# Patient Record
Sex: MALE | Race: WHITE | NOT HISPANIC OR LATINO | Employment: OTHER | URBAN - METROPOLITAN AREA
[De-identification: names, ages, dates, MRNs, and addresses within clinical notes are randomized per-mention and may not be internally consistent; named-entity substitution may affect disease eponyms.]

---

## 2018-03-08 ENCOUNTER — OFFICE VISIT CONVERTED (OUTPATIENT)
Dept: FAMILY MEDICINE CLINIC | Facility: CLINIC | Age: 83
End: 2018-03-08
Attending: FAMILY MEDICINE

## 2018-03-08 ENCOUNTER — CONVERSION ENCOUNTER (OUTPATIENT)
Dept: FAMILY MEDICINE CLINIC | Facility: CLINIC | Age: 83
End: 2018-03-08

## 2018-03-15 ENCOUNTER — CONVERSION ENCOUNTER (OUTPATIENT)
Dept: FAMILY MEDICINE CLINIC | Facility: CLINIC | Age: 83
End: 2018-03-15

## 2018-03-15 ENCOUNTER — OFFICE VISIT CONVERTED (OUTPATIENT)
Dept: FAMILY MEDICINE CLINIC | Facility: CLINIC | Age: 83
End: 2018-03-15
Attending: FAMILY MEDICINE

## 2018-07-24 ENCOUNTER — OFFICE VISIT CONVERTED (OUTPATIENT)
Dept: FAMILY MEDICINE CLINIC | Facility: CLINIC | Age: 83
End: 2018-07-24
Attending: FAMILY MEDICINE

## 2019-02-06 ENCOUNTER — OFFICE VISIT CONVERTED (OUTPATIENT)
Dept: FAMILY MEDICINE CLINIC | Facility: CLINIC | Age: 84
End: 2019-02-06
Attending: FAMILY MEDICINE

## 2019-03-14 ENCOUNTER — OFFICE VISIT CONVERTED (OUTPATIENT)
Dept: FAMILY MEDICINE CLINIC | Facility: CLINIC | Age: 84
End: 2019-03-14
Attending: FAMILY MEDICINE

## 2019-03-14 ENCOUNTER — CONVERSION ENCOUNTER (OUTPATIENT)
Dept: FAMILY MEDICINE CLINIC | Facility: CLINIC | Age: 84
End: 2019-03-14

## 2019-03-18 ENCOUNTER — HOSPITAL ENCOUNTER (OUTPATIENT)
Dept: GENERAL RADIOLOGY | Facility: HOSPITAL | Age: 84
Discharge: HOME | End: 2019-03-18
Attending: FAMILY MEDICINE

## 2019-03-21 ENCOUNTER — HOSPITAL ENCOUNTER (OUTPATIENT)
Dept: SURGERY | Facility: CLINIC | Age: 84
Discharge: HOME | End: 2019-03-21

## 2019-03-21 ENCOUNTER — CONVERSION ENCOUNTER (OUTPATIENT)
Dept: SURGERY | Facility: CLINIC | Age: 84
End: 2019-03-21

## 2019-03-21 ENCOUNTER — OFFICE VISIT CONVERTED (OUTPATIENT)
Dept: SURGERY | Facility: CLINIC | Age: 84
End: 2019-03-21
Attending: UROLOGY

## 2019-03-22 ENCOUNTER — HOSPITAL ENCOUNTER (OUTPATIENT)
Dept: OTHER | Facility: HOSPITAL | Age: 84
Discharge: HOME | End: 2019-03-22

## 2019-03-22 LAB
ANION GAP SERPL CALC-SCNC: 14 MMOL/L (ref 8–19)
BASOPHILS # BLD AUTO: 0.03 10*3/UL (ref 0–0.2)
BASOPHILS NFR BLD AUTO: 0.3 % (ref 0–3)
BUN SERPL-MCNC: 16 MG/DL (ref 5–25)
BUN/CREAT SERPL: 14 {RATIO} (ref 6–20)
CALCIUM SERPL-MCNC: 8.7 MG/DL (ref 8.7–10.4)
CHLORIDE SERPL-SCNC: 103 MMOL/L (ref 99–111)
CONV ABS IMM GRAN: 0.03 10*3/UL (ref 0–0.2)
CONV CO2: 29 MMOL/L (ref 22–32)
CONV IMMATURE GRAN: 0.3 % (ref 0–1.8)
CREAT UR-MCNC: 1.16 MG/DL (ref 0.7–1.2)
DEPRECATED RDW RBC AUTO: 45.1 FL (ref 35.1–43.9)
EOSINOPHIL # BLD AUTO: 0.09 10*3/UL (ref 0–0.7)
EOSINOPHIL # BLD AUTO: 1 % (ref 0–7)
ERYTHROCYTE [DISTWIDTH] IN BLOOD BY AUTOMATED COUNT: 12.4 % (ref 11.6–14.4)
GFR SERPLBLD BASED ON 1.73 SQ M-ARVRAT: 58 ML/MIN/{1.73_M2}
GLUCOSE SERPL-MCNC: 83 MG/DL (ref 70–99)
HBA1C MFR BLD: 14.9 G/DL (ref 14–18)
HCT VFR BLD AUTO: 46.6 % (ref 42–52)
LYMPHOCYTES # BLD AUTO: 2.91 10*3/UL (ref 1–5)
MCH RBC QN AUTO: 31.8 PG (ref 27–31)
MCHC RBC AUTO-ENTMCNC: 32 G/DL (ref 33–37)
MCV RBC AUTO: 99.6 FL (ref 80–96)
MONOCYTES # BLD AUTO: 1.01 10*3/UL (ref 0.2–1.2)
MONOCYTES NFR BLD AUTO: 11.4 % (ref 3–10)
NEUTROPHILS # BLD AUTO: 4.78 10*3/UL (ref 2–8)
NEUTROPHILS NFR BLD AUTO: 54.1 % (ref 30–85)
NRBC CBCN: 0 % (ref 0–0.7)
OSMOLALITY SERPL CALC.SUM OF ELEC: 294 MOSM/KG (ref 273–304)
PLATELET # BLD AUTO: 288 10*3/UL (ref 130–400)
PMV BLD AUTO: 9.3 FL (ref 9.4–12.4)
POTASSIUM SERPL-SCNC: 4.4 MMOL/L (ref 3.5–5.3)
RBC # BLD AUTO: 4.68 10*6/UL (ref 4.7–6.1)
SODIUM SERPL-SCNC: 142 MMOL/L (ref 135–147)
VARIANT LYMPHS NFR BLD MANUAL: 32.9 % (ref 20–45)
WBC # BLD AUTO: 8.85 10*3/UL (ref 4.8–10.8)

## 2019-03-23 LAB — BACTERIA UR CULT: NORMAL

## 2019-03-24 LAB — BACTERIA UR CULT: NORMAL

## 2019-03-27 ENCOUNTER — HOSPITAL ENCOUNTER (OUTPATIENT)
Dept: PERIOP | Facility: HOSPITAL | Age: 84
Setting detail: HOSPITAL OUTPATIENT SURGERY
Discharge: HOME | End: 2019-03-27

## 2019-04-02 LAB
COLOR STONE: NORMAL
COMPN STONE: NORMAL
CONV CA OXALATE DIHYDRATE: 20 %
CONV CA OXALATE MONOHYDRATE: 75 %
CONV CALCIUM PHOSPHATE: 5 %
CONV CALCULI COMMENT: NORMAL
CONV CALCULI DISCLAIMER: NORMAL
CONV CALCULI NOTE: NORMAL
NIDUS STONE QL: NORMAL
SIZE STONE: NORMAL MM
WT STONE: 13.3 MG

## 2019-04-09 ENCOUNTER — PROCEDURE VISIT CONVERTED (OUTPATIENT)
Dept: SURGERY | Facility: CLINIC | Age: 84
End: 2019-04-09
Attending: UROLOGY

## 2019-05-07 ENCOUNTER — OFFICE VISIT CONVERTED (OUTPATIENT)
Dept: SURGERY | Facility: CLINIC | Age: 84
End: 2019-05-07
Attending: UROLOGY

## 2019-06-20 ENCOUNTER — OFFICE VISIT CONVERTED (OUTPATIENT)
Dept: FAMILY MEDICINE CLINIC | Facility: CLINIC | Age: 84
End: 2019-06-20
Attending: FAMILY MEDICINE

## 2019-08-08 ENCOUNTER — CONVERSION ENCOUNTER (OUTPATIENT)
Dept: FAMILY MEDICINE CLINIC | Facility: CLINIC | Age: 84
End: 2019-08-08

## 2019-08-08 ENCOUNTER — OFFICE VISIT CONVERTED (OUTPATIENT)
Dept: FAMILY MEDICINE CLINIC | Facility: CLINIC | Age: 84
End: 2019-08-08
Attending: FAMILY MEDICINE

## 2019-08-08 ENCOUNTER — HOSPITAL ENCOUNTER (OUTPATIENT)
Dept: FAMILY MEDICINE CLINIC | Facility: CLINIC | Age: 84
Discharge: HOME | End: 2019-08-08
Attending: FAMILY MEDICINE

## 2019-08-09 LAB
CHOLEST SERPL-MCNC: 185 MG/DL (ref 107–200)
CHOLEST/HDLC SERPL: 2.7 {RATIO} (ref 3–6)
HDLC SERPL-MCNC: 69 MG/DL (ref 40–60)
LDLC SERPL CALC-MCNC: 91 MG/DL (ref 70–100)
TRIGL SERPL-MCNC: 123 MG/DL (ref 40–150)
VLDLC SERPL-MCNC: 25 MG/DL (ref 5–37)

## 2019-10-31 ENCOUNTER — CONVERSION ENCOUNTER (OUTPATIENT)
Dept: FAMILY MEDICINE CLINIC | Facility: CLINIC | Age: 84
End: 2019-10-31

## 2019-10-31 ENCOUNTER — OFFICE VISIT CONVERTED (OUTPATIENT)
Dept: FAMILY MEDICINE CLINIC | Facility: CLINIC | Age: 84
End: 2019-10-31
Attending: FAMILY MEDICINE

## 2019-11-27 ENCOUNTER — HOSPITAL ENCOUNTER (OUTPATIENT)
Dept: GENERAL RADIOLOGY | Facility: HOSPITAL | Age: 84
Discharge: HOME | End: 2019-11-27
Attending: FAMILY MEDICINE

## 2019-11-27 ENCOUNTER — OFFICE VISIT CONVERTED (OUTPATIENT)
Dept: FAMILY MEDICINE CLINIC | Facility: CLINIC | Age: 84
End: 2019-11-27
Attending: FAMILY MEDICINE

## 2019-11-27 ENCOUNTER — CONVERSION ENCOUNTER (OUTPATIENT)
Dept: FAMILY MEDICINE CLINIC | Facility: CLINIC | Age: 84
End: 2019-11-27

## 2019-12-16 ENCOUNTER — CONVERSION ENCOUNTER (OUTPATIENT)
Dept: FAMILY MEDICINE CLINIC | Facility: CLINIC | Age: 84
End: 2019-12-16

## 2019-12-16 ENCOUNTER — OFFICE VISIT CONVERTED (OUTPATIENT)
Dept: FAMILY MEDICINE CLINIC | Facility: CLINIC | Age: 84
End: 2019-12-16
Attending: FAMILY MEDICINE

## 2020-05-21 ENCOUNTER — HOSPITAL ENCOUNTER (OUTPATIENT)
Dept: GENERAL RADIOLOGY | Facility: HOSPITAL | Age: 85
Discharge: HOME | End: 2020-05-21
Attending: FAMILY MEDICINE

## 2020-05-21 ENCOUNTER — OFFICE VISIT CONVERTED (OUTPATIENT)
Dept: FAMILY MEDICINE CLINIC | Facility: CLINIC | Age: 85
End: 2020-05-21
Attending: FAMILY MEDICINE

## 2020-06-29 ENCOUNTER — OFFICE VISIT CONVERTED (OUTPATIENT)
Dept: FAMILY MEDICINE CLINIC | Facility: CLINIC | Age: 85
End: 2020-06-29
Attending: FAMILY MEDICINE

## 2020-07-30 ENCOUNTER — OFFICE VISIT CONVERTED (OUTPATIENT)
Dept: FAMILY MEDICINE CLINIC | Facility: CLINIC | Age: 85
End: 2020-07-30
Attending: FAMILY MEDICINE

## 2020-08-31 ENCOUNTER — OFFICE VISIT CONVERTED (OUTPATIENT)
Dept: FAMILY MEDICINE CLINIC | Facility: CLINIC | Age: 85
End: 2020-08-31
Attending: FAMILY MEDICINE

## 2020-08-31 ENCOUNTER — CONVERSION ENCOUNTER (OUTPATIENT)
Dept: FAMILY MEDICINE CLINIC | Facility: CLINIC | Age: 85
End: 2020-08-31

## 2021-01-05 ENCOUNTER — OFFICE VISIT CONVERTED (OUTPATIENT)
Dept: FAMILY MEDICINE CLINIC | Facility: CLINIC | Age: 86
End: 2021-01-05
Attending: FAMILY MEDICINE

## 2021-01-05 ENCOUNTER — HOSPITAL ENCOUNTER (OUTPATIENT)
Dept: FAMILY MEDICINE CLINIC | Facility: CLINIC | Age: 86
Discharge: HOME | End: 2021-01-05
Attending: FAMILY MEDICINE

## 2021-01-05 LAB
ALBUMIN SERPL-MCNC: 4.2 G/DL (ref 3.5–5)
ALBUMIN/GLOB SERPL: 1.6 {RATIO} (ref 1.4–2.6)
ALP SERPL-CCNC: 72 U/L (ref 56–155)
ALT SERPL-CCNC: 11 U/L (ref 10–40)
ANION GAP SERPL CALC-SCNC: 14 MMOL/L (ref 8–19)
AST SERPL-CCNC: 23 U/L (ref 15–50)
BASOPHILS # BLD AUTO: 0.02 10*3/UL (ref 0–0.2)
BASOPHILS NFR BLD AUTO: 0.3 % (ref 0–3)
BILIRUB SERPL-MCNC: 0.68 MG/DL (ref 0.2–1.3)
BUN SERPL-MCNC: 18 MG/DL (ref 5–25)
BUN/CREAT SERPL: 16 {RATIO} (ref 6–20)
CALCIUM SERPL-MCNC: 9.2 MG/DL (ref 8.7–10.4)
CHLORIDE SERPL-SCNC: 107 MMOL/L (ref 99–111)
CHOLEST SERPL-MCNC: 160 MG/DL (ref 107–200)
CHOLEST/HDLC SERPL: 2 {RATIO} (ref 3–6)
CONV ABS IMM GRAN: 0.01 10*3/UL (ref 0–0.2)
CONV CO2: 27 MMOL/L (ref 22–32)
CONV IMMATURE GRAN: 0.2 % (ref 0–1.8)
CONV TOTAL PROTEIN: 6.8 G/DL (ref 6.3–8.2)
CREAT UR-MCNC: 1.15 MG/DL (ref 0.7–1.2)
DEPRECATED RDW RBC AUTO: 44.3 FL (ref 35.1–43.9)
EOSINOPHIL # BLD AUTO: 0.04 10*3/UL (ref 0–0.7)
EOSINOPHIL # BLD AUTO: 0.6 % (ref 0–7)
ERYTHROCYTE [DISTWIDTH] IN BLOOD BY AUTOMATED COUNT: 12.3 % (ref 11.6–14.4)
GFR SERPLBLD BASED ON 1.73 SQ M-ARVRAT: 58 ML/MIN/{1.73_M2}
GLOBULIN UR ELPH-MCNC: 2.6 G/DL (ref 2–3.5)
GLUCOSE SERPL-MCNC: 90 MG/DL (ref 70–99)
HCT VFR BLD AUTO: 45.9 % (ref 42–52)
HDLC SERPL-MCNC: 82 MG/DL (ref 40–60)
HGB BLD-MCNC: 14.6 G/DL (ref 14–18)
LDLC SERPL CALC-MCNC: 64 MG/DL (ref 70–100)
LYMPHOCYTES # BLD AUTO: 1.11 10*3/UL (ref 1–5)
LYMPHOCYTES NFR BLD AUTO: 17.5 % (ref 20–45)
MCH RBC QN AUTO: 31.4 PG (ref 27–31)
MCHC RBC AUTO-ENTMCNC: 31.8 G/DL (ref 33–37)
MCV RBC AUTO: 98.7 FL (ref 80–96)
MONOCYTES # BLD AUTO: 0.79 10*3/UL (ref 0.2–1.2)
MONOCYTES NFR BLD AUTO: 12.5 % (ref 3–10)
NEUTROPHILS # BLD AUTO: 4.36 10*3/UL (ref 2–8)
NEUTROPHILS NFR BLD AUTO: 68.9 % (ref 30–85)
NRBC CBCN: 0 % (ref 0–0.7)
OSMOLALITY SERPL CALC.SUM OF ELEC: 297 MOSM/KG (ref 273–304)
PLATELET # BLD AUTO: 243 10*3/UL (ref 130–400)
PMV BLD AUTO: 9.3 FL (ref 9.4–12.4)
POTASSIUM SERPL-SCNC: 4.6 MMOL/L (ref 3.5–5.3)
RBC # BLD AUTO: 4.65 10*6/UL (ref 4.7–6.1)
SODIUM SERPL-SCNC: 143 MMOL/L (ref 135–147)
TRIGL SERPL-MCNC: 68 MG/DL (ref 40–150)
TSH SERPL-ACNC: 1.03 M[IU]/L (ref 0.27–4.2)
VLDLC SERPL-MCNC: 14 MG/DL (ref 5–37)
WBC # BLD AUTO: 6.33 10*3/UL (ref 4.8–10.8)

## 2021-05-13 NOTE — PROGRESS NOTES
Progress Note      Patient Name: Enrique Wylie   Patient ID: [id number]   Sex: Male   YOB: 1935    Primary Care Provider: Delmer Bunch III MD   Referring Provider: Delmer Bunch III MD    Visit Date: August 31, 2020    Provider: Delmer Bunch III MD   Location: [facility]   Location Address: [address] [place] [address]  [id number]   Location Phone: [phone]          Chief Complaint  · 1 month follow up, grieving his wife, weight loss and unsteady gait      History Of Present Illness  Enrique Wylie is a 85 year old /White male who presents for evaluation and treatment of: here today for follow up weight loss and unsteady gait.      HPI     patient is 85-year-old has some gait disturbance and mild Parkinson's on takes carbidopa levodopa.  No history of falls states he is doing better well at home.  Estimate his bathroom says he is doing fine has no problem with the bathroom.  States his appetite is okay discussed assisted living.    Review of systems     cardiovascular no chest pain no palpitations  Respiratory no shortness of breath dyspnea on exertion  Neurologic no falls.  States he is moving about the same.    PE    Blood pressure 118/70 weight is 180 no weight gain or loss pulse ox 98 heart rate 62 temperature 98.2  General no distress  Neurologic slow shuffling gait.  Mentation is normal speech is normal  Cardiovascular regular rhythm no murmur  Respiratory no increased work of breathing lungs clear and equal bilaterally no rales rhonchi or wheezes     assessment     patient stable wants to live at home    Plan     recheck 4 months.    Flu shot in October coronavirus vaccine first part of the year       Past Medical History  Disease Name Date Onset Notes   Advance directive in chart 05/21/2020 --    Back Pain  --  --    Back Pain  05/14/2014 --    Cervical spinal stenosis 02/02/2016 Previous surgery for myelopathy.   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-6 with edema in the cord at C4-5   Death of wife 07/30/2020 --    Fall at home, initial encounter 05/21/2020 --    Gait instability 02/06/2019 --    Grief reaction 07/30/2020 --    High cholesterol --  --    Lumbar Spinal Stenosis 03/12/2015 Worsening leg symptoms   Lumbar Spinal Stenosis 02/02/2016 --    Medication management 02/06/2019 --    Restless leg syndrome 02/06/2019 --    Weight loss, unintentional 07/30/2020 --          Past Surgical History  Procedure Name Date Notes   Arthroscopic knee surgery, right --  --    Lumbar laminectomy 2/10/2016 L3-4   Repair of right rotator cuff --  --          Medication List  Name Date Started Instructions   atorvastatin 10 mg oral tablet 08/08/2019 take 1 tablet (10 mg) by oral route once daily for 90 days   carbidopa-levodopa  mg oral tablet 06/08/2020 take 1 tablet twice a day for 90 days   ChlorTabs 4 mg oral tablet  take 1 tablet (4 mg) by oral route every 6 hours as needed   diphenhydramine HCl 25 mg oral capsule  take 1 capsule by oral route once a day (at bedtime)   Glucosamine 500 mg Oral tablet  take 1 tablet by oral route 3 times a day   hydrocortisone 2.5 % topical ointment 06/29/2020 apply a thin layer to the affected area(s) by topical route 2 times per day D: 454 gram jar   multivitamin oral capsule  take 1 capsule by oral route daily   Remeron 15 mg oral tablet 07/30/2020 take 1 tablet (15 mg) by oral route once daily before bedtime for 30 days   Tylenol Arthritis Pain 650 mg Oral tablet extended release  take 2 tablets (1,300 mg) by oral route every 8 hours as needed swallowing whole with water. Do not break, crush, dissolve and/or chew.         Allergy List  Allergen Name Date Reaction Notes   NO KNOWN DRUG ALLERGIES --  --  --          Family Medical History  Disease Name Relative/Age Notes   Congestive Heart Failure Father/  Mother/   --          Social History  Finding Status Start/Stop Quantity Notes  "  Active but no formal exercise --  --/-- --  --    Advance Care Plan/Surrogate Decision Maker scanned into EMR --  --/-- --  --    Advance directive declined by patient --  --/-- --  --    Alcohol Never --/-- --  07/19/2017 -    Tobacco Never --/-- --  --          Immunizations  NameDate Admin Mfg Trade Name Lot Number Route Inj VIS Given VIS Publication   Hddbcsqvz70/01/2019 SKB Fluarix, quadrivalent, preservative free [id number] NE NE 10/01/2019    Comments: [facility]   Fmtcutmse0980/01/2009 NE PNEUMOVAX 23  NE NE     Comments:    Prevnar 1307/19/2017 UNK PREVNAR 13 [id number] IM LD 07/19/2017 11/05/2015   Comments: [facility] pt tolerated shot well   Lvioedil66/10/2018 NE Shingrix  NE NE     Comments: pharmacy   Kunzqmdm68/27/2018 NE Shingrix  NE NE     Comments: pharmacy         Review of Systems  · Constitutional  o * See HPI  · Eyes  o * See HPI  · HENT  o * See HPI  · Breasts  o * See HPI  · Cardiovascular  o * See HPI  · Respiratory  o * See HPI  · Gastrointestinal  o * See HPI  · Genitourinary  o * See HPI  · Integument  o * See HPI  · Neurologic  o * See HPI  · Musculoskeletal  o * See HPI  · Endocrine  o * See HPI  · Psychiatric  o * See HPI  · Heme-Lymph  o * See HPI  · Allergic-Immunologic  o * See HPI      Vitals  Date Time BP Position Site L\R Cuff Size HR RR TEMP (F) WT  HT  BMI kg/m2 BSA m2 O2 Sat        08/31/2020 10:16 /70 Sitting    62 - R  98.2 180lbs 0oz 6'  1\" 23.75 2.05 98 %                  Assessment  · Screening for depression     V79.0/Z13.89  · Death of wife     V62.82/Z63.4  · Gait instability     781.2/R26.81  · Medication management     V58.69/Z79.899  · Restless leg syndrome     333.94/G25.81    Problems Reconciled  Plan  · Orders  o ACO-39: Current medications updated and reviewed () - - 08/31/2020  o ACO-18: Negative screen for clinical depression using a standardized tool () - - 08/31/2020  o ACO-13: Fall Risk Screening with no falls in past year or only one " fall without injury in the past year (1101F) - - 08/31/2020  · Medications  o Medications have been Reconciled  o Transition of Care or Provider Policy  · Instructions  o Depression Screen completed and scanned into the EMR under the designated folder within the patient's documents.  o Today's PHQ-9 result is __0_  o The provider screening met the required time of 15 minutes.  o Patient is taking medications as prescribed and doing well.   o Take all medications as prescribed/directed.  o Patient instructed/educated on their diet and exercise program.  o Patient was educated/instructed on their diagnosis, treatment and medications prior to discharge from the clinic today.  o Bring all medicines with their bottles to each office visit.  o Time spent with the patient was 20 minutes, more than 50% face to face.  o Chronic conditions reviewed and taken into consideration for today's treatment plan.  o Discussed Covid-19 precautions including, but not limited to, social distancing, avoid touching your face, and hand washing.             Electronically Signed by: Faby Talley, -Author on August 31, 2020 01:51:02 PM  Electronically Co-signed by: Delmer Bunch III MD -Reviewer on September 29, 2020 05:37:39 PM

## 2021-05-13 NOTE — PROGRESS NOTES
Progress Note      Patient Name: Enrique Wylie   Patient ID: [id number]   Sex: Male   YOB: 1935    Primary Care Provider: Delmer Bunch III MD   Referring Provider: Delmer Bunch III MD    Visit Date: July 30, 2020    Provider: Delmer Bunch III MD   Location: [facility]   Location Address: [address] [place] [address]  [id number]   Location Phone: [phone]          Chief Complaint  · follow up weight loss, grieving death of wife      History Of Present Illness  Enrique Wylie is a 85 year old /White male who presents for evaluation and treatment of: here today for follow up weight loss, he has lost 4 more pounds since last visit. Pt states he only took escitalopram 10mg 1/2 tab but he stopped it because he was having increased urination. Pt states that he thinks he feels better.      HPI     Mr. Wylie is 85 year old this lost his wife of over 40 years.  Did not take the antidepressant because he said it made him urinate we will try some Remeron 15.  He says he will take it in the stroke well.  Discussed moving and he needs to move his difficulty walking he will be able to walk for a fall.    Review of systems     cardiovascular chest pain no palpitations  Respiratory no shortness of breath dyspnea exertion.  Patient is lost 4 pounds is eating reasonably well    Physical exam     weight is 180 this is a 4 pound weight loss blood pressure 120/70 temperature 97.8  General does not appear to be any distress is noted.  A bit of spasticity.  Cardiovascular regular rhythm no murmur  Respiratory no increased work of breathing lungs clinical bilaterally no wheezes no rales no rhonchi  Psych patient does not appear to be especially anxious but there is a general sadness to his countenance.      Assessment     grief seen, loss of his wife   he needs to walk more or he will not be able to stay at home.    Plan     Remeron 15 mg recheck in 1 month                  .        Past Medical History  Disease Name Date Onset Notes   Advance directive in chart 05/21/2020 --    Back Pain  --  --    Back Pain  05/14/2014 --    Cervical spinal stenosis 02/02/2016 Previous surgery for myelopathy.   Cervical spondylosis with myelopathy 12/18/2012 C4-5 and C5-6 with edema in the cord at C4-5   Death of wife 07/30/2020 --    Fall at home, initial encounter 05/21/2020 --    Gait instability 02/06/2019 --    Grief reaction 07/30/2020 --    High cholesterol --  --    Lumbar Spinal Stenosis 03/12/2015 Worsening leg symptoms   Lumbar Spinal Stenosis 02/02/2016 --    Medication management 02/06/2019 --    Restless leg syndrome 02/06/2019 --    Weight loss, unintentional 07/30/2020 --          Past Surgical History  Procedure Name Date Notes   Arthroscopic knee surgery, right --  --    Lumbar laminectomy 2/10/2016 L3-4   Repair of right rotator cuff --  --          Medication List  Name Date Started Instructions   atorvastatin 10 mg oral tablet 08/08/2019 take 1 tablet (10 mg) by oral route once daily for 90 days   carbidopa-levodopa  mg oral tablet 06/08/2020 take 1 tablet twice a day for 90 days   ChlorTabs 4 mg oral tablet  take 1 tablet (4 mg) by oral route every 6 hours as needed   diphenhydramine HCl 25 mg oral capsule  take 1 capsule by oral route once a day (at bedtime)   Glucosamine 500 mg Oral tablet  take 1 tablet by oral route 3 times a day   hydrocortisone 2.5 % topical ointment 06/29/2020 apply a thin layer to the affected area(s) by topical route 2 times per day D: 454 gram jar   multivitamin oral capsule  take 1 capsule by oral route daily   Tylenol Arthritis Pain 650 mg Oral tablet extended release  take 2 tablets (1,300 mg) by oral route every 8 hours as needed swallowing whole with water. Do not break, crush, dissolve and/or chew.         Allergy List  Allergen Name Date Reaction Notes   NO KNOWN DRUG ALLERGIES --  --  --        Allergies Reconciled  Family Medical  "History  Disease Name Relative/Age Notes   Congestive Heart Failure Father/  Mother/   --          Social History  Finding Status Start/Stop Quantity Notes   Active but no formal exercise --  --/-- --  --    Advance Care Plan/Surrogate Decision Maker scanned into EMR --  --/-- --  --    Advance directive declined by patient --  --/-- --  --    Alcohol Never --/-- --  07/19/2017 -    Tobacco Never --/-- --  --          Immunizations  NameDate Admin Mfg Trade Name Lot Number Route Inj VIS Given VIS Publication   Nrkkkeitl68/01/2019 SKB Fluarix, quadrivalent, preservative free [id number] NE NE 10/01/2019    Comments: [facility]   Tkuvfqhdt4697/01/2009 NE PNEUMOVAX 23  NE NE     Comments:    Prevnar 1307/19/2017 UNK PREVNAR 13 [id number] IM LD 07/19/2017 11/05/2015   Comments: mf:[facility] pt tolerated shot well   Ndnwxmkm55/10/2018 NE Sh[facility]rix  NE NE     Comments: pharmacy   Tkrnbngo16/27/2018 NE [facility]  NE NE     Comments: pharmacy         Review of Systems  · Constitutional  o * See HPI  · Eyes  o * See HPI  · HENT  o * See HPI  · Breasts  o * See HPI  · Cardiovascular  o * See HPI  · Respiratory  o * See HPI  · Gastrointestinal  o * See HPI  · Genitourinary  o * See HPI  · Integument  o * See HPI  · Neurologic  o * See HPI  · Musculoskeletal  o * See HPI  · Endocrine  o * See HPI  · Psychiatric  o * See HPI  · Heme-Lymph  o * See HPI  · Allergic-Immunologic  o * See HPI      Vitals  Date Time BP Position Site L\R Cuff Size HR RR TEMP (F) WT  HT  BMI kg/m2 BSA m2 O2 Sat        07/30/2020 10:52 /70 Sitting    81 - R  97.8 180lbs 0oz 6'  1\" 23.75 2.05 99 %                  Assessment  · Advance directive in chart     V49.89/Z78.9  · Gait instability     781.2/R26.81  · Medication management     V58.69/Z79.899  · Restless leg syndrome     333.94/G25.81  · High cholesterol     272.0/E78.0  · Grief reaction     309.0/F43.21  · Death of wife     V62.82/Z63.4  · Weight loss, unintentional     783.21/R63.4    Problems " Reconciled  Plan  · Orders  o ACO-39: Current medications updated and reviewed () - - 07/30/2020  · Medications  o Remeron 15 mg oral tablet   SIG: take 1 tablet (15 mg) by oral route once daily before bedtime for 30 days   DISP: (30) tablets with 5 refills  Prescribed on 07/30/2020     o escitalopram oxalate 10 mg oral tablet   SIG: take 1 tablet (10 mg) by oral route once daily for 30 days   DISP: (30) tablets with 5 refills  Discontinued on 07/30/2020     o Medications have been Reconciled  o Transition of Care or Provider Policy  · Instructions  o Patient is taking medications as prescribed and doing well.   o Take all medications as prescribed/directed.  o Patient instructed/educated on their diet and exercise program.  o Patient was educated/instructed on their diagnosis, treatment and medications prior to discharge from the clinic today.  o Bring all medicines with their bottles to each office visit.  o Time spent with the patient was 25 minutes, more than 50% face to face.  o Chronic conditions reviewed and taken into consideration for today's treatment plan.  o Discussed Covid-19 precautions including, but not limited to, social distancing, avoid touching your face, and hand washing.             Electronically Signed by: Faby Talley, -Author on July 30, 2020 12:51:54 PM  Electronically Co-signed by: Delmer Bunch III MD -Reviewer on July 30, 2020 01:50:38 PM

## 2021-05-13 NOTE — PROGRESS NOTES
Progress Note      Patient Name: Enrique Wylie   Patient ID: [id number]   Sex: Male   YOB: 1935    Primary Care Provider: Delmer Bunch III MD   Referring Provider: Delmer Bunch III MD    Visit Date: May 21, 2020    Provider: Delmer Bunch III MD   Location: [facility]   Location Address: [address] [place] [address]  [id number]   Location Phone: [phone]          Chief Complaint  · fell and landed on right knee 1 week ago, having knee pain      History Of Present Illness  Enrique Wylie is a 84 year old /White male who presents for evaluation and treatment of:      HPI     patient is a 84-year-old white male somewhat unstable gait, he fell and has pain above the right knee, he is  able to walk. Pt states he was on steep ground when he did.  States not needing any extra pain medicine for it. Pt  Is already taking Tylenol arthritis 2 tablets every 8 hours.    Review of systems     psych patient is wife of 40 years suggest that he is lost about 11 pounds.  Has a desultory forgetting also explained that it was congestive heart failure which was.  He stated he was sorry that he had a come and get her from the house.    Physical exam     weight 184 this is a 11 pound weight loss,  blood pressure 120/62,  temperature 98  General patient appears stable  Respiratory No increased work of breathing, lungs clinical bilaterally, no rales rhonchi or wheezes  Cardiovascular regular rhythm.  no murmur  Musculoskeletal slight tenderness about the right knee.  More medial than lateral.  No effusion in the knee.      Assessment     #1 contusion knee   #2 grief reaction    plan     x-ray right knee  weight loss,  recheck in 2 months       Past Medical History  Disease Name Date Onset Notes   Advance directive in chart 05/21/2020 --    Back Pain  --  --    Back Pain  05/14/2014 --    Cervical spinal stenosis 02/02/2016 Previous surgery for myelopathy.   Cervical spondylosis with  myelopathy 12/18/2012 C4-5 and C5-6 with edema in the cord at C4-5   Fall at home, initial encounter 05/21/2020 --    Gait instability 02/06/2019 --    High cholesterol --  --    Lumbar Spinal Stenosis 03/12/2015 Worsening leg symptoms   Lumbar Spinal Stenosis 02/02/2016 --    Medication management 02/06/2019 --    Restless leg syndrome 02/06/2019 --          Past Surgical History  Procedure Name Date Notes   Arthroscopic knee surgery, right --  --    Lumbar laminectomy 2/10/2016 L3-4   Repair of right rotator cuff --  --          Medication List  Name Date Started Instructions   atorvastatin 10 mg oral tablet 08/08/2019 take 1 tablet (10 mg) by oral route once daily for 90 days   carbidopa-levodopa  mg oral tablet 08/08/2019 take 1 tablet by oral route one hour QHS for 90 days   ChlorTabs 4 mg oral tablet  take 1 tablet (4 mg) by oral route every 6 hours as needed   diphenhydramine HCl 25 mg oral capsule  take 1 capsule by oral route once a day (at bedtime)   Glucosamine 500 mg Oral tablet  take 1 tablet by oral route 3 times a day   hydrocortisone 2.5 % topical lotion 01/26/2015 apply a thin layer to the affected area(s) by topical route once daily   multivitamin oral capsule  take 1 capsule by oral route daily   Tylenol Arthritis Pain 650 mg Oral tablet extended release  take 2 tablets (1,300 mg) by oral route every 8 hours as needed swallowing whole with water. Do not break, crush, dissolve and/or chew.         Allergy List  Allergen Name Date Reaction Notes   NO KNOWN DRUG ALLERGIES --  --  --        Allergies Reconciled  Family Medical History  Disease Name Relative/Age Notes   Congestive Heart Failure Father/  Mother/   --          Social History  Finding Status Start/Stop Quantity Notes   Active but no formal exercise --  --/-- --  --    Advance directive declined by patient --  --/-- --  --    Alcohol Never --/-- --  07/19/2017 -    Tobacco Never --/-- --  --          Immunizations  NameDate Admin [facility]  "Trade Name Lot Number Route Inj VIS Given VIS Publication   Szfarsswy24/01/2019 SKB Fluarix, quadrivalent, preservative free [id number] NE NE 10/01/2019    Comments: [facility]   Pndsuxcym2750/01/2009 NE PNEUMOVAX 23  NE NE     Comments:    Prevnar 1307/19/2017 UNK PREVNAR 13 [id number] IM LD 07/19/2017 11/05/2015   Comments: mfg:[facility] pt tolerated shot well   Zdtghzum44/10/2018 NE [facility]  NE NE     Comments: pharmacy   Vcdapgnc57/27/2018 NE [facility]  NE NE     Comments: pharmacy         Vitals  Date Time BP Position Site L\R Cuff Size HR RR TEMP (F) WT  HT  BMI kg/m2 BSA m2 O2 Sat HC       05/21/2020 11:50 /62 Sitting      90 184lbs 0oz 6'  1\" 24.28 2.07               Assessment  · Gait instability     781.2/R26.81  · Right knee pain     719.46/M25.561  · Fall at home, initial encounter       Unspecified fall, initial encounter     E888.9/W19.XXXA  Unspecified place in unspecified non-institutional (private) residence as the place of occurrence of the external cause     E888.9/Y92.009  · Advance directive in chart     V49.89/Z78.9    Problems Reconciled  Plan  · Orders  o ACO - Advance Care Plan or Surrogate Decision Maker documented in EMR (1123F) - - 05/21/2020  o ACO-39: Current medications updated and reviewed () - - 05/21/2020  o ACO-13: Fall Risk Screening with 2 or more falls in past year or any fall with injury in the past year (1100F) - - 05/21/2020  o Knee (Right) 3 views X-Ray [facility] ([id number]) - 719.46/M25.561, E888.9/Y92.009, E888.9/W19.XXXA, 781.2/R26.81 - 05/21/2020   call to dr Bunch [phone]  · Medications  o Medications have been Reconciled  o Transition of Care or Provider Policy  · Instructions  o Patient is taking medications as prescribed and doing well.   o Take all medications as prescribed/directed.  o Patient instructed/educated on their diet and exercise program.  o Patient was educated/instructed on their diagnosis, treatment and medications prior to " discharge from the clinic today.  o Bring all medicines with their bottles to each office visit.  o Time spent with the patient was 20 minutes, more than 50% face to face.  o Chronic conditions reviewed and taken into consideration for today's treatment plan.  o Discussed Covid-19 precautions including, but not limited to, social distancing, avoid touching your face, and hand washing.             Electronically Signed by: Faby Talley, -Author on May 21, 2020 01:08:41 PM  Electronically Co-signed by: Delmer Bunch III MD -Reviewer on May 21, 2020 01:41:48 PM

## 2021-05-13 NOTE — PROGRESS NOTES
Progress Note      Patient Name: Enrique Wylie   Patient ID: [id number]   Sex: Male   YOB: 1935    Primary Care Provider: Delmer Bunch III MD   Referring Provider: Delmer Bunch III MD    Visit Date: 2020    Provider: Delmer Bunch III MD   Location: [facility]   Location Address: [address] [place] [address]  [id number]   Location Phone: [phone]          Chief Complaint  · check sores on right buttocks  · grieving the death of his wife, crying alot      History Of Present Illness  Enrique Wylie is a 85 year old /White male who presents for evaluation and treatment of:      HPI    patient is a 85-year-old white male who is wife just  about a month ago.  His been crying more and more sad.  Hehas a place on his bottom, due to pressure.  Has not been moving around much, discussed sitting and discussed not sitting, discussed either lying down or standing is better and to get off off his buttocks when he was sitting on one side or the other.  This has a stage I decubitus ulcer on his right buttock.    Review of systems     psych patient has been more sad crying or feeling chronic at bedtime.  Respiratory no shortness of breath dyspnea exertion  Cardiovascular chest chest pain.  No shortness of breath.      Physical exam     weight is 184, no weight gain or loss.  Pulse ox 98, blood pressure 110/60, temperature 99.4, heart rate 76  General patient appears sad.    Cardiovascular regular rhythm no murmur respiratory no increased work of breathing lungs clinical bilaterally no wheezes no rales or rhonchi   skin patient has a stage I soreness more on the right than the left.  But on both sides.      Assessment     depression grief reaction, start escitalopram   No. 2 is patient is to get out of the house.    Pressure sore buttock stage between 1 and 2, needs to use hydrocortisone ointment and stay off of the it.    Plan     hydrocortisone ointment to 20%  twice daily stay off the the sore,  left sitting.    Start escitalopram 10 mg       Past Medical History  Disease Name Date Onset Notes   Advance directive in chart 05/21/2020 --    Back Pain  --  --    Back Pain  05/14/2014 --    Cervical spinal stenosis 02/02/2016 Previous surgery for myelopathy.   Cervical spondylosis with myelopathy 12/18/2012 C4-5 and C5-6 with edema in the cord at C4-5   Fall at home, initial encounter 05/21/2020 --    Gait instability 02/06/2019 --    High cholesterol --  --    Lumbar Spinal Stenosis 03/12/2015 Worsening leg symptoms   Lumbar Spinal Stenosis 02/02/2016 --    Medication management 02/06/2019 --    Restless leg syndrome 02/06/2019 --          Past Surgical History  Procedure Name Date Notes   Arthroscopic knee surgery, right --  --    Lumbar laminectomy 2/10/2016 L3-4   Repair of right rotator cuff --  --          Medication List  Name Date Started Instructions   atorvastatin 10 mg oral tablet 08/08/2019 take 1 tablet (10 mg) by oral route once daily for 90 days   carbidopa-levodopa  mg oral tablet 06/08/2020 take 1 tablet twice a day for 90 days   ChlorTabs 4 mg oral tablet  take 1 tablet (4 mg) by oral route every 6 hours as needed   diphenhydramine HCl 25 mg oral capsule  take 1 capsule by oral route once a day (at bedtime)   Glucosamine 500 mg Oral tablet  take 1 tablet by oral route 3 times a day   hydrocortisone 2.5 % topical ointment 06/29/2020 apply a thin layer to the affected area(s) by topical route 2 times per day D: 454 gram jar   multivitamin oral capsule  take 1 capsule by oral route daily   Tylenol Arthritis Pain 650 mg Oral tablet extended release  take 2 tablets (1,300 mg) by oral route every 8 hours as needed swallowing whole with water. Do not break, crush, dissolve and/or chew.         Allergy List  Allergen Name Date Reaction Notes   NO KNOWN DRUG ALLERGIES --  --  --          Family Medical History  Disease Name Relative/Age Notes   Congestive Heart  "Failure Father/  Mother/   --          Social History  Finding Status Start/Stop Quantity Notes   Active but no formal exercise --  --/-- --  --    Advance directive declined by patient --  --/-- --  --    Alcohol Never --/-- --  07/19/2017 -    Tobacco Never --/-- --  --          Immunizations  NameDate Admin Mfg Trade Name Lot Number Route Inj VIS Given VIS Publication   Zbuibxxul26/01/2019 SKB Fluarix, quadrivalent, preservative free [id number] NE NE 10/01/2019    Comments: [facility]   Nuiskspet1935/01/2009 NE PNEUMOVAX 23  NE NE     Comments:    Prevnar 1307/19/2017 UNK PREVNAR 13 [id number] IM LD 07/19/2017 11/05/2015   Comments: mfg:[facility] pt tolerated shot well   Hmexrxhs87/10/2018 NE Shingrix  NE NE     Comments: pharmacy   Gnazghja54/27/2018 NE Shingrix  NE NE     Comments: pharmacy         Vitals  Date Time BP Position Site L\R Cuff Size HR RR TEMP (F) WT  HT  BMI kg/m2 BSA m2 O2 Sat HC       06/29/2020 10:37 /60 Sitting    76 - R  99.4 184lbs 0oz 6'  1\" 24.28 2.07 98 %              Assessment  · Anxiety disorder     300.00/F41.9  · Depression     311/F32.9  · Fatigue     780.79/R53.83  · Advance directive in chart     V49.89/Z78.9  · Gait instability     781.2/R26.81  · Medication management     V58.69/Z79.899  · Grieving     309.0/F43.21  · Death of wife     V62.82/Z63.4  · Pressure sore on buttocks       Pressure ulcer of unspecified buttock, unspecified stage     707.05/L89.309    Problems Reconciled  Plan  · Orders  o ACO - Advance Care Plan or Surrogate Decision Maker documented in EMR (1123F) - - 06/29/2020  o ACO-39: Current medications updated and reviewed () - - 06/29/2020  · Medications  o escitalopram oxalate 10 mg oral tablet   SIG: take 1 tablet (10 mg) by oral route once daily for 30 days   DISP: (30) tablets with 5 refills  Prescribed on 06/29/2020     o hydrocortisone 2.5 % topical ointment   SIG: apply a thin layer to the affected area(s) by topical route 2 times per day D: 454 " gram jar   DISP: (1) 454 gm jar with 1 refills  Adjusted on 06/29/2020     o Medications have been Reconciled  o Transition of Care or Provider Policy  · Instructions  o Patient was given an SSRI/SSNRI medication and warned of possible side effects of the medication including potential for increased risk of suicidal thoughts and feelings. Patient was instructed that if they begin to exhibit any of these effects they will discontinue the medication immediately and contact our office or the ER ASAP.  o Patient was given an SSRI/SSNRI medication and warned of possible side effects of the medication including potential for increased risk of suicidal thoughts and feelings. Patient was instructed that if they begin to exhibit any of these effects they will discontinue the medication immediately and contact our office or the ER ASAP.  o Patient is taking medications as prescribed and doing well.   o Take all medications as prescribed/directed.  o Patient instructed/educated on their diet and exercise program.  o Patient was educated/instructed on their diagnosis, treatment and medications prior to discharge from the clinic today.  o Bring all medicines with their bottles to each office visit.  o Time spent with the patient was 20 minutes, more than 50% face to face.  o Chronic conditions reviewed and taken into consideration for today's treatment plan.  o Discussed Covid-19 precautions including, but not limited to, social distancing, avoid touching your face, and hand washing.             Electronically Signed by: Faby Talley, -Author on June 29, 2020 12:26:47 PM  Electronically Co-signed by: Delmer Bunch III MD -Reviewer on June 29, 2020 05:13:40 PM

## 2021-05-14 VITALS
BODY MASS INDEX: 23.86 KG/M2 | DIASTOLIC BLOOD PRESSURE: 70 MMHG | HEIGHT: 73 IN | OXYGEN SATURATION: 98 % | HEART RATE: 62 BPM | WEIGHT: 180 LBS | TEMPERATURE: 98.2 F | SYSTOLIC BLOOD PRESSURE: 118 MMHG

## 2021-05-14 VITALS
OXYGEN SATURATION: 98 % | BODY MASS INDEX: 23.99 KG/M2 | SYSTOLIC BLOOD PRESSURE: 110 MMHG | TEMPERATURE: 98.8 F | HEIGHT: 73 IN | WEIGHT: 181 LBS | DIASTOLIC BLOOD PRESSURE: 70 MMHG | HEART RATE: 102 BPM

## 2021-05-14 NOTE — PROGRESS NOTES
Progress Note      Patient Name: Enrique Wylie   Patient ID: [id number]   Sex: Male   YOB: 1935    Primary Care Provider: Delmer Bunch III MD   Referring Provider: Delmer Bunch III MD    Visit Date: January 5, 2021    Provider: Delmer Bunch III MD   Location: [facility]   Location Address: [address] [place] [address]  [id number]   Location Phone: [phone]          Chief Complaint  · 4 month general check up      History Of Present Illness  Enrique Wylie is a 85 year old /White male who presents for evaluation and treatment of: Here for 4 month follow up depression and weight loss. States he is doing well with the cane, no more falls. Had bad news over the holidays, brother in law committed suicide.      HPI    Patient 85 years old here for his regular visit.  Pt has gait instability on atorvastatin history of depression on Remeron.  Pt wife just passed away.     Review of systems     cardiovascular no chest pain no palpitations  Respiratory no shortness of breath no dyspnea exertion  Neuro no falls.  She is doing reasonably well not losing any more weight suggesting to his weight loss.  His wife's brother just killed himself.  He is crying a good bit about that.    Physical exam     pulse ox 98 heart rate 102 temperature 98.8 blood pressure 110/70 weight is 181 is a 1 pound weight gain  General no distress  Respiratory no increased work of breathing lungs clear and equal bilaterally no rales no rhonchi no wheezes neurologic mentation is normal speech is normal gait is as usual.  For him.     Assessment     #1 depression improving   #2 gait instability stable    Plan     recheck in 6 months       Past Medical History  Disease Name Date Onset Notes   Advance directive in chart 05/21/2020 --    Back Pain  --  --    Back Pain  05/14/2014 --    Cervical spinal stenosis 02/02/2016 Previous surgery for myelopathy.   Cervical spondylosis with  myelopathy 12/18/2012 C4-5 and C5-6 with edema in the cord at C4-5   Death of wife 07/30/2020 --    Fall at home, initial encounter 05/21/2020 --    Gait instability 02/06/2019 --    Grief reaction 07/30/2020 --    High cholesterol --  --    Lumbar Spinal Stenosis 03/12/2015 Worsening leg symptoms   Lumbar Spinal Stenosis 02/02/2016 --    Medication management 02/06/2019 --    Restless leg syndrome 02/06/2019 --    Weight loss, unintentional 07/30/2020 --          Past Surgical History  Procedure Name Date Notes   Arthroscopic knee surgery, right --  --    Lumbar laminectomy 2/10/2016 L3-4   Repair of right rotator cuff --  --          Medication List  Name Date Started Instructions   atorvastatin 10 mg oral tablet 01/05/2021 TAKE ONE TABLET BY MOUTH EVERY DAY   carbidopa-levodopa  mg oral tablet 01/05/2021 TAKE 1 TABLET BY MOUTH TWICE DAILY   ChlorTabs 4 mg oral tablet  take 1 tablet (4 mg) by oral route every 6 hours as needed   diphenhydramine HCl 25 mg oral capsule  take 1 capsule by oral route once a day (at bedtime)   Glucosamine 500 mg Oral tablet  take 1 tablet by oral route 3 times a day   hydrocortisone 2.5 % topical ointment 06/29/2020 apply a thin layer to the affected area(s) by topical route 2 times per day D: 454 gram jar   multivitamin oral capsule  take 1 capsule by oral route daily   Remeron 15 mg oral tablet 01/05/2021 take 1 tablet (15 mg) by oral route once daily before bedtime for 90 days   Tylenol Arthritis Pain 650 mg Oral tablet extended release  take 2 tablets (1,300 mg) by oral route every 8 hours as needed swallowing whole with water. Do not break, crush, dissolve and/or chew.         Allergy List  Allergen Name Date Reaction Notes   NO KNOWN DRUG ALLERGIES --  --  --        Allergies Reconciled  Family Medical History  Disease Name Relative/Age Notes   Congestive Heart Failure Father/  Mother/   --          Social History  Finding Status Start/Stop Quantity Notes   Active but no  "formal exercise --  --/-- --  --    Advance Care Plan/Surrogate Decision Maker scanned into EMR --  --/-- --  --    Advance directive declined by patient --  --/-- --  --    Alcohol Never --/-- --  07/19/2017 -    Tobacco Never --/-- --  --          Immunizations  NameDate Admin Mfg Trade Name Lot Number Route Inj VIS Given VIS Publication   Nuuwzerfv56/07/2020 SKB Fluarix, quadrivalent, preservative free [id number] NE NE 01/05/2021    Comments: pharmacy   Onjhjsnop2440/01/2009 NE PNEUMOVAX 23  NE NE     Comments:    Prevnar 1307/19/2017 UNK PREVNAR 13 [id number] IM LD 07/19/2017 11/05/2015   Comments: mfg:[facility] pt tolerated shot well   Agxuniim18/10/2018 NE Shingrix  NE NE     Comments: pharmacy   Kadflmjk64/27/2018 NE Shingrix  NE NE     Comments: pharmacy         Review of Systems  · Constitutional  o * See HPI  · Eyes  o * See HPI  · HENT  o * See HPI  · Breasts  o * See HPI  · Cardiovascular  o * See HPI  · Respiratory  o * See HPI  · Gastrointestinal  o * See HPI  · Genitourinary  o * See HPI  · Integument  o * See HPI  · Neurologic  o * See HPI  · Musculoskeletal  o * See HPI  · Endocrine  o * See HPI  · Psychiatric  o * See HPI  · Heme-Lymph  o * See HPI  · Allergic-Immunologic  o * See HPI      Vitals  Date Time BP Position Site L\R Cuff Size HR RR TEMP (F) WT  HT  BMI kg/m2 BSA m2 O2 Sat FR L/min FiO2        01/05/2021 10:54 /70 Sitting    102 - R  98.8 181lbs 0oz 6'  1\" 23.88 2.06 98 %  21%                  Assessment  · Encounter for screening for cardiovascular disorders     V81.2/Z13.6  · Major depressive disorder     296.20/F32.2  · Advance directive in chart     V49.89/Z78.9  · Death of wife     V62.82/Z63.4  · Gait instability     781.2/R26.81  · Grief reaction     309.0/F43.21  · Medication management     V58.69/Z79.899  · Restless leg syndrome     333.94/G25.81  · High cholesterol     272.0/E78.0  · 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  · Routine adult health " maintenance     V70.0/Z00.00    Problems Reconciled  Plan  · Orders  o Physical, Primary Care Panel (CBC, CMP, Lipid, TSH) [facility] ([id number], [id number], [id number], [id number]) - V70.0/Z00.00, V81.2/Z13.6, V58.69/Z79.899, 333.94/G25.81 - 01/05/2021  o ACO-39: Current medications updated and reviewed (1159F, ) - - 01/05/2021  · Medications  o Remeron 15 mg oral tablet   SIG: take 1 tablet (15 mg) by oral route once daily before bedtime for 90 days   DISP: (90) Tablet with 3 refills  Adjusted on 01/05/2021     o atorvastatin 10 mg oral tablet   SIG: TAKE ONE TABLET BY MOUTH EVERY DAY   DISP: (90) Tablet with 3 refills  Refilled on 01/05/2021     o carbidopa-levodopa  mg oral tablet   SIG: TAKE 1 TABLET BY MOUTH TWICE DAILY   DISP: (180) Tablet with 3 refills  Refilled on 01/05/2021     o Medications have been Reconciled  o Transition of Care or Provider Policy  · Instructions  o Patient is taking medications as prescribed and doing well.   o Take all medications as prescribed/directed.  o Patient instructed/educated on their diet and exercise program.  o Patient was educated/instructed on their diagnosis, treatment and medications prior to discharge from the clinic today.  o Bring all medicines with their bottles to each office visit.  o Time spent with the patient was 25 minutes, more than 50% face to face.  o Chronic conditions reviewed and taken into consideration for today's treatment plan.  o Discussed Covid-19 precautions including, but not limited to, social distancing, avoid touching your face, and hand washing.             Electronically Signed by: Faby Talley, -Author on January 5, 2021 05:57:19 PM  Electronically Co-signed by: Delmer Bunch III MD -Reviewer on January 6, 2021 06:33:22 PM

## 2021-05-15 VITALS — RESPIRATION RATE: 12 BRPM | HEIGHT: 72 IN | WEIGHT: 210 LBS | BODY MASS INDEX: 28.44 KG/M2

## 2021-05-15 VITALS
SYSTOLIC BLOOD PRESSURE: 130 MMHG | WEIGHT: 197 LBS | BODY MASS INDEX: 26.11 KG/M2 | HEIGHT: 73 IN | DIASTOLIC BLOOD PRESSURE: 70 MMHG

## 2021-05-15 VITALS
DIASTOLIC BLOOD PRESSURE: 76 MMHG | BODY MASS INDEX: 26.51 KG/M2 | WEIGHT: 200 LBS | SYSTOLIC BLOOD PRESSURE: 112 MMHG | HEIGHT: 73 IN

## 2021-05-15 VITALS
SYSTOLIC BLOOD PRESSURE: 120 MMHG | TEMPERATURE: 97.8 F | HEIGHT: 73 IN | OXYGEN SATURATION: 99 % | DIASTOLIC BLOOD PRESSURE: 70 MMHG | HEART RATE: 81 BPM | BODY MASS INDEX: 23.86 KG/M2 | WEIGHT: 180 LBS

## 2021-05-15 VITALS
BODY MASS INDEX: 24.39 KG/M2 | WEIGHT: 184 LBS | TEMPERATURE: 90 F | HEIGHT: 73 IN | DIASTOLIC BLOOD PRESSURE: 62 MMHG | SYSTOLIC BLOOD PRESSURE: 120 MMHG

## 2021-05-15 VITALS
HEIGHT: 73 IN | BODY MASS INDEX: 25.98 KG/M2 | WEIGHT: 196 LBS | SYSTOLIC BLOOD PRESSURE: 130 MMHG | DIASTOLIC BLOOD PRESSURE: 80 MMHG

## 2021-05-15 VITALS
OXYGEN SATURATION: 98 % | TEMPERATURE: 99.4 F | HEART RATE: 76 BPM | DIASTOLIC BLOOD PRESSURE: 60 MMHG | BODY MASS INDEX: 24.39 KG/M2 | WEIGHT: 184 LBS | SYSTOLIC BLOOD PRESSURE: 110 MMHG | HEIGHT: 73 IN

## 2021-05-15 VITALS
HEART RATE: 64 BPM | HEIGHT: 73 IN | BODY MASS INDEX: 25.84 KG/M2 | DIASTOLIC BLOOD PRESSURE: 62 MMHG | WEIGHT: 195 LBS | OXYGEN SATURATION: 98 % | SYSTOLIC BLOOD PRESSURE: 122 MMHG

## 2021-05-15 VITALS
DIASTOLIC BLOOD PRESSURE: 56 MMHG | BODY MASS INDEX: 26.27 KG/M2 | HEART RATE: 76 BPM | SYSTOLIC BLOOD PRESSURE: 131 MMHG | HEIGHT: 73 IN | WEIGHT: 198.25 LBS | OXYGEN SATURATION: 98 %

## 2021-05-15 VITALS — RESPIRATION RATE: 12 BRPM | HEIGHT: 73 IN | WEIGHT: 210.12 LBS | BODY MASS INDEX: 27.85 KG/M2

## 2021-05-15 VITALS — HEIGHT: 73 IN | WEIGHT: 204 LBS | RESPIRATION RATE: 16 BRPM | BODY MASS INDEX: 27.04 KG/M2

## 2021-05-16 VITALS
BODY MASS INDEX: 27.3 KG/M2 | WEIGHT: 206 LBS | DIASTOLIC BLOOD PRESSURE: 68 MMHG | HEIGHT: 73 IN | SYSTOLIC BLOOD PRESSURE: 118 MMHG

## 2021-05-16 VITALS
HEIGHT: 73 IN | SYSTOLIC BLOOD PRESSURE: 120 MMHG | DIASTOLIC BLOOD PRESSURE: 64 MMHG | WEIGHT: 204 LBS | BODY MASS INDEX: 27.04 KG/M2

## 2021-05-16 VITALS
WEIGHT: 212 LBS | BODY MASS INDEX: 28.1 KG/M2 | SYSTOLIC BLOOD PRESSURE: 140 MMHG | DIASTOLIC BLOOD PRESSURE: 70 MMHG | HEIGHT: 73 IN

## 2021-05-16 VITALS
WEIGHT: 215 LBS | SYSTOLIC BLOOD PRESSURE: 150 MMHG | BODY MASS INDEX: 28.49 KG/M2 | DIASTOLIC BLOOD PRESSURE: 70 MMHG | TEMPERATURE: 98.2 F | HEIGHT: 73 IN

## 2021-05-16 VITALS
DIASTOLIC BLOOD PRESSURE: 70 MMHG | WEIGHT: 210 LBS | SYSTOLIC BLOOD PRESSURE: 128 MMHG | HEIGHT: 73 IN | BODY MASS INDEX: 27.83 KG/M2

## 2021-07-07 PROBLEM — F43.20 GRIEF REACTION: Status: ACTIVE | Noted: 2020-07-30

## 2021-07-07 PROBLEM — Z78.9 ADVANCE DIRECTIVE IN CHART: Status: ACTIVE | Noted: 2020-05-21

## 2021-07-07 PROBLEM — G25.81 RESTLESS LEG SYNDROME: Status: ACTIVE | Noted: 2019-02-06

## 2021-07-07 PROBLEM — R26.81 GAIT INSTABILITY: Status: ACTIVE | Noted: 2019-02-06

## 2021-07-07 PROBLEM — F43.21 GRIEF REACTION: Status: ACTIVE | Noted: 2020-07-30

## 2021-07-07 PROBLEM — Z79.899 MEDICATION MANAGEMENT: Status: ACTIVE | Noted: 2019-02-06

## 2021-07-07 PROBLEM — Z63.4 DEATH OF WIFE: Status: ACTIVE | Noted: 2020-07-30

## 2021-07-07 RX ORDER — DIPHENHYDRAMINE HCL 25 MG
25 CAPSULE ORAL
COMMUNITY

## 2021-07-07 RX ORDER — SENNOSIDES 8.6 MG
CAPSULE ORAL
COMMUNITY

## 2021-07-07 RX ORDER — MULTIPLE VITAMINS W/ MINERALS TAB 9MG-400MCG
TAB ORAL
COMMUNITY

## 2021-07-08 ENCOUNTER — OFFICE VISIT (OUTPATIENT)
Dept: FAMILY MEDICINE CLINIC | Facility: CLINIC | Age: 86
End: 2021-07-08

## 2021-07-08 VITALS
DIASTOLIC BLOOD PRESSURE: 70 MMHG | BODY MASS INDEX: 23.72 KG/M2 | HEIGHT: 73 IN | TEMPERATURE: 98.1 F | HEART RATE: 109 BPM | SYSTOLIC BLOOD PRESSURE: 130 MMHG | OXYGEN SATURATION: 99 % | WEIGHT: 179 LBS

## 2021-07-08 DIAGNOSIS — C44.90 SKIN CANCER: Primary | ICD-10-CM

## 2021-07-08 PROCEDURE — 99213 OFFICE O/P EST LOW 20 MIN: CPT | Performed by: FAMILY MEDICINE

## 2021-07-08 RX ORDER — MIRTAZAPINE 15 MG/1
1 TABLET, FILM COATED ORAL DAILY
COMMUNITY
Start: 2021-05-10 | End: 2022-01-19 | Stop reason: SDUPTHER

## 2021-07-08 NOTE — PROGRESS NOTES
"Chief Complaint  Weight Loss (follow up ), Depression (follow up), and Suspicious Skin Lesion (behind left ear, wants checked)    Subjective          Enrique Wylie presents to [facility]  History of Present Illness  Patient is 86 years old history of recent  restless legs lumbar spinal stenosis unstable gait numerous skin cancers patient has a lesion behind his right ear    No Known Allergies     Past Surgical History:   • CATARACT EXTRACTION   • CERVICAL FUSION    C4,5 and 6   • KNEE ARTHROSCOPY    right knee   • LUMBAR LAMINECTOMY    L3-4   • ROTATOR CUFF REPAIR   • SKIN BIOPSY    face and ears, squamous cell    • SPINE SURGERY    minimally invasive procedure \"closing in on spinal Cord\"  \"gave spinal cord more room\"       Social History     Tobacco Use   • Smoking status: Never Smoker   • Smokeless tobacco: Never Used   Substance Use Topics   • Alcohol use: Never       Family History   Problem Relation Age of Onset   • Heart failure Mother    • Heart failure Father         Health Maintenance Due   Topic Date Due   • TDAP/TD VACCINES (2 - Tdap) 10/30/2007   • ANNUAL WELLNESS VISIT  06/29/2021      Last Completed Colonoscopy     This patient has no relevant Health Maintenance data.          Review of Systems   Cardiovascular no chest pain no palpitations  Respiratory no shortness of breath no dyspnea exertion  Neurologic despite unstable gait has had no recent falls that were significant  Skin lesion behind his right ear  Objective     Vitals:    07/08/21 1022   BP: 130/70   Pulse: 109   Temp: 98.1 °F (36.7 °C)   SpO2: 99%   Weight: 81.2 kg (179 lb)   Height: 185.4 cm (73\")     Body mass index is 23.62 kg/m².      Physical Exam  General patient looks good  Cardiovascular regular rhythm no murmur  Respiratory lungs clear and equal bilaterally  Skin approximately 0.5 cm nodule behind the right ear basal cell carcinoma needs to be excised she has several AK's more on the " right face and the left need to be frozen  Neurologic mentation is normal speech is normal and gait is normal for him  Result Review :              Assessment and Plan    0.5 cm nodule behind the right ear needs excision gait is stable patient is doing well with the grief from the death of his wife                Patient was given instructions and counseling regarding his condition or for health maintenance advice. Please see specific information pulled into the AVS if appropriate.

## 2021-08-18 ENCOUNTER — PROCEDURE VISIT (OUTPATIENT)
Dept: FAMILY MEDICINE CLINIC | Facility: CLINIC | Age: 86
End: 2021-08-18

## 2021-08-18 VITALS
HEIGHT: 73 IN | DIASTOLIC BLOOD PRESSURE: 60 MMHG | BODY MASS INDEX: 23.33 KG/M2 | SYSTOLIC BLOOD PRESSURE: 140 MMHG | OXYGEN SATURATION: 96 % | HEART RATE: 79 BPM | WEIGHT: 176 LBS | TEMPERATURE: 98.4 F

## 2021-08-18 DIAGNOSIS — D49.2 ABNORMAL SKIN GROWTH: Primary | ICD-10-CM

## 2021-08-18 DIAGNOSIS — L57.0 AK (ACTINIC KERATOSIS): ICD-10-CM

## 2021-08-18 PROCEDURE — 17110 DESTRUCTION B9 LES UP TO 14: CPT | Performed by: FAMILY MEDICINE

## 2021-08-18 PROCEDURE — 88305 TISSUE EXAM BY PATHOLOGIST: CPT | Performed by: FAMILY MEDICINE

## 2021-08-18 PROCEDURE — 11442 EXC FACE-MM B9+MARG 1.1-2 CM: CPT | Performed by: FAMILY MEDICINE

## 2021-08-18 NOTE — PROGRESS NOTES
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 behind his right ear in the office after careful chlorhexidine prep and 1% Xylocaine anesthesia patient had a excision of the lesion was sent for pathology 4 sutures 3-0 nylon were used for closure    1 cm raised lesion in front of his right ear after careful chlorhexidine prep 1% Xylocaine anesthesia patient had an excision of that lesion and 4 sutures were used 4-0 nylon to close the lesion Path was sent patient tolerated both procedures well    Cryosurgery 0.5 cm actinic keratosis inside the right ear and 8.5 cm actinic keratosis was also crowded on top of the left ear    Patient tolerated all procedures well he is to use soap and water and Vaseline on all lesions and return for removal of the sutures in 1 week    Assessment as above 2 lesions excised 2 lesions crowd recheck in 1 week

## 2021-08-24 LAB
CYTO UR: NORMAL
LAB AP CASE REPORT: NORMAL
LAB AP CLINICAL INFORMATION: NORMAL
PATH REPORT.FINAL DX SPEC: NORMAL
PATH REPORT.GROSS SPEC: NORMAL

## 2021-08-25 ENCOUNTER — CLINICAL SUPPORT (OUTPATIENT)
Dept: FAMILY MEDICINE CLINIC | Facility: CLINIC | Age: 86
End: 2021-08-25

## 2021-08-25 NOTE — PROGRESS NOTES
Here for suture removal in front of right ear and behind right ear.     Removed 4 sutures in front of right ear.  Would appeared well approximated and healing well. Instructed about wound care.      Also removed 4 sutures from behind right ear.  Slight bleeding noted.  Area dressed and patient instructed about wound care.    Will call office if any other problems or questions.      Patient chooses not to have Turner procedure done behind right ear.  Pathology showed basal Cell going to the margins.

## 2021-11-15 ENCOUNTER — OFFICE VISIT (OUTPATIENT)
Dept: FAMILY MEDICINE CLINIC | Facility: CLINIC | Age: 86
End: 2021-11-15

## 2021-11-15 VITALS
DIASTOLIC BLOOD PRESSURE: 64 MMHG | TEMPERATURE: 98.2 F | HEIGHT: 73 IN | WEIGHT: 184 LBS | OXYGEN SATURATION: 97 % | SYSTOLIC BLOOD PRESSURE: 120 MMHG | BODY MASS INDEX: 24.39 KG/M2 | HEART RATE: 55 BPM

## 2021-11-15 DIAGNOSIS — L85.3 XEROSIS OF SKIN: Primary | ICD-10-CM

## 2021-11-15 PROCEDURE — 99213 OFFICE O/P EST LOW 20 MIN: CPT | Performed by: FAMILY MEDICINE

## 2022-01-13 ENCOUNTER — TELEPHONE (OUTPATIENT)
Dept: FAMILY MEDICINE CLINIC | Facility: CLINIC | Age: 87
End: 2022-01-13

## 2022-01-13 NOTE — TELEPHONE ENCOUNTER
Caller: [facility]    Relationship: Other    Best call back number: PLEASE REACH PATIENT DIRECTLY :[phone]    What is the best time to reach you: ANY    Who are you requesting to speak with (clinical staff, provider,  specific staff member): CLINICAL    What was the call regarding: PATIENT IS REQUESTING OSTEOPETROSIS SCREENING. PLEASE ADVISE WHERE PATIENT CAN GET THAT DONE.    Do you require a callback: YES, CALL PATIENT

## 2022-01-14 DIAGNOSIS — E78.2 MIXED HYPERLIPIDEMIA: Primary | ICD-10-CM

## 2022-01-17 RX ORDER — ATORVASTATIN CALCIUM 10 MG/1
TABLET, FILM COATED ORAL
Qty: 90 TABLET | Refills: 0 | Status: SHIPPED | OUTPATIENT
Start: 2022-01-17 | End: 2022-01-19 | Stop reason: SDUPTHER

## 2022-01-19 ENCOUNTER — OFFICE VISIT (OUTPATIENT)
Dept: FAMILY MEDICINE CLINIC | Facility: CLINIC | Age: 87
End: 2022-01-19

## 2022-01-19 VITALS
HEIGHT: 73 IN | OXYGEN SATURATION: 98 % | HEART RATE: 68 BPM | WEIGHT: 184 LBS | SYSTOLIC BLOOD PRESSURE: 118 MMHG | TEMPERATURE: 98.8 F | DIASTOLIC BLOOD PRESSURE: 60 MMHG | BODY MASS INDEX: 24.39 KG/M2

## 2022-01-19 DIAGNOSIS — Z13.220 ENCOUNTER FOR LIPID SCREENING FOR CARDIOVASCULAR DISEASE: ICD-10-CM

## 2022-01-19 DIAGNOSIS — Z13.6 ENCOUNTER FOR LIPID SCREENING FOR CARDIOVASCULAR DISEASE: ICD-10-CM

## 2022-01-19 DIAGNOSIS — Z00.00 ENCOUNTER FOR MEDICARE ANNUAL WELLNESS EXAM: ICD-10-CM

## 2022-01-19 DIAGNOSIS — Z00.00 ROUTINE MEDICAL EXAM: Primary | ICD-10-CM

## 2022-01-19 DIAGNOSIS — E78.2 MIXED HYPERLIPIDEMIA: ICD-10-CM

## 2022-01-19 DIAGNOSIS — Z79.899 MEDICATION MANAGEMENT: ICD-10-CM

## 2022-01-19 LAB
ALBUMIN SERPL-MCNC: 4 G/DL (ref 3.5–5.2)
ALBUMIN/GLOB SERPL: 2 G/DL
ALP SERPL-CCNC: 63 U/L (ref 39–117)
ALT SERPL W P-5'-P-CCNC: 15 U/L (ref 1–41)
ANION GAP SERPL CALCULATED.3IONS-SCNC: 9.8 MMOL/L (ref 5–15)
AST SERPL-CCNC: 20 U/L (ref 1–40)
BASOPHILS # BLD AUTO: 0.02 10*3/MM3 (ref 0–0.2)
BASOPHILS NFR BLD AUTO: 0.3 % (ref 0–1.5)
BILIRUB SERPL-MCNC: 0.5 MG/DL (ref 0–1.2)
BUN SERPL-MCNC: 17 MG/DL (ref 8–23)
BUN/CREAT SERPL: 14.5 (ref 7–25)
CALCIUM SPEC-SCNC: 8.5 MG/DL (ref 8.6–10.5)
CHLORIDE SERPL-SCNC: 107 MMOL/L (ref 98–107)
CHOLEST SERPL-MCNC: 137 MG/DL (ref 0–200)
CO2 SERPL-SCNC: 27.2 MMOL/L (ref 22–29)
CREAT SERPL-MCNC: 1.17 MG/DL (ref 0.76–1.27)
DEPRECATED RDW RBC AUTO: 39.7 FL (ref 37–54)
EOSINOPHIL # BLD AUTO: 0.07 10*3/MM3 (ref 0–0.4)
EOSINOPHIL NFR BLD AUTO: 1.1 % (ref 0.3–6.2)
ERYTHROCYTE [DISTWIDTH] IN BLOOD BY AUTOMATED COUNT: 11.8 % (ref 12.3–15.4)
GFR SERPL CREATININE-BSD FRML MDRD: 59 ML/MIN/1.73
GLOBULIN UR ELPH-MCNC: 2 GM/DL
GLUCOSE SERPL-MCNC: 85 MG/DL (ref 65–99)
HCT VFR BLD AUTO: 39.4 % (ref 37.5–51)
HDLC SERPL-MCNC: 73 MG/DL (ref 40–60)
HGB BLD-MCNC: 13.5 G/DL (ref 13–17.7)
IMM GRANULOCYTES # BLD AUTO: 0.02 10*3/MM3 (ref 0–0.05)
IMM GRANULOCYTES NFR BLD AUTO: 0.3 % (ref 0–0.5)
LDLC SERPL CALC-MCNC: 49 MG/DL (ref 0–100)
LDLC/HDLC SERPL: 0.67 {RATIO}
LYMPHOCYTES # BLD AUTO: 1.4 10*3/MM3 (ref 0.7–3.1)
LYMPHOCYTES NFR BLD AUTO: 22.6 % (ref 19.6–45.3)
MCH RBC QN AUTO: 31.8 PG (ref 26.6–33)
MCHC RBC AUTO-ENTMCNC: 34.3 G/DL (ref 31.5–35.7)
MCV RBC AUTO: 92.7 FL (ref 79–97)
MONOCYTES # BLD AUTO: 0.68 10*3/MM3 (ref 0.1–0.9)
MONOCYTES NFR BLD AUTO: 11 % (ref 5–12)
NEUTROPHILS NFR BLD AUTO: 4.01 10*3/MM3 (ref 1.7–7)
NEUTROPHILS NFR BLD AUTO: 64.7 % (ref 42.7–76)
NRBC BLD AUTO-RTO: 0 /100 WBC (ref 0–0.2)
PLATELET # BLD AUTO: 232 10*3/MM3 (ref 140–450)
PMV BLD AUTO: 9.5 FL (ref 6–12)
POTASSIUM SERPL-SCNC: 4.2 MMOL/L (ref 3.5–5.2)
PROT SERPL-MCNC: 6 G/DL (ref 6–8.5)
RBC # BLD AUTO: 4.25 10*6/MM3 (ref 4.14–5.8)
SODIUM SERPL-SCNC: 144 MMOL/L (ref 136–145)
TRIGL SERPL-MCNC: 74 MG/DL (ref 0–150)
TSH SERPL DL<=0.05 MIU/L-ACNC: 1.02 UIU/ML (ref 0.27–4.2)
VLDLC SERPL-MCNC: 15 MG/DL (ref 5–40)
WBC NRBC COR # BLD: 6.2 10*3/MM3 (ref 3.4–10.8)

## 2022-01-19 PROCEDURE — G0439 PPPS, SUBSEQ VISIT: HCPCS | Performed by: FAMILY MEDICINE

## 2022-01-19 PROCEDURE — 84443 ASSAY THYROID STIM HORMONE: CPT | Performed by: FAMILY MEDICINE

## 2022-01-19 PROCEDURE — 1159F MED LIST DOCD IN RCRD: CPT | Performed by: FAMILY MEDICINE

## 2022-01-19 PROCEDURE — 85025 COMPLETE CBC W/AUTO DIFF WBC: CPT | Performed by: FAMILY MEDICINE

## 2022-01-19 PROCEDURE — 80061 LIPID PANEL: CPT | Performed by: FAMILY MEDICINE

## 2022-01-19 PROCEDURE — 80053 COMPREHEN METABOLIC PANEL: CPT | Performed by: FAMILY MEDICINE

## 2022-01-19 PROCEDURE — 36415 COLL VENOUS BLD VENIPUNCTURE: CPT | Performed by: FAMILY MEDICINE

## 2022-01-19 RX ORDER — MIRTAZAPINE 15 MG/1
15 TABLET, FILM COATED ORAL DAILY
Qty: 90 TABLET | Refills: 1 | Status: SHIPPED | OUTPATIENT
Start: 2022-01-19 | End: 2022-05-17 | Stop reason: SDUPTHER

## 2022-01-19 RX ORDER — ATORVASTATIN CALCIUM 10 MG/1
10 TABLET, FILM COATED ORAL DAILY
Qty: 90 TABLET | Refills: 3 | Status: SHIPPED | OUTPATIENT
Start: 2022-01-19 | End: 2023-03-01

## 2022-01-19 NOTE — PROGRESS NOTES
The ABCs of the Annual Wellness Visit  Initial Medicare Wellness Visit    Subjective   History of Present Illness:  Enrique Wylie is a 86 y.o. male who presents for an Initial Medicare Wellness Visit.    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  He does not have any pertinent problems on file.  He  has a past surgical history that includes Knee arthroscopy; Rotator cuff repair (Right); Cervical fusion; Cataract Extraction (Bilateral); Skin biopsy; Spine surgery; and Lumbar laminectomy (02/10/2016).  His family history includes Heart failure in his father and mother.  He  reports that he has never smoked. He has never used smokeless tobacco. He reports that he does not drink alcohol and does not use drugs.  Current Outpatient Medications   Medication Sig Dispense Refill   • acetaminophen (Tylenol 8 Hour) 650 MG 8 hr tablet Tylenol Arthritis Pain 650 mg oral tablet extended release take 2 tablets (1,300 mg) by oral route every 8 hours as needed swallowing whole with water. Do not break, crush, dissolve and/or chew.   Active     • atorvastatin (LIPITOR) 10 MG tablet Take 1 tablet by mouth Daily. 90 tablet 3   • carbidopa-levodopa (SINEMET)  MG per tablet Take 1 tablet by mouth 2 (Two) Times a Day. 180 tablet 1   • chlorpheniramine (CHLOR-TRIMETON) 4 MG tablet Take 4 mg by mouth every 6 (six) hours as needed  for allergies.     • diphenhydrAMINE (BENADRYL) 25 mg capsule Take 25 mg by mouth every night at bedtime.     • glucosamine sulfate 500 MG capsule capsule Take  by mouth 1 (one) time.     • mirtazapine (REMERON) 15 MG tablet Take 1 tablet by mouth Daily. HS 90 tablet 1   • multivitamin with minerals (CENTRUM SILVER PO) Centrum  mg-mcg oral tablet take 1 tablet by oral route once daily with food   Suspended       No current facility-administered medications for this visit.     Current Outpatient Medications on File Prior to Visit   Medication Sig   • acetaminophen (Tylenol 8 Hour) 650 MG 8 hr tablet Tylenol Arthritis Pain 650 mg oral tablet extended release take 2 tablets (1,300 mg) by oral route every 8 hours as needed swallowing whole with water. Do not break, crush, dissolve and/or chew.   Active   • chlorpheniramine (CHLOR-TRIMETON) 4 MG tablet Take 4 mg by mouth every 6 (six) hours as needed for allergies.   • diphenhydrAMINE (BENADRYL) 25 mg capsule Take 25 mg by mouth every night at bedtime.   • glucosamine sulfate 500 MG capsule capsule Take  by mouth 1 (one) time.   • multivitamin with minerals (CENTRUM SILVER PO) Centrum  mg-mcg oral tablet take 1 tablet by oral route once daily with food   Suspended   • [DISCONTINUED] atorvastatin (LIPITOR) 10 MG tablet TAKE ONE TABLET BY MOUTH EVERY DAY   • [DISCONTINUED] carbidopa-levodopa (SINEMET)  MG per tablet Take 1 tablet by mouth 2 (Two) Times a Day.   • [DISCONTINUED] mirtazapine (REMERON) 15 MG tablet Take 1 tablet by mouth Daily. HS     No current facility-administered medications on file prior to visit.   .     Compared to one year ago, the patient feels his physical   health is the same.    Compared to one year ago, the patient feels his mental   health is the same.    Recent Hospitalizations:  He was admitted within the past 365 days at  hospital.       Current Medical Providers:  Patient Care Team:  Delmer Bunch III, MD as PCP - General  (Family Medicine)    Outpatient Medications Prior to Visit   Medication Sig Dispense Refill   • acetaminophen (Tylenol 8 Hour) 650 MG 8 hr tablet Tylenol Arthritis Pain 650 mg oral tablet extended release take 2 tablets (1,300 mg) by oral route every 8 hours as needed swallowing whole with water. Do not break, crush, dissolve and/or chew.   Active     • atorvastatin (LIPITOR) 10 MG tablet TAKE ONE TABLET BY MOUTH EVERY DAY 90 tablet 0   • carbidopa-levodopa (SINEMET)  MG per tablet Take 1 tablet by mouth 2 (Two) Times a Day.     • chlorpheniramine (CHLOR-TRIMETON) 4 MG tablet Take 4 mg by mouth every 6 (six) hours as needed for allergies.     • diphenhydrAMINE (BENADRYL) 25 mg capsule Take 25 mg by mouth every night at bedtime.     • glucosamine sulfate 500 MG capsule capsule Take  by mouth 1 (one) time.     • mirtazapine (REMERON) 15 MG tablet Take 1 tablet by mouth Daily. HS     • multivitamin with minerals (CENTRUM SILVER PO) Centrum  mg-mcg oral tablet take 1 tablet by oral route once daily with food   Suspended       No facility-administered medications prior to visit.       No opioid medication identified on active medication list. I have reviewed chart for other potential  high risk medication/s and harmful drug interactions in the elderly.          Aspirin is not on active medication list.  Aspirin use is not indicated based on review of current medical condition/s. Risk of harm outweighs potential benefits.  .    Patient Active Problem List   Diagnosis   • Balance problem   • Restless leg syndrome   • Medication management   • Lumbar spinal stenosis   • Grief reaction   • High cholesterol   • Gait instability   • Death of wife   • Cervical spinal stenosis   • Advance directive in chart   • Skin cancer     Advance Care Planning  Advance Directive is on file.     REVIEW OF SYSTEMS  Vascular no chest pain no palpitations  Respiratory shortness of breath no dyspnea on exertion  Neurologic no falls  "still slow steady gait  GI is gained his weight back to 184 pounds this is a good weight for him    Objective       Vitals:    01/19/22 1456   BP: 118/60   Pulse: 68   Temp: 98.8 °F (37.1 °C)   SpO2: 98%   Weight: 83.5 kg (184 lb)   Height: 185.4 cm (73\")     BMI Readings from Last 1 Encounters:   01/19/22 24.28 kg/m²   BMI is within normal parameters. No follow-up required.    Does the patient have evidence of cognitive impairment? No    PHYSICAL EXAM  General no distress  Cardiovascular regular rhythm no murmur  Respiratory lungs clinical bilaterally  Neurologic gait appears stable slow mentation is normal speech is normal  Abdomen soft nontender no hepatosplenomegaly  Skin no cancerous or precancerous lesions         HEALTH RISK ASSESSMENT    Smoking Status:  Social History     Tobacco Use   Smoking Status Never Smoker   Smokeless Tobacco Never Used     Alcohol Consumption:  Social History     Substance and Sexual Activity   Alcohol Use Never     Fall Risk Screen:    STEADI Fall Risk Assessment was completed, and patient is at LOW risk for falls.Assessment completed on:1/19/2022    Depression Screen:   PHQ-2/PHQ-9 Depression Screening 7/8/2021   Little interest or pleasure in doing things 0   Feeling down, depressed, or hopeless 1   Total Score 1       Health Habits and Functional and Cognitive Screening:  No flowsheet data found.    Age-appropriate Screening Schedule:  Refer to the list below for future screening recommendations based on patient's age, sex and/or medical conditions. Orders for these recommended tests are listed in the plan section. The patient has been provided with a written plan.    Health Maintenance   Topic Date Due   • TDAP/TD VACCINES (2 - Tdap) 10/30/2007   • LIPID PANEL  07/07/2021   • INFLUENZA VACCINE  Completed   • ZOSTER VACCINE  Completed            CMS Preventative Services Quick Reference  Risk Factors Identified During Encounter  None Identified  The above risks/problems have " "been discussed with the patient.  Follow up actions/plans if indicated are seen below in the Assessment/Plan Section.  Pertinent information has been shared with the patient in the After Visit Summary.      Follow Up:  No follow-ups on file.     An After Visit Summary and PPPS were made available to the patient.      I spent 45 minutes caring for Enrique on this date of service. This time includes time spent by me in the following activities:preparing for the visit, reviewing tests, obtaining and/or reviewing a separately obtained history, performing a medically appropriate examination and/or evaluation , counseling and educating the patient/family/caregiver, ordering medications, tests, or procedures, referring and communicating with other health care professionals , documenting information in the medical record, independently interpreting results and communicating that information with the patient/family/caregiver and care coordination    _________________________________________________________________________________________________--  Evaluation & Management    Chief Complaint  Annual Exam (medicare wellness) and Med Management    SUBJECTIVE  Enrique Wylie presents to [facility]  -year-old  hypercholesterolemia history numerous skin cancersLumbar spinal stenosis Parkinson's    PAST MEDICAL HISTORY  No Known Allergies     Past Surgical History:   • CATARACT EXTRACTION   • CERVICAL FUSION    C4,5 and 6   • KNEE ARTHROSCOPY    right knee   • LUMBAR LAMINECTOMY    L3-4   • ROTATOR CUFF REPAIR   • SKIN BIOPSY    face and ears, squamous cell    • SPINE SURGERY    minimally invasive procedure \"closing in on spinal Cord\"  \"gave spinal cord more room\"       Social History     Tobacco Use   • Smoking status: Never Smoker   • Smokeless tobacco: Never Used   Substance Use Topics   • Alcohol use: Never       Family History   Problem Relation Age of Onset   • Heart failure Mother "    • Heart failure Father         Health Maintenance Due   Topic Date Due   • TDAP/TD VACCINES (2 - Tdap) 10/30/2007   • ANNUAL WELLNESS VISIT  06/29/2021   • LIPID PANEL  07/07/2021      Last Completed Colonoscopy     This patient has no relevant Health Maintenance data.            ASSESSMENT & PLAN  Diagnoses and all orders for this visit:    1. Mixed hyperlipidemia      Parkinson's stable spinal stenosis stable hypercholesterolemia on atorvastatin mild depression on Remeron doing well Parkinson's stable            Patient was given instructions and counseling regarding his condition or for health maintenance advice. Please see specific information pulled into the AVS if appropriate.

## 2022-04-14 NOTE — TELEPHONE ENCOUNTER
Caller: Enrique Wylie    Relationship: Self    Best call back number: [phone]    Requested Prescriptions:   Requested Prescriptions     Pending Prescriptions Disp Refills   • hydrocortisone 2.5 % ointment 454 g 1        Pharmacy where request should be sent: [facility] - [place], [place] - [address] - [phone]  - [phone] FX     Does the patient have less than a 3 day supply:  [] Yes  [x] No    Antionette WRIGHT Rep   04/14/22 10:54 EDT

## 2022-05-17 ENCOUNTER — OFFICE VISIT (OUTPATIENT)
Dept: FAMILY MEDICINE CLINIC | Facility: CLINIC | Age: 87
End: 2022-05-17

## 2022-05-17 VITALS
SYSTOLIC BLOOD PRESSURE: 110 MMHG | HEIGHT: 73 IN | WEIGHT: 178 LBS | DIASTOLIC BLOOD PRESSURE: 70 MMHG | OXYGEN SATURATION: 100 % | HEART RATE: 104 BPM | BODY MASS INDEX: 23.59 KG/M2 | TEMPERATURE: 98 F

## 2022-05-17 DIAGNOSIS — G20 PARKINSON'S DISEASE: Primary | ICD-10-CM

## 2022-05-17 PROCEDURE — 99213 OFFICE O/P EST LOW 20 MIN: CPT | Performed by: FAMILY MEDICINE

## 2022-05-17 RX ORDER — PENICILLIN V POTASSIUM 500 MG/1
TABLET ORAL
COMMUNITY
Start: 2022-05-12 | End: 2022-11-17

## 2022-05-17 RX ORDER — CHLORHEXIDINE GLUCONATE 0.12 MG/ML
RINSE ORAL
COMMUNITY
Start: 2022-05-12 | End: 2022-11-17

## 2022-05-17 RX ORDER — MIRTAZAPINE 15 MG/1
15 TABLET, FILM COATED ORAL DAILY
Qty: 90 TABLET | Refills: 1 | Status: SHIPPED | OUTPATIENT
Start: 2022-05-17 | End: 2022-11-17 | Stop reason: SDUPTHER

## 2022-05-17 NOTE — PROGRESS NOTES
"Chief Complaint  Med Management (4 month routine follow up. He is doing ok and states he has no new concerns today, unless dr rivas can stop the \"getting old process\")    SUBJECTIVE  Enrique Wylie presents to [facility]    Patient is 86 years old lives alone history of pancreatitis wife  2 years ago history of numerous basal and squamous cell carcinomas depression    PAST MEDICAL HISTORY  No Known Allergies     Past Surgical History:   • CATARACT EXTRACTION   • CERVICAL FUSION    C4,5 and 6   • KNEE ARTHROSCOPY    right knee   • LUMBAR LAMINECTOMY    L3-4   • ROTATOR CUFF REPAIR   • SKIN BIOPSY    face and ears, squamous cell    • SPINE SURGERY    minimally invasive procedure \"closing in on spinal Cord\"  \"gave spinal cord more room\"       Social History     Tobacco Use   • Smoking status: Never Smoker   • Smokeless tobacco: Never Used   Substance Use Topics   • Alcohol use: Never       Family History   Problem Relation Age of Onset   • Heart failure Mother    • Heart failure Father         Health Maintenance Due   Topic Date Due   • TDAP/TD VACCINES (2 - Tdap) 10/30/2007        Last Completed Colonoscopy     This patient has no relevant Health Maintenance data.          REVIEW OF SYSTEMS    Cardiovascular no chest pain no palpitations  Respiratory no shortness of breath no dyspnea on exertion patient needs a booster with either Pfizer or Moderna vaccine since he had the J&J  Neurologic no falls    OBJECTIVE  Vitals:    22 1049   BP: 110/70   Pulse: 104   Temp: 98 °F (36.7 °C)   SpO2: 100%   Weight: 80.7 kg (178 lb)   Height: 185.4 cm (73\")     Body mass index is 23.48 kg/m².    PHYSICAL EXAM    General no distress  Cardiovascular regular rhythm few extra beats  No murmur  Respiratory lungs clear and equal bilaterally  Neurologic mentation is normal speech is normal gait is stooped and slow but his usual  Skin patient has multiple AK's not need to be frozen " immediately  ASSESSMENT & PLAN  There are no diagnoses linked to this encounter.      Parkinson's is stable depression seems to be controlled continue Remeron multiple actinic keratoses face            Patient was given instructions and counseling regarding his condition or for health maintenance advice. Please see specific information pulled into the AVS if appropriate.

## 2022-11-17 ENCOUNTER — OFFICE VISIT (OUTPATIENT)
Dept: FAMILY MEDICINE CLINIC | Facility: CLINIC | Age: 87
End: 2022-11-17

## 2022-11-17 VITALS
HEIGHT: 73 IN | BODY MASS INDEX: 24.39 KG/M2 | SYSTOLIC BLOOD PRESSURE: 142 MMHG | WEIGHT: 184 LBS | HEART RATE: 62 BPM | OXYGEN SATURATION: 100 % | DIASTOLIC BLOOD PRESSURE: 70 MMHG | TEMPERATURE: 98.7 F

## 2022-11-17 DIAGNOSIS — G20 PARKINSON'S DISEASE: ICD-10-CM

## 2022-11-17 DIAGNOSIS — Z79.899 MEDICATION MANAGEMENT: Primary | ICD-10-CM

## 2022-11-17 DIAGNOSIS — L57.0 AK (ACTINIC KERATOSIS): ICD-10-CM

## 2022-11-17 PROCEDURE — 17000 DESTRUCT PREMALG LESION: CPT | Performed by: FAMILY MEDICINE

## 2022-11-17 RX ORDER — MIRTAZAPINE 15 MG/1
15 TABLET, FILM COATED ORAL DAILY
Qty: 90 TABLET | Refills: 1 | Status: SHIPPED | OUTPATIENT
Start: 2022-11-17

## 2022-11-17 NOTE — PROGRESS NOTES
"Chief Complaint  Med Management (6 month routine follow up parkinson's  ), Actinic Keratosis (On face that needs cryo), and Suspicious Skin Lesion (Check area  on back of left earlobe, if needs removed we will schedule at the next removal day.)    SUBJECTIVE  Enrique Wylie presents to [facility]    Patient is 87 years old Parkinson's is stable the gait is stable slow but is stable patient has had 3 actinic keratoses on his right face all approximately 2 cm he has a lesion behind his left ear    PAST MEDICAL HISTORY  No Known Allergies     Past Surgical History:   • CATARACT EXTRACTION   • CERVICAL FUSION    C4,5 and 6   • KNEE ARTHROSCOPY    right knee   • LUMBAR LAMINECTOMY    L3-4   • ROTATOR CUFF REPAIR   • SKIN BIOPSY    face and ears, squamous cell    • SPINE SURGERY    minimally invasive procedure \"closing in on spinal Cord\"  \"gave spinal cord more room\"       Social History     Tobacco Use   • Smoking status: Never   • Smokeless tobacco: Never   Substance Use Topics   • Alcohol use: Never       Family History   Problem Relation Age of Onset   • Heart failure Mother    • Heart failure Father         Health Maintenance Due   Topic Date Due   • TDAP/TD VACCINES (2 - Tdap) 10/30/2007   • COVID-19 Vaccine (4 - Booster for Jorge A series) 07/12/2022        Last Completed Colonoscopy     This patient has no relevant Health Maintenance data.          REVIEW OF SYSTEMS    Skin 3 lesions on the right face 1 lesion behind the left ear    OBJECTIVE  Vitals:    11/17/22 1046   BP: 142/70   Pulse: 62   Temp: 98.7 °F (37.1 °C)   SpO2: 100%   Weight: 83.5 kg (184 lb)   Height: 185.4 cm (73\")     Body mass index is 24.28 kg/m².    PHYSICAL EXAM    General no distress  Cardiovascular regular rhythm no murmur  Respiratory lungs clear and equal bilaterally  Skin 3 to centimeter keratoses on his actinic keratoses on his right face and 1 basal cell cancer beside his left ear which require " removal  Procedure cryosurgery 3 2 cm actinic keratoses on the right face    ASSESSMENT & PLAN  Diagnoses and all orders for this visit:    1. Medication management (Primary)    2. AK (actinic keratosis)    3. Parkinson's disease (HCC)          Parkinson's is stable cryosurgery  Actinic keratoses right ear  Cryotherapy, Skin Lesion    Date/Time: 11/17/2022 10:55 AM  Performed by: Delmer Bunch III, MD  Authorized by: Delmre Bunch III, MD   Preparation: Patient was prepped and draped in the usual sterile fashion.  Local anesthesia used: no    Anesthesia:  Local anesthesia used: no    Sedation:  Patient sedated: no    Patient tolerance: patient tolerated the procedure well with no immediate complications             Patient was given instructions and counseling regarding his condition or for health maintenance advice. Please see specific information pulled into the AVS if appropriate.

## 2023-01-18 ENCOUNTER — PROCEDURE VISIT (OUTPATIENT)
Dept: FAMILY MEDICINE CLINIC | Facility: CLINIC | Age: 88
End: 2023-01-18
Payer: MEDICARE

## 2023-01-18 VITALS
DIASTOLIC BLOOD PRESSURE: 70 MMHG | WEIGHT: 184 LBS | HEART RATE: 89 BPM | OXYGEN SATURATION: 97 % | SYSTOLIC BLOOD PRESSURE: 134 MMHG | TEMPERATURE: 97.9 F | HEIGHT: 73 IN | BODY MASS INDEX: 24.39 KG/M2

## 2023-01-18 DIAGNOSIS — D49.2 ABNORMAL SKIN GROWTH: Primary | ICD-10-CM

## 2023-01-18 PROCEDURE — 88305 TISSUE EXAM BY PATHOLOGIST: CPT | Performed by: FAMILY MEDICINE

## 2023-01-18 PROCEDURE — 11622 EXC S/N/H/F/G MAL+MRG 1.1-2: CPT | Performed by: FAMILY MEDICINE

## 2023-01-18 NOTE — PROGRESS NOTES
"Chief Complaint  Suspicious Skin Lesion (Abnormal skin growth back of left ear X 1 year. Here to have removed)    SUBJECTIVE  Enrique Wylie presents to [facility]    87-year-old history of multiple skin cancers has a lesion behind his left ear that appears to be a basal cell carcinoma 15 mm    PAST MEDICAL HISTORY  No Known Allergies     Past Surgical History:   • CATARACT EXTRACTION   • CERVICAL FUSION    C4,5 and 6   • KNEE ARTHROSCOPY    right knee   • LUMBAR LAMINECTOMY    L3-4   • ROTATOR CUFF REPAIR   • SKIN BIOPSY    face and ears, squamous cell    • SPINE SURGERY    minimally invasive procedure \"closing in on spinal Cord\"  \"gave spinal cord more room\"       Social History     Tobacco Use   • Smoking status: Never   • Smokeless tobacco: Never   Substance Use Topics   • Alcohol use: Never       Family History   Problem Relation Age of Onset   • Heart failure Mother    • Heart failure Father         Health Maintenance Due   Topic Date Due   • TDAP/TD VACCINES (2 - Tdap) 10/30/2007   • COVID-19 Vaccine (4 - Booster for Jorge A series) 07/12/2022        Last Completed Colonoscopy     This patient has no relevant Health Maintenance data.          REVIEW OF SYSTEMS    Skin 15 mm lesion behind left ear patient only noticed recently    OBJECTIVE  Vitals:    01/18/23 1119   BP: 134/70   Pulse: 89   Temp: 97.9 °F (36.6 °C)   SpO2: 97%   Weight: 83.5 kg (184 lb)   Height: 185.4 cm (73\")     Body mass index is 24.28 kg/m².    PHYSICAL EXAM    General no distress  Cardiovascular regular rhythm no murmur  Lungs clinical bilaterally  Skin 15 mm x 15 mm lesion behind the left ear obvious cancer probably basal cell    After careful chlorhexidine prep patient had a wide excision of the lesion Path was sent was closed with 4 sutures 3-0 nylon    ASSESSMENT & PLAN  Diagnoses and all orders for this visit:    1. Abnormal skin growth (Primary)  -     Tissue Pathology Exam    Other orders  -  "    Biopsy          15 mm lesion excised closed with 4 sutures 3-0 nylon Path was sent Vaseline soap and water    Biopsy    Date/Time: 1/18/2023 11:47 AM  Performed by: Delmer Bunch III, MD  Authorized by: Dlemer Bunch III, MD     Procedure Details - Skin Biopsy:     Body area: head/neck    Head/neck location: L ear    Initial size (mm): 15    Final defect size (mm): 30    Malignancy: malignancy unknown      Destruction method comment: excision    Comments:   Excision of lesion on back of left ear.  area will not heal, [id number] lesion.  30 mm removed.  4 sutures of 3-0 nylon           Patient was given instructions and counseling regarding his condition or for health maintenance advice. Please see specific information pulled into the AVS if appropriate.

## 2023-01-25 ENCOUNTER — CLINICAL SUPPORT (OUTPATIENT)
Dept: FAMILY MEDICINE CLINIC | Facility: CLINIC | Age: 88
End: 2023-01-25
Payer: MEDICARE

## 2023-01-25 DIAGNOSIS — Z48.02 VISIT FOR SUTURE REMOVAL: Primary | ICD-10-CM

## 2023-01-25 NOTE — PROGRESS NOTES
Suture Removal    Date/Time: 1/25/2023 1:53 PM  Performed by: Delmer Bunch III, MD  Authorized by: Delmer Bunch III, MD   Body area: head/neck  Location details: left ear  Wound Appearance: clean  Sutures Removed: 4  Facility: sutures placed in this facility  Comments: Area looked well approximated and healing well. No redness noted. 4 sutures were removed. Dr Bunch instructed patient that pathology showed basal cell carcinoma present at deep and peripheral margin.   Patient does not want to do anything at this time.  If patient does decide to have area removed he will need to be referred out for Mohs procedure

## 2023-03-01 ENCOUNTER — TELEPHONE (OUTPATIENT)
Dept: FAMILY MEDICINE CLINIC | Facility: CLINIC | Age: 88
End: 2023-03-01
Payer: MEDICARE

## 2023-03-01 DIAGNOSIS — E78.2 MIXED HYPERLIPIDEMIA: ICD-10-CM

## 2023-03-01 RX ORDER — ATORVASTATIN CALCIUM 10 MG/1
10 TABLET, FILM COATED ORAL DAILY
Qty: 90 TABLET | Refills: 3 | OUTPATIENT
Start: 2023-03-01

## 2023-03-01 RX ORDER — ATORVASTATIN CALCIUM 10 MG/1
10 TABLET, FILM COATED ORAL DAILY
Qty: 90 TABLET | Refills: 0 | Status: SHIPPED | OUTPATIENT
Start: 2023-03-01

## 2023-03-01 NOTE — TELEPHONE ENCOUNTER
Caller: DejanEnrique    Relationship: Self    Best call back number: [phone]    Requested Prescriptions:   Requested Prescriptions     Pending Prescriptions Disp Refills   • atorvastatin (LIPITOR) 10 MG tablet 90 tablet 3     Sig: Take 1 tablet by mouth Daily.        Pharmacy where request should be sent: [facility] - [place], [place] - [address] [facility] - [phone]  - [phone] FX     Additional details provided by patient: PATIENT TOOK LAST DOSE OF THIS MEDICATION TODAY AND TAKES THE NEXT DOSE TOMORROW.      Does the patient have less than a 3 day supply:  [x] Yes  [] No    Would you like a call back once the refill request has been completed: [x] Yes [] No    If the office needs to give you a call back, can they leave a voicemail: [x] Yes [] No    Umu Ayers, PCT   03/01/23 09:21 EST

## 2023-06-05 ENCOUNTER — OFFICE VISIT (OUTPATIENT)
Dept: FAMILY MEDICINE CLINIC | Facility: CLINIC | Age: 88
End: 2023-06-05
Payer: MEDICARE

## 2023-06-05 VITALS
OXYGEN SATURATION: 96 % | WEIGHT: 188 LBS | HEART RATE: 55 BPM | TEMPERATURE: 97.6 F | HEIGHT: 73 IN | DIASTOLIC BLOOD PRESSURE: 78 MMHG | BODY MASS INDEX: 24.92 KG/M2 | SYSTOLIC BLOOD PRESSURE: 132 MMHG

## 2023-06-05 DIAGNOSIS — Z79.899 MEDICATION MANAGEMENT: Primary | ICD-10-CM

## 2023-06-05 DIAGNOSIS — E78.2 MIXED HYPERLIPIDEMIA: ICD-10-CM

## 2023-06-05 DIAGNOSIS — G25.81 RESTLESS LEG SYNDROME: ICD-10-CM

## 2023-06-05 DIAGNOSIS — G20 PARKINSON'S DISEASE: ICD-10-CM

## 2023-06-05 DIAGNOSIS — R00.9 ABNORMAL HEART RATE: ICD-10-CM

## 2023-06-05 RX ORDER — MIRTAZAPINE 15 MG/1
15 TABLET, FILM COATED ORAL DAILY
Qty: 90 TABLET | Refills: 1 | Status: SHIPPED | OUTPATIENT
Start: 2023-06-05

## 2023-06-05 RX ORDER — ATORVASTATIN CALCIUM 10 MG/1
10 TABLET, FILM COATED ORAL DAILY
Qty: 90 TABLET | Refills: 3 | Status: SHIPPED | OUTPATIENT
Start: 2023-06-05

## 2023-06-05 NOTE — PROGRESS NOTES
"Chief Complaint  Parkinson's Disease (Yearly follow up ), Restless Legs Syndrome, and Hyperlipidemia    SUBJECTIVE  Enrique Wylie presents to [facility]    Patient is 87 years old history of Parkinson's hyperlipidemia on Lipitor numerous skin cancers    PAST MEDICAL HISTORY  No Known Allergies     Past Surgical History:    CATARACT EXTRACTION    CERVICAL FUSION    C4,5 and 6    KNEE ARTHROSCOPY    right knee    LUMBAR LAMINECTOMY    L3-4    ROTATOR CUFF REPAIR    SKIN BIOPSY    face and ears, squamous cell     SPINE SURGERY    minimally invasive procedure \"closing in on spinal Cord\"  \"gave spinal cord more room\"       Social History     Tobacco Use    Smoking status: Never    Smokeless tobacco: Never   Substance Use Topics    Alcohol use: Never       Family History   Problem Relation Age of Onset    Heart failure Mother     Heart failure Father         Health Maintenance Due   Topic Date Due    TDAP/TD VACCINES (2 - Tdap) 10/30/2007    COVID-19 Vaccine (4 - Booster for Jorge A series) 07/12/2022    ANNUAL WELLNESS VISIT  01/19/2023    LIPID PANEL  01/19/2023        Last Completed Colonoscopy       This patient has no relevant Health Maintenance data.            REVIEW OF SYSTEMS    Cardiovascular no palpitations no chest pain  Lungs no shortness of breath no dyspnea exertion  Neurologic did have 1 fall discussed canes walking safely and not hitting his head especially if he is on Eliquis for his atrial fibs    OBJECTIVE  Vitals:    06/05/23 1205   BP: 132/78   Pulse: 55   Temp: 97.6 °F (36.4 °C)   SpO2: 96%   Weight: 85.3 kg (188 lb)   Height: 185.4 cm (73\")     Body mass index is 24.8 kg/m².    PHYSICAL EXAM    General no distress  Skin has several AK's of his face need to be frozen and 1 nodule in front of his left ear that needs to be excised  Cardiovascular irregular rhythm no murmur probably atrial fib EKG confirms atrial fibs  Neurologic mentation is normal speech is " normal gait is normal for him    ASSESSMENT & PLAN  Diagnoses and all orders for this visit:    1. Medication management (Primary)  -     Comprehensive Metabolic Panel; Future  -     TSH; Future  -     CBC & Differential; Future  -     Lipid Panel; Future    2. Mixed hyperlipidemia  -     atorvastatin (LIPITOR) 10 MG tablet; Take 1 tablet by mouth Daily.  Dispense: 90 tablet; Refill: 3  -     Comprehensive Metabolic Panel; Future  -     Lipid Panel; Future    3. Parkinson's disease  -     Comprehensive Metabolic Panel; Future  -     TSH; Future  -     CBC & Differential; Future    4. Restless leg syndrome  -     Comprehensive Metabolic Panel; Future  -     TSH; Future  -     CBC & Differential; Future    5. Abnormal heart rate  -     ECG 12 Lead    Other orders  -     mirtazapine (REMERON) 15 MG tablet; Take 1 tablet by mouth Daily. HS  Dispense: 90 tablet; Refill: 1  -     apixaban (ELIQUIS) 5 MG tablet tablet; Take 1 tablet by mouth 2 (Two) Times a Day.  Dispense: 60 tablet; Refill: 5          New onset atrial fibs start Eliquis appointment Dr. Ballard needs a TSH was drawn Parkinson's is stable start Eliquis 5 mg twice daily AK's and nodule need excision and cryo surgery scheduled            Patient was given instructions and counseling regarding his condition or for health maintenance advice. Please see specific information pulled into the AVS if appropriate.

## 2023-06-06 ENCOUNTER — LAB (OUTPATIENT)
Dept: LAB | Facility: HOSPITAL | Age: 88
End: 2023-06-06
Payer: MEDICARE

## 2023-06-06 DIAGNOSIS — Z79.899 MEDICATION MANAGEMENT: ICD-10-CM

## 2023-06-06 DIAGNOSIS — G25.81 RESTLESS LEG SYNDROME: ICD-10-CM

## 2023-06-06 DIAGNOSIS — E78.2 MIXED HYPERLIPIDEMIA: ICD-10-CM

## 2023-06-06 DIAGNOSIS — G20 PARKINSON'S DISEASE: ICD-10-CM

## 2023-06-06 LAB
BASOPHILS # BLD AUTO: 0.03 10*3/MM3 (ref 0–0.2)
BASOPHILS NFR BLD AUTO: 0.5 % (ref 0–1.5)
DEPRECATED RDW RBC AUTO: 40.4 FL (ref 37–54)
EOSINOPHIL # BLD AUTO: 0.11 10*3/MM3 (ref 0–0.4)
EOSINOPHIL NFR BLD AUTO: 1.7 % (ref 0.3–6.2)
ERYTHROCYTE [DISTWIDTH] IN BLOOD BY AUTOMATED COUNT: 11.6 % (ref 12.3–15.4)
HCT VFR BLD AUTO: 44.5 % (ref 37.5–51)
HGB BLD-MCNC: 15.2 G/DL (ref 13–17.7)
IMM GRANULOCYTES # BLD AUTO: 0.01 10*3/MM3 (ref 0–0.05)
IMM GRANULOCYTES NFR BLD AUTO: 0.2 % (ref 0–0.5)
LYMPHOCYTES # BLD AUTO: 1.23 10*3/MM3 (ref 0.7–3.1)
LYMPHOCYTES NFR BLD AUTO: 18.7 % (ref 19.6–45.3)
MCH RBC QN AUTO: 31.9 PG (ref 26.6–33)
MCHC RBC AUTO-ENTMCNC: 34.2 G/DL (ref 31.5–35.7)
MCV RBC AUTO: 93.5 FL (ref 79–97)
MONOCYTES # BLD AUTO: 0.7 10*3/MM3 (ref 0.1–0.9)
MONOCYTES NFR BLD AUTO: 10.7 % (ref 5–12)
NEUTROPHILS NFR BLD AUTO: 4.49 10*3/MM3 (ref 1.7–7)
NEUTROPHILS NFR BLD AUTO: 68.2 % (ref 42.7–76)
NRBC BLD AUTO-RTO: 0.2 /100 WBC (ref 0–0.2)
PLATELET # BLD AUTO: 247 10*3/MM3 (ref 140–450)
PMV BLD AUTO: 9.4 FL (ref 6–12)
RBC # BLD AUTO: 4.76 10*6/MM3 (ref 4.14–5.8)
WBC NRBC COR # BLD: 6.57 10*3/MM3 (ref 3.4–10.8)

## 2023-06-06 PROCEDURE — 85025 COMPLETE CBC W/AUTO DIFF WBC: CPT

## 2023-06-06 PROCEDURE — 36415 COLL VENOUS BLD VENIPUNCTURE: CPT

## 2023-06-06 PROCEDURE — 80061 LIPID PANEL: CPT

## 2023-06-06 PROCEDURE — 80053 COMPREHEN METABOLIC PANEL: CPT

## 2023-06-06 PROCEDURE — 84443 ASSAY THYROID STIM HORMONE: CPT

## 2023-06-07 LAB
ALBUMIN SERPL-MCNC: 4.3 G/DL (ref 3.5–5.2)
ALBUMIN/GLOB SERPL: 1.8 G/DL
ALP SERPL-CCNC: 72 U/L (ref 39–117)
ALT SERPL W P-5'-P-CCNC: 12 U/L (ref 1–41)
ANION GAP SERPL CALCULATED.3IONS-SCNC: 10.4 MMOL/L (ref 5–15)
AST SERPL-CCNC: 19 U/L (ref 1–40)
BILIRUB SERPL-MCNC: 0.6 MG/DL (ref 0–1.2)
BUN SERPL-MCNC: 19 MG/DL (ref 8–23)
BUN/CREAT SERPL: 17.9 (ref 7–25)
CALCIUM SPEC-SCNC: 9.6 MG/DL (ref 8.6–10.5)
CHLORIDE SERPL-SCNC: 108 MMOL/L (ref 98–107)
CHOLEST SERPL-MCNC: 166 MG/DL (ref 0–200)
CO2 SERPL-SCNC: 25.6 MMOL/L (ref 22–29)
CREAT SERPL-MCNC: 1.06 MG/DL (ref 0.76–1.27)
EGFRCR SERPLBLD CKD-EPI 2021: 68.8 ML/MIN/1.73
GLOBULIN UR ELPH-MCNC: 2.4 GM/DL
GLUCOSE SERPL-MCNC: 111 MG/DL (ref 65–99)
HDLC SERPL-MCNC: 81 MG/DL (ref 40–60)
LDLC SERPL CALC-MCNC: 69 MG/DL (ref 0–100)
LDLC/HDLC SERPL: 0.83 {RATIO}
POTASSIUM SERPL-SCNC: 4.6 MMOL/L (ref 3.5–5.2)
PROT SERPL-MCNC: 6.7 G/DL (ref 6–8.5)
SODIUM SERPL-SCNC: 144 MMOL/L (ref 136–145)
TRIGL SERPL-MCNC: 87 MG/DL (ref 0–150)
TSH SERPL DL<=0.05 MIU/L-ACNC: 0.9 UIU/ML (ref 0.27–4.2)
VLDLC SERPL-MCNC: 16 MG/DL (ref 5–40)

## 2023-06-19 ENCOUNTER — TELEPHONE (OUTPATIENT)
Dept: FAMILY MEDICINE CLINIC | Facility: CLINIC | Age: 88
End: 2023-06-19

## 2023-06-19 NOTE — TELEPHONE ENCOUNTER
Called and spoke with patient. Patient states that is causes him to bleed and he doesn't want to take it.  I tried to explain how with his A-fib he needs to have his blood more thin to keep from having a stroke.  Patient finally agreed to take at least one daily until Dr Bunch gets back in the office 07/05/2023 and he will call and talk with him.    Also called and talked to emergency contact and explained to her also.  She will call the office back if any other problems or concerns.

## 2023-06-19 NOTE — TELEPHONE ENCOUNTER
Caller: АНДРЕЙ ELI    Relationship to patient: Emergency Contact    Best call back number: [phone]    Chief complaint: PATIENT REFUSING TO TAKE ELIQUIS, WANTING TO CHANGE MEDICATION     Type of visit: OFFICE     Requested date: ASAP     Additional notes: PATIENTS CAREGIVER STATES PATIENT DOES NOT LIKE THE ELIQUIS MEDICATION THAT WAS PRESCRIBED AND IS REFUSING TO TAKE IT. CAREGIVER IS CONCERNED REGARDING PATIENTS AFIB. WANTING TO GET HIM BACK IN FOR AN APPOINTMENT ASAP.

## 2023-06-22 NOTE — TELEPHONE ENCOUNTER
He is complaining for bleeding easily when he gets a scratch and that is why he does not want to take it.  I stressed to him the importance to take it because of the risk of not taking with having the a-fib.  Patient wants to discuss with Dr Bunch when he gets back

## 2023-09-21 DIAGNOSIS — L57.0 AK (ACTINIC KERATOSIS): Primary | ICD-10-CM

## 2023-09-21 DIAGNOSIS — D49.2 ABNORMAL SKIN GROWTH: ICD-10-CM

## 2023-09-28 ENCOUNTER — EXTERNAL PBMM DATA (OUTPATIENT)
Dept: PHARMACY | Facility: OTHER | Age: 88
End: 2023-09-28
Payer: MEDICARE

## 2023-11-02 ENCOUNTER — TELEPHONE (OUTPATIENT)
Dept: FAMILY MEDICINE CLINIC | Facility: CLINIC | Age: 88
End: 2023-11-02

## 2023-11-10 ENCOUNTER — TELEPHONE (OUTPATIENT)
Dept: UROLOGY | Facility: CLINIC | Age: 88
End: 2023-11-10
Payer: MEDICARE

## 2023-11-10 ENCOUNTER — HOSPITAL ENCOUNTER (OUTPATIENT)
Dept: CT IMAGING | Facility: HOSPITAL | Age: 88
Discharge: HOME | End: 2023-11-10
Admitting: NURSE PRACTITIONER
Payer: MEDICARE

## 2023-11-10 ENCOUNTER — LAB (OUTPATIENT)
Dept: LAB | Facility: HOSPITAL | Age: 88
End: 2023-11-10
Payer: MEDICARE

## 2023-11-10 ENCOUNTER — TELEPHONE (OUTPATIENT)
Dept: FAMILY MEDICINE CLINIC | Facility: CLINIC | Age: 88
End: 2023-11-10

## 2023-11-10 ENCOUNTER — OFFICE VISIT (OUTPATIENT)
Dept: FAMILY MEDICINE CLINIC | Facility: CLINIC | Age: 88
End: 2023-11-10
Payer: MEDICARE

## 2023-11-10 ENCOUNTER — PREP FOR SURGERY (OUTPATIENT)
Dept: OTHER | Facility: HOSPITAL | Age: 88
End: 2023-11-10
Payer: MEDICARE

## 2023-11-10 VITALS
HEART RATE: 81 BPM | WEIGHT: 190 LBS | OXYGEN SATURATION: 99 % | SYSTOLIC BLOOD PRESSURE: 148 MMHG | TEMPERATURE: 98.1 F | BODY MASS INDEX: 25.18 KG/M2 | HEIGHT: 73 IN | DIASTOLIC BLOOD PRESSURE: 73 MMHG

## 2023-11-10 DIAGNOSIS — Z13.89 SCREENING FOR BLOOD OR PROTEIN IN URINE: ICD-10-CM

## 2023-11-10 DIAGNOSIS — N20.1 LEFT URETERAL STONE: ICD-10-CM

## 2023-11-10 DIAGNOSIS — R31.0 GROSS HEMATURIA: Primary | ICD-10-CM

## 2023-11-10 DIAGNOSIS — N20.0 NEPHROLITHIASIS: Primary | ICD-10-CM

## 2023-11-10 DIAGNOSIS — R31.0 GROSS HEMATURIA: ICD-10-CM

## 2023-11-10 DIAGNOSIS — N13.2 URETERAL STONE WITH HYDRONEPHROSIS: Primary | ICD-10-CM

## 2023-11-10 LAB
ALBUMIN SERPL-MCNC: 4.3 G/DL (ref 3.5–5.2)
ALBUMIN/GLOB SERPL: 1.7 G/DL
ALP SERPL-CCNC: 81 U/L (ref 39–117)
ALT SERPL W P-5'-P-CCNC: 13 U/L (ref 1–41)
ANION GAP SERPL CALCULATED.3IONS-SCNC: 8.4 MMOL/L (ref 5–15)
AST SERPL-CCNC: 22 U/L (ref 1–40)
BACTERIA UR QL AUTO: ABNORMAL /HPF
BASOPHILS # BLD AUTO: 0.04 10*3/MM3 (ref 0–0.2)
BASOPHILS NFR BLD AUTO: 0.5 % (ref 0–1.5)
BILIRUB BLD-MCNC: ABNORMAL MG/DL
BILIRUB SERPL-MCNC: 0.7 MG/DL (ref 0–1.2)
BILIRUB UR QL STRIP: NEGATIVE
BUN SERPL-MCNC: 15 MG/DL (ref 8–23)
BUN/CREAT SERPL: 14.3 (ref 7–25)
CALCIUM SPEC-SCNC: 8.7 MG/DL (ref 8.6–10.5)
CHLORIDE SERPL-SCNC: 107 MMOL/L (ref 98–107)
CLARITY UR: ABNORMAL
CLARITY, POC: ABNORMAL
CO2 SERPL-SCNC: 26.6 MMOL/L (ref 22–29)
COLOR UR: ABNORMAL
COLOR UR: ABNORMAL
CREAT BLDA-MCNC: 1.2 MG/DL
CREAT SERPL-MCNC: 1.05 MG/DL (ref 0.76–1.27)
DEPRECATED RDW RBC AUTO: 37.7 FL (ref 37–54)
EGFRCR SERPLBLD CKD-EPI 2021: 58.9 ML/MIN/1.73
EGFRCR SERPLBLD CKD-EPI 2021: 69.1 ML/MIN/1.73
EOSINOPHIL # BLD AUTO: 0.15 10*3/MM3 (ref 0–0.4)
EOSINOPHIL NFR BLD AUTO: 2 % (ref 0.3–6.2)
ERYTHROCYTE [DISTWIDTH] IN BLOOD BY AUTOMATED COUNT: 11.2 % (ref 12.3–15.4)
EXPIRATION DATE: ABNORMAL
GLOBULIN UR ELPH-MCNC: 2.6 GM/DL
GLUCOSE SERPL-MCNC: 85 MG/DL (ref 65–99)
GLUCOSE UR STRIP-MCNC: NEGATIVE MG/DL
GLUCOSE UR STRIP-MCNC: NEGATIVE MG/DL
HCT VFR BLD AUTO: 43.5 % (ref 37.5–51)
HGB BLD-MCNC: 15 G/DL (ref 13–17.7)
HGB UR QL STRIP.AUTO: ABNORMAL
HOLD SPECIMEN: NORMAL
HYALINE CASTS UR QL AUTO: ABNORMAL /LPF
IMM GRANULOCYTES # BLD AUTO: 0.01 10*3/MM3 (ref 0–0.05)
IMM GRANULOCYTES NFR BLD AUTO: 0.1 % (ref 0–0.5)
KETONES UR QL STRIP: NEGATIVE
KETONES UR QL: NEGATIVE
LEUKOCYTE EST, POC: NEGATIVE
LEUKOCYTE ESTERASE UR QL STRIP.AUTO: ABNORMAL
LYMPHOCYTES # BLD AUTO: 1.38 10*3/MM3 (ref 0.7–3.1)
LYMPHOCYTES NFR BLD AUTO: 18.9 % (ref 19.6–45.3)
Lab: ABNORMAL
MCH RBC QN AUTO: 31.8 PG (ref 26.6–33)
MCHC RBC AUTO-ENTMCNC: 34.5 G/DL (ref 31.5–35.7)
MCV RBC AUTO: 92.2 FL (ref 79–97)
MONOCYTES # BLD AUTO: 0.9 10*3/MM3 (ref 0.1–0.9)
MONOCYTES NFR BLD AUTO: 12.3 % (ref 5–12)
NEUTROPHILS NFR BLD AUTO: 4.84 10*3/MM3 (ref 1.7–7)
NEUTROPHILS NFR BLD AUTO: 66.2 % (ref 42.7–76)
NITRITE UR QL STRIP: NEGATIVE
NITRITE UR-MCNC: NEGATIVE MG/ML
NRBC BLD AUTO-RTO: 0 /100 WBC (ref 0–0.2)
PH UR STRIP.AUTO: 6.5 [PH] (ref 5–8)
PH UR: 6.5 [PH] (ref 5–8)
PLATELET # BLD AUTO: 277 10*3/MM3 (ref 140–450)
PMV BLD AUTO: 9.7 FL (ref 6–12)
POTASSIUM SERPL-SCNC: 4.3 MMOL/L (ref 3.5–5.2)
PROT SERPL-MCNC: 6.9 G/DL (ref 6–8.5)
PROT UR QL STRIP: ABNORMAL
PROT UR STRIP-MCNC: ABNORMAL MG/DL
RBC # BLD AUTO: 4.72 10*6/MM3 (ref 4.14–5.8)
RBC # UR STRIP: ABNORMAL /HPF
RBC # UR STRIP: ABNORMAL /UL
REF LAB TEST METHOD: ABNORMAL
SODIUM SERPL-SCNC: 142 MMOL/L (ref 136–145)
SP GR UR STRIP: 1.02 (ref 1–1.03)
SP GR UR: 1.03 (ref 1–1.03)
SQUAMOUS #/AREA URNS HPF: ABNORMAL /HPF
UROBILINOGEN UR QL STRIP: ABNORMAL
UROBILINOGEN UR QL: ABNORMAL
WBC # UR STRIP: ABNORMAL /HPF
WBC NRBC COR # BLD: 7.32 10*3/MM3 (ref 3.4–10.8)

## 2023-11-10 PROCEDURE — 25510000001 IOPAMIDOL PER 1 ML: Performed by: NURSE PRACTITIONER

## 2023-11-10 PROCEDURE — 80053 COMPREHEN METABOLIC PANEL: CPT

## 2023-11-10 PROCEDURE — 81001 URINALYSIS AUTO W/SCOPE: CPT | Performed by: NURSE PRACTITIONER

## 2023-11-10 PROCEDURE — 74178 CT ABD&PLV WO CNTR FLWD CNTR: CPT

## 2023-11-10 PROCEDURE — 99213 OFFICE O/P EST LOW 20 MIN: CPT | Performed by: NURSE PRACTITIONER

## 2023-11-10 PROCEDURE — 1160F RVW MEDS BY RX/DR IN RCRD: CPT | Performed by: NURSE PRACTITIONER

## 2023-11-10 PROCEDURE — 81003 URINALYSIS AUTO W/O SCOPE: CPT | Performed by: NURSE PRACTITIONER

## 2023-11-10 PROCEDURE — 87086 URINE CULTURE/COLONY COUNT: CPT | Performed by: NURSE PRACTITIONER

## 2023-11-10 PROCEDURE — 1159F MED LIST DOCD IN RCRD: CPT | Performed by: NURSE PRACTITIONER

## 2023-11-10 PROCEDURE — 82565 ASSAY OF CREATININE: CPT

## 2023-11-10 PROCEDURE — 85025 COMPLETE CBC W/AUTO DIFF WBC: CPT

## 2023-11-10 PROCEDURE — 36415 COLL VENOUS BLD VENIPUNCTURE: CPT

## 2023-11-10 RX ORDER — LEVOFLOXACIN 5 MG/ML
500 INJECTION, SOLUTION INTRAVENOUS ONCE
Status: CANCELLED | OUTPATIENT
Start: 2023-11-10 | End: 2023-11-10

## 2023-11-10 RX ADMIN — IOPAMIDOL 100 ML: 755 INJECTION, SOLUTION INTRAVENOUS at 12:41

## 2023-11-10 NOTE — PROGRESS NOTES
"Chief Complaint  Blood in Urine (Pt states he has noticed a change in color of his urine. Not sure if it is blood or not. Just wants to make sure)    Subjective      Enrique Wylie is an 88-year-old male that presents to [facility] with c/o change in urine color that started about 4 days ago.  He is unsure if it is blood but just wanted to have it checked out.  He denies any associated symptoms such as flank pain, dysuria or fever.  He is on eliquis, started it about 1 month ago for irregular heart beat.  He denies any increase in bleeding or bruising.     History of Present Illness    Current Outpatient Medications   Medication Instructions    acetaminophen (Tylenol 8 Hour) 650 MG 8 hr tablet Tylenol Arthritis Pain 650 mg oral tablet extended release take 2 tablets (1,300 mg) by oral route every 8 hours as needed swallowing whole with water. Do not break, crush, dissolve and/or chew.   Active    apixaban (ELIQUIS) 5 mg, Oral, 2 Times Daily    atorvastatin (LIPITOR) 10 mg, Oral, Daily    carbidopa-levodopa (SINEMET)  MG per tablet 1 tablet, Oral, 2 Times Daily    chlorpheniramine (CHLOR-TRIMETON) 4 mg, Oral, Every 6 Hours PRN    diphenhydrAMINE (BENADRYL) 25 mg, Oral, Every Night at Bedtime    glucosamine sulfate 500 MG capsule capsule Oral, Once    hydrocortisone 2.5 % ointment apply A thin layer topically TO THE affected AREA TWICE DAILY    mirtazapine (REMERON) 15 mg, Oral, Daily, HS    multivitamin with minerals (CENTRUM SILVER PO) Centrum  mg-mcg oral tablet take 1 tablet by oral route once daily with food   Suspended       The following portions of the patient's history were reviewed and updated as appropriate: allergies, current medications, past family history, past medical history, past social history, past surgical history, and problem list.    Objective   Vital Signs:   /73   Pulse 81   Temp 98.1 °F (36.7 °C) (Temporal)   Ht 185.4 cm (73\")   Wt " 86.2 kg (190 lb)   SpO2 99%   BMI 25.07 kg/m²     Wt Readings from Last 3 Encounters:   11/10/23 86.2 kg (190 lb)   06/05/23 85.3 kg (188 lb)   01/18/23 83.5 kg (184 lb)     BP Readings from Last 3 Encounters:   11/10/23 148/73   06/05/23 132/78   01/18/23 134/70     Physical Exam  Vitals reviewed.   Constitutional:       Appearance: Normal appearance. He is well-developed. He is not ill-appearing.   HENT:      Head: Normocephalic and atraumatic.   Eyes:      Conjunctiva/sclera: Conjunctivae normal.      Pupils: Pupils are equal, round, and reactive to light.   Cardiovascular:      Rate and Rhythm: Normal rate and regular rhythm.   Pulmonary:      Effort: Pulmonary effort is normal.      Breath sounds: Normal breath sounds.   Abdominal:      Tenderness: There is no right CVA tenderness or left CVA tenderness.   Skin:     General: Skin is warm and dry.   Neurological:      Mental Status: He is alert and oriented to person, place, and time.   Psychiatric:         Mood and Affect: Mood and affect normal.         Behavior: Behavior normal.          Result Review :  The following data was reviewed by: DANIELA Benson on 11/10/2023:          Lab Results (last 72 hours)       Procedure Component Value Units Date/Time    Urine Culture - Urine, Urine, Clean Catch [[id number]] Collected: 11/10/23 1018    Specimen: Urine, Clean Catch Updated: 11/10/23 1018    POCT urinalysis dipstick, automated [[id number]]  (Abnormal) Collected: 11/10/23 1057    Specimen: Urine Updated: 11/10/23 1059     Color Other     Clarity, UA Turbid     Specific Gravity  1.030     pH, Urine 6.5     Leukocytes Negative     Nitrite, UA Negative     Protein,  mg/dL mg/dL      Glucose, UA Negative mg/dL      Ketones, UA Negative     Urobilinogen, UA 1 E.U./dL     Bilirubin Small (1+)     Blood, UA Large     Lot Number [id number]     Expiration Date 09/30/2024    CBC w AUTO Differential [[id number]] Collected: 11/10/23 1104    Specimen: Blood  from Arm, Left Updated: 11/10/23 1124    Narrative:      The following orders were created for panel order CBC w AUTO Differential.  Procedure                               Abnormality         Status                     ---------                               -----------         ------                     CBC Auto Differential[[id number]]                            In process                   Please view results for these tests on the individual orders.    Comprehensive metabolic panel [[id number]] Collected: 11/10/23 1104    Specimen: Blood from Arm, Left Updated: 11/10/23 1124    CBC Auto Differential [[id number]] Collected: 11/10/23 1104    Specimen: Blood from Arm, Left Updated: 11/10/23 1124    POC Creatinine [[id number]]  (Abnormal) Collected: 11/10/23 1219    Specimen: Blood Updated: 11/10/23 1237     Creatinine 1.20 mg/dL      Comment: Serial Number: [id number]:  [id number]        eGFR 58.9 mL/min/1.73              No Images in the past 120 days found..    Lab Results   Component Value Date    BILIRUBINUR Small (1+) (A) 11/10/2023       Procedures        Assessment and Plan   Diagnoses and all orders for this visit:    1. Gross hematuria (Primary)  -     Urinalysis With Culture If Indicated -; Future  -     CBC w AUTO Differential; Future  -     Comprehensive metabolic panel; Future  -     Cancel: Ambulatory Referral to Urology  -     Urinalysis With Culture If Indicated -  -     CT Abdomen Pelvis With & Without Contrast; Future    2. Screening for blood or protein in urine  -     POCT urinalysis dipstick, automated  -     Cancel: Urine Culture - Urine, Urine, Clean Catch; Future  -     Urine Culture - Urine, Urine, Clean Catch        BMI is >= 25 and <30. (Overweight) The following options were offered after discussion;: none (medical contraindication)         There are no discontinued medications.       Follow Up   No follow-ups on file.  Patient was given instructions and counseling regarding his condition  or for health maintenance advice. Please see specific information pulled into the AVS if appropriate.       Angi Muñoz, DANIELA  11/10/23  16:39 EST

## 2023-11-10 NOTE — TELEPHONE ENCOUNTER
Spoke with Pamela Waters, pts POA/emergency contact. Verified patients information. Informed of patients CT scan results and need for urology. Advised that if he were to develop new symptoms such as pain, difficulty urinating or fever to go directly to the ER. Verbalized understanding.

## 2023-11-10 NOTE — TELEPHONE ENCOUNTER
SPOKE TO PT AND ADVISED THAT DR GUEVARA WANTS HIM TO HOLD HIS ELIQUIS, IF HE IS COMFORTABLE WITH THAT, AND WE WILL SCHEDULE HIM FOR SURGERY ON MONDAY 11/13. PT IS AGREEABLE TO HOLD MED AND PROCEED WITH SURGERY. ADVISED HIM THAT HE WILL RECEIVE A CALL FROM THE HOSPITAL ON SUNDAY TO ADVISE WHAT TIME HE SHOULD BE AT THE HOSPITAL ON MONDAY, ADVISED NOTHING TO EAT/DRINK AFTER MIDNIGHT, ADVISED WILL NEED SOMEONE TO DRIVE HIM HOME AFTER SURGERY. ADVISED PT THAT SHOULD HE DEVELOP A FEVER/HAVE UNCONTROLLED PAIN/VOMITING, HE SHOULD GO TO [facility] ER. PT EXPRESSED UNDERSTANDING AND IS AGREEABLE.    SENT MESSAGE TO DR GUEVARA TO INFORM.

## 2023-11-12 LAB — BACTERIA SPEC AEROBE CULT: NO GROWTH

## 2023-11-13 ENCOUNTER — ANESTHESIA (OUTPATIENT)
Dept: PERIOP | Facility: HOSPITAL | Age: 88
End: 2023-11-13
Payer: MEDICARE

## 2023-11-13 ENCOUNTER — ANESTHESIA EVENT (OUTPATIENT)
Dept: PERIOP | Facility: HOSPITAL | Age: 88
End: 2023-11-13
Payer: MEDICARE

## 2023-11-13 ENCOUNTER — HOSPITAL ENCOUNTER (OUTPATIENT)
Facility: HOSPITAL | Age: 88
Setting detail: HOSPITAL OUTPATIENT SURGERY
Discharge: HOME | End: 2023-11-13
Attending: UROLOGY | Admitting: UROLOGY
Payer: MEDICARE

## 2023-11-13 ENCOUNTER — APPOINTMENT (OUTPATIENT)
Dept: GENERAL RADIOLOGY | Facility: HOSPITAL | Age: 88
End: 2023-11-13
Payer: MEDICARE

## 2023-11-13 VITALS
RESPIRATION RATE: 16 BRPM | HEART RATE: 75 BPM | SYSTOLIC BLOOD PRESSURE: 125 MMHG | BODY MASS INDEX: 27.49 KG/M2 | WEIGHT: 192.02 LBS | TEMPERATURE: 98.8 F | HEIGHT: 70 IN | OXYGEN SATURATION: 97 % | DIASTOLIC BLOOD PRESSURE: 72 MMHG

## 2023-11-13 DIAGNOSIS — N20.0 NEPHROLITHIASIS: ICD-10-CM

## 2023-11-13 DIAGNOSIS — N20.1 LEFT URETERAL STONE: ICD-10-CM

## 2023-11-13 PROCEDURE — 82365 CALCULUS SPECTROSCOPY: CPT | Performed by: UROLOGY

## 2023-11-13 PROCEDURE — 25010000002 MIDAZOLAM PER 1MG: Performed by: ANESTHESIOLOGY

## 2023-11-13 PROCEDURE — 74420 UROGRAPHY RTRGR +-KUB: CPT | Performed by: UROLOGY

## 2023-11-13 PROCEDURE — 25010000002 LEVOFLOXACIN PER 250 MG: Performed by: UROLOGY

## 2023-11-13 PROCEDURE — C1769 GUIDE WIRE: HCPCS | Performed by: UROLOGY

## 2023-11-13 PROCEDURE — 25010000002 FENTANYL CITRATE (PF) 50 MCG/ML SOLUTION

## 2023-11-13 PROCEDURE — 52356 CYSTO/URETERO W/LITHOTRIPSY: CPT | Performed by: UROLOGY

## 2023-11-13 PROCEDURE — 52332 CYSTOSCOPY AND TREATMENT: CPT | Performed by: UROLOGY

## 2023-11-13 PROCEDURE — 76000 FLUOROSCOPY <1 HR PHYS/QHP: CPT

## 2023-11-13 PROCEDURE — S0260 H&P FOR SURGERY: HCPCS | Performed by: UROLOGY

## 2023-11-13 PROCEDURE — C1894 INTRO/SHEATH, NON-LASER: HCPCS | Performed by: UROLOGY

## 2023-11-13 PROCEDURE — C1758 CATHETER, URETERAL: HCPCS | Performed by: UROLOGY

## 2023-11-13 PROCEDURE — 25510000001 IOPAMIDOL PER 1 ML: Performed by: UROLOGY

## 2023-11-13 PROCEDURE — 25010000002 PROPOFOL 10 MG/ML EMULSION

## 2023-11-13 PROCEDURE — C2617 STENT, NON-COR, TEM W/O DEL: HCPCS | Performed by: UROLOGY

## 2023-11-13 PROCEDURE — 25810000003 LACTATED RINGERS PER 1000 ML: Performed by: ANESTHESIOLOGY

## 2023-11-13 PROCEDURE — 25010000002 SUGAMMADEX 200 MG/2ML SOLUTION

## 2023-11-13 PROCEDURE — 88300 SURGICAL PATH GROSS: CPT | Performed by: UROLOGY

## 2023-11-13 DEVICE — STNT URETRL QUADRA/COIL M/LEN BR/NYLON 6F 22TO28CM: Type: IMPLANTABLE DEVICE | Site: URETER | Status: FUNCTIONAL

## 2023-11-13 DEVICE — URETERAL STENT
Type: IMPLANTABLE DEVICE | Site: URETER | Status: FUNCTIONAL
Brand: ASCERTA™

## 2023-11-13 RX ORDER — DEXMEDETOMIDINE HYDROCHLORIDE 100 UG/ML
INJECTION, SOLUTION INTRAVENOUS AS NEEDED
Status: DISCONTINUED | OUTPATIENT
Start: 2023-11-13 | End: 2023-11-13 | Stop reason: SURG

## 2023-11-13 RX ORDER — ONDANSETRON 2 MG/ML
4 INJECTION INTRAMUSCULAR; INTRAVENOUS ONCE AS NEEDED
Status: DISCONTINUED | OUTPATIENT
Start: 2023-11-13 | End: 2023-11-13 | Stop reason: HOSPADM

## 2023-11-13 RX ORDER — IBUPROFEN 600 MG/1
600 TABLET ORAL EVERY 6 HOURS PRN
Status: DISCONTINUED | OUTPATIENT
Start: 2023-11-13 | End: 2023-11-13 | Stop reason: HOSPADM

## 2023-11-13 RX ORDER — MAGNESIUM HYDROXIDE 1200 MG/15ML
LIQUID ORAL AS NEEDED
Status: DISCONTINUED | OUTPATIENT
Start: 2023-11-13 | End: 2023-11-13 | Stop reason: HOSPADM

## 2023-11-13 RX ORDER — SODIUM CHLORIDE, SODIUM LACTATE, POTASSIUM CHLORIDE, CALCIUM CHLORIDE 600; 310; 30; 20 MG/100ML; MG/100ML; MG/100ML; MG/100ML
9 INJECTION, SOLUTION INTRAVENOUS CONTINUOUS PRN
Status: DISCONTINUED | OUTPATIENT
Start: 2023-11-13 | End: 2023-11-13 | Stop reason: HOSPADM

## 2023-11-13 RX ORDER — LEVOFLOXACIN 5 MG/ML
500 INJECTION, SOLUTION INTRAVENOUS ONCE
Status: COMPLETED | OUTPATIENT
Start: 2023-11-13 | End: 2023-11-13

## 2023-11-13 RX ORDER — FENTANYL CITRATE 50 UG/ML
INJECTION, SOLUTION INTRAMUSCULAR; INTRAVENOUS AS NEEDED
Status: DISCONTINUED | OUTPATIENT
Start: 2023-11-13 | End: 2023-11-13 | Stop reason: SURG

## 2023-11-13 RX ORDER — PROMETHAZINE HYDROCHLORIDE 12.5 MG/1
25 TABLET ORAL ONCE AS NEEDED
Status: DISCONTINUED | OUTPATIENT
Start: 2023-11-13 | End: 2023-11-13 | Stop reason: HOSPADM

## 2023-11-13 RX ORDER — PROMETHAZINE HYDROCHLORIDE 12.5 MG/1
12.5 TABLET ORAL ONCE AS NEEDED
Status: DISCONTINUED | OUTPATIENT
Start: 2023-11-13 | End: 2023-11-13 | Stop reason: HOSPADM

## 2023-11-13 RX ORDER — ACETAMINOPHEN 325 MG/1
650 TABLET ORAL ONCE
Status: DISCONTINUED | OUTPATIENT
Start: 2023-11-13 | End: 2023-11-13 | Stop reason: HOSPADM

## 2023-11-13 RX ORDER — OXYCODONE HYDROCHLORIDE AND ACETAMINOPHEN 5; 325 MG/1; MG/1
1-2 TABLET ORAL EVERY 6 HOURS PRN
Qty: 12 TABLET | Refills: 0 | Status: SHIPPED | OUTPATIENT
Start: 2023-11-13

## 2023-11-13 RX ORDER — PROMETHAZINE HYDROCHLORIDE 25 MG/1
25 SUPPOSITORY RECTAL ONCE AS NEEDED
Status: DISCONTINUED | OUTPATIENT
Start: 2023-11-13 | End: 2023-11-13 | Stop reason: HOSPADM

## 2023-11-13 RX ORDER — LIDOCAINE HYDROCHLORIDE 20 MG/ML
INJECTION, SOLUTION EPIDURAL; INFILTRATION; INTRACAUDAL; PERINEURAL AS NEEDED
Status: DISCONTINUED | OUTPATIENT
Start: 2023-11-13 | End: 2023-11-13 | Stop reason: SURG

## 2023-11-13 RX ORDER — METOPROLOL TARTRATE 5 MG/5ML
INJECTION INTRAVENOUS AS NEEDED
Status: DISCONTINUED | OUTPATIENT
Start: 2023-11-13 | End: 2023-11-13 | Stop reason: SURG

## 2023-11-13 RX ORDER — PHENYLEPHRINE HCL IN 0.9% NACL 1 MG/10 ML
SYRINGE (ML) INTRAVENOUS AS NEEDED
Status: DISCONTINUED | OUTPATIENT
Start: 2023-11-13 | End: 2023-11-13 | Stop reason: SURG

## 2023-11-13 RX ORDER — PROPOFOL 10 MG/ML
VIAL (ML) INTRAVENOUS AS NEEDED
Status: DISCONTINUED | OUTPATIENT
Start: 2023-11-13 | End: 2023-11-13 | Stop reason: SURG

## 2023-11-13 RX ORDER — ONDANSETRON 4 MG/1
4 TABLET, FILM COATED ORAL ONCE AS NEEDED
Status: DISCONTINUED | OUTPATIENT
Start: 2023-11-13 | End: 2023-11-13 | Stop reason: HOSPADM

## 2023-11-13 RX ORDER — ACETAMINOPHEN 500 MG
1000 TABLET ORAL ONCE
Status: COMPLETED | OUTPATIENT
Start: 2023-11-13 | End: 2023-11-13

## 2023-11-13 RX ORDER — PHENAZOPYRIDINE HYDROCHLORIDE 200 MG/1
200 TABLET, FILM COATED ORAL 3 TIMES DAILY PRN
Qty: 12 TABLET | Refills: 0 | Status: SHIPPED | OUTPATIENT
Start: 2023-11-13

## 2023-11-13 RX ORDER — MIDAZOLAM HYDROCHLORIDE 2 MG/2ML
1 INJECTION, SOLUTION INTRAMUSCULAR; INTRAVENOUS ONCE
Status: COMPLETED | OUTPATIENT
Start: 2023-11-13 | End: 2023-11-13

## 2023-11-13 RX ORDER — OXYCODONE HYDROCHLORIDE 5 MG/1
5 TABLET ORAL
Status: DISCONTINUED | OUTPATIENT
Start: 2023-11-13 | End: 2023-11-13 | Stop reason: HOSPADM

## 2023-11-13 RX ORDER — ROCURONIUM BROMIDE 10 MG/ML
INJECTION, SOLUTION INTRAVENOUS AS NEEDED
Status: DISCONTINUED | OUTPATIENT
Start: 2023-11-13 | End: 2023-11-13 | Stop reason: SURG

## 2023-11-13 RX ORDER — TAMSULOSIN HYDROCHLORIDE 0.4 MG/1
1 CAPSULE ORAL DAILY
Qty: 30 CAPSULE | Refills: 0 | Status: SHIPPED | OUTPATIENT
Start: 2023-11-13

## 2023-11-13 RX ADMIN — METOPROLOL TARTRATE 1 MG: 1 INJECTION, SOLUTION INTRAVENOUS at 14:19

## 2023-11-13 RX ADMIN — LIDOCAINE HYDROCHLORIDE 30 MG: 20 INJECTION, SOLUTION EPIDURAL; INFILTRATION; INTRACAUDAL; PERINEURAL at 13:20

## 2023-11-13 RX ADMIN — FENTANYL CITRATE 25 MCG: 50 INJECTION, SOLUTION INTRAMUSCULAR; INTRAVENOUS at 13:07

## 2023-11-13 RX ADMIN — LEVOFLOXACIN 500 MG: 5 INJECTION, SOLUTION INTRAVENOUS at 13:11

## 2023-11-13 RX ADMIN — PROPOFOL 100 MG: 10 INJECTION, EMULSION INTRAVENOUS at 13:07

## 2023-11-13 RX ADMIN — METOPROLOL TARTRATE 1 MG: 1 INJECTION, SOLUTION INTRAVENOUS at 13:36

## 2023-11-13 RX ADMIN — METOPROLOL TARTRATE 1 MG: 1 INJECTION, SOLUTION INTRAVENOUS at 13:28

## 2023-11-13 RX ADMIN — ROCURONIUM BROMIDE 10 MG: 50 INJECTION INTRAVENOUS at 14:05

## 2023-11-13 RX ADMIN — ROCURONIUM BROMIDE 50 MG: 50 INJECTION INTRAVENOUS at 13:07

## 2023-11-13 RX ADMIN — MIDAZOLAM HYDROCHLORIDE 1 MG: 1 INJECTION, SOLUTION INTRAMUSCULAR; INTRAVENOUS at 12:42

## 2023-11-13 RX ADMIN — ROCURONIUM BROMIDE 15 MG: 50 INJECTION INTRAVENOUS at 13:45

## 2023-11-13 RX ADMIN — ACETAMINOPHEN 1000 MG: 500 TABLET ORAL at 11:58

## 2023-11-13 RX ADMIN — SODIUM CHLORIDE, POTASSIUM CHLORIDE, SODIUM LACTATE AND CALCIUM CHLORIDE 9 ML/HR: 600; 310; 30; 20 INJECTION, SOLUTION INTRAVENOUS at 11:58

## 2023-11-13 RX ADMIN — LIDOCAINE HYDROCHLORIDE 50 MG: 20 INJECTION, SOLUTION EPIDURAL; INFILTRATION; INTRACAUDAL; PERINEURAL at 13:07

## 2023-11-13 RX ADMIN — METOPROLOL TARTRATE 1 MG: 1 INJECTION, SOLUTION INTRAVENOUS at 13:51

## 2023-11-13 RX ADMIN — DEXMEDETOMIDINE 10 MCG: 100 INJECTION, SOLUTION INTRAVENOUS at 14:28

## 2023-11-13 RX ADMIN — SUGAMMADEX 200 MG: 100 INJECTION, SOLUTION INTRAVENOUS at 14:32

## 2023-11-13 RX ADMIN — Medication 150 MCG: at 13:19

## 2023-11-13 RX ADMIN — METOPROLOL TARTRATE 1 MG: 1 INJECTION, SOLUTION INTRAVENOUS at 13:45

## 2023-11-13 NOTE — ANESTHESIA POSTPROCEDURE EVALUATION
Patient: Enrique Wylie    Procedure Summary       Date: 11/13/23 Room / Location: [facility] OR 08 / [facility] MAIN OR    Anesthesia Start: 1301 Anesthesia Stop: 1444    Procedure: Bilateral Retrograde Pyelogram, Left Ureteroscopy Lasertripsy Basket Stone Extraction and Stent Insertion, Right Stent Insertion, Bladder Stone Extraction (Bilateral: Ureter) Diagnosis:       Nephrolithiasis      Left ureteral stone      (Nephrolithiasis [N20.0])      (Left ureteral stone [N20.1])    Surgeons: Katerina Carpenter MD Provider: Jose Gillette CRNA    Anesthesia Type: general ASA Status: 3            Anesthesia Type: general    Vitals  Vitals Value Taken Time   /72 11/13/23 1515   Temp 36.6 °C (97.8 °F) 11/13/23 1515   Pulse 65 11/13/23 1516   Resp 16 11/13/23 1515   SpO2 98 % 11/13/23 1516   Vitals shown include unfiled device data.        Post Anesthesia Care and Evaluation    Patient location during evaluation: bedside  Patient participation: complete - patient participated  Level of consciousness: awake  Pain management: adequate    Airway patency: patent  PONV Status: none  Cardiovascular status: acceptable  Respiratory status: acceptable  Hydration status: acceptable    Comments: An Anesthesiologist personally participated in the most demanding procedures (including induction and emergence if applicable) in the anesthesia plan, monitored the course of anesthesia administration at frequent intervals and remained physically present and available for immediate diagnosis and treatment of emergencies.

## 2023-11-13 NOTE — ANESTHESIA PREPROCEDURE EVALUATION
Anesthesia Evaluation     Patient summary reviewed and Nursing notes reviewed   no history of anesthetic complications:   NPO Solid Status: > 8 hours  NPO Liquid Status: > 2 hours           Airway   Mallampati: II  TM distance: >3 FB  Neck ROM: full  No difficulty expected and Anterior  Dental      Pulmonary - negative pulmonary ROS and normal exam    breath sounds clear to auscultation  Cardiovascular - normal exam  Exercise tolerance: good (4-7 METS)    Rhythm: regular  Rate: normal    (+) dysrhythmias Atrial Fib, hyperlipidemia      Neuro/Psych- negative ROS  GI/Hepatic/Renal/Endo    (+) renal disease- stones    Musculoskeletal     (+) back pain  Abdominal    Substance History - negative use     OB/GYN negative ob/gyn ROS         Other   arthritis,   history of cancer    ROS/Med Hx Other: PAT Nursing Notes unavailable.               Anesthesia Plan    ASA 3     general     (Patient understands anesthesia not responsible for dental damage.)  intravenous induction     Anesthetic plan, risks, benefits, and alternatives have been provided, discussed and informed consent has been obtained with: patient.  Pre-procedure education provided  Plan discussed with CRNA.    CODE STATUS:

## 2023-11-13 NOTE — DISCHARGE INSTRUCTIONS
DISCHARGE INSTRUCTIONS  Ureteroscopy Lasertripsy      For your surgery you had:  General anesthesia (you may have a sore throat for the first 24 hours)     You may experience dizziness, drowsiness, or lightheadedness for several hours following surgery.  Do not stay alone today or tonight.  Limit your activity for 24 hours.  You should not drive or operate machinery, drink alcohol, or sign legally binding documents for 24 hours or while you are taking pain medication.  Resume your diet slowly.  Follow any special dietary instructions you may have been given by your doctor.     NOTIFY YOUR DOCTOR IF YOU EXPERIENCE ANY OF THE FOLLOWING:  Temperature greater than 101 degrees Fahrenheit  Shaking Chills  Redness or excessive drainage from incision  Nausea, vomiting and/or pain that is not controlled by prescribed medications  Increase in bleeding or bleeding that is excessive  Unable to urinate in 6 hours after surgery  If unable to reach your doctor, please go to the closest Emergency Room  Strain urine if instructed by physician.  Collect any fragments and take with you on your scheduled appointment. You may pass stone pieces or small blood clots.  Blood in your urine is normal.  It could be light pink to cherry color.  Drink 6-8 glasses of fluid each day to assist with passing of stone fragments.  Back pain is common.  It may feel like a dull ache or back spasm.  Urine will be bloody for several days.  Slight redness or bruising may be noticed on treated side.  If you have difficulty urinating, try sitting in a bathtub of warm water.    If you have a stent, it must be managed by your urologist.  Do NOT forget.  Medications per physician instructions as indicated on Discharge Medication Information Sheet.    Last dose of pain medication was given at: Tylenol 1000 mg given at 11:58. Do not exceed 4000 mg in a 24 hour period.    SPECIAL INSTRUCTIONS:  Dr Trujillo office will contact you with a follow up appointment.

## 2023-11-13 NOTE — OP NOTE
Preoperative diagnosis  Left ureteral stones; right nephrolithiasis; bladder stone, gross hematuria    Postoperative diagnosis  Left ureteral stones; right nephrolithiasis; bladder stone, gross hematuria    Procedure performed  Cystoscopy, bilateral retrograde pyelogram, bilateral ureteral stent insertion  Left ureteroscopy with laser lithotripsy  Bladder stone extraction    Attending surgeon  Katerina Carpenter MD     Anesthesia  General    EBL  0 mL    Complications  None    Specimen  Left ureteral stones  Bladder stone    Findings  Significant prostatomegaly with bilateral coapting lateral lobes and median lobe; severely trabeculated bladder with numerous diverticula; no tumors or masses.  Bladder stone extracted with basket.  Bilateral orthotopic ureters    Left distal ureteral stone somewhat tight; able to accommodate access sheath; large left ureteral stones treated with laser lithotripsy and basket extraction; retrograde pyelogram with severe hydroureteronephrosis; no stones visualized at last inspection  6 [place]wana variable length stent    Right retrograde pyelogram with mild proximal hydronephrosis, 6 x 28 stent no string      Indications  88 y.o. male agreed to undergo the above named pro visualized last inspectioncedure after discussion of the alternatives, risks and benefits.   Informed consent was obtained.      Procedure  After informed consent was obtained, the patient was taken back to the  operating suite where general anesthesia was administered. Once the patient  was adequately anesthetized, he was placed in the dorsal lithotomy position.  Her genitals were prepped and draped in a normal sterile fashion.  Rigid  cystoscope was inserted gently in the patient's urethra meatus, which passed  atraumatically into the bladder; pan cystoscopy was performed in a typical 360 degree manner.  Patient noted to have severe prostatomegaly with bilateral coapting lateral lobes and median lobe.  Bladder was of large  capacity, severe trabeculations and numerous diverticula.  Numerous tiny bladder stones and 1 sizable larger bladder stone consistent with findings on CT scan.  Basket was inserted and the largest bladder stone, approximately 8 mm was extracted entirely and passed off for chemical analysis.  Scope was then reinserted, bilateral orthotopic ureters noted.     Pollack catheter was then placed in the left ureter in order to obtain retrograde  pyelogram. Retrograde pyelogram was performed revealing severe hydroureteronephrosis, ureteral stones Dr. Carpenter the wire  was then replaced after retrograde pyelogram, the Pollack was reduced.  Second sensor wire was placed into the renal pelvis. The  access sheath was passed level of the mid ureter.  The ureteroscope was then assembled and ureteroscopy  proceeded in a systematic manner until the stone was  encountered.  There was a large stone burden within the mid ureter.  Laser lithotripsy was initiated to fragment the stone.  The stones had to be fragmented into considerably small sizes to accommodate the ureter.  At one point, the access sheath had to be replaced as it moved more distally.  Eventually, the ureter was free of stone.  Pyeloscopy was performed and there was a couple fragments that had blown up into the renal pelvis.  These were able to be lasered and retrieved with basket.  Final inspection of the intrarenal collecting system and ureter only revealed sediment and dust. Once there were no further stone fragments, ureteroscopy proceeded down the length of the ureter  with retrieval of the access sheath, leaving the safety wire in place. There were no further ureteral calculi  or fragments. After withdrawing the  ureteroscope, as well as the access sheath, the wire was back loaded through  the cystoscope and 6 Slovenian variable length stent was placed over the wire under  direct visualization.  Upon retrieval of the wire, there was good proximal  coil noted on  x-ray, as well as distal coil within the bladder.     Focus was then placed on the right side.  Pollick catheter was inserted at the ureteral orifice, contrast dye indicated somewhat tortuous ureter with mild hydronephrosis; no obvious filling defects.  Wire was then placed and Pollack reduced.  A 6 x 28 stent was placed over the wire under direct vision.  Upon retrieval of the wire, good proximal coil was noted as well as in the bladder.  The bladder was emptied and the cystoscope removed.    At this  point, the procedure was deemed terminated.  The patient's bladder was then emptied and the cystoscope removed.  He was then placed back in the supine position and awakened from general  anesthesia and transferred to the PACU in good condition.       Signed:  Katerina Carpenter MD  11/13/23  14:40 EST

## 2023-11-14 LAB
LAB AP CASE REPORT: NORMAL
LAB AP CLINICAL INFORMATION: NORMAL
PATH REPORT.FINAL DX SPEC: NORMAL
PATH REPORT.GROSS SPEC: NORMAL

## 2023-11-17 ENCOUNTER — PREP FOR SURGERY (OUTPATIENT)
Dept: OTHER | Facility: HOSPITAL | Age: 88
End: 2023-11-17
Payer: MEDICARE

## 2023-11-17 DIAGNOSIS — N20.0 NEPHROLITHIASIS: Primary | ICD-10-CM

## 2023-11-17 DIAGNOSIS — T19.9XXA FOREIGN BODY IN GENITOURINARY TRACT, INITIAL ENCOUNTER: ICD-10-CM

## 2023-11-17 RX ORDER — LEVOFLOXACIN 5 MG/ML
500 INJECTION, SOLUTION INTRAVENOUS ONCE
OUTPATIENT
Start: 2023-11-17 | End: 2023-11-17

## 2023-11-21 ENCOUNTER — TELEPHONE (OUTPATIENT)
Dept: UROLOGY | Facility: CLINIC | Age: 88
End: 2023-11-21
Payer: MEDICARE

## 2023-11-21 DIAGNOSIS — N20.0 NEPHROLITHIASIS: Primary | ICD-10-CM

## 2023-11-21 DIAGNOSIS — T19.9XXA FOREIGN BODY IN GENITOURINARY TRACT, INITIAL ENCOUNTER: ICD-10-CM

## 2023-11-21 DIAGNOSIS — N20.1 LEFT URETERAL STONE: ICD-10-CM

## 2023-11-21 DIAGNOSIS — Z01.818 PRE-OP TESTING: ICD-10-CM

## 2023-11-21 NOTE — TELEPHONE ENCOUNTER
SPOKE TO АНДРЕЙ RE- SCHED SX. OK TO SCHED 12/4. ADVISED WILL NEED UCX WEEK PRIOR; SENDING ORDER AND SX BROCHURE; ADVISED [facility] WILL CALL WITH AN ARRIVAL TIME ON 12/1. PT ON ELIQUIS; FAXED ANTI-COAG CLEAR TO RICH JUDD NP. АНДРЕЙ EXPRESSED UNDERSTANDING AND IS AGREEABLE.

## 2023-11-22 ENCOUNTER — TRANSCRIBE ORDERS (OUTPATIENT)
Dept: ADMINISTRATIVE | Facility: HOSPITAL | Age: 88
End: 2023-11-22
Payer: MEDICARE

## 2023-11-22 ENCOUNTER — TELEPHONE (OUTPATIENT)
Dept: UROLOGY | Facility: CLINIC | Age: 88
End: 2023-11-22
Payer: MEDICARE

## 2023-11-22 DIAGNOSIS — R07.9 CHEST PAIN, UNSPECIFIED TYPE: Primary | ICD-10-CM

## 2023-11-22 LAB
COLOR STONE: NORMAL
COM MFR STONE: 100 %
COMPN STONE: NORMAL
LABORATORY COMMENT REPORT: NORMAL
Lab: NORMAL
Lab: NORMAL
PHOTO: NORMAL
SIZE STONE: NORMAL MM
SPEC SOURCE SUBJ: NORMAL
WT STONE: 709 MG

## 2023-11-22 NOTE — TELEPHONE ENCOUNTER
DR BRASWELL'S OFFICE CALLED RE- ANTI-COAG CLEARANCE. SHE SAID THAT HE WILL NEED A LEXISCAN(?) AND THAT SHE WILL TRY TO GET IT DONE BEFORE HIS SCHED SURG. IF THERE'S AN ISSUE, SHE WILL CALL ME BACK.

## 2023-12-01 ENCOUNTER — TELEPHONE (OUTPATIENT)
Dept: UROLOGY | Facility: CLINIC | Age: 88
End: 2023-12-01
Payer: MEDICARE

## 2023-12-01 NOTE — TELEPHONE ENCOUNTER
SPOKE TO MS ELI AND ADVISED THAT I SPOKE TO DR BRASWELL'S OFFICE AND ADVISED OF THE SITUATION. ADVISED MS ELI THAT SHE SHOULD GET IN CONTACT WITH DR BRASWELL'S OFFICE TO GET THE STRESS TEST RESCHEDULED, AND THAT DR GUEVARA'S SURGERY TIMING WILL BE DEPENDANT ON THAT AND DR BRASWELL'S APPROVAL.    MS ELI STATED THAT WE NEED TO CALL HER AND NOT THE PT, BECAUSE THAT IS HOW ALL THIS GOT CONFUSED IN THE FIRST PLACE. SHE SAID PT DOESN'T REMEMBER THINGS AND GETS CONFUSED. WE DO HAVE HER NUMBER AS PT'S PRIMARY NUMBER.    OF NOTE-- PER CHART REVIEW, I SPOKE TO MS ELI ON 11/21 TO SCHEDULE PT'S SURGERY FOR 12/4. I ALSO MAILED HER A LAB ORDER FOR THE UCX AND A SURGERY BROCHURE CONFIRMING 12/4 AS HIS SURGERY DATE.

## 2023-12-01 NOTE — TELEPHONE ENCOUNTER
KACY CALLED ABOUT THIS PT'S UPCOMING SURGERY AND THE STRESS TEST. ADVISED HER THAT I HAD ALREADY SPOKEN TO THE PT POA. KACY SAID THAT PT POA CANCELLED THE STRESS TEST. I ADVISED HER THAT WE WILL PUT THE CASE IN THE DEPOT, AND CALL DR BRASWELL'S OFFICE TO ADVISE THEM OF THE SITUATION. KACY SAID THAT THEY WILL CALL SAME DAY TO ADVISE.

## 2023-12-01 NOTE — PAT
TRACEY NOTIFIED THAT PATIENT STATES HE WAS UNAWARE OF STRESS TEST.  PER TRACEY SHE HAD ALREADY SPOKEN TO FRIEND AND AWARE THAT SURGERY NEEDS TO BE POSTPONED UNTIL AFTER STRESS. PER TRACEY FRIEND WHO HELP WITH PATIENT AWARE THAT SURGERY TO BE RESCHEDULED FOR AFTER STRESS TEST.

## 2023-12-01 NOTE — TELEPHONE ENCOUNTER
SPOKE TO SOMEONE AT DR BRASWELL'S OFFICE, ADVISED THAT PT WASN'T AWARE HIS STRESS TEST WAS TODAY. I LET THEM KNOW THAT THEY HAD CALLED IN AND CANCELLED THE APPT. ADVISED HER THAT HE WOULD NEED TO BE R/S FOR THE STRESS TEST. HIS SURGERY WITH DR GUEVARA IS PENDING THOSE RESULTS, AND DR BRASWELL'S APPROVAL.    THEY WILL CALL MS ELI TO GET THIS TAKEN CARE OF.

## 2023-12-01 NOTE — TELEPHONE ENCOUNTER
CALLED PT TO SEE IF URINE CULTURE WAS DONE FOR 12/4 SURGERY.     MS ELI SAID THAT HE WASN'T HAVING SURGERY ON 12/4, IT WAS 12/5, AND THAT HE HAS A STRESS TEST SCHEDULED FOR 12/4.     PER CHART REVIEW, ADVISED POA THAT HIS STRESS TEST IS SCHEDULED 12/1 (TODAY) AT 9AM. ALSO ADVISED THAT DR GUEVARA DOESN'T OPERATE ON TUESDAYS, AND HIS SURGERY IS SCHEDULED FOR 12/4. POA SAID THAT DR BRASWELL'S OFFICE TOLD HER STRESS TEST WAS 12/4 AND SURGERY WAS 12/5.    ADVISED POA THAT I WOULD MAKE SOME PHONE CALLS AND CALL HER BACK. HOWEVER, WE WOULD BE POSTPONING THE 12/4 SCHEDULED SURGERY.

## 2023-12-04 ENCOUNTER — TELEPHONE (OUTPATIENT)
Dept: UROLOGY | Facility: CLINIC | Age: 88
End: 2023-12-04
Payer: MEDICARE

## 2023-12-04 NOTE — TELEPHONE ENCOUNTER
SPOKE TO АНДРЕЙ, WHO WAS CALLING TO ADVISE THAT HE IS HAVING A STRESS TEST 12/5. I ADVISED HER THAT WE WILL BE IN CONTACT AFTER THAT RESULTS AND DETERMINE A NEW SURGERY DATE. SHE EXPRESSED UNDERSTANDING.

## 2023-12-05 ENCOUNTER — HOSPITAL ENCOUNTER (OUTPATIENT)
Dept: NUCLEAR MEDICINE | Facility: HOSPITAL | Age: 88
Discharge: HOME | End: 2023-12-05
Payer: MEDICARE

## 2023-12-05 DIAGNOSIS — R07.9 CHEST PAIN, UNSPECIFIED TYPE: ICD-10-CM

## 2023-12-05 LAB
BH CV IMMEDIATE POST TECH DATA BLOOD PRESSURE: NORMAL MMHG
BH CV IMMEDIATE POST TECH DATA HEART RATE: 95 BPM
BH CV IMMEDIATE POST TECH DATA OXYGEN SATS: 94 %
BH CV REST NUCLEAR ISOTOPE DOSE: 9.2 MCI
BH CV SIX MINUTE RECOVERY TECH DATA BLOOD PRESSURE: NORMAL
BH CV SIX MINUTE RECOVERY TECH DATA HEART RATE: 94 BPM
BH CV SIX MINUTE RECOVERY TECH DATA OXYGEN SATURATION: 98 %
BH CV STRESS BP STAGE 1: NORMAL
BH CV STRESS COMMENTS STAGE 1: NORMAL
BH CV STRESS DOSE REGADENOSON STAGE 1: 0.4
BH CV STRESS DURATION MIN STAGE 1: 0
BH CV STRESS DURATION SEC STAGE 1: 10
BH CV STRESS HR STAGE 1: 98
BH CV STRESS NUCLEAR ISOTOPE DOSE: 32.5 MCI
BH CV STRESS O2 STAGE 1: 99
BH CV STRESS PROTOCOL 1: NORMAL
BH CV STRESS RECOVERY BP: NORMAL MMHG
BH CV STRESS RECOVERY HR: 94 BPM
BH CV STRESS RECOVERY O2: 98 %
BH CV STRESS STAGE 1: 1
BH CV THREE MINUTE POST TECH DATA BLOOD PRESSURE: NORMAL MMHG
BH CV THREE MINUTE POST TECH DATA HEART RATE: 86 BPM
BH CV THREE MINUTE POST TECH DATA OXYGEN SATURATION: 97 %
LV EF NUC BP: 48 %
MAXIMAL PREDICTED HEART RATE: 134 BPM
PERCENT MAX PREDICTED HR: 76.87 %
STRESS BASELINE BP: NORMAL MMHG
STRESS BASELINE HR: 85 BPM
STRESS O2 SAT REST: 98 %
STRESS PERCENT HR: 90 %
STRESS POST O2 SAT PEAK: 99 %
STRESS POST PEAK BP: NORMAL MMHG
STRESS POST PEAK HR: 103 BPM
STRESS TARGET HR: 114 BPM

## 2023-12-05 PROCEDURE — 93017 CV STRESS TEST TRACING ONLY: CPT

## 2023-12-05 PROCEDURE — 0 TECHNETIUM TETROFOSMIN KIT: Performed by: SPECIALIST

## 2023-12-05 PROCEDURE — 25010000002 REGADENOSON 0.4 MG/5ML SOLUTION: Performed by: SPECIALIST

## 2023-12-05 PROCEDURE — 78452 HT MUSCLE IMAGE SPECT MULT: CPT

## 2023-12-05 PROCEDURE — A9502 TC99M TETROFOSMIN: HCPCS | Performed by: SPECIALIST

## 2023-12-05 RX ORDER — REGADENOSON 0.08 MG/ML
0.4 INJECTION, SOLUTION INTRAVENOUS
Status: COMPLETED | OUTPATIENT
Start: 2023-12-05 | End: 2023-12-05

## 2023-12-05 RX ADMIN — TETROFOSMIN 1 DOSE: 1.38 INJECTION, POWDER, LYOPHILIZED, FOR SOLUTION INTRAVENOUS at 08:25

## 2023-12-05 RX ADMIN — TETROFOSMIN 1 DOSE: 1.38 INJECTION, POWDER, LYOPHILIZED, FOR SOLUTION INTRAVENOUS at 09:36

## 2023-12-05 RX ADMIN — REGADENOSON 0.4 MG: 0.08 INJECTION, SOLUTION INTRAVENOUS at 09:35

## 2023-12-06 ENCOUNTER — TELEPHONE (OUTPATIENT)
Dept: UROLOGY | Facility: CLINIC | Age: 88
End: 2023-12-06
Payer: MEDICARE

## 2023-12-06 NOTE — TELEPHONE ENCOUNTER
АНДРЕЙ ELI CALLED.  SHE SAID DR. RUVALCABA DID SURGERY AND PUT A STENT IN PATIENT'S KIDNEY ON 11/13/23.    SHE SAID HE NEEDED A NUCLEAR SCAN ON HIS HEART, BEFORE DR. RUVALCABA WOULD REMOVE IT.    SHE SAID DR. BRASWELL CLEARED PATIENT FOR SURGERY YESTERDAY.    SHE WANTS TO SCHEDULE THE STENT REMOVAL ASAP.  SHE SAID PATIENT IS GETTING ANTSY.

## 2023-12-06 NOTE — TELEPHONE ENCOUNTER
SPOKE TO АНДРЕЙ, ADVISED HER THAT WE NOW HAVE CLEARANCE FROM [facility]. HE NEEDS TO HOLD HIS PLAVIX STARTING 12/14. ADVISED HER THAT PAT NURSE WILL CALL TO GIVE INSTRUCTIONS, THAT SCHEDULING WILL CALL ON 12/15 TO GIVE AN ARRIVAL TIME. АНДРЕЙ EXPRESSED UNDERSTANDING AND IS AGREEABLE.

## 2023-12-14 NOTE — PRE-PROCEDURE INSTRUCTIONS
IMPORTANT INSTRUCTIONS - PRE-ADMISSION TESTING  DO NOT EAT OR CHEW anything after midnight the night before your procedure.    You may have CLEAR liquids up to __2__ hours prior to ARRIVAL time.   Take the following medications the morning of your procedure with JUST A SIP OF WATER:  NO MEDICATIONS ________________  DO NOT BRING your medications to the hospital with you, UNLESS something has changed since your PRE-Admission Testing appointment.  Hold all vitamins, supplements, and NSAIDS (Non- steroidal anti-inflammatory meds) for one week prior to surgery (you MAY take Tylenol or Acetaminophen).  If you are diabetic, check your blood sugar the morning of your procedure. If it is less than 70 or if you are feeling symptomatic, call the following number for further instructions: [phone]  Use your inhalers/nebulizers as usual, the morning of your procedure. BRING YOUR INHALERS with you.   Bring your CPAP or BIPAP to hospital, ONLY IF YOU WILL BE SPENDING THE NIGHT.   Make sure you have a ride home and have someone who will stay with you the day of your procedure after you go home.  If you have any questions, please call your Pre-Admission Testing Nurse, __ERICA__ at [phone]___.   Per anesthesia request, do not smoke for 24 hours before your procedure or as instructed by your surgeon.    Clear Liquid Diet        Find out when you need to start a clear liquid diet.   Think of “clear liquids” as anything you could read a newspaper through. This includes things like water, broth, sports drinks, or tea WITHOUT any kind of milk or cream.           Once you are told to start a clear liquid diet, only drink these things until 2 hours before arrival to the hospital or when the hospital says to stop. Total volume limitation: 8 oz.       Clear liquids you CAN drink:   Water   Clear broth: beef, chicken, vegetable, or bone broth with nothing in it   Gatorade   Lemonade or Omega-aid   Soda   Tea, coffee (NO cream or  honey)   Jell-O (without fruit)   Popsicles (without fruit or cream)   Italian ices   Juice without pulp: apple, white, grape   You may use salt, pepper, and sugar    Do NOT drink:   Milk or cream   Soy milk, almond milk, coconut milk, or other non-dairy drinks and   creamers   Milkshakes or smoothies   Tomato juice   Orange juice   Grapefruit juice   Cream soups or any other than broth         Clear Liquid Diet:  Do NOT eat any solid food.  Do NOT eat or suck on mints or candy.  Do NOT chew gum.  Do NOT drink thick liquids like milk or juice with pulp in it.  Do NOT add milk, cream, or anything like soy milk or almond milk to coffee or tea.

## 2023-12-15 ENCOUNTER — ANESTHESIA EVENT (OUTPATIENT)
Dept: PERIOP | Facility: HOSPITAL | Age: 88
End: 2023-12-15
Payer: MEDICARE

## 2023-12-18 ENCOUNTER — ANESTHESIA (OUTPATIENT)
Dept: PERIOP | Facility: HOSPITAL | Age: 88
End: 2023-12-18
Payer: MEDICARE

## 2023-12-18 ENCOUNTER — HOSPITAL ENCOUNTER (OUTPATIENT)
Facility: HOSPITAL | Age: 88
Setting detail: HOSPITAL OUTPATIENT SURGERY
Discharge: HOME | End: 2023-12-18
Attending: UROLOGY | Admitting: UROLOGY
Payer: MEDICARE

## 2023-12-18 ENCOUNTER — APPOINTMENT (OUTPATIENT)
Dept: GENERAL RADIOLOGY | Facility: HOSPITAL | Age: 88
End: 2023-12-18
Payer: MEDICARE

## 2023-12-18 VITALS
HEIGHT: 72 IN | DIASTOLIC BLOOD PRESSURE: 77 MMHG | WEIGHT: 190.92 LBS | OXYGEN SATURATION: 98 % | RESPIRATION RATE: 16 BRPM | BODY MASS INDEX: 25.86 KG/M2 | HEART RATE: 82 BPM | SYSTOLIC BLOOD PRESSURE: 138 MMHG | TEMPERATURE: 97.6 F

## 2023-12-18 DIAGNOSIS — N20.1 RIGHT URETERAL STONE: Primary | ICD-10-CM

## 2023-12-18 DIAGNOSIS — N20.0 NEPHROLITHIASIS: ICD-10-CM

## 2023-12-18 DIAGNOSIS — N20.1 LEFT URETERAL STONE: ICD-10-CM

## 2023-12-18 DIAGNOSIS — T19.9XXA FOREIGN BODY IN GENITOURINARY TRACT, INITIAL ENCOUNTER: ICD-10-CM

## 2023-12-18 PROCEDURE — C1758 CATHETER, URETERAL: HCPCS | Performed by: UROLOGY

## 2023-12-18 PROCEDURE — C1769 GUIDE WIRE: HCPCS | Performed by: UROLOGY

## 2023-12-18 PROCEDURE — 25010000002 MIDAZOLAM PER 1MG: Performed by: ANESTHESIOLOGY

## 2023-12-18 PROCEDURE — 76000 FLUOROSCOPY <1 HR PHYS/QHP: CPT

## 2023-12-18 PROCEDURE — 25010000002 DEXAMETHASONE PER 1 MG: Performed by: NURSE ANESTHETIST, CERTIFIED REGISTERED

## 2023-12-18 PROCEDURE — 25010000002 FENTANYL CITRATE (PF) 50 MCG/ML SOLUTION: Performed by: NURSE ANESTHETIST, CERTIFIED REGISTERED

## 2023-12-18 PROCEDURE — 25010000002 LEVOFLOXACIN PER 250 MG: Performed by: UROLOGY

## 2023-12-18 PROCEDURE — 25010000002 SUGAMMADEX 200 MG/2ML SOLUTION: Performed by: NURSE ANESTHETIST, CERTIFIED REGISTERED

## 2023-12-18 PROCEDURE — C2617 STENT, NON-COR, TEM W/O DEL: HCPCS | Performed by: UROLOGY

## 2023-12-18 PROCEDURE — 25010000002 PROPOFOL 10 MG/ML EMULSION: Performed by: NURSE ANESTHETIST, CERTIFIED REGISTERED

## 2023-12-18 PROCEDURE — 82365 CALCULUS SPECTROSCOPY: CPT | Performed by: UROLOGY

## 2023-12-18 PROCEDURE — 25010000002 ONDANSETRON PER 1 MG: Performed by: NURSE ANESTHETIST, CERTIFIED REGISTERED

## 2023-12-18 PROCEDURE — 25810000003 LACTATED RINGERS PER 1000 ML: Performed by: ANESTHESIOLOGY

## 2023-12-18 PROCEDURE — C1894 INTRO/SHEATH, NON-LASER: HCPCS | Performed by: UROLOGY

## 2023-12-18 PROCEDURE — 25510000001 IOPAMIDOL PER 1 ML: Performed by: UROLOGY

## 2023-12-18 PROCEDURE — 88300 SURGICAL PATH GROSS: CPT | Performed by: UROLOGY

## 2023-12-18 DEVICE — URETERAL STENT
Type: IMPLANTABLE DEVICE | Site: URETER | Status: FUNCTIONAL
Brand: ASCERTA™

## 2023-12-18 RX ORDER — OXYBUTYNIN CHLORIDE 5 MG/1
5 TABLET ORAL 3 TIMES DAILY PRN
Qty: 12 TABLET | Refills: 0 | Status: SHIPPED | OUTPATIENT
Start: 2023-12-18

## 2023-12-18 RX ORDER — TAMSULOSIN HYDROCHLORIDE 0.4 MG/1
1 CAPSULE ORAL NIGHTLY
Qty: 30 CAPSULE | Refills: 0 | Status: SHIPPED | OUTPATIENT
Start: 2023-12-18

## 2023-12-18 RX ORDER — MAGNESIUM HYDROXIDE 1200 MG/15ML
LIQUID ORAL AS NEEDED
Status: DISCONTINUED | OUTPATIENT
Start: 2023-12-18 | End: 2023-12-18 | Stop reason: HOSPADM

## 2023-12-18 RX ORDER — LIDOCAINE HYDROCHLORIDE 20 MG/ML
INJECTION, SOLUTION EPIDURAL; INFILTRATION; INTRACAUDAL; PERINEURAL AS NEEDED
Status: DISCONTINUED | OUTPATIENT
Start: 2023-12-18 | End: 2023-12-18 | Stop reason: SURG

## 2023-12-18 RX ORDER — PROMETHAZINE HYDROCHLORIDE 25 MG/1
25 SUPPOSITORY RECTAL ONCE AS NEEDED
Status: DISCONTINUED | OUTPATIENT
Start: 2023-12-18 | End: 2023-12-18 | Stop reason: HOSPADM

## 2023-12-18 RX ORDER — DEXAMETHASONE SODIUM PHOSPHATE 4 MG/ML
INJECTION, SOLUTION INTRA-ARTICULAR; INTRALESIONAL; INTRAMUSCULAR; INTRAVENOUS; SOFT TISSUE AS NEEDED
Status: DISCONTINUED | OUTPATIENT
Start: 2023-12-18 | End: 2023-12-18 | Stop reason: SURG

## 2023-12-18 RX ORDER — PROMETHAZINE HYDROCHLORIDE 12.5 MG/1
12.5 TABLET ORAL ONCE AS NEEDED
Status: DISCONTINUED | OUTPATIENT
Start: 2023-12-18 | End: 2023-12-18 | Stop reason: HOSPADM

## 2023-12-18 RX ORDER — LEVOFLOXACIN 5 MG/ML
500 INJECTION, SOLUTION INTRAVENOUS ONCE
Status: COMPLETED | OUTPATIENT
Start: 2023-12-18 | End: 2023-12-18

## 2023-12-18 RX ORDER — PHENAZOPYRIDINE HYDROCHLORIDE 200 MG/1
200 TABLET, FILM COATED ORAL 3 TIMES DAILY PRN
Qty: 12 TABLET | Refills: 0 | Status: SHIPPED | OUTPATIENT
Start: 2023-12-18

## 2023-12-18 RX ORDER — FENTANYL CITRATE 50 UG/ML
INJECTION, SOLUTION INTRAMUSCULAR; INTRAVENOUS AS NEEDED
Status: DISCONTINUED | OUTPATIENT
Start: 2023-12-18 | End: 2023-12-18 | Stop reason: SURG

## 2023-12-18 RX ORDER — ACETAMINOPHEN 500 MG
1000 TABLET ORAL ONCE
Status: COMPLETED | OUTPATIENT
Start: 2023-12-18 | End: 2023-12-18

## 2023-12-18 RX ORDER — PHENYLEPHRINE HCL IN 0.9% NACL 1 MG/10 ML
SYRINGE (ML) INTRAVENOUS AS NEEDED
Status: DISCONTINUED | OUTPATIENT
Start: 2023-12-18 | End: 2023-12-18 | Stop reason: SURG

## 2023-12-18 RX ORDER — HYDROCODONE BITARTRATE AND ACETAMINOPHEN 5; 325 MG/1; MG/1
1-2 TABLET ORAL EVERY 6 HOURS PRN
Qty: 12 TABLET | Refills: 0 | Status: SHIPPED | OUTPATIENT
Start: 2023-12-18

## 2023-12-18 RX ORDER — SODIUM CHLORIDE, SODIUM LACTATE, POTASSIUM CHLORIDE, CALCIUM CHLORIDE 600; 310; 30; 20 MG/100ML; MG/100ML; MG/100ML; MG/100ML
9 INJECTION, SOLUTION INTRAVENOUS CONTINUOUS PRN
Status: DISCONTINUED | OUTPATIENT
Start: 2023-12-18 | End: 2023-12-18 | Stop reason: HOSPADM

## 2023-12-18 RX ORDER — ROCURONIUM BROMIDE 10 MG/ML
INJECTION, SOLUTION INTRAVENOUS AS NEEDED
Status: DISCONTINUED | OUTPATIENT
Start: 2023-12-18 | End: 2023-12-18 | Stop reason: SURG

## 2023-12-18 RX ORDER — MIDAZOLAM HYDROCHLORIDE 2 MG/2ML
1 INJECTION, SOLUTION INTRAMUSCULAR; INTRAVENOUS ONCE
Status: COMPLETED | OUTPATIENT
Start: 2023-12-18 | End: 2023-12-18

## 2023-12-18 RX ORDER — OXYCODONE HYDROCHLORIDE 5 MG/1
5 TABLET ORAL
Status: DISCONTINUED | OUTPATIENT
Start: 2023-12-18 | End: 2023-12-18 | Stop reason: HOSPADM

## 2023-12-18 RX ORDER — PROMETHAZINE HYDROCHLORIDE 12.5 MG/1
25 TABLET ORAL ONCE AS NEEDED
Status: DISCONTINUED | OUTPATIENT
Start: 2023-12-18 | End: 2023-12-18 | Stop reason: HOSPADM

## 2023-12-18 RX ORDER — IBUPROFEN 600 MG/1
600 TABLET ORAL EVERY 6 HOURS PRN
Status: DISCONTINUED | OUTPATIENT
Start: 2023-12-18 | End: 2023-12-18 | Stop reason: HOSPADM

## 2023-12-18 RX ORDER — PROPOFOL 10 MG/ML
VIAL (ML) INTRAVENOUS AS NEEDED
Status: DISCONTINUED | OUTPATIENT
Start: 2023-12-18 | End: 2023-12-18 | Stop reason: SURG

## 2023-12-18 RX ORDER — ACETAMINOPHEN 325 MG/1
650 TABLET ORAL ONCE
Qty: 2 TABLET | Refills: 0 | Status: DISCONTINUED | OUTPATIENT
Start: 2023-12-18 | End: 2023-12-18 | Stop reason: HOSPADM

## 2023-12-18 RX ORDER — ONDANSETRON 4 MG/1
4 TABLET, FILM COATED ORAL ONCE AS NEEDED
Status: DISCONTINUED | OUTPATIENT
Start: 2023-12-18 | End: 2023-12-18 | Stop reason: HOSPADM

## 2023-12-18 RX ORDER — ONDANSETRON 2 MG/ML
4 INJECTION INTRAMUSCULAR; INTRAVENOUS ONCE AS NEEDED
Status: DISCONTINUED | OUTPATIENT
Start: 2023-12-18 | End: 2023-12-18 | Stop reason: HOSPADM

## 2023-12-18 RX ORDER — ONDANSETRON 2 MG/ML
INJECTION INTRAMUSCULAR; INTRAVENOUS AS NEEDED
Status: DISCONTINUED | OUTPATIENT
Start: 2023-12-18 | End: 2023-12-18 | Stop reason: SURG

## 2023-12-18 RX ADMIN — DEXAMETHASONE SODIUM PHOSPHATE 4 MG: 4 INJECTION, SOLUTION INTRAMUSCULAR; INTRAVENOUS at 14:24

## 2023-12-18 RX ADMIN — PROPOFOL 140 MG: 10 INJECTION, EMULSION INTRAVENOUS at 14:29

## 2023-12-18 RX ADMIN — LIDOCAINE HYDROCHLORIDE 100 MG: 20 INJECTION, SOLUTION EPIDURAL; INFILTRATION; INTRACAUDAL; PERINEURAL at 14:29

## 2023-12-18 RX ADMIN — FENTANYL CITRATE 50 MCG: 50 INJECTION, SOLUTION INTRAMUSCULAR; INTRAVENOUS at 14:54

## 2023-12-18 RX ADMIN — ONDANSETRON 4 MG: 2 INJECTION INTRAMUSCULAR; INTRAVENOUS at 15:22

## 2023-12-18 RX ADMIN — ACETAMINOPHEN 1000 MG: 500 TABLET ORAL at 13:50

## 2023-12-18 RX ADMIN — LEVOFLOXACIN 500 MG: 5 INJECTION, SOLUTION INTRAVENOUS at 14:23

## 2023-12-18 RX ADMIN — ROCURONIUM BROMIDE 50 MG: 50 INJECTION INTRAVENOUS at 14:29

## 2023-12-18 RX ADMIN — Medication 200 MCG: at 14:50

## 2023-12-18 RX ADMIN — FENTANYL CITRATE 50 MCG: 50 INJECTION, SOLUTION INTRAMUSCULAR; INTRAVENOUS at 14:29

## 2023-12-18 RX ADMIN — MIDAZOLAM HYDROCHLORIDE 1 MG: 1 INJECTION, SOLUTION INTRAMUSCULAR; INTRAVENOUS at 14:00

## 2023-12-18 RX ADMIN — SODIUM CHLORIDE, POTASSIUM CHLORIDE, SODIUM LACTATE AND CALCIUM CHLORIDE 9 ML/HR: 600; 310; 30; 20 INJECTION, SOLUTION INTRAVENOUS at 13:51

## 2023-12-18 RX ADMIN — Medication 200 MCG: at 14:44

## 2023-12-18 RX ADMIN — SUGAMMADEX 200 MG: 100 INJECTION, SOLUTION INTRAVENOUS at 15:22

## 2023-12-18 RX ADMIN — Medication 200 MCG: at 14:36

## 2023-12-18 NOTE — DISCHARGE INSTRUCTIONS
DISCHARGE INSTRUCTIONS  Extracorporeal Shock Wave Lithotripsy (ESWL)/ Ureteroscopy Lasertripsy      For your surgery you had:  General anesthesia (you may have a sore throat for the first 24 hours)  You may experience dizziness, drowsiness, or lightheadedness for several hours following surgery.  Do not stay alone today or tonight.  Limit your activity for 24 hours.  You should not drive or operate machinery, drink alcohol, or sign legally binding documents for 24 hours or while you are taking pain medication.  Resume your diet slowly.  Follow any special dietary instructions you may have been given by your doctor.  Last dose of pain medication was given at:   Tylenol 1000 mg given at 1:50.      NOTIFY YOUR DOCTOR IF YOU EXPERIENCE ANY OF THE FOLLOWING:  Temperature greater than 101 degrees Fahrenheit  Shaking Chills  Redness or excessive drainage from incision  Nausea, vomiting and/or pain that is not controlled by prescribed medications  Increase in bleeding or bleeding that is excessive  Unable to urinate in 6 hours after surgery  If unable to reach your doctor, please go to the closest Emergency Room  Strain urine if instructed by physician.  Collect any fragments and take with you on your scheduled appointment. You may pass stone pieces or small blood clots.  Blood in your urine is normal.  It could be light pink to cherry color.  Drink 6-8 glasses of fluid each day to assist with passing of stone fragments.  Back pain is common.  It may feel like a dull ache or back spasm.  Urine will be bloody for several days.  Slight redness or bruising may be noticed on treated side.  If you have difficulty urinating, try sitting in a bathtub of warm water.    If you have a stent, it must be managed by your urologist.  Do NOT forget.  Medications per physician instructions as indicated on Discharge Medication Information Sheet.  SPECIAL INSTRUCTIONS:  Follow any verbal instructions from Dr. Carpenter.

## 2023-12-18 NOTE — ANESTHESIA POSTPROCEDURE EVALUATION
Patient: Enrique Wylie    Procedure Summary       Date: 12/18/23 Room / Location: [place] [address] / [facility] MAIN OR    Anesthesia Start: 1423 Anesthesia Stop: 1530    Procedure: CYSTOSCOPY URETEROSCOPY RIGHT RETROGRADE PYELOGRAM HOLMIUM LASER STENT exchange; LEFT STENT REMOVAL (Bilateral) Diagnosis:       Nephrolithiasis      Foreign body in genitourinary tract, initial encounter      (Nephrolithiasis [N20.0])      (Foreign body in genitourinary tract, initial encounter [T19.9XXA])    Surgeons: Katerina Carpenter MD Provider: Mina Woo CRNA    Anesthesia Type: general ASA Status: 3            Anesthesia Type: general    Vitals  Vitals Value Taken Time   /68 12/18/23 1610   Temp 36.8 °C (98.3 °F) 12/18/23 1555   Pulse 101 12/18/23 1610   Resp 16 12/18/23 1610   SpO2 99 % 12/18/23 1610           Post Anesthesia Care and Evaluation    Patient location during evaluation: bedside  Patient participation: complete - patient participated  Level of consciousness: awake and alert  Pain management: adequate    Airway patency: patent  Anesthetic complications: No anesthetic complications  PONV Status: none  Cardiovascular status: acceptable  Respiratory status: acceptable  Hydration status: acceptable    Comments: An Anesthesiologist personally participated in the most demanding procedures (including induction and emergence if applicable) in the anesthesia plan, monitored the course of anesthesia administration at frequent intervals and remained physically present and available for immediate diagnosis and treatment of emergencies.

## 2023-12-18 NOTE — H&P
[facility]   Urology Preop H&P Note    Patient Name: Enrique Wylie  : 1935  MRN: [id number]  Primary Care Physician:  Josh Maya MD  Referring Physician: Katerina Carpenter MD  Date of admission: 2023    Subjective   Subjective     Reason for Consult/ Chief Complaint: Nephrolithiasis [N20.0]  Foreign body in genitourinary tract, initial encounter [T19.9XXA]    HPI:  Enrique Wylie is a 88 y.o. male history ofNephrolithiasis [N20.0]  Foreign body in genitourinary tract, initial encounter [T19.9XXA] who presents for further management OR.  Presents for planned Procedure(s):  CYSTOSCOPY URETEROSCOPY RIGHT RETROGRADE PYELOGRAM HOLMIUM LASER STENT exchange; LEFT STENT REMOVAL;  .  Procedure to be preformed bilaterally; left stent removal and right stone removal, stent exchange.  Risk, benefits, and alternatives discussed with patient prior to today.All questions were addressed after providing time for discussion.  Patient denies significant changes since last visit.  No new complaints today.    Review of Systems   All systems were reviewed and negative except for the above  Personal History     Past Medical History:   Diagnosis Date    Advance directive in chart 2020    AF (paroxysmal atrial fibrillation)     Allergic rhinitis     occasionally    Arthritis     Back pain 2014    Balance problem     walking is hard, using cane    Cancer     squamous cell  face and ears    Cervical spinal stenosis 2016    Previous surgery for myelopathy    Cervical spondylosis with myelopathy 2012    C4-5 and C5-6 with edema in the cord at C4-5    Death of wife 2020    Fall at home, initial encounter 2020    Gait instability 2019    Grief reaction 2020    High cholesterol     Kidney stone     Lumbar spinal stenosis 2016    Worsening leg symptoms,  3/12/15    Medication management 2019    Restless leg syndrome 2019    Weight loss,  "unintentional 07/30/2020       Past Surgical History:   Procedure Laterality Date    CATARACT EXTRACTION Bilateral     CERVICAL FUSION      C4,5 and 6    KNEE ARTHROSCOPY      right knee    LUMBAR LAMINECTOMY  02/10/2016    L3-4    ROTATOR CUFF REPAIR Right     SKIN BIOPSY      face and ears, squamous cell     SPINE SURGERY      minimally invasive procedure \"closing in on spinal Cord\"  \"gave spinal cord more room\"    URETEROSCOPY LASER LITHOTRIPSY WITH STENT INSERTION Bilateral 11/13/2023    Procedure: Bilateral Retrograde Pyelogram, Left Ureteroscopy Lasertripsy Basket Stone Extraction and Stent Insertion, Right Stent Insertion, Bladder Stone Extraction;  Surgeon: Katerina Carpenter MD;  Location: [facility] MAIN OR;  Service: Urology;  Laterality: Bilateral;       Family History: family history includes Heart failure in his father and mother. Otherwise pertinent FHx was reviewed and not pertinent to current issue.    Social History:  reports that he has never smoked. He has never used smokeless tobacco. He reports that he does not drink alcohol and does not use drugs.    Home Medications:  acetaminophen, atorvastatin, carbidopa-levodopa, chlorpheniramine, diphenhydrAMINE, hydrocortisone, mirtazapine, multivitamin with minerals, and tamsulosin    Allergies:  No Known Allergies    Objective    Objective     Vitals:   Temp:  [99.5 °F (37.5 °C)] 99.5 °F (37.5 °C)  Heart Rate:  [70] 70  Resp:  [16] 16  BP: (106)/(57) 106/57    Physical Exam:   Constitutional: Awake, alert   Respiratory: Clear, nonlabored respirations    Cardiovascular: Regular rate, no chest retractions   gastrointestinal: Appears soft, nontender     Results:    Assessment & Plan   Assessment / Plan     Brief Patient Summary:  Enrique Wylie is a 88 y.o. male who     Active Hospital Problems:  Active Hospital Problems    Diagnosis     **Foreign body in genitourinary tract     Nephrolithiasis        Plan:   Proceed to the OR for planned procedure, " Procedure(s):  CYSTOSCOPY URETEROSCOPY RIGHT RETROGRADE PYELOGRAM HOLMIUM LASER STENT exchange; LEFT STENT REMOVAL,  ,  Risk, benefits, and alternatives discussed with patient at length he is agreeable to proceed  All questions addressed      Electronically signed by Katerina Carpenter MD, 12/18/23, 2:00 PM EST.

## 2023-12-18 NOTE — ANESTHESIA PREPROCEDURE EVALUATION
Anesthesia Evaluation     Patient summary reviewed and Nursing notes reviewed   no history of anesthetic complications:   NPO Solid Status: > 8 hours  NPO Liquid Status: > 2 hours           Airway   Mallampati: II  TM distance: >3 FB  Neck ROM: full  No difficulty expected  Dental      Pulmonary - negative pulmonary ROS and normal exam    breath sounds clear to auscultation  Cardiovascular   Exercise tolerance: good (4-7 METS)    Rhythm: irregular  Rate: normal    (+) dysrhythmias Paroxysmal Atrial Fib, hyperlipidemia      Neuro/Psych- negative ROS  GI/Hepatic/Renal/Endo    (+) renal disease- stones    Musculoskeletal     (+) back pain  Abdominal    Substance History - negative use     OB/GYN negative ob/gyn ROS         Other   arthritis,   history of cancer    ROS/Med Hx Other: >4METS, HX PAF. STRESS 12/5/23 EF 48%, NO ISCHEMIA. CARDS CLEARANCE. KT            terpretation Summary stress test 12/5/23         Left ventricular ejection fraction is borderline normal (Calculated EF = 48%).    Low probability of ischemia in this study..         Anesthesia Plan    ASA 3     general     (Patient understands anesthesia not responsible for dental damage.)  intravenous induction     Anesthetic plan, risks, benefits, and alternatives have been provided, discussed and informed consent has been obtained with: patient.    Use of blood products discussed with patient .    Plan discussed with CRNA.        CODE STATUS:

## 2023-12-18 NOTE — OP NOTE
Preoperative diagnosis  Foreign body in genitourinary tract, left; right nephrolithiasis    Postoperative diagnosis  Foreign body in genitourinary tract, left; right ureteral stones    Procedure performed  Cystoscopy, left ureteral stent removal; right retrograde pyelogram, ureteroscopy, laser lithotripsy, ureteral stent exchange    Attending surgeon  Katerina Carpenter MD     Anesthesia  General    EBL  0 mL    Complications  None    Specimen  Right ureteral stones    Findings  Left ureteral stent removed without issue  Right ureteroscopy with several stones in the right ureter treated with laser lithotripsy, fragments retrieved; no intrarenal stone burden; sediment and dust remaining  Exchange of 6 x 28 no string      Indications  88 y.o. male agreed to undergo the above named procedure after discussion of the alternatives, risks and benefits.   Informed consent was obtained.      Procedure  After informed consent was obtained, the patient was taken back to the  operating suite where general anesthesia was administered. Once the patient  was adequately anesthetized, he was placed in the dorsal lithotomy position.  His genitals were prepped and draped in a normal sterile fashion.  Rigid  cystoscope was inserted gently in the patient's urethra meatus, which passed  atraumatically into the bladder.  The previously placed left ureteral stent was easily visible.  It was grasped and retrieved under x-ray guidance.  The stent was retrieved entirely.      Focus for the remainder of the case was on the right side.  The previously placed right ureteral stent was easily visible.  It was grasped and retrieved through the urethral meatus.  Sensor wire was threaded through it into the renal pelvis and the stent reduced.  Dual-lumen catheter was then placed over the wire and retrograde  pyelogram was performed revealing  defect of the mid ureter, no hydronephrosis.  Second wire was then placed after retrograde pyelogram, the  dual-lumen reduced.  One of the wires was secured to the drape as a safety wire for the remainder of the case.  The  access sheath was passed level of the mid ureter.  The ureteroscope was then assembled and ureteroscopy  proceeded in a systematic manner, stones were encountered at the mid ureter, noted to be somewhat impacted.  Once the stone was encountered, laser  lithotripsy was then initiated.  Laser lithotripsy was utilized to dust and  fragment the calculi into tiny pieces.  Any sizable  fragments were then basketed out. After adequate treatment of the stone, pyeloscopy then proceeded in a systematic manner.  There were several small fragments, retrieved from the kidney; no discrete intrarenal calculi appreciated.  Contrast dye was injected ensuring and thorough inspection of all calyces.  Once there were no further stone fragments, ureteroscopy proceeded down the length of the ureter  with retrieval of the access sheath, leaving the safety wire in place. There were no further ureteral calculi  or fragments.   After withdrawing the  ureteroscope, as well as the access sheath, the wire was back loaded through  the cystoscope and 6 x 28 double J stent was placed over the wire under  direct visualization.  Upon retrieval of the wire, there was good proximal  coil noted on x-ray, as well as distal coil within the bladder. At this  point, the procedure was deemed terminated.  The patient's bladder was then emptied and the cystoscope removed.  He was then placed back in the supine position and awakened from general  anesthesia and transferred to the PACU in good condition.        Signed:  Katerina Carpenter MD  12/18/23  15:24 EST

## 2024-01-08 LAB
COLOR STONE: NORMAL
COM MFR STONE: 100 %
COMPN STONE: NORMAL
LABORATORY COMMENT REPORT: NORMAL
LABORATORY COMMENT REPORT: NORMAL
Lab: NORMAL
Lab: NORMAL
PHOTO: NORMAL
SIZE STONE: NORMAL MM
SPEC SOURCE SUBJ: NORMAL
WT STONE: 66 MG

## 2024-01-09 ENCOUNTER — PROCEDURE VISIT (OUTPATIENT)
Dept: UROLOGY | Facility: CLINIC | Age: 89
End: 2024-01-09
Payer: MEDICARE

## 2024-01-09 VITALS — BODY MASS INDEX: 25.89 KG/M2 | HEIGHT: 72 IN | RESPIRATION RATE: 16 BRPM

## 2024-01-09 DIAGNOSIS — N13.30 HYDRONEPHROSIS, UNSPECIFIED HYDRONEPHROSIS TYPE: ICD-10-CM

## 2024-01-09 DIAGNOSIS — T19.9XXA FOREIGN BODY IN GENITOURINARY TRACT, INITIAL ENCOUNTER: Primary | ICD-10-CM

## 2024-01-10 NOTE — PROGRESS NOTES
Preoperative diagnosis  Foreign body in genitourinary tract; hydronephrosis    Postoperative diagnosis  Same as above    Procedure  Flexible cystourethroscopy with stent removal    Attending surgeon  Katerina Carpenter MD     Anesthesia  2% lidocaine jelly intraurethrally    Complications  None    Specimen  None    Estimated blood loss  0cc    Indications  88 y.o. male who is status post right ureteroscopy with stone treatment who presents for stent removal.    Procedure  The patient was appropriately identified and brought to the cytoscopy suite. A timeout was undertaken documenting the correct patient, site, and procedure. The patient was prepped in a normal sterile fashion. A flexible cytoscope was then introduced into the bladder. The stent was identified and grasped with endoscopic graspers. The entire stent was removed and passed off the field. Patient tolerated the procedure well. There were no intraprocedural complications.     Plan  Tolerated procedure well.  Instructions provided.  Patient follow-up in 4 to 6 weeks with renal ultrasound prior or earlier as needed  All question addressed

## 2024-02-12 ENCOUNTER — HOSPITAL ENCOUNTER (OUTPATIENT)
Dept: ULTRASOUND IMAGING | Facility: HOSPITAL | Age: 89
Discharge: HOME | End: 2024-02-12
Admitting: UROLOGY
Payer: MEDICARE

## 2024-02-12 DIAGNOSIS — N13.30 HYDRONEPHROSIS, UNSPECIFIED HYDRONEPHROSIS TYPE: ICD-10-CM

## 2024-02-12 PROCEDURE — 76775 US EXAM ABDO BACK WALL LIM: CPT

## 2024-02-19 PROBLEM — I51.7 CARDIOMEGALY: Status: ACTIVE | Noted: 2024-02-19

## 2024-02-19 PROBLEM — I10 ESSENTIAL HYPERTENSION: Status: ACTIVE | Noted: 2024-02-19

## 2024-02-19 PROBLEM — E78.5 HYPERLIPIDEMIA: Status: ACTIVE | Noted: 2024-02-19

## 2024-02-19 PROBLEM — F43.21 ADJUSTMENT DISORDER WITH DEPRESSED MOOD: Status: ACTIVE | Noted: 2020-07-30

## 2024-02-19 PROBLEM — R63.4 WEIGHT LOSS, UNINTENTIONAL: Status: ACTIVE | Noted: 2020-07-30

## 2024-02-19 PROBLEM — I48.0 PAROXYSMAL ATRIAL FIBRILLATION: Status: ACTIVE | Noted: 2024-02-19

## 2024-02-19 PROBLEM — H90.3 BILATERAL SENSORINEURAL HEARING LOSS: Status: ACTIVE | Noted: 2021-03-30
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
## 2024-02-19 PROBLEM — Z78.9 ADVANCE DIRECTIVE IN CHART: Status: ACTIVE | Noted: 2020-05-21

## 2024-02-20 ENCOUNTER — OFFICE VISIT (OUTPATIENT)
Dept: UROLOGY | Facility: CLINIC | Age: 89
End: 2024-02-20
Payer: MEDICARE

## 2024-02-20 VITALS
BODY MASS INDEX: 25.73 KG/M2 | RESPIRATION RATE: 16 BRPM | HEIGHT: 72 IN | WEIGHT: 190 LBS | HEART RATE: 93 BPM | DIASTOLIC BLOOD PRESSURE: 66 MMHG | SYSTOLIC BLOOD PRESSURE: 120 MMHG

## 2024-02-20 DIAGNOSIS — E83.59 CALCIUM OXALATE CALCULUS: Primary | ICD-10-CM

## 2024-02-20 DIAGNOSIS — N28.89 DILATION OF RENAL PELVIS: ICD-10-CM

## 2024-02-20 RX ORDER — CEPHALEXIN 500 MG/1
CAPSULE ORAL
COMMUNITY
Start: 2024-02-19

## 2024-02-20 NOTE — PROGRESS NOTES
UROLOGY OFFICE follow-up NOTE    Subjective   HPI  Enrique Wylie is a 88 y.o. male.  Presents for follow-up status post bilateral ureteroscopy, stone treatment.  Presents with friend, Pamela.      Patient subsequently underwent stent removal in office and presents with renal ultrasound prior to today's appointment.Patient states that he has been doing well since the procedure.  Currently denies any urinary symptoms.      Denies hematuria or flank pain.  No urologic complaints today.    Pamela concerned about lower extremity swelling and some erythema which is bilateral.  Sees PCP for evaluation later this week.      _________  Renal ultrasound 2/12/2024: Significant decrease dilation of the proximal collecting system on the left with persistent   dilation of the renal pelvis.  Findings may represent underlying stenosis or chronic change.    Follow-up could always be obtained if there is concern for persistent obstruction.  2. Right kidney is grossly unremarkable in appearance  3. Limited imaging the bladder is grossly unremarkable in appearance.    Stone analysis  12/18/2023 100% calcium oxalate  11/13/2023 100% calcium oxalate    URS staged bilateral stone treatment, 11/13/2023 and 12/18/2023      Results for orders placed or performed during the hospital encounter of 12/18/23   STONE ANALYSIS - Calculus, Ureter, Right    Specimen: Ureter, Right; Calculus   Result Value Ref Range    Stone Source Comment     Color Brown     Size 4x3 mm    Stone Weight 66 mg    Composition Comment     Ca Oxalate - Monohydrate, Stone 100 %    Comment Comment     Photo Comment     Comments: Comment     Please note Comment     Disclaimer: Comment    Tissue Pathology Exam    Specimen: Ureter, Right; Calculus   Result Value Ref Range    Case Report       Surgical Pathology Report                         Case: [id number]                                  Authorizing Provider:  Katerina Carpenter MD      Collected:            "12/18/2023 03:22 PM          Ordering Location:     [facility] MAIN Received:            12/19/2023 07:53 AM                                 OR                                                                           Pathologist:           Avelino Brito MD                                                            Specimen:    Ureter, Right, RIGHT URETERAL STONES                                                       Clinical Information       Nephrolithiasis  Foreign body in genitourinary tract, initial encounter      Final Diagnosis       Right ureter, stones, removal:   - Stones, sent for chemical analysis (gross only diagnosis)         Gross Description       1. Ureter, Right.  The specimen is received fresh labeled \" right ureteral stones\" and consists of a 0.7 x 0.7 x 0.7 cm aggregate of brown, friable renal stones.  The specimen is submitted en toto for chemical analysis following gross examination by staff pathologist Avelino Brito.   MARIO ALBERTO         Review of systems  A review of systems was performed, and positive findings are noted in the HPI.    Objective     Vital Signs:   /66   Pulse 93   Resp 16   Ht 182.9 cm (72\")   Wt 86.2 kg (190 lb)   BMI 25.77 kg/m²       Physical exam  No acute distress, well-nourished  Awake alert and oriented  Mood normal; affect normal    Results  PROCEDURE:  US RENAL BILATERAL     COMPARISON: [facility], CT, CT ABDOMEN PELVIS W WO CONTRAST, 11/10/2023,   12:23.     INDICATIONS:  s/p urs, stent removal     FINDINGS:          Study limited by body habitus and overlying bowel gas.     Right kidney measures:  11.8 x 5.3 x 5.9 cm     Left kidney measures:  11.2 x 4.4 x 5.9 cm     Right kidney demonstrates no hydronephrosis or cortical thinning.  Echogenicity is unremarkable.     No suspicious renal mass or calcification.     Mild to moderate prominence of the left renal pelvis noted significantly decreased from the   comparison study.  " Small simple appearing cyst noted better seen on prior CT.     Urinary bladder:     Bladder is grossly unremarkable in appearance.  Ureteral jets are not visualized.        IMPRESSION:                 1. Significant decrease dilation of the proximal collecting system on the left with persistent   dilation of the renal pelvis.  Findings may represent underlying stenosis or chronic change.    Follow-up could always be obtained if there is concern for persistent obstruction.  2. Right kidney is grossly unremarkable in appearance  3. Limited imaging the bladder is grossly unremarkable in appearance.               SIOMARA PRIETO MD         Electronically Signed and Approved By: SIOMARA PRIETO MD on 2/12/2024 at 13:51               Problem List:  Patient Active Problem List   Diagnosis    Balance problem    Restless leg syndrome    Encounter for long-term (current) use of other medications    Lumbar spinal stenosis    Grief reaction    Hyperlipidemia    Unsteady gait when walking    Death of wife    Lumbar spinal stenosis    Advance directive in chart    Skin cancer    Nephrolithiasis    Left ureteral stone    Foreign body in genitourinary tract    Advance directive in chart    Bilateral sensorineural hearing loss    Cardiomegaly    Essential hypertension    Fall at home, initial encounter    Paroxysmal atrial fibrillation    Weight loss, unintentional    Adjustment disorder with depressed mood    Back pain       Assessment & Plan   Diagnoses and all orders for this visit:    1. Calcium oxalate calculus (Primary)    2. Dilation of renal pelvis  -     US Renal Bilateral; Future      Renal ultrasound imaging reviewed and discussed with patient at length, no hydronephrosis, or evidence of residual stone.     Consider patient stone free at this time.   Mild persistent dilation of left collecting system of unknown significance.  Recommend repeat imaging renal ultrasound to establish if physiologic baseline or worsening  requiring further workup and potential intervention.  He is agreeable to this     Calcium oxalate stone- Discussed dietary modifications for prevention of stone growth and recurrence including increased hydration, making 2.5-3 L of urine a day decrease sodium, normal calcium, low animal protein diet.    Informational handouts provided    Lower extremity complaints; will defer to PCP    Follow-up after repeat renal ultrasound  All questions addressed

## 2024-02-21 ENCOUNTER — OFFICE VISIT (OUTPATIENT)
Dept: FAMILY MEDICINE CLINIC | Facility: CLINIC | Age: 89
End: 2024-02-21
Payer: MEDICARE

## 2024-02-21 ENCOUNTER — REFERRAL TRIAGE (OUTPATIENT)
Dept: CASE MANAGEMENT | Facility: OTHER | Age: 89
End: 2024-02-21
Payer: MEDICARE

## 2024-02-21 VITALS
BODY MASS INDEX: 25.73 KG/M2 | SYSTOLIC BLOOD PRESSURE: 124 MMHG | OXYGEN SATURATION: 97 % | HEART RATE: 87 BPM | DIASTOLIC BLOOD PRESSURE: 49 MMHG | HEIGHT: 72 IN | TEMPERATURE: 98.3 F | WEIGHT: 190 LBS

## 2024-02-21 DIAGNOSIS — Z00.00 ENCOUNTER FOR ANNUAL WELLNESS EXAM IN MEDICARE PATIENT: Primary | ICD-10-CM

## 2024-02-21 DIAGNOSIS — A46 ERYSIPELAS: ICD-10-CM

## 2024-02-21 DIAGNOSIS — L97.529 ULCER OF LEFT FOOT, UNSPECIFIED ULCER STAGE: ICD-10-CM

## 2024-02-21 DIAGNOSIS — Z91.81 AT RISK FOR FALLS: ICD-10-CM

## 2024-02-21 DIAGNOSIS — F03.90 DEMENTIA WITHOUT BEHAVIORAL DISTURBANCE, PSYCHOTIC DISTURBANCE, MOOD DISTURBANCE, OR ANXIETY, UNSPECIFIED DEMENTIA SEVERITY, UNSPECIFIED DEMENTIA TYPE: ICD-10-CM

## 2024-02-21 RX ORDER — POTASSIUM CHLORIDE 750 MG/1
10 TABLET, FILM COATED, EXTENDED RELEASE ORAL 2 TIMES DAILY
Qty: 120 TABLET | Refills: 4 | Status: SHIPPED | OUTPATIENT
Start: 2024-02-21

## 2024-02-21 RX ORDER — OSTOMY SUPPLY 1"
1 EACH MISCELLANEOUS EVERY OTHER DAY
Qty: 10 EACH | Refills: 0 | Status: SHIPPED | OUTPATIENT
Start: 2024-02-21

## 2024-02-21 NOTE — PROGRESS NOTES
The ABCs of the Annual Wellness Visit  Subsequent Medicare Wellness Visit    Subjective    Enrique Wylie is a 88 y.o. male who presents for a Subsequent Medicare Wellness Visit.    The following portions of the patient's history were reviewed and   updated as appropriate: allergies, current medications, past family history, past medical history, past social history, past surgical history, and problem list.    Compared to one year ago, the patient feels his physical   health is the same.    Compared to one year ago, the patient feels his mental   health is the same.    Recent Hospitalizations:  He was not admitted to the hospital during the last year.       Current Medical Providers:  Patient Care Team:  Josh Maya MD as PCP - General (Internal Medicine)    Outpatient Medications Prior to Visit   Medication Sig Dispense Refill    acetaminophen (Tylenol 8 Hour) 650 MG 8 hr tablet Tylenol Arthritis Pain 650 mg oral tablet extended release take 2 tablets (1,300 mg) by oral route every 8 hours as needed swallowing whole with water. Do not break, crush, dissolve and/or chew.   Active      atorvastatin (LIPITOR) 10 MG tablet Take 1 tablet by mouth Daily. 90 tablet 3    carbidopa-levodopa (SINEMET)  MG per tablet Take 1 tablet by mouth 2 (Two) Times a Day. (Patient taking differently: Take 1 tablet by mouth 2 (Two) Times a Day. Patient states once a day in evening) 180 tablet 1    cephalexin (KEFLEX) 500 MG capsule       chlorpheniramine (CHLOR-TRIMETON) 4 MG tablet Take 1 tablet by mouth Every 6 (Six) Hours As Needed for Allergies.      hydrocortisone 2.5 % ointment apply A thin layer topically TO THE affected AREA TWICE DAILY 454 g 1    mirtazapine (REMERON) 15 MG tablet Take 1 tablet by mouth Daily. HS 90 tablet 1    multivitamin with minerals (CENTRUM SILVER PO) Centrum  mg-mcg oral tablet take 1 tablet by oral route once daily with food   Suspended      oxybutynin (DITROPAN) 5 MG tablet  Take 1 tablet by mouth 3 (Three) Times a Day As Needed (Bladder spasm). May cause constipation 12 tablet 0    phenazopyridine (Pyridium) 200 MG tablet Take 1 tablet by mouth 3 (Three) Times a Day As Needed for Bladder Spasms (Burning, urinary discomfort). 12 tablet 0    tamsulosin (FLOMAX) 0.4 MG capsule 24 hr capsule Take 1 capsule by mouth Every Night. 30 capsule 0    diphenhydrAMINE (BENADRYL) 25 mg capsule Take 1 capsule by mouth every night at bedtime. (Patient not taking: Reported on 2/21/2024)      HYDROcodone-acetaminophen (Norco) 5-325 MG per tablet Take 1-2 tablets by mouth Every 6 (Six) Hours As Needed for Severe Pain. (Patient not taking: Reported on 2/21/2024) 12 tablet 0     No facility-administered medications prior to visit.       No opioid medication identified on active medication list. I have reviewed chart for other potential  high risk medication/s and harmful drug interactions in the elderly.        Aspirin is not on active medication list.  Aspirin use is contraindicated for this patient due to: current use of Eliquis.  .    Patient Active Problem List   Diagnosis    Balance problem    Restless leg syndrome    Encounter for long-term (current) use of other medications    Lumbar spinal stenosis    Grief reaction    Hyperlipidemia    Unsteady gait when walking    Death of wife    Lumbar spinal stenosis    Advance directive in chart    Skin cancer    Nephrolithiasis    Left ureteral stone    Foreign body in genitourinary tract    Advance directive in chart    Bilateral sensorineural hearing loss    Cardiomegaly    Essential hypertension    Fall at home, initial encounter    Paroxysmal atrial fibrillation    Weight loss, unintentional    Adjustment disorder with depressed mood    Back pain     Advance Care Planning   Advance Care Planning     Advance Directive is on file.  ACP discussion was held with the patient during this visit. Patient has an advance directive in EMR which is still valid.     "  Objective    Vitals:    24 0738   BP: 124/49   BP Location: Left arm   Patient Position: Sitting   Cuff Size: Adult   Pulse: 87   Temp: 98.3 °F (36.8 °C)   TempSrc: Temporal   SpO2: 97%   Weight: 86.2 kg (190 lb)   Height: 182.9 cm (72\")     Estimated body mass index is 25.77 kg/m² as calculated from the following:    Height as of this encounter: 182.9 cm (72\").    Weight as of this encounter: 86.2 kg (190 lb).    BMI is >= 25 and <30. (Overweight) The following options were offered after discussion;: weight loss educational material (shared in after visit summary), exercise counseling/recommendations, and nutrition counseling/recommendations      Does the patient have evidence of cognitive impairment? No          HEALTH RISK ASSESSMENT    Smoking Status:  Social History     Tobacco Use   Smoking Status Never   Smokeless Tobacco Never     Alcohol Consumption:  Social History     Substance and Sexual Activity   Alcohol Use Never     Fall Risk Screen:    MARITO Fall Risk Assessment was completed, and patient is at HIGH risk for falls. Assessment completed on:2024    Depression Screenin/21/2024     7:37 AM   PHQ-2/PHQ-9 Depression Screening   Little Interest or Pleasure in Doing Things 1-->several days   Feeling Down, Depressed or Hopeless 1-->several days   Trouble Falling or Staying Asleep, or Sleeping Too Much 1-->several days   Feeling Tired or Having Little Energy 1-->several days   Poor Appetite or Overeating 0-->not at all   Feeling Bad about Yourself - or that You are a Failure or Have Let Yourself or Your Family Down 1-->several days   Trouble Concentrating on Things, Such as Reading the Newspaper or Watching Television 1-->several days   Moving or Speaking So Slowly that Other People Could Have Noticed? Or the Opposite - Being So Fidgety 1-->several days   Thoughts that You Would be Better Off Dead or of Hurting Yourself in Some Way 0-->not at all   PHQ-9: Brief Depression Severity " Measure Score 7   If You Checked Off Any Problems, How Difficult Have These Problems Made It For You to Do Your Work, Take Care of Things at Home, or Get Along with Other People? somewhat difficult       Health Habits and Functional and Cognitive Screenin/21/2024     8:00 AM   Functional & Cognitive Status   Do you have difficulty preparing food and eating? Yes   Do you have difficulty bathing yourself, getting dressed or grooming yourself? Yes   Do you have difficulty using the toilet? Yes   Do you have difficulty moving around from place to place? Yes   Do you have trouble with steps or getting out of a bed or a chair? Yes   Current Diet Unhealthy Diet   Dental Exam Unknown   Eye Exam Unknown   Exercise (times per week) 0 times per week   Current Exercises Include No Regular Exercise   Do you need help using the phone?  No   Are you deaf or do you have serious difficulty hearing?  Yes   Do you need help to go to places out of walking distance? Yes   Do you need help shopping? Yes   Do you need help preparing meals?  Yes   Do you need help with housework?  Yes   Do you need help with laundry? Yes   Do you need help taking your medications? Yes   Do you need help managing money? Yes   Do you ever drive or ride in a car without wearing a seat belt? No   Have you felt unusual stress, anger or loneliness in the last month? No   Who do you live with? Alone   If you need help, do you have trouble finding someone available to you? Yes   Have you been bothered in the last four weeks by sexual problems? No   Do you have difficulty concentrating, remembering or making decisions? Yes       Age-appropriate Screening Schedule:  Refer to the list below for future screening recommendations based on patient's age, sex and/or medical conditions. Orders for these recommended tests are listed in the plan section. The patient has been provided with a written plan.    Health Maintenance   Topic Date Due    RSV Vaccine - Adults  "(1 - 1-dose 60+ series) Never done    TDAP/TD VACCINES (2 - Tdap) 10/30/2007    COVID-19 Vaccine (4 - 2023-24 season) 09/01/2023    LIPID PANEL  06/06/2024    ANNUAL WELLNESS VISIT  02/21/2025    BMI FOLLOWUP  02/21/2025    INFLUENZA VACCINE  Completed    Pneumococcal Vaccine 65+  Completed    ZOSTER VACCINE  Completed                  CMS Preventative Services Quick Reference  Risk Factors Identified During Encounter  None Identified  The above risks/problems have been discussed with the patient.  Pertinent information has been shared with the patient in the After Visit Summary.  An After Visit Summary and PPPS were made available to the patient.    Follow Up:   Next Medicare Wellness visit to be scheduled in 1 year.       Additional E&M Note during same encounter follows:  Patient has multiple medical problems which are significant and separately identifiable that require additional work above and beyond the Medicare Wellness Visit.      Chief Complaint  leg is red,hot to touch,swollen (BOTH LEGS )    Subjective        HPI  Enrique Wylie is also being seen today for memory loss, LE edema, he was on AC but stopped them. Today also with LE redness likely erysipelas. On day one cephalexin. Will follow next week. HH care ordered. Patient needs support.          Objective   Vital Signs:  /49 (BP Location: Left arm, Patient Position: Sitting, Cuff Size: Adult)   Pulse 87   Temp 98.3 °F (36.8 °C) (Temporal)   Ht 182.9 cm (72\")   Wt 86.2 kg (190 lb)   SpO2 97%   BMI 25.77 kg/m²     Physical Exam  Constitutional:       Comments: B/l LE redness   Edema +2     HENT:      Mouth/Throat:      Mouth: Mucous membranes are moist.   Cardiovascular:      Rate and Rhythm: Rhythm irregular.   Pulmonary:      Effort: Pulmonary effort is normal.   Skin:     General: Skin is warm.      Capillary Refill: Capillary refill takes less than 2 seconds.   Neurological:      Mental Status: Mental status is at baseline.      "              Assessment and Plan   Diagnoses and all orders for this visit:    1. Encounter for annual wellness exam in Medicare patient (Primary)    2. Erysipelas  -     Ambulatory Referral to Chronic Care Management    3. At risk for falls  -     Ambulatory Referral to Home Health  -     Ambulatory Referral to Chronic Care Management    4. Dementia without behavioral disturbance, psychotic disturbance, mood disturbance, or anxiety, unspecified dementia severity, unspecified dementia type  -     Ambulatory Referral to Home Health  -     Ambulatory Referral to Chronic Care Management    5. Ulcer of left foot, unspecified ulcer stage  -     Dressing Duoderm CGF 4X4IN Sterile BX5  -     Ambulatory Referral to Chronic Care Management    Other orders  -     apixaban (ELIQUIS) 5 MG tablet tablet; Take 1 tablet by mouth 2 (Two) Times a Day.  Dispense: 60 tablet; Refill: 5  -     potassium chloride 10 MEQ CR tablet; Take 1 tablet by mouth 2 (Two) Times a Day.  Dispense: 120 tablet; Refill: 4           I spent 55 minutes caring for Enrique on this date of service. This time includes time spent by me in the following activities:preparing for the visit, reviewing tests, obtaining and/or reviewing a separately obtained history, performing a medically appropriate examination and/or evaluation , counseling and educating the patient/family/caregiver, ordering medications, tests, or procedures, independently interpreting results and communicating that information with the patient/family/caregiver, and care coordination  Follow Up   No follow-ups on file.  Patient was given instructions and counseling regarding his condition or for health maintenance advice. Please see specific information pulled into the AVS if appropriate.

## 2024-02-26 ENCOUNTER — PATIENT OUTREACH (OUTPATIENT)
Dept: CASE MANAGEMENT | Facility: OTHER | Age: 89
End: 2024-02-26
Payer: MEDICARE

## 2024-02-26 DIAGNOSIS — G25.81 RESTLESS LEG SYNDROME: ICD-10-CM

## 2024-02-26 DIAGNOSIS — R26.89 BALANCE PROBLEM: Primary | ICD-10-CM

## 2024-02-26 DIAGNOSIS — R26.81 UNSTEADY GAIT WHEN WALKING: ICD-10-CM

## 2024-02-26 NOTE — OUTREACH NOTE
Patient Outreach    Spoke with family friend Андерй. She provides transportation, dressing change to left foot wound and meals. I addressed these concerns:    He is very unstable on his feet. Has multiple stairways to maneuver in the home. Declines stair lift. Uses walker and has a scooter. Needs Physical Therapy for strengthening. Referral has been placed. [facility] declined due to insurance. Awaiting reply from [facility].     Андрей provides any transportation he may need. She feels he is unsafe to drive yet he will still try to drive his car. Андрей plans on talking to PCP at next visit to have him encourage him to not drive.    Андрей will bring food to the home. Mr Wylie is able to heat meals in microwave.     Андрей has discussed assisted living option but he refuses.    He has small ulcer to left foot with Duoderm dressing. Андрей changes for him QOD.    He is very hard of hearing. He has bilateral hearing aides but refuses to wear them.     Next PCP f/u 2/28/24.         Name and Relationship of Patient/Support Person: АНДРЕЙ ELI - Emergency Contact    Adult Patient Profile  Questions/Answers      Flowsheet Row Most Recent Value   Symptoms/Conditions Managed at Home musculoskeletal, skin   Musculoskeletal Symptoms/Conditions mobility limited, unsteady gait   Musculoskeletal Management Strategies other (see comments)  [trying to find home health agency for PT]   Musculoskeletal Self-Management Outcome 2 (bad)   Musculoskeletal Comment poor mobility. freq falls   Skin Symptoms/Conditions wound  [left foot ulcer]   Skin Management Strategies dressing changes   Skin Self-Management Outcome 3 (uncertain)   Skin Comment duoderm dressing change QOD to wound on left foot   Current Living Arrangements home        SDOH updated and reviewed with the patient during this program:  Financial Resource Strain: Low Risk  (2/26/2024)    Overall Financial Resource Strain (CARDIA)     Difficulty of Paying Living Expenses: Not  hard at all      --     Food Insecurity: No Food Insecurity (2/26/2024)    Hunger Vital Sign     Worried About Running Out of Food in the Last Year: Never true     Ran Out of Food in the Last Year: Never true      --     Housing Stability: Low Risk  (2/26/2024)    Housing Stability Vital Sign     Unable to Pay for Housing in the Last Year: No     Number of Places Lived in the Last Year: 1     Unstable Housing in the Last Year: No      --     Transportation Needs: No Transportation Needs (2/26/2024)    PRAPARE - Transportation     Lack of Transportation (Medical): No     Lack of Transportation (Non-Medical): No       Education Documentation  Home Safety, taught by Nani Diaz, RN at 2/26/2024  1:56 PM.  Learner: Caregiver  Readiness: Acceptance  Method: Explanation  Response: Verbalizes Understanding          Nani GONZALEZ  Ambulatory Case Management    2/26/2024, 14:04 EST

## 2024-02-28 ENCOUNTER — OFFICE VISIT (OUTPATIENT)
Dept: FAMILY MEDICINE CLINIC | Facility: CLINIC | Age: 89
End: 2024-02-28
Payer: MEDICARE

## 2024-02-28 VITALS
HEART RATE: 77 BPM | HEIGHT: 72 IN | DIASTOLIC BLOOD PRESSURE: 79 MMHG | OXYGEN SATURATION: 99 % | SYSTOLIC BLOOD PRESSURE: 133 MMHG | BODY MASS INDEX: 25.77 KG/M2

## 2024-02-28 DIAGNOSIS — I10 ESSENTIAL HYPERTENSION: ICD-10-CM

## 2024-02-28 DIAGNOSIS — I82.503 CHRONIC DEEP VEIN THROMBOSIS (DVT) OF BOTH LOWER EXTREMITIES, UNSPECIFIED VEIN: Primary | ICD-10-CM

## 2024-02-28 DIAGNOSIS — A46 ERYSIPELAS: ICD-10-CM

## 2024-02-28 DIAGNOSIS — L97.509 ULCER OF FOOT, UNSPECIFIED LATERALITY, UNSPECIFIED ULCER STAGE: ICD-10-CM

## 2024-02-28 DIAGNOSIS — F03.C0 SEVERE DEMENTIA WITHOUT BEHAVIORAL DISTURBANCE, PSYCHOTIC DISTURBANCE, MOOD DISTURBANCE, OR ANXIETY, UNSPECIFIED DEMENTIA TYPE: ICD-10-CM

## 2024-02-28 DIAGNOSIS — I51.7 CARDIOMEGALY: ICD-10-CM

## 2024-02-28 DIAGNOSIS — I82.4Z2 ACUTE EMBOLISM AND THROMBOSIS OF UNSPECIFIED DEEP VEINS OF LEFT DISTAL LOWER EXTREMITY: ICD-10-CM

## 2024-02-28 RX ORDER — AMOXICILLIN AND CLAVULANATE POTASSIUM 875; 125 MG/1; MG/1
1 TABLET, FILM COATED ORAL 2 TIMES DAILY
Qty: 14 TABLET | Refills: 0 | Status: SHIPPED | OUTPATIENT
Start: 2024-02-28 | End: 2024-03-06

## 2024-02-28 NOTE — PROGRESS NOTES
"Chief Complaint  Follow-up (Legs and Swelling )    Subjective      History of Present Illness    Enrique Wylie is a 88 y.o. male who presents to [facility]     Patient with right lower extremity edema. Likely DVT. He is already on AC. Pt dc previous to last visit and he was instructed to restart. Erysipelas is improving. Will give short term antibiotics. And follow up.     High risk of falling.     Minimental is 10 advance dementia. Pt need assistant with activities of daily living.     Objective   Vital Signs:   Vitals:    02/28/24 1332   BP: 133/79   Pulse: 77   SpO2: 99%   Height: 182.9 cm (72\")     Body mass index is 25.77 kg/m².    Wt Readings from Last 3 Encounters:   02/21/24 86.2 kg (190 lb)   02/20/24 86.2 kg (190 lb)   12/18/23 86.6 kg (190 lb 14.7 oz)     BP Readings from Last 3 Encounters:   02/28/24 133/79   02/21/24 124/49   02/20/24 120/66       Health Maintenance   Topic Date Due    RSV Vaccine - Adults (1 - 1-dose 60+ series) Never done    TDAP/TD VACCINES (2 - Tdap) 10/30/2007    COVID-19 Vaccine (4 - 2023-24 season) 09/01/2023    LIPID PANEL  06/06/2024    ANNUAL WELLNESS VISIT  02/21/2025    BMI FOLLOWUP  02/21/2025    INFLUENZA VACCINE  Completed    Pneumococcal Vaccine 65+  Completed    ZOSTER VACCINE  Completed       Physical Exam  HENT:      Mouth/Throat:      Mouth: Mucous membranes are moist.   Eyes:      Pupils: Pupils are equal, round, and reactive to light.   Cardiovascular:      Rate and Rhythm: Normal rate.   Pulmonary:      Effort: Pulmonary effort is normal.   Skin:     General: Skin is warm.   Neurological:      Mental Status: He is alert. Mental status is at baseline.            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  Psychological condition  will be reassessed in 3 months.  Essential hypertension    Cardiomegaly      Orders Placed This Encounter   Procedures    US Venous Doppler Lower Extremity Bilateral (duplex)    Ambulatory Referral to Podiatry     New Medications Ordered This Visit   Medications    amoxicillin-clavulanate (AUGMENTIN) 875-125 MG per tablet     Sig: Take 1 tablet by mouth 2 (Two) Times a Day for 7 days.     Dispense:  14 tablet     Refill:  0                  I spent 45 minutes caring for Enrique on this date of service. This time includes time spent by me in the following activities:preparing for the visit, reviewing tests, obtaining and/or reviewing a separately obtained history, performing a medically appropriate examination and/or evaluation , counseling and educating the patient/family/caregiver, ordering medications, tests, or procedures, referring and communicating with other health care professionals , documenting information in the medical record, independently interpreting results and communicating that information with the patient/family/caregiver, and care coordination  FOLLOW UP  Return in about 3 months (around 5/28/2024).  Patient was given instructions and counseling regarding his condition or for health maintenance advice. Please see specific information pulled into the AVS if appropriate.       Josh Lees MD  02/28/24  14:18 EST    CURRENT & DISCONTINUED MEDICATIONS  Current Outpatient Medications   Medication Instructions    acetaminophen (Tylenol 8 Hour) 650 MG 8 hr tablet Tylenol Arthritis Pain 650 mg oral tablet extended release take 2 tablets (1,300 mg) by oral route every 8 hours as needed swallowing whole with water. Do not break, crush, dissolve and/or chew.   Active     amoxicillin-clavulanate (AUGMENTIN) 875-125 MG per tablet 1 tablet, Oral, 2 Times Daily    apixaban (ELIQUIS) 5 mg, Oral, 2 Times Daily    atorvastatin (LIPITOR) 10 mg, Oral, Daily    carbidopa-levodopa (SINEMET)  MG per tablet 1 tablet, Oral, 2 Times Daily    chlorpheniramine (CHLOR-TRIMETON) 4 mg, Oral, Every 6 Hours PRN    Control Gel Formula Dressing (DuoDERM CGF Extra Thin) misc 1 each, Apply externally, Every Other Day    hydrocortisone 2.5 % ointment apply A thin layer topically TO THE affected AREA TWICE DAILY    mirtazapine (REMERON) 15 mg, Oral, Daily, HS    multivitamin with minerals (CENTRUM SILVER PO) Centrum  mg-mcg oral tablet take 1 tablet by oral route once daily with food   Suspended    oxybutynin (DITROPAN) 5 mg, Oral, 3 Times Daily PRN, May cause constipation    phenazopyridine (PYRIDIUM) 200 mg, Oral, 3 Times Daily PRN    potassium chloride 10 MEQ CR tablet 10 mEq, Oral, 2 Times Daily    tamsulosin (FLOMAX) 0.4 mg, Oral, Nightly       Medications Discontinued During This Encounter   Medication Reason    cephalexin (KEFLEX) 500 MG capsule *Therapy completed        45 min visit

## 2024-03-04 ENCOUNTER — TELEPHONE (OUTPATIENT)
Dept: FAMILY MEDICINE CLINIC | Facility: CLINIC | Age: 89
End: 2024-03-04
Payer: MEDICARE

## 2024-03-06 ENCOUNTER — TELEPHONE (OUTPATIENT)
Dept: FAMILY MEDICINE CLINIC | Facility: CLINIC | Age: 89
End: 2024-03-06
Payer: MEDICARE

## 2024-03-06 NOTE — TELEPHONE ENCOUNTER
Called ans spoke with Pamela, patient POA and medical POA. Instructed OT I supposed to come on Friday.  They will call her and talk with her about the times that they are coming.   Patient legs are better. Do not look as bad as before.  Still swollen, patient not keeping elevated and still sleeping in chair.  Just taking one day at a time.

## 2024-03-07 ENCOUNTER — TELEPHONE (OUTPATIENT)
Dept: FAMILY MEDICINE CLINIC | Facility: CLINIC | Age: 89
End: 2024-03-07
Payer: MEDICARE

## 2024-03-07 NOTE — TELEPHONE ENCOUNTER
[place] from [facility] called office.  States that she is reporting a fall yesterday.  No apparent injuries.  Just calling to report fall.

## 2024-03-11 ENCOUNTER — OUTSIDE FACILITY SERVICE (OUTPATIENT)
Dept: FAMILY MEDICINE CLINIC | Facility: CLINIC | Age: 89
End: 2024-03-11
Payer: MEDICARE

## 2024-03-12 ENCOUNTER — PATIENT OUTREACH (OUTPATIENT)
Dept: CASE MANAGEMENT | Facility: OTHER | Age: 89
End: 2024-03-12
Payer: MEDICARE

## 2024-03-12 DIAGNOSIS — G25.81 RESTLESS LEG SYNDROME: ICD-10-CM

## 2024-03-12 DIAGNOSIS — R26.81 UNSTEADY GAIT WHEN WALKING: ICD-10-CM

## 2024-03-12 DIAGNOSIS — R26.89 BALANCE PROBLEM: Primary | ICD-10-CM

## 2024-03-12 NOTE — OUTREACH NOTE
Patient Outreach    Family friend Pamela contacts Julianne VASQUEZ to Dr Maya directly with any concerns. [facility] home health PT/OT in the home. No ACM needs.     Name and Relationship of Patient/Support Person:  -         Education Documentation  No documentation found.        Nani GONZALEZ  Ambulatory Case Management    3/12/2024, 15:20 EDT

## 2024-03-14 ENCOUNTER — TELEPHONE (OUTPATIENT)
Dept: FAMILY MEDICINE CLINIC | Facility: CLINIC | Age: 89
End: 2024-03-14
Payer: MEDICARE

## 2024-03-14 NOTE — TELEPHONE ENCOUNTER
Soni from [facility] called to let you know they got wound care orders and the patients left leg more so left foot is red with pitting edema +2 she is concerned the patient has cellulitis.  Please advise.

## 2024-03-14 NOTE — TELEPHONE ENCOUNTER
Spoke to home health and relayed   Patient was treated for erysipelas. He was doing well with antibiotics. If he is having again skin infection please bring him to the clinic for evaluation. Thank you

## 2024-03-15 ENCOUNTER — OFFICE VISIT (OUTPATIENT)
Dept: FAMILY MEDICINE CLINIC | Facility: CLINIC | Age: 89
End: 2024-03-15
Payer: MEDICARE

## 2024-03-15 VITALS
OXYGEN SATURATION: 97 % | SYSTOLIC BLOOD PRESSURE: 130 MMHG | DIASTOLIC BLOOD PRESSURE: 64 MMHG | WEIGHT: 205 LBS | HEIGHT: 72 IN | TEMPERATURE: 98.6 F | BODY MASS INDEX: 27.77 KG/M2 | HEART RATE: 82 BPM

## 2024-03-15 DIAGNOSIS — A46 ERYSIPELAS: ICD-10-CM

## 2024-03-15 DIAGNOSIS — L97.529 ULCER OF BOTH FEET, UNSPECIFIED ULCER STAGE: ICD-10-CM

## 2024-03-15 DIAGNOSIS — L97.519 ULCER OF BOTH FEET, UNSPECIFIED ULCER STAGE: ICD-10-CM

## 2024-03-15 DIAGNOSIS — R60.0 LOWER EXTREMITY EDEMA: Primary | ICD-10-CM

## 2024-03-15 PROCEDURE — 99214 OFFICE O/P EST MOD 30 MIN: CPT | Performed by: STUDENT IN AN ORGANIZED HEALTH CARE EDUCATION/TRAINING PROGRAM

## 2024-03-15 RX ORDER — UREA 8.5 G/85G
1 CREAM TOPICAL AS NEEDED
Qty: 1 EACH | Refills: 2 | Status: SHIPPED | OUTPATIENT
Start: 2024-03-15

## 2024-03-15 RX ORDER — FUROSEMIDE 20 MG/1
20 TABLET ORAL 2 TIMES DAILY
Qty: 60 TABLET | Refills: 0 | Status: SHIPPED | OUTPATIENT
Start: 2024-03-15 | End: 2024-04-14

## 2024-03-15 RX ORDER — AMOXICILLIN 500 MG/1
1000 CAPSULE ORAL 2 TIMES DAILY
Qty: 28 CAPSULE | Refills: 0 | Status: SHIPPED | OUTPATIENT
Start: 2024-03-15 | End: 2024-03-22

## 2024-03-15 NOTE — PROGRESS NOTES
"Chief Complaint  Leg Swelling (Swelling seems worse in legs), increased falling at home, and check thumb left hand (Fell and hit thumb at home.  Still some soreness)    Subjective      Leg Swelling        Enrique Wylie is a 88 y.o. male who presents to [facility]     Patient comes with erythema, edema LE b/l. Erysipelas. Patient has multiple skin breaks that allows persistent infection. Encourage feet care to avoid another infection.      Objective   Vital Signs:   Vitals:    03/15/24 1110   BP: 130/64   Pulse: 82   Temp: 98.6 °F (37 °C)   SpO2: 97%   Weight: 93 kg (205 lb)   Height: 182.9 cm (72\")     Body mass index is 27.8 kg/m².    Wt Readings from Last 3 Encounters:   03/15/24 93 kg (205 lb)   02/21/24 86.2 kg (190 lb)   02/20/24 86.2 kg (190 lb)     BP Readings from Last 3 Encounters:   03/15/24 130/64   02/28/24 133/79   02/21/24 124/49       Health Maintenance   Topic Date Due    RSV Vaccine - Adults (1 - 1-dose 60+ series) Never done    TDAP/TD VACCINES (2 - Tdap) 10/30/2007    COVID-19 Vaccine (4 - 2023-24 season) 09/01/2023    LIPID PANEL  06/06/2024    ANNUAL WELLNESS VISIT  02/21/2025    BMI FOLLOWUP  02/21/2025    INFLUENZA VACCINE  Completed    Pneumococcal Vaccine 65+  Completed    ZOSTER VACCINE  Completed       Physical Exam  Constitutional:       Comments: B/l LE erythema and edema            Assessment & Plan  Lower extremity edema    Erysipelas    Ulcer of both feet, unspecified ulcer stage      Orders Placed This Encounter   Procedures    Comprehensive Metabolic Panel    CBC & Differential     New Medications Ordered This Visit   Medications    furosemide (Lasix) 20 MG tablet     Sig: Take 1 tablet by mouth 2 (Two) Times a Day for 30 days.     Dispense:  60 tablet     Refill:  0    amoxicillin (AMOXIL) 500 MG capsule     Sig: Take 2 capsules by mouth 2 (Two) Times a Day for 7 days.     Dispense:  28 capsule     Refill:  0    urea (CARMOL) 10 % cream     " Sig: Apply 1 Application topically to the appropriate area as directed As Needed for Dry Skin.     Dispense:  1 each     Refill:  2                    FOLLOW UP  No follow-ups on file.  Patient was given instructions and counseling regarding his condition or for health maintenance advice. Please see specific information pulled into the AVS if appropriate.       Josh Lees MD  03/15/24  11:39 EDT    CURRENT & DISCONTINUED MEDICATIONS  Current Outpatient Medications   Medication Instructions    acetaminophen (Tylenol 8 Hour) 650 MG 8 hr tablet Tylenol Arthritis Pain 650 mg oral tablet extended release take 2 tablets (1,300 mg) by oral route every 8 hours as needed swallowing whole with water. Do not break, crush, dissolve and/or chew.   Active    amoxicillin (AMOXIL) 1,000 mg, Oral, 2 Times Daily    apixaban (ELIQUIS) 5 mg, Oral, 2 Times Daily    atorvastatin (LIPITOR) 10 mg, Oral, Daily    carbidopa-levodopa (SINEMET)  MG per tablet 1 tablet, Oral, 2 Times Daily    Control Gel Formula Dressing (DuoDERM CGF Extra Thin) misc 1 each, Apply externally, Every Other Day    furosemide (LASIX) 20 mg, Oral, 2 Times Daily    hydrocortisone 2.5 % ointment apply A thin layer topically TO THE affected AREA TWICE DAILY    mirtazapine (REMERON) 15 mg, Oral, Daily, HS    multivitamin with minerals (CENTRUM SILVER PO) Centrum  mg-mcg oral tablet take 1 tablet by oral route once daily with food   Suspended    oxybutynin (DITROPAN) 5 mg, Oral, 3 Times Daily PRN, May cause constipation    phenazopyridine (PYRIDIUM) 200 mg, Oral, 3 Times Daily PRN    potassium chloride 10 MEQ CR tablet 10 mEq, Oral, 2 Times Daily    tamsulosin (FLOMAX) 0.4 mg, Oral, Nightly    urea (CARMOL) 10 % cream 1 Application, Topical, As Needed       Medications Discontinued During This Encounter   Medication Reason    chlorpheniramine (CHLOR-TRIMETON) 4 MG tablet *Therapy completed

## 2024-03-18 ENCOUNTER — LAB (OUTPATIENT)
Dept: LAB | Facility: HOSPITAL | Age: 89
End: 2024-03-18
Payer: MEDICARE

## 2024-03-18 LAB
ALBUMIN SERPL-MCNC: 4 G/DL (ref 3.5–5.2)
ALBUMIN/GLOB SERPL: 1.3 G/DL
ALP SERPL-CCNC: 113 U/L (ref 39–117)
ALT SERPL W P-5'-P-CCNC: 8 U/L (ref 1–41)
ANION GAP SERPL CALCULATED.3IONS-SCNC: 10.2 MMOL/L (ref 5–15)
AST SERPL-CCNC: 15 U/L (ref 1–40)
BASOPHILS # BLD AUTO: 0.03 10*3/MM3 (ref 0–0.2)
BASOPHILS NFR BLD AUTO: 0.5 % (ref 0–1.5)
BILIRUB SERPL-MCNC: 0.6 MG/DL (ref 0–1.2)
BUN SERPL-MCNC: 15 MG/DL (ref 8–23)
BUN/CREAT SERPL: 14.6 (ref 7–25)
CALCIUM SPEC-SCNC: 8.4 MG/DL (ref 8.6–10.5)
CHLORIDE SERPL-SCNC: 106 MMOL/L (ref 98–107)
CO2 SERPL-SCNC: 25.8 MMOL/L (ref 22–29)
CREAT SERPL-MCNC: 1.03 MG/DL (ref 0.76–1.27)
DEPRECATED RDW RBC AUTO: 39 FL (ref 37–54)
EGFRCR SERPLBLD CKD-EPI 2021: 70.7 ML/MIN/1.73
EOSINOPHIL # BLD AUTO: 0.19 10*3/MM3 (ref 0–0.4)
EOSINOPHIL NFR BLD AUTO: 2.9 % (ref 0.3–6.2)
ERYTHROCYTE [DISTWIDTH] IN BLOOD BY AUTOMATED COUNT: 12.4 % (ref 12.3–15.4)
GLOBULIN UR ELPH-MCNC: 3 GM/DL
GLUCOSE SERPL-MCNC: 94 MG/DL (ref 65–99)
HCT VFR BLD AUTO: 38.1 % (ref 37.5–51)
HGB BLD-MCNC: 12.1 G/DL (ref 13–17.7)
IMM GRANULOCYTES # BLD AUTO: 0.02 10*3/MM3 (ref 0–0.05)
IMM GRANULOCYTES NFR BLD AUTO: 0.3 % (ref 0–0.5)
LYMPHOCYTES # BLD AUTO: 1.28 10*3/MM3 (ref 0.7–3.1)
LYMPHOCYTES NFR BLD AUTO: 19.4 % (ref 19.6–45.3)
MCH RBC QN AUTO: 27.5 PG (ref 26.6–33)
MCHC RBC AUTO-ENTMCNC: 31.8 G/DL (ref 31.5–35.7)
MCV RBC AUTO: 86.6 FL (ref 79–97)
MONOCYTES # BLD AUTO: 0.86 10*3/MM3 (ref 0.1–0.9)
MONOCYTES NFR BLD AUTO: 13.1 % (ref 5–12)
NEUTROPHILS NFR BLD AUTO: 4.21 10*3/MM3 (ref 1.7–7)
NEUTROPHILS NFR BLD AUTO: 63.8 % (ref 42.7–76)
NRBC BLD AUTO-RTO: 0 /100 WBC (ref 0–0.2)
PLATELET # BLD AUTO: 291 10*3/MM3 (ref 140–450)
PMV BLD AUTO: 9.2 FL (ref 6–12)
POTASSIUM SERPL-SCNC: 4.3 MMOL/L (ref 3.5–5.2)
PROT SERPL-MCNC: 7 G/DL (ref 6–8.5)
RBC # BLD AUTO: 4.4 10*6/MM3 (ref 4.14–5.8)
SODIUM SERPL-SCNC: 142 MMOL/L (ref 136–145)
WBC NRBC COR # BLD AUTO: 6.59 10*3/MM3 (ref 3.4–10.8)

## 2024-03-18 PROCEDURE — 85025 COMPLETE CBC W/AUTO DIFF WBC: CPT | Performed by: STUDENT IN AN ORGANIZED HEALTH CARE EDUCATION/TRAINING PROGRAM

## 2024-03-18 PROCEDURE — 80053 COMPREHEN METABOLIC PANEL: CPT | Performed by: STUDENT IN AN ORGANIZED HEALTH CARE EDUCATION/TRAINING PROGRAM

## 2024-03-21 ENCOUNTER — OFFICE VISIT (OUTPATIENT)
Dept: FAMILY MEDICINE CLINIC | Facility: CLINIC | Age: 89
End: 2024-03-21
Payer: MEDICARE

## 2024-03-21 VITALS
SYSTOLIC BLOOD PRESSURE: 112 MMHG | WEIGHT: 205 LBS | HEIGHT: 72 IN | DIASTOLIC BLOOD PRESSURE: 45 MMHG | OXYGEN SATURATION: 98 % | HEART RATE: 87 BPM | BODY MASS INDEX: 27.77 KG/M2

## 2024-03-21 DIAGNOSIS — L03.90 CELLULITIS, UNSPECIFIED CELLULITIS SITE: Primary | ICD-10-CM

## 2024-03-21 PROCEDURE — 99214 OFFICE O/P EST MOD 30 MIN: CPT | Performed by: STUDENT IN AN ORGANIZED HEALTH CARE EDUCATION/TRAINING PROGRAM

## 2024-03-21 RX ORDER — DOXYCYCLINE 100 MG/1
100 TABLET ORAL 2 TIMES DAILY
Qty: 30 TABLET | Refills: 0 | Status: SHIPPED | OUTPATIENT
Start: 2024-03-21 | End: 2024-03-25

## 2024-03-21 RX ORDER — DOXYCYCLINE 100 MG/1
100 TABLET ORAL 2 TIMES DAILY
Qty: 14 TABLET | Refills: 0 | Status: SHIPPED | OUTPATIENT
Start: 2024-03-21 | End: 2024-03-21

## 2024-03-21 NOTE — PROGRESS NOTES
"Chief Complaint  Leg Problem (Skin is breaking down and feet are swollen )    Subjective      Enrique Wylie is a 88 y.o. male who presents to [facility]     LE edema and erythema - cellulitis.     Objective   Vital Signs:   Vitals:    03/21/24 1514   BP: 112/45   BP Location: Left arm   Patient Position: Sitting   Cuff Size: Large Adult   Pulse: 87   SpO2: 98%   Weight: 93 kg (205 lb)   Height: 182.9 cm (72\")     Body mass index is 27.8 kg/m².    Wt Readings from Last 3 Encounters:   03/21/24 93 kg (205 lb)   03/15/24 93 kg (205 lb)   02/21/24 86.2 kg (190 lb)     BP Readings from Last 3 Encounters:   03/21/24 112/45   03/15/24 130/64   02/28/24 133/79       Health Maintenance   Topic Date Due    RSV Vaccine - Adults (1 - 1-dose 60+ series) Never done    TDAP/TD VACCINES (2 - Tdap) 10/30/2007    COVID-19 Vaccine (4 - 2023-24 season) 09/01/2023    LIPID PANEL  06/06/2024    ANNUAL WELLNESS VISIT  02/21/2025    BMI FOLLOWUP  02/21/2025    INFLUENZA VACCINE  Completed    Pneumococcal Vaccine 65+  Completed    ZOSTER VACCINE  Completed       Physical Exam  Constitutional:       Comments: LE cellulitis.    Neurological:      Mental Status: He is alert.            Assessment & Plan       New Medications Ordered This Visit   Medications    doxycycline (ADOXA) 100 MG tablet     Sig: Take 1 tablet by mouth 2 (Two) Times a Day for 7 days.     Dispense:  14 tablet     Refill:  0                  I spent 20 minutes caring for Enrique on this date of service. This time includes time spent by me in the following activities:preparing for the visit, reviewing tests, obtaining and/or reviewing a separately obtained history, performing a medically appropriate examination and/or evaluation , counseling and educating the patient/family/caregiver, ordering medications, tests, or procedures, referring and communicating with other health care professionals , documenting information in the medical " record, independently interpreting results and communicating that information with the patient/family/caregiver, and care coordination  FOLLOW UP  No follow-ups on file.  Patient was given instructions and counseling regarding his condition or for health maintenance advice. Please see specific information pulled into the AVS if appropriate.       Josh Lees MD  03/21/24  15:36 EDT    CURRENT & DISCONTINUED MEDICATIONS  Current Outpatient Medications   Medication Instructions    acetaminophen (Tylenol 8 Hour) 650 MG 8 hr tablet Tylenol Arthritis Pain 650 mg oral tablet extended release take 2 tablets (1,300 mg) by oral route every 8 hours as needed swallowing whole with water. Do not break, crush, dissolve and/or chew.   Active    amoxicillin (AMOXIL) 1,000 mg, Oral, 2 Times Daily    apixaban (ELIQUIS) 5 mg, Oral, 2 Times Daily    atorvastatin (LIPITOR) 10 mg, Oral, Daily    carbidopa-levodopa (SINEMET)  MG per tablet 1 tablet, Oral, 2 Times Daily    Control Gel Formula Dressing (DuoDERM CGF Extra Thin) misc 1 each, Apply externally, Every Other Day    doxycycline (ADOXA) 100 mg, Oral, 2 Times Daily    furosemide (LASIX) 20 mg, Oral, 2 Times Daily    hydrocortisone 2.5 % ointment apply A thin layer topically TO THE affected AREA TWICE DAILY    mirtazapine (REMERON) 15 mg, Oral, Daily, HS    oxybutynin (DITROPAN) 5 mg, Oral, 3 Times Daily PRN, May cause constipation    phenazopyridine (PYRIDIUM) 200 mg, Oral, 3 Times Daily PRN    potassium chloride 10 MEQ CR tablet 10 mEq, Oral, 2 Times Daily    tamsulosin (FLOMAX) 0.4 mg, Oral, Nightly    urea (CARMOL) 10 % cream 1 Application, Topical, As Needed       Medications Discontinued During This Encounter   Medication Reason    multivitamin with minerals (CENTRUM SILVER PO) *Therapy completed

## 2024-03-25 ENCOUNTER — OFFICE VISIT (OUTPATIENT)
Dept: FAMILY MEDICINE CLINIC | Facility: CLINIC | Age: 89
End: 2024-03-25
Payer: MEDICARE

## 2024-03-25 VITALS
TEMPERATURE: 99.2 F | WEIGHT: 205 LBS | HEIGHT: 72 IN | DIASTOLIC BLOOD PRESSURE: 60 MMHG | OXYGEN SATURATION: 96 % | BODY MASS INDEX: 27.77 KG/M2 | SYSTOLIC BLOOD PRESSURE: 92 MMHG | HEART RATE: 95 BPM

## 2024-03-25 DIAGNOSIS — L03.119 CELLULITIS OF LOWER EXTREMITY, UNSPECIFIED LATERALITY: Primary | ICD-10-CM

## 2024-03-25 DIAGNOSIS — R60.0 LOWER EXTREMITY EDEMA: ICD-10-CM

## 2024-03-25 PROCEDURE — 99214 OFFICE O/P EST MOD 30 MIN: CPT | Performed by: STUDENT IN AN ORGANIZED HEALTH CARE EDUCATION/TRAINING PROGRAM

## 2024-03-25 RX ORDER — SULFAMETHOXAZOLE AND TRIMETHOPRIM 800; 160 MG/1; MG/1
1 TABLET ORAL 2 TIMES DAILY
Qty: 20 TABLET | Refills: 0 | Status: SHIPPED | OUTPATIENT
Start: 2024-03-25 | End: 2024-04-04

## 2024-03-25 RX ORDER — FUROSEMIDE 20 MG/1
20 TABLET ORAL DAILY
Qty: 90 TABLET | Refills: 4 | Status: SHIPPED | OUTPATIENT
Start: 2024-03-25

## 2024-03-25 NOTE — PROGRESS NOTES
"Chief Complaint  Foot Swelling (Follow up feet swelling. ), Tremors (Sitter has noticed over last couple weeks), left arm pain (Left upper arm pain.  States no known injury but patient frequently falls at home. ), and increased difficulty standing (Have noticed not sitting up straight and having more difficulty with walking and standing at home)    Subjective      Enrique Wylie is a 88 y.o. male who presents to [facility]     Patient comes for follow up of LE cellulitis.     Patient improved but still with cellulitis. Edema decreased.     Will initiate bactrim for 7-10 days and reeval.       Objective   Vital Signs:   Vitals:    03/25/24 1519   BP: 92/60   Pulse: 95   Temp: 99.2 °F (37.3 °C)   SpO2: 96%   Weight: 93 kg (205 lb)   Height: 182.9 cm (72\")     Body mass index is 27.8 kg/m².    Wt Readings from Last 3 Encounters:   03/25/24 93 kg (205 lb)   03/21/24 93 kg (205 lb)   03/15/24 93 kg (205 lb)     BP Readings from Last 3 Encounters:   03/25/24 92/60   03/21/24 112/45   03/15/24 130/64       Health Maintenance   Topic Date Due    RSV Vaccine - Adults (1 - 1-dose 60+ series) Never done    TDAP/TD VACCINES (2 - Tdap) 10/30/2007    COVID-19 Vaccine (4 - 2023-24 season) 09/01/2023    LIPID PANEL  06/06/2024    ANNUAL WELLNESS VISIT  02/21/2025    BMI FOLLOWUP  02/21/2025    INFLUENZA VACCINE  Completed    Pneumococcal Vaccine 65+  Completed    ZOSTER VACCINE  Completed       Physical Exam  Constitutional:       Comments: LE edema +1 redness    Cardiovascular:      Rate and Rhythm: Normal rate.   Skin:     General: Skin is warm.      Capillary Refill: Capillary refill takes less than 2 seconds.   Neurological:      General: No focal deficit present.      Mental Status: He is alert.            Assessment & Plan  Lower extremity edema  Lasix   Cellulitis of lower extremity, unspecified laterality  Bactrim      New Medications Ordered This Visit   Medications    " sulfamethoxazole-trimethoprim (BACTRIM DS,SEPTRA DS) 800-160 MG per tablet     Sig: Take 1 tablet by mouth 2 (Two) Times a Day for 10 days.     Dispense:  20 tablet     Refill:  0    furosemide (Lasix) 20 MG tablet     Sig: Take 1 tablet by mouth Daily.     Dispense:  90 tablet     Refill:  4                  I spent 25 minutes caring for Enrique on this date of service. This time includes time spent by me in the following activities:preparing for the visit, reviewing tests, obtaining and/or reviewing a separately obtained history, performing a medically appropriate examination and/or evaluation , counseling and educating the patient/family/caregiver, ordering medications, tests, or procedures, referring and communicating with other health care professionals , documenting information in the medical record, independently interpreting results and communicating that information with the patient/family/caregiver, and care coordination  FOLLOW UP  Return in about 10 days (around 4/4/2024).  Patient was given instructions and counseling regarding his condition or for health maintenance advice. Please see specific information pulled into the AVS if appropriate.       Josh Lees MD  03/25/24  15:52 EDT    CURRENT & DISCONTINUED MEDICATIONS  Current Outpatient Medications   Medication Instructions    acetaminophen (Tylenol 8 Hour) 650 MG 8 hr tablet Tylenol Arthritis Pain 650 mg oral tablet extended release take 2 tablets (1,300 mg) by oral route every 8 hours as needed swallowing whole with water. Do not break, crush, dissolve and/or chew.   Active    apixaban (ELIQUIS) 5 mg, Oral, 2 Times Daily    atorvastatin (LIPITOR) 10 mg, Oral, Daily    carbidopa-levodopa (SINEMET)  MG per tablet 1 tablet, Oral, 2 Times Daily    Control Gel Formula Dressing (DuoDERM CGF Extra Thin) misc 1 each, Apply externally, Every Other Day    furosemide (LASIX) 20 mg, Oral, Daily    hydrocortisone 2.5 % ointment apply A  thin layer topically TO THE affected AREA TWICE DAILY    mirtazapine (REMERON) 15 mg, Oral, Daily, HS    oxybutynin (DITROPAN) 5 mg, Oral, 3 Times Daily PRN, May cause constipation    phenazopyridine (PYRIDIUM) 200 mg, Oral, 3 Times Daily PRN    potassium chloride 10 MEQ CR tablet 10 mEq, Oral, 2 Times Daily    sulfamethoxazole-trimethoprim (BACTRIM DS,SEPTRA DS) 800-160 MG per tablet 1 tablet, Oral, 2 Times Daily    tamsulosin (FLOMAX) 0.4 mg, Oral, Nightly    urea (CARMOL) 10 % cream 1 Application, Topical, As Needed       Medications Discontinued During This Encounter   Medication Reason    doxycycline (ADOXA) 100 MG tablet *Therapy completed    furosemide (Lasix) 20 MG tablet Reorder

## 2024-04-01 ENCOUNTER — TELEPHONE (OUTPATIENT)
Dept: FAMILY MEDICINE CLINIC | Facility: CLINIC | Age: 89
End: 2024-04-01
Payer: MEDICARE

## 2024-04-01 NOTE — TELEPHONE ENCOUNTER
Pamela called stating Mr. Wylie has fallen again.  He has injured his arm and hand.  Patient is refusing Urgent Care.  I recommended patient to go to urgent care due to them having Xray capabilities.  Pamela stated she would let him know that we recommended he go to Urgent Care and hopefully he would go.  Patient does have an appointment with Dr. Maya on Thursday.

## 2024-04-02 ENCOUNTER — TELEPHONE (OUTPATIENT)
Dept: FAMILY MEDICINE CLINIC | Facility: CLINIC | Age: 89
End: 2024-04-02
Payer: MEDICARE

## 2024-04-02 NOTE — TELEPHONE ENCOUNTER
Spoke to Pamela and relayed Dr mai wants him to go to the ED.  She said she thinks she can get him to Urgent care.

## 2024-04-02 NOTE — TELEPHONE ENCOUNTER
Ashu with [facility] called in this morning to advise that Mr. Wylie has fell a few times. He is complaining of his left arm and shoulder. Family tried to get him to go to Urgent Care but patient would NOT go. Ashu ask for an x-ray order and I advised that you were out of office until 04- and Mr. Wylie has an appointment on 04-. I did state if patient needs treatment he should seek that at Urgent Care or ER if needed. Ashu states, that Mr. Wylie will probably wait until his follow up appointment and address the issue with you.

## 2024-04-04 ENCOUNTER — TELEPHONE (OUTPATIENT)
Dept: FAMILY MEDICINE CLINIC | Facility: CLINIC | Age: 89
End: 2024-04-04

## 2024-04-04 ENCOUNTER — OFFICE VISIT (OUTPATIENT)
Dept: FAMILY MEDICINE CLINIC | Facility: CLINIC | Age: 89
End: 2024-04-04
Payer: MEDICARE

## 2024-04-04 VITALS
DIASTOLIC BLOOD PRESSURE: 80 MMHG | OXYGEN SATURATION: 99 % | HEART RATE: 94 BPM | WEIGHT: 195 LBS | HEIGHT: 72 IN | SYSTOLIC BLOOD PRESSURE: 140 MMHG | TEMPERATURE: 98.9 F | BODY MASS INDEX: 26.41 KG/M2

## 2024-04-04 DIAGNOSIS — L03.90 WOUND CELLULITIS: Primary | ICD-10-CM

## 2024-04-04 DIAGNOSIS — R60.0 LEG EDEMA: ICD-10-CM

## 2024-04-04 DIAGNOSIS — L03.119 CELLULITIS OF LOWER EXTREMITY, UNSPECIFIED LATERALITY: ICD-10-CM

## 2024-04-04 DIAGNOSIS — S41.109A WOUND OF UPPER EXTREMITY, UNSPECIFIED LATERALITY, INITIAL ENCOUNTER: ICD-10-CM

## 2024-04-04 RX ORDER — FUROSEMIDE 20 MG/1
20 TABLET ORAL 2 TIMES DAILY
Qty: 90 TABLET | Refills: 3 | Status: SHIPPED | OUTPATIENT
Start: 2024-04-04

## 2024-04-04 RX ORDER — SULFAMETHOXAZOLE AND TRIMETHOPRIM 800; 160 MG/1; MG/1
1 TABLET ORAL 2 TIMES DAILY
Qty: 20 TABLET | Refills: 0 | Status: SHIPPED | OUTPATIENT
Start: 2024-04-04 | End: 2024-04-14

## 2024-04-04 RX ORDER — OSTOMY SUPPLY 1"
1 EACH MISCELLANEOUS EVERY OTHER DAY
Qty: 10 EACH | Refills: 0 | Status: SHIPPED | OUTPATIENT
Start: 2024-04-04

## 2024-04-04 NOTE — PROGRESS NOTES
"Chief Complaint  follow up leg (Follow up swelling in legs.  Some better but still red according to home health. ) and Fall (Fell at 3:00 am scrapped up right upper arm needs area checked. )    Subjective      Enrique Wylie is a 88 y.o. male who presents to [facility]     Patient with lower extremity cellulitis follow up. Multiple episode in the past months.     Patient on bactrim.     He is not compliance with medication and might be a reason why antibiotics are not effective. Also pt should be on AC but he is not using his eliquis as he should. Pt is high risk for complications.      Objective   Vital Signs:   Vitals:    04/04/24 1528   BP: 140/80   BP Location: Left arm   Patient Position: Sitting   Cuff Size: Adult   Pulse: 94   Temp: 98.9 °F (37.2 °C)   SpO2: 99%   Weight: 88.5 kg (195 lb)   Height: 182.9 cm (72\")     Body mass index is 26.45 kg/m².    Wt Readings from Last 3 Encounters:   04/04/24 88.5 kg (195 lb)   04/02/24 83.9 kg (185 lb)   03/25/24 93 kg (205 lb)     BP Readings from Last 3 Encounters:   04/04/24 140/80   04/02/24 119/57   03/25/24 92/60       Health Maintenance   Topic Date Due    RSV Vaccine - Adults (1 - 1-dose 60+ series) Never done    TDAP/TD VACCINES (2 - Tdap) 10/30/2007    COVID-19 Vaccine (4 - 2023-24 season) 09/01/2023    LIPID PANEL  06/06/2024    INFLUENZA VACCINE  08/01/2024    ANNUAL WELLNESS VISIT  02/21/2025    BMI FOLLOWUP  02/21/2025    Pneumococcal Vaccine 65+  Completed    ZOSTER VACCINE  Completed       Physical Exam  Constitutional:       Comments: LE improving. Finish antibiotics.    Neurological:      Mental Status: He is alert.              Assessment & Plan  Wound cellulitis    Cellulitis of lower extremity, unspecified laterality    Leg edema    Wound of upper extremity, unspecified laterality, initial encounter      Orders Placed This Encounter   Procedures    Ambulatory Referral to Wound Clinic     New Medications Ordered " This Visit   Medications    sulfamethoxazole-trimethoprim (BACTRIM DS,SEPTRA DS) 800-160 MG per tablet     Sig: Take 1 tablet by mouth 2 (Two) Times a Day for 10 days.     Dispense:  20 tablet     Refill:  0    Control Gel Formula Dressing (DuoDERM CGF Extra Thin) misc     Sig: Apply 1 each topically Every Other Day.     Dispense:  10 each     Refill:  0    furosemide (Lasix) 20 MG tablet     Sig: Take 1 tablet by mouth 2 (Two) Times a Day.     Dispense:  90 tablet     Refill:  3                  I spent 45 minutes caring for Enrique on this date of service. This time includes time spent by me in the following activities:preparing for the visit, reviewing tests, obtaining and/or reviewing a separately obtained history, performing a medically appropriate examination and/or evaluation , counseling and educating the patient/family/caregiver, ordering medications, tests, or procedures, referring and communicating with other health care professionals , documenting information in the medical record, independently interpreting results and communicating that information with the patient/family/caregiver, and care coordination  FOLLOW UP  No follow-ups on file.  Patient was given instructions and counseling regarding his condition or for health maintenance advice. Please see specific information pulled into the AVS if appropriate.       Josh Lees MD  04/04/24  16:08 EDT    CURRENT & DISCONTINUED MEDICATIONS  Current Outpatient Medications   Medication Instructions    acetaminophen (Tylenol 8 Hour) 650 MG 8 hr tablet Tylenol Arthritis Pain 650 mg oral tablet extended release take 2 tablets (1,300 mg) by oral route every 8 hours as needed swallowing whole with water. Do not break, crush, dissolve and/or chew.   Active    apixaban (ELIQUIS) 5 mg, Oral, 2 Times Daily    atorvastatin (LIPITOR) 10 mg, Oral, Daily    carbidopa-levodopa (SINEMET)  MG per tablet 1 tablet, Oral, 2 Times Daily    Control Gel  Formula Dressing (DuoDERM CGF Extra Thin) misc 1 each, Apply externally, Every Other Day    Control Gel Formula Dressing (DuoDERM CGF Extra Thin) misc 1 each, Apply externally, Every Other Day    furosemide (LASIX) 20 mg, Oral, 2 Times Daily    hydrocortisone 2.5 % ointment apply A thin layer topically TO THE affected AREA TWICE DAILY    mirtazapine (REMERON) 15 mg, Oral, Daily, HS    oxybutynin (DITROPAN) 5 mg, Oral, 3 Times Daily PRN, May cause constipation    potassium chloride 10 MEQ CR tablet 10 mEq, Oral, 2 Times Daily    sulfamethoxazole-trimethoprim (BACTRIM DS,SEPTRA DS) 800-160 MG per tablet 1 tablet, Oral, 2 Times Daily    tamsulosin (FLOMAX) 0.4 mg, Oral, Nightly    urea (CARMOL) 10 % cream 1 Application, Topical, As Needed       Medications Discontinued During This Encounter   Medication Reason    sulfamethoxazole-trimethoprim (BACTRIM DS,SEPTRA DS) 800-160 MG per tablet

## 2024-04-04 NOTE — TELEPHONE ENCOUNTER
Chandni with [facility] called and said they need to know what size on the DuoDerm dressing? Please call back at [phone].

## 2024-04-10 ENCOUNTER — APPOINTMENT (OUTPATIENT)
Dept: GENERAL RADIOLOGY | Facility: HOSPITAL | Age: 89
End: 2024-04-10
Payer: MEDICARE

## 2024-04-10 ENCOUNTER — TELEPHONE (OUTPATIENT)
Dept: FAMILY MEDICINE CLINIC | Facility: CLINIC | Age: 89
End: 2024-04-10
Payer: MEDICARE

## 2024-04-10 ENCOUNTER — APPOINTMENT (OUTPATIENT)
Dept: CT IMAGING | Facility: HOSPITAL | Age: 89
End: 2024-04-10
Payer: MEDICARE

## 2024-04-10 ENCOUNTER — HOSPITAL ENCOUNTER (INPATIENT)
Facility: HOSPITAL | Age: 89
LOS: 9 days | Discharge: HOME HEALTH | End: 2024-04-19
Attending: EMERGENCY MEDICINE | Admitting: STUDENT IN AN ORGANIZED HEALTH CARE EDUCATION/TRAINING PROGRAM
Payer: MEDICARE

## 2024-04-10 DIAGNOSIS — Z78.9 DECREASED ACTIVITIES OF DAILY LIVING (ADL): ICD-10-CM

## 2024-04-10 DIAGNOSIS — R26.2 DIFFICULTY WALKING: ICD-10-CM

## 2024-04-10 DIAGNOSIS — T79.6XXA TRAUMATIC RHABDOMYOLYSIS, INITIAL ENCOUNTER: Primary | ICD-10-CM
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
## 2024-04-10 PROBLEM — M62.82 RHABDOMYOLYSIS: Status: ACTIVE | Noted: 2024-04-10

## 2024-04-10 LAB
ALBUMIN SERPL-MCNC: 3.9 G/DL (ref 3.5–5.2)
ALBUMIN/GLOB SERPL: 1.3 G/DL
ALP SERPL-CCNC: 93 U/L (ref 39–117)
ALT SERPL W P-5'-P-CCNC: 49 U/L (ref 1–41)
ANION GAP SERPL CALCULATED.3IONS-SCNC: 13.4 MMOL/L (ref 5–15)
AST SERPL-CCNC: 175 U/L (ref 1–40)
BACTERIA UR QL AUTO: NORMAL /HPF
BASOPHILS # BLD AUTO: 0.04 10*3/MM3 (ref 0–0.2)
BASOPHILS NFR BLD AUTO: 0.3 % (ref 0–1.5)
BILIRUB SERPL-MCNC: 1.1 MG/DL (ref 0–1.2)
BILIRUB UR QL STRIP: NEGATIVE
BUN SERPL-MCNC: 18 MG/DL (ref 8–23)
BUN/CREAT SERPL: 13.3 (ref 7–25)
CALCIUM SPEC-SCNC: 8.6 MG/DL (ref 8.6–10.5)
CHLORIDE SERPL-SCNC: 100 MMOL/L (ref 98–107)
CK SERPL-CCNC: 7965 U/L (ref 20–200)
CLARITY UR: CLEAR
CO2 SERPL-SCNC: 24.6 MMOL/L (ref 22–29)
COLOR UR: YELLOW
CREAT SERPL-MCNC: 1.35 MG/DL (ref 0.76–1.27)
DEPRECATED RDW RBC AUTO: 45.1 FL (ref 37–54)
EGFRCR SERPLBLD CKD-EPI 2021: 51.1 ML/MIN/1.73
EOSINOPHIL # BLD AUTO: 0 10*3/MM3 (ref 0–0.4)
EOSINOPHIL NFR BLD AUTO: 0 % (ref 0.3–6.2)
ERYTHROCYTE [DISTWIDTH] IN BLOOD BY AUTOMATED COUNT: 14.3 % (ref 12.3–15.4)
GLOBULIN UR ELPH-MCNC: 2.9 GM/DL
GLUCOSE SERPL-MCNC: 121 MG/DL (ref 65–99)
GLUCOSE UR STRIP-MCNC: NEGATIVE MG/DL
HCT VFR BLD AUTO: 42.8 % (ref 37.5–51)
HGB BLD-MCNC: 14.1 G/DL (ref 13–17.7)
HGB UR QL STRIP.AUTO: ABNORMAL
HOLD SPECIMEN: NORMAL
HOLD SPECIMEN: NORMAL
HYALINE CASTS UR QL AUTO: NORMAL /LPF
IMM GRANULOCYTES # BLD AUTO: 0.04 10*3/MM3 (ref 0–0.05)
IMM GRANULOCYTES NFR BLD AUTO: 0.3 % (ref 0–0.5)
INR PPP: 1.15 (ref 0.86–1.15)
KETONES UR QL STRIP: NEGATIVE
LEUKOCYTE ESTERASE UR QL STRIP.AUTO: NEGATIVE
LYMPHOCYTES # BLD AUTO: 0.69 10*3/MM3 (ref 0.7–3.1)
LYMPHOCYTES NFR BLD AUTO: 5.8 % (ref 19.6–45.3)
MAGNESIUM SERPL-MCNC: 1.8 MG/DL (ref 1.6–2.4)
MCH RBC QN AUTO: 29 PG (ref 26.6–33)
MCHC RBC AUTO-ENTMCNC: 32.9 G/DL (ref 31.5–35.7)
MCV RBC AUTO: 87.9 FL (ref 79–97)
MONOCYTES # BLD AUTO: 1.31 10*3/MM3 (ref 0.1–0.9)
MONOCYTES NFR BLD AUTO: 11 % (ref 5–12)
NEUTROPHILS NFR BLD AUTO: 82.6 % (ref 42.7–76)
NEUTROPHILS NFR BLD AUTO: 9.83 10*3/MM3 (ref 1.7–7)
NITRITE UR QL STRIP: NEGATIVE
NRBC BLD AUTO-RTO: 0 /100 WBC (ref 0–0.2)
PH UR STRIP.AUTO: 6.5 [PH] (ref 5–8)
PLATELET # BLD AUTO: 287 10*3/MM3 (ref 140–450)
PMV BLD AUTO: 8.4 FL (ref 6–12)
POTASSIUM SERPL-SCNC: 4.4 MMOL/L (ref 3.5–5.2)
PROT SERPL-MCNC: 6.8 G/DL (ref 6–8.5)
PROT UR QL STRIP: ABNORMAL
PROTHROMBIN TIME: 14.9 SECONDS (ref 11.8–14.9)
RBC # BLD AUTO: 4.87 10*6/MM3 (ref 4.14–5.8)
RBC # UR STRIP: NORMAL /HPF
REF LAB TEST METHOD: NORMAL
SODIUM SERPL-SCNC: 138 MMOL/L (ref 136–145)
SP GR UR STRIP: 1.02 (ref 1–1.03)
SQUAMOUS #/AREA URNS HPF: NORMAL /HPF
TROPONIN T SERPL HS-MCNC: 49 NG/L
TSH SERPL DL<=0.05 MIU/L-ACNC: 0.74 UIU/ML (ref 0.27–4.2)
UROBILINOGEN UR QL STRIP: ABNORMAL
WBC # UR STRIP: NORMAL /HPF
WBC NRBC COR # BLD AUTO: 11.91 10*3/MM3 (ref 3.4–10.8)
WHOLE BLOOD HOLD COAG: NORMAL
WHOLE BLOOD HOLD SPECIMEN: NORMAL

## 2024-04-10 PROCEDURE — 70450 CT HEAD/BRAIN W/O DYE: CPT

## 2024-04-10 PROCEDURE — 85025 COMPLETE CBC W/AUTO DIFF WBC: CPT

## 2024-04-10 PROCEDURE — 73521 X-RAY EXAM HIPS BI 2 VIEWS: CPT

## 2024-04-10 PROCEDURE — 80053 COMPREHEN METABOLIC PANEL: CPT

## 2024-04-10 PROCEDURE — 82607 VITAMIN B-12: CPT | Performed by: STUDENT IN AN ORGANIZED HEALTH CARE EDUCATION/TRAINING PROGRAM

## 2024-04-10 PROCEDURE — 93005 ELECTROCARDIOGRAM TRACING: CPT | Performed by: EMERGENCY MEDICINE

## 2024-04-10 PROCEDURE — 99222 1ST HOSP IP/OBS MODERATE 55: CPT | Performed by: STUDENT IN AN ORGANIZED HEALTH CARE EDUCATION/TRAINING PROGRAM

## 2024-04-10 PROCEDURE — 84443 ASSAY THYROID STIM HORMONE: CPT | Performed by: STUDENT IN AN ORGANIZED HEALTH CARE EDUCATION/TRAINING PROGRAM

## 2024-04-10 PROCEDURE — 85610 PROTHROMBIN TIME: CPT

## 2024-04-10 PROCEDURE — 73030 X-RAY EXAM OF SHOULDER: CPT

## 2024-04-10 PROCEDURE — 81001 URINALYSIS AUTO W/SCOPE: CPT | Performed by: EMERGENCY MEDICINE

## 2024-04-10 PROCEDURE — 71045 X-RAY EXAM CHEST 1 VIEW: CPT

## 2024-04-10 PROCEDURE — 93005 ELECTROCARDIOGRAM TRACING: CPT

## 2024-04-10 PROCEDURE — 25810000003 LACTATED RINGERS SOLUTION: Performed by: EMERGENCY MEDICINE

## 2024-04-10 PROCEDURE — 93010 ELECTROCARDIOGRAM REPORT: CPT | Performed by: INTERNAL MEDICINE

## 2024-04-10 PROCEDURE — 82550 ASSAY OF CK (CPK): CPT

## 2024-04-10 PROCEDURE — 25810000003 SODIUM CHLORIDE 0.9 % SOLUTION: Performed by: STUDENT IN AN ORGANIZED HEALTH CARE EDUCATION/TRAINING PROGRAM

## 2024-04-10 PROCEDURE — 99285 EMERGENCY DEPT VISIT HI MDM: CPT

## 2024-04-10 PROCEDURE — 84484 ASSAY OF TROPONIN QUANT: CPT

## 2024-04-10 PROCEDURE — 83735 ASSAY OF MAGNESIUM: CPT

## 2024-04-10 PROCEDURE — 72100 X-RAY EXAM L-S SPINE 2/3 VWS: CPT

## 2024-04-10 RX ORDER — SODIUM CHLORIDE 0.9 % (FLUSH) 0.9 %
10 SYRINGE (ML) INJECTION EVERY 12 HOURS SCHEDULED
Status: DISCONTINUED | OUTPATIENT
Start: 2024-04-10 | End: 2024-04-19 | Stop reason: HOSPADM

## 2024-04-10 RX ORDER — NITROGLYCERIN 0.4 MG/1
0.4 TABLET SUBLINGUAL
Status: DISCONTINUED | OUTPATIENT
Start: 2024-04-10 | End: 2024-04-15

## 2024-04-10 RX ORDER — SODIUM CHLORIDE 9 MG/ML
40 INJECTION, SOLUTION INTRAVENOUS AS NEEDED
Status: DISCONTINUED | OUTPATIENT
Start: 2024-04-10 | End: 2024-04-19 | Stop reason: HOSPADM

## 2024-04-10 RX ORDER — SENNOSIDES 8.6 MG
650 CAPSULE ORAL EVERY 8 HOURS PRN
Status: ON HOLD | COMMUNITY

## 2024-04-10 RX ORDER — BISACODYL 10 MG
10 SUPPOSITORY, RECTAL RECTAL DAILY PRN
Status: DISCONTINUED | OUTPATIENT
Start: 2024-04-10 | End: 2024-04-19 | Stop reason: HOSPADM

## 2024-04-10 RX ORDER — ACETAMINOPHEN 500 MG
1000 TABLET ORAL EVERY 8 HOURS
Status: DISCONTINUED | OUTPATIENT
Start: 2024-04-10 | End: 2024-04-11

## 2024-04-10 RX ORDER — ACETAMINOPHEN 325 MG/1
650 TABLET ORAL EVERY 6 HOURS PRN
Status: DISCONTINUED | OUTPATIENT
Start: 2024-04-10 | End: 2024-04-11

## 2024-04-10 RX ORDER — SODIUM CHLORIDE 0.9 % (FLUSH) 0.9 %
10 SYRINGE (ML) INJECTION AS NEEDED
Status: DISCONTINUED | OUTPATIENT
Start: 2024-04-10 | End: 2024-04-19 | Stop reason: HOSPADM

## 2024-04-10 RX ORDER — AMOXICILLIN 250 MG
2 CAPSULE ORAL 2 TIMES DAILY PRN
Status: DISCONTINUED | OUTPATIENT
Start: 2024-04-10 | End: 2024-04-19 | Stop reason: HOSPADM

## 2024-04-10 RX ORDER — POLYETHYLENE GLYCOL 3350 17 G/17G
17 POWDER, FOR SOLUTION ORAL DAILY PRN
Status: DISCONTINUED | OUTPATIENT
Start: 2024-04-10 | End: 2024-04-19 | Stop reason: HOSPADM

## 2024-04-10 RX ORDER — CHOLECALCIFEROL (VITAMIN D3) 125 MCG
5 CAPSULE ORAL NIGHTLY PRN
Status: DISCONTINUED | OUTPATIENT
Start: 2024-04-10 | End: 2024-04-19 | Stop reason: HOSPADM

## 2024-04-10 RX ORDER — ALUMINA, MAGNESIA, AND SIMETHICONE 2400; 2400; 240 MG/30ML; MG/30ML; MG/30ML
15 SUSPENSION ORAL EVERY 6 HOURS PRN
Status: DISCONTINUED | OUTPATIENT
Start: 2024-04-10 | End: 2024-04-19 | Stop reason: HOSPADM

## 2024-04-10 RX ORDER — SODIUM CHLORIDE 9 MG/ML
150 INJECTION, SOLUTION INTRAVENOUS CONTINUOUS
Status: ACTIVE | OUTPATIENT
Start: 2024-04-10 | End: 2024-04-12

## 2024-04-10 RX ORDER — BISACODYL 5 MG/1
5 TABLET, DELAYED RELEASE ORAL DAILY PRN
Status: DISCONTINUED | OUTPATIENT
Start: 2024-04-10 | End: 2024-04-19 | Stop reason: HOSPADM

## 2024-04-10 RX ADMIN — SODIUM CHLORIDE, POTASSIUM CHLORIDE, SODIUM LACTATE AND CALCIUM CHLORIDE 1000 ML: 600; 310; 30; 20 INJECTION, SOLUTION INTRAVENOUS at 18:48

## 2024-04-10 RX ADMIN — Medication 10 ML: at 21:06

## 2024-04-10 RX ADMIN — ACETAMINOPHEN 1000 MG: 500 TABLET ORAL at 21:06

## 2024-04-10 RX ADMIN — SODIUM CHLORIDE 150 ML/HR: 9 INJECTION, SOLUTION INTRAVENOUS at 21:17

## 2024-04-10 NOTE — TELEPHONE ENCOUNTER
Al[place] from [facility] call the office to let us know that when she got to his house today for a home visit the patient had fallen.  He was on the floor and had urinated on himself.  She does not know how long he has been on the floor and she stated that she doesn't think he is in the same spot as when he fell.  Cause the potted plant was on the other side of the room and the patient was not near it when she found him but he had potting soil in his hair.  Ambulance has been called for him because she did not know how long he had been on the floor and for him to go to ER to be checked.  Also no one was at the house with the patient and we stressed to the patient and caregiver at the last appointment that the patient can not be left alone in the home.  They stated that arrangements were going to be made for the patient to have someone there with him.

## 2024-04-10 NOTE — H&P
[facility]   HOSPITALIST HISTORY AND PHYSICAL  Date: 4/10/2024   Patient Name: Enrique Wylie  : 1935  MRN: [id number]  Primary Care Physician:  Josh Maya MD  Date of admission: 4/10/2024    Subjective   Subjective     Chief Complaint: Fall, found on ground    HPI:    Enrique Wylie is a 88 y.o. male past medical history of A-fib with unknown Eliquis adherence, frequent falls, chronic lumbar spinal stenosis, restless leg syndrome, hypertension, chronic venous stasis, hyperlipidemia who presents to the ER due to a fall and being found on the ground.  Patient is relatively poor historian but at the very least recollects falling earlier this morning.  He states he was potentially getting out of bed and fell on the ground and was in significant pain and unable to call for help.  He has no family that lives with him and only has neighbors that check on him throughout the day.  His home health nurse was scheduled to come in today and when she came in to evaluate him he was found on the ground in his own urine.  The nurse then notified EMS and patient was brought to the ER for evaluation    Upon arrival here he was found to be hemodynamically stable and in atrial fibrillation with a controlled rate.  Lab workup was notable for a mild leukocytosis of 11,000, mild elevated troponin of 49, acute kidney injury with a creatinine of 1.35 up from a baseline of 1.  CK was found to be significantly elevated at 7965.  Imaging of the chest and head were unremarkable.  Patient was given a liter fluid bolus hospitalist was then contacted for admission of rhabdomyolysis.      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    C4-5 and C5-6 with edema in the cord at C4-5   • Death of wife 07/30/2020   • Fall at home, initial encounter 05/21/2020   • Gait instability 02/06/2019   • Grief reaction 07/30/2020   • High cholesterol    • Kidney stone    • Lumbar spinal stenosis 02/02/2016    Worsening leg symptoms,  3/12/15   • Medication management 02/06/2019   • Restless leg syndrome 02/06/2019   • Weight loss, unintentional 07/30/2020         Past Surgical History:  Past Surgical History:   Procedure Laterality Date   • CATARACT EXTRACTION Bilateral    • CERVICAL FUSION      C4,5 and 6   • KNEE ARTHROSCOPY      right knee   • LUMBAR LAMINECTOMY  02/10/2016    L3-4   • ROTATOR CUFF REPAIR Right    • SKIN BIOPSY      face and ears, squamous cell    • SPINE SURGERY      minimally invasive procedure \"closing in on spinal Cord\"  \"gave spinal cord more room\"   • URETEROSCOPY LASER LITHOTRIPSY WITH STENT INSERTION Bilateral 11/13/2023    Procedure: Bilateral Retrograde Pyelogram, Left Ureteroscopy Lasertripsy Basket Stone Extraction and Stent Insertion, Right Stent Insertion, Bladder Stone Extraction;  Surgeon: Katerina Carpenter MD;  Location: [facility] OR;  Service: Urology;  Laterality: Bilateral;   • URETEROSCOPY LASER LITHOTRIPSY WITH STENT INSERTION Bilateral 12/18/2023    Procedure: CYSTOSCOPY URETEROSCOPY RIGHT RETROGRADE PYELOGRAM HOLMIUM LASER STENT exchange; LEFT STENT REMOVAL;  Surgeon: Katerina Carpenter MD;  Location: [facility] OR;  Service: Urology;  Laterality: Bilateral;         Family History:   Reviewed and noncontributory except as mentioned in HPI    Social History:   Social Determinants of Health     Tobacco Use: Low Risk  (4/10/2024)    Patient History    • Smoking Tobacco Use: Never    • Smokeless Tobacco Use: Never    • Passive Exposure: Not on file   Alcohol Use: Not At Risk " (7/8/2021)    AUDIT-C    • Frequency of Alcohol Consumption: Never    • Average Number of Drinks: Not on file    • Frequency of Binge Drinking: Not on file   Financial Resource Strain: Low Risk  (2/26/2024)    Overall Financial Resource Strain (CARDIA)    • Difficulty of Paying Living Expenses: Not hard at all   Food Insecurity: No Food Insecurity (2/26/2024)    Hunger Vital Sign    • Worried About Running Out of Food in the Last Year: Never true    • Ran Out of Food in the Last Year: Never true   Transportation Needs: No Transportation Needs (2/26/2024)    PRAPARE - Transportation    • Lack of Transportation (Medical): No    • Lack of Transportation (Non-Medical): No   Physical Activity: Not on file   Stress: Not on file   Social Connections: Unknown (10/13/2023)    Family and Community Support    • Help with Day-to-Day Activities: Not on file    • Lonely or Isolated: Not on file   Interpersonal Safety: Not At Risk (4/10/2024)    Abuse Screen    • Unsafe at Home or Work/School: no    • Feels Threatened by Someone?: no    • Does Anyone Keep You from Contacting Others or Doint Things Outside the Home?: no    • Physical Sign of Abuse Present: no   Depression: At risk (2/21/2024)    PHQ-2    • PHQ-2 Score: 7   Housing Stability: Not At Risk (2/26/2024)    Housing Stability    • Current Living Arrangements: home    • Potentially Unsafe Housing Conditions: none   Utilities: Not on file   Health Literacy: Unknown (10/13/2023)    Education    • Help with school or training?: Not on file    • Preferred Language: Not on file   Employment: Unknown (10/13/2023)    Employment    • Do you want help finding or keeping work or a job?: Not on file   Disabilities: At Risk (12/18/2023)    Disabilities    • Concentrating, Remembering, or Making Decisions Difficulty: yes    • Doing Errands Independently Difficulty: no         Home Medications:  DuoDERM CGF Extra Thin, acetaminophen, apixaban, atorvastatin, carbidopa-levodopa,  furosemide, hydrocortisone, mirtazapine, oxybutynin, potassium chloride, sulfamethoxazole-trimethoprim, tamsulosin, and urea    Allergies:  No Known Allergies    Review of Systems   All systems were reviewed and negative except for: Back pain    Objective   Objective     Vitals:   Temp:  [98.2 °F (36.8 °C)] 98.2 °F (36.8 °C)  Heart Rate:  [] 96  Resp:  [20-22] 20  BP: (101-165)/() 115/72    Physical Exam    Constitutional: Awake, alert, no acute distress   Eyes: Pupils equal, sclerae anicteric, no conjunctival injection   HENT: Left posterior parietal area with noted abrasion, mucous membranes moist   Neck: Supple, no thyromegaly, no lymphadenopathy, trachea midline   Respiratory: Clear to auscultation bilaterally, nonlabored respirations    Cardiovascular: RRR, no murmurs, rubs, or gallops, palpable pedal pulses bilaterally   Gastrointestinal: Positive bowel sounds, soft, nontender, nondistended   Musculoskeletal: No bilateral ankle edema, no clubbing or cyanosis to extremities   Psychiatric: Appropriate affect, cooperative   Neurologic: Oriented x 3, strength symmetric in all extremities, No saddle anesthesia. Cranial Nerves grossly intact to confrontation, speech clear   Skin: Venous stasis changes noted in the lower extremities without any warmth    Result Review    Result Review:  I have personally reviewed the results from the time of this admission to 4/10/2024 18:39 EDT and agree with these findings:  [x]  Laboratory  [x]  Microbiology  [x]  Radiology  [x]  EKG/Telemetry   [x]  Cardiology/Vascular   []  Pathology  [x]  Old records  []  Other:      Assessment & Plan   Assessment / Plan     Assessment/Plan:   Rhabdomyolysis  Acute kidney injury secondary to above  Fall on anticoagulation with head trauma  Elevated troponin likely related to delayed renal clearance  Paroxysmal atrial fibrillation with unknown Eliquis adherence  Mild cognitive impairment  Hypertension      Plan  - Admit to  hospitalist service  - Continue aggressive hydration for rhabdo that is likely related to being on the ground for an unclear amount of time.  No focal deficits or saddle anesthesia in the ER.  No imaging done of the hips/pelvis or lumbar spine, we have ordered stat while in the ER given the unclear nature of his fall and vague complaints of back pain  - Urinalysis is consistent with rhabdomyolysis, we will repeat a CK in the morning to ensure there is a downtrend.  - PT/OT eval  - Check TSH and B12 for reversible causes of recurrent falls in the outpatient setting  - Given his recurrent falls we will hold off on anticoagulation for his A-fib at this time.  Repeat head CT in the morning given evidence of head trauma to rule out any delayed ICH  - EKG personally reviewed showed atrial fibrillation without any ST elevations or depressions to explain mildly elevated troponin.  We will repeat a troponin in the a.m., monitor clinically for complaints of chest pain which she does not have at this time  - Clarify chronic home meds and resume as appropriate.  Patient does not seem to take his medications as directed on initial interview.  Has no family that lives with him      Discussed with ER Physician and Nurse    All labs/imaging studies were personally reviewed and findings are as noted above      DVT Prophylaxis: SCDs, resume Eliquis if no delayed ICH on repeat head CT    CODE STATUS:    Medical Intervention Limits: NO intubation (DNI)  Level Of Support Discussed With: Patient  Code Status (Patient has no pulse and is not breathing): No CPR (Do Not Attempt to Resuscitate)  Medical Interventions (Patient has pulse or is breathing): Limited Support      Admission Status:  I believe this patient meets inpatient status.    Electronically signed by Guanako Wang MD, 04/10/24, 6:39 PM EDT.

## 2024-04-11 ENCOUNTER — APPOINTMENT (OUTPATIENT)
Dept: CT IMAGING | Facility: HOSPITAL | Age: 89
End: 2024-04-11
Payer: MEDICARE

## 2024-04-11 LAB
ANION GAP SERPL CALCULATED.3IONS-SCNC: 10.2 MMOL/L (ref 5–15)
BASOPHILS # BLD AUTO: 0.04 10*3/MM3 (ref 0–0.2)
BASOPHILS NFR BLD AUTO: 0.4 % (ref 0–1.5)
BUN SERPL-MCNC: 18 MG/DL (ref 8–23)
BUN/CREAT SERPL: 14.4 (ref 7–25)
CALCIUM SPEC-SCNC: 8.2 MG/DL (ref 8.6–10.5)
CHLORIDE SERPL-SCNC: 103 MMOL/L (ref 98–107)
CK SERPL-CCNC: 4943 U/L (ref 20–200)
CO2 SERPL-SCNC: 22.8 MMOL/L (ref 22–29)
CREAT SERPL-MCNC: 1.25 MG/DL (ref 0.76–1.27)
D-LACTATE SERPL-SCNC: 1.7 MMOL/L (ref 0.5–2)
DEPRECATED RDW RBC AUTO: 46.2 FL (ref 37–54)
EGFRCR SERPLBLD CKD-EPI 2021: 56.1 ML/MIN/1.73
EOSINOPHIL # BLD AUTO: 0.08 10*3/MM3 (ref 0–0.4)
EOSINOPHIL NFR BLD AUTO: 0.8 % (ref 0.3–6.2)
ERYTHROCYTE [DISTWIDTH] IN BLOOD BY AUTOMATED COUNT: 14.4 % (ref 12.3–15.4)
FLUAV SUBTYP SPEC NAA+PROBE: NOT DETECTED
FLUBV RNA ISLT QL NAA+PROBE: NOT DETECTED
GEN 5 2HR TROPONIN T REFLEX: 46 NG/L
GLUCOSE SERPL-MCNC: 99 MG/DL (ref 65–99)
HCT VFR BLD AUTO: 39.9 % (ref 37.5–51)
HGB BLD-MCNC: 12.8 G/DL (ref 13–17.7)
IMM GRANULOCYTES # BLD AUTO: 0.05 10*3/MM3 (ref 0–0.05)
IMM GRANULOCYTES NFR BLD AUTO: 0.5 % (ref 0–0.5)
LYMPHOCYTES # BLD AUTO: 0.83 10*3/MM3 (ref 0.7–3.1)
LYMPHOCYTES NFR BLD AUTO: 7.9 % (ref 19.6–45.3)
MAGNESIUM SERPL-MCNC: 1.7 MG/DL (ref 1.6–2.4)
MCH RBC QN AUTO: 28.4 PG (ref 26.6–33)
MCHC RBC AUTO-ENTMCNC: 32.1 G/DL (ref 31.5–35.7)
MCV RBC AUTO: 88.5 FL (ref 79–97)
MONOCYTES # BLD AUTO: 1.27 10*3/MM3 (ref 0.1–0.9)
MONOCYTES NFR BLD AUTO: 12 % (ref 5–12)
NEUTROPHILS NFR BLD AUTO: 78.4 % (ref 42.7–76)
NEUTROPHILS NFR BLD AUTO: 8.3 10*3/MM3 (ref 1.7–7)
NRBC BLD AUTO-RTO: 0 /100 WBC (ref 0–0.2)
NT-PROBNP SERPL-MCNC: 889.4 PG/ML (ref 0–1800)
PLATELET # BLD AUTO: 244 10*3/MM3 (ref 140–450)
PMV BLD AUTO: 9 FL (ref 6–12)
POTASSIUM SERPL-SCNC: 4.1 MMOL/L (ref 3.5–5.2)
PROCALCITONIN SERPL-MCNC: 0.17 NG/ML (ref 0–0.25)
RBC # BLD AUTO: 4.51 10*6/MM3 (ref 4.14–5.8)
RSV RNA NPH QL NAA+NON-PROBE: NOT DETECTED
SARS-COV-2 RNA RESP QL NAA+PROBE: NOT DETECTED
SODIUM SERPL-SCNC: 136 MMOL/L (ref 136–145)
TROPONIN T DELTA: -2 NG/L
TROPONIN T SERPL HS-MCNC: 48 NG/L
VIT B12 BLD-MCNC: 323 PG/ML (ref 211–946)
WBC NRBC COR # BLD AUTO: 10.57 10*3/MM3 (ref 3.4–10.8)

## 2024-04-11 PROCEDURE — 83880 ASSAY OF NATRIURETIC PEPTIDE: CPT | Performed by: INTERNAL MEDICINE

## 2024-04-11 PROCEDURE — 25010000002 MAGNESIUM SULFATE 2 GM/50ML SOLUTION: Performed by: INTERNAL MEDICINE

## 2024-04-11 PROCEDURE — 70450 CT HEAD/BRAIN W/O DYE: CPT

## 2024-04-11 PROCEDURE — 97166 OT EVAL MOD COMPLEX 45 MIN: CPT

## 2024-04-11 PROCEDURE — 85025 COMPLETE CBC W/AUTO DIFF WBC: CPT | Performed by: STUDENT IN AN ORGANIZED HEALTH CARE EDUCATION/TRAINING PROGRAM

## 2024-04-11 PROCEDURE — 82550 ASSAY OF CK (CPK): CPT | Performed by: STUDENT IN AN ORGANIZED HEALTH CARE EDUCATION/TRAINING PROGRAM

## 2024-04-11 PROCEDURE — 84145 PROCALCITONIN (PCT): CPT | Performed by: INTERNAL MEDICINE

## 2024-04-11 PROCEDURE — 83605 ASSAY OF LACTIC ACID: CPT | Performed by: INTERNAL MEDICINE

## 2024-04-11 PROCEDURE — 87040 BLOOD CULTURE FOR BACTERIA: CPT | Performed by: INTERNAL MEDICINE

## 2024-04-11 PROCEDURE — 99233 SBSQ HOSP IP/OBS HIGH 50: CPT | Performed by: INTERNAL MEDICINE

## 2024-04-11 PROCEDURE — 83735 ASSAY OF MAGNESIUM: CPT | Performed by: STUDENT IN AN ORGANIZED HEALTH CARE EDUCATION/TRAINING PROGRAM

## 2024-04-11 PROCEDURE — 80048 BASIC METABOLIC PNL TOTAL CA: CPT | Performed by: STUDENT IN AN ORGANIZED HEALTH CARE EDUCATION/TRAINING PROGRAM

## 2024-04-11 PROCEDURE — 97161 PT EVAL LOW COMPLEX 20 MIN: CPT

## 2024-04-11 PROCEDURE — 25810000003 SODIUM CHLORIDE 0.9 % SOLUTION: Performed by: STUDENT IN AN ORGANIZED HEALTH CARE EDUCATION/TRAINING PROGRAM

## 2024-04-11 PROCEDURE — 84484 ASSAY OF TROPONIN QUANT: CPT | Performed by: STUDENT IN AN ORGANIZED HEALTH CARE EDUCATION/TRAINING PROGRAM

## 2024-04-11 PROCEDURE — 87637 SARSCOV2&INF A&B&RSV AMP PRB: CPT | Performed by: INTERNAL MEDICINE

## 2024-04-11 RX ORDER — ACETAMINOPHEN 325 MG/1
650 TABLET ORAL EVERY 6 HOURS PRN
Status: DISCONTINUED | OUTPATIENT
Start: 2024-04-11 | End: 2024-04-19 | Stop reason: HOSPADM

## 2024-04-11 RX ORDER — MIRTAZAPINE 15 MG/1
15 TABLET, FILM COATED ORAL NIGHTLY
Status: DISCONTINUED | OUTPATIENT
Start: 2024-04-11 | End: 2024-04-19 | Stop reason: HOSPADM

## 2024-04-11 RX ORDER — METOPROLOL SUCCINATE 25 MG/1
12.5 TABLET, EXTENDED RELEASE ORAL
Status: DISCONTINUED | OUTPATIENT
Start: 2024-04-11 | End: 2024-04-19 | Stop reason: HOSPADM

## 2024-04-11 RX ORDER — MAGNESIUM SULFATE HEPTAHYDRATE 40 MG/ML
2 INJECTION, SOLUTION INTRAVENOUS ONCE
Status: COMPLETED | OUTPATIENT
Start: 2024-04-11 | End: 2024-04-11

## 2024-04-11 RX ORDER — GINSENG 100 MG
1 CAPSULE ORAL
Status: DISCONTINUED | OUTPATIENT
Start: 2024-04-11 | End: 2024-04-19 | Stop reason: HOSPADM

## 2024-04-11 RX ORDER — ROPINIROLE 0.25 MG/1
0.5 TABLET, FILM COATED ORAL NIGHTLY
Status: DISCONTINUED | OUTPATIENT
Start: 2024-04-11 | End: 2024-04-19 | Stop reason: HOSPADM

## 2024-04-11 RX ORDER — LIDOCAINE 4 G/G
1 PATCH TOPICAL DAILY PRN
Status: DISCONTINUED | OUTPATIENT
Start: 2024-04-11 | End: 2024-04-19 | Stop reason: HOSPADM

## 2024-04-11 RX ORDER — NYSTATIN 100000 [USP'U]/G
POWDER TOPICAL EVERY 12 HOURS SCHEDULED
Status: DISCONTINUED | OUTPATIENT
Start: 2024-04-11 | End: 2024-04-19 | Stop reason: HOSPADM

## 2024-04-11 RX ORDER — HYDROXYZINE HYDROCHLORIDE 25 MG/1
25 TABLET, FILM COATED ORAL 3 TIMES DAILY PRN
Status: DISCONTINUED | OUTPATIENT
Start: 2024-04-11 | End: 2024-04-19 | Stop reason: HOSPADM

## 2024-04-11 RX ORDER — PROCHLORPERAZINE EDISYLATE 5 MG/ML
5 INJECTION INTRAMUSCULAR; INTRAVENOUS EVERY 6 HOURS PRN
Status: DISCONTINUED | OUTPATIENT
Start: 2024-04-11 | End: 2024-04-19 | Stop reason: HOSPADM

## 2024-04-11 RX ORDER — NICOTINE 21 MG/24HR
1 PATCH, TRANSDERMAL 24 HOURS TRANSDERMAL DAILY PRN
Status: DISCONTINUED | OUTPATIENT
Start: 2024-04-11 | End: 2024-04-19 | Stop reason: HOSPADM

## 2024-04-11 RX ORDER — FUROSEMIDE 20 MG/1
20 TABLET ORAL 2 TIMES DAILY
Status: DISCONTINUED | OUTPATIENT
Start: 2024-04-11 | End: 2024-04-17

## 2024-04-11 RX ORDER — ATORVASTATIN CALCIUM 10 MG/1
10 TABLET, FILM COATED ORAL DAILY
Status: DISCONTINUED | OUTPATIENT
Start: 2024-04-11 | End: 2024-04-19 | Stop reason: HOSPADM

## 2024-04-11 RX ORDER — MULTIPLE VITAMINS W/ MINERALS TAB 9MG-400MCG
1 TAB ORAL DAILY
Status: DISCONTINUED | OUTPATIENT
Start: 2024-04-12 | End: 2024-04-19 | Stop reason: HOSPADM

## 2024-04-11 RX ORDER — ONDANSETRON 2 MG/ML
4 INJECTION INTRAMUSCULAR; INTRAVENOUS EVERY 4 HOURS PRN
Status: DISCONTINUED | OUTPATIENT
Start: 2024-04-11 | End: 2024-04-19 | Stop reason: HOSPADM

## 2024-04-11 RX ADMIN — APIXABAN 5 MG: 5 TABLET, FILM COATED ORAL at 20:48

## 2024-04-11 RX ADMIN — ROPINIROLE HYDROCHLORIDE 0.5 MG: 0.25 TABLET, FILM COATED ORAL at 20:47

## 2024-04-11 RX ADMIN — SODIUM CHLORIDE 150 ML/HR: 9 INJECTION, SOLUTION INTRAVENOUS at 10:47

## 2024-04-11 RX ADMIN — SODIUM CHLORIDE 150 ML/HR: 9 INJECTION, SOLUTION INTRAVENOUS at 04:51

## 2024-04-11 RX ADMIN — METOPROLOL SUCCINATE 12.5 MG: 25 TABLET, EXTENDED RELEASE ORAL at 20:48

## 2024-04-11 RX ADMIN — Medication 400 MG: at 10:44

## 2024-04-11 RX ADMIN — NYSTATIN: 100000 POWDER TOPICAL at 22:46

## 2024-04-11 RX ADMIN — Medication 10 ML: at 20:48

## 2024-04-11 RX ADMIN — ACETAMINOPHEN 1000 MG: 500 TABLET ORAL at 04:54

## 2024-04-11 RX ADMIN — BACITRACIN 0.9 G: 500 OINTMENT TOPICAL at 22:46

## 2024-04-11 RX ADMIN — ACETAMINOPHEN 650 MG: 325 TABLET ORAL at 20:48

## 2024-04-11 RX ADMIN — MIRTAZAPINE 15 MG: 15 TABLET, FILM COATED ORAL at 20:48

## 2024-04-11 RX ADMIN — MAGNESIUM SULFATE HEPTAHYDRATE 2 G: 40 INJECTION, SOLUTION INTRAVENOUS at 10:44

## 2024-04-11 NOTE — CONSULTS
Discharge Planning Assessment  [facility]     Patient Name: Enrique Wylie  MRN: [id number]  Today's Date: 4/11/2024    Admit Date: 4/10/2024  Plan: Pt admitted due to sustaining a fall. Pt documented to be confused. SAMPSON contacted pt's friend/POA, Pamela, to assess needs. Pt lives at home alone. Pt has had frequent falls- per Pamela, pt will no longer be living alone as he is requiring more assistance in care. Pamela is working to get a chair lift in his home from his basement to the main floor. She is also working to find a caregiver who can stay the night with pt. States she has reached out to individuals she knows privately but would like to find a caregiving agency. SAMPSON informed her of [facility] services and encouraged her to call. Discussed potential rehab needs and Pamela feels pt would benefit from this prior to returning home. She is agreeable to local referrals being sent with the exception of [facility]. PTOT pending. Pt is current with [facility]. Pharmacy/PCP confirmed. Will follow.   Discharge Needs Assessment       Row Name 04/11/24 1553       Living Environment    People in Home alone    Unique Family Situation Pt lives in [facility] [place] [facility] alone    Current Living Arrangements home    Primary Care Provided by self;homecare agency    Provides Primary Care For no one, unable/limited ability to care for self    Family Caregiver if Needed friend(s)    Family Caregiver Names Pamela Waters- Friend/POA    Quality of Family Relationships supportive;involved;helpful    Able to Return to Prior Arrangements yes       Resource/Environmental Concerns    Resource/Environmental Concerns none       Transition Planning    Patient/Family Anticipates Transition to home with help/services;inpatient rehabilitation facility    Patient/Family Anticipated Services at Transition home health care;skilled nursing    Transportation Anticipated family or friend will provide       Discharge Needs Assessment    Readmission  Within the Last 30 Days no previous admission in last 30 days    Current Outpatient/Agency/Support Group homecare agency    Concerns to be Addressed basic needs;discharge planning    Anticipated Changes Related to Illness inability to care for self    Discharge Facility/Level of Care Needs home with home health;nursing facility, skilled              Discharge Plan       Row Name 04/11/24 6703       Plan    Plan Pt admitted due to sustaining a fall. Pt documented to be confused. SAMPSON contacted pt's friend/POA, Pamela, to assess needs. Pt lives at home alone. Pt has had frequent falls- per Pamela, pt will no longer be living alone as he is requiring more assistance in care. Pamela is working to get a chair lift in his home from his basement to the main floor. She is also working to find a caregiver who can stay the night with pt. States she has reached out to individuals she knows privately but would like to find a caregiving agency. SAMPSON informed her of [facility] services and encouraged her to call. Discussed potential rehab needs and Pamela feels pt would benefit from this prior to returning home. She is agreeable to local referrals being sent with the exception of [facility]. PTOT pending. Pt is current with [facility]. Pharmacy/PCP confirmed. Will follow.    Patient/Family in Agreement with Plan yes             Continued Care and Services - Admitted Since 4/10/2024       Home Medical Care       Service Provider Request Status Selected Services Address Phone Fax Patient Preferred    [facility] Pending - Request Sent N/A [id number] [place] [address] [phone] --                Demographic Summary       Row Name 04/11/24 1558       General Information    Admission Type inpatient    Arrived From emergency department    Referral Source admission list    Reason for Consult discharge planning    Preferred Language English       Contact Information     Permission Granted to Share Info With family/designee                   Functional Status       Row Name 04/11/24 1551       Functional Status    Usual Activity Tolerance moderate    Current Activity Tolerance moderate       Functional Status, IADL    Medications independent    Meal Preparation assistive person    Housekeeping completely dependent    Laundry completely dependent    Shopping completely dependent                   Psychosocial       Row Name 04/11/24 1555       Coping/Stress    Sources of Support friend(s)    Techniques to [place] with Loss/Stress/Change not applicable    Understanding of Condition and Treatment unable to assess       Developmental Stage (Eriksson's)    Developmental Stage Stage 8 (67 years-death/Late Adulthood) Integrity vs. RON James

## 2024-04-11 NOTE — PLAN OF CARE
Goal Outcome Evaluation:  Plan of Care Reviewed With: patient        Progress: no change (First session for evaluation)  Outcome Evaluation: Patient presents with limitations of balance, strength, endurance/activity tolerance and cognition/safety awareness which impede his ability to perform ADL and transfers as prior.  The skills of a therapist will be required to safely and effectively implement treatment plan to restore maximal level of function.      Anticipated Discharge Disposition (OT): inpatient rehabilitation facility

## 2024-04-11 NOTE — SIGNIFICANT NOTE
" Wound Eval / Progress Noted    KARTHIK Alegria     Patient Name: Enrique Wylie  : 1935  MRN: [id number]  Today's Date: 2024                 Admit Date: 4/10/2024    Visit Dx:    ICD-10-CM ICD-9-CM   1. Traumatic rhabdomyolysis, initial encounter  T79.6XXA 958.6   2. Fall, initial encounter  W19.XXXA E888.9   3. Decreased activities of daily living (ADL)  Z78.9 V49.89   4. Difficulty walking  R26.2 719.7         Rhabdomyolysis        Past Medical History:   Diagnosis Date    Advance directive in chart 2020    AF (paroxysmal atrial fibrillation)     Allergic rhinitis     occasionally    Arthritis     Back pain 2014    Balance problem     walking is hard, using cane    Cancer     squamous cell  face and ears    Cervical spinal stenosis 2016    Previous surgery for myelopathy    Cervical spondylosis with myelopathy 2012    C4-5 and C5-6 with edema in the cord at C4-5    Death of wife 2020    Fall at home, initial encounter 2020    Gait instability 2019    Grief reaction 2020    High cholesterol     Kidney stone     Lumbar spinal stenosis 2016    Worsening leg symptoms,  3/12/15    Medication management 2019    Restless leg syndrome 2019    Weight loss, unintentional 2020        Past Surgical History:   Procedure Laterality Date    CATARACT EXTRACTION Bilateral     CERVICAL FUSION      C4,5 and 6    KNEE ARTHROSCOPY      right knee    LUMBAR LAMINECTOMY  02/10/2016    L3-4    ROTATOR CUFF REPAIR Right     SKIN BIOPSY      face and ears, squamous cell     SPINE SURGERY      minimally invasive procedure \"closing in on spinal Cord\"  \"gave spinal cord more room\"    URETEROSCOPY LASER LITHOTRIPSY WITH STENT INSERTION Bilateral 2023    Procedure: Bilateral Retrograde Pyelogram, Left Ureteroscopy Lasertripsy Basket Stone Extraction and Stent Insertion, Right Stent Insertion, Bladder Stone Extraction;  Surgeon: Katerina Carpenter MD;  " Location: [facility] MAIN OR;  Service: Urology;  Laterality: Bilateral;    URETEROSCOPY LASER LITHOTRIPSY WITH STENT INSERTION Bilateral 12/18/2023    Procedure: CYSTOSCOPY URETEROSCOPY RIGHT RETROGRADE PYELOGRAM HOLMIUM LASER STENT exchange; LEFT STENT REMOVAL;  Surgeon: Katerina Carpenter MD;  Location: [facility] MAIN OR;  Service: Urology;  Laterality: Bilateral;         Physical Assessment:  Wound 04/11/24 0022 Right anterior foot Skin Tear (Active)   Wound Image   04/11/24 1453   Pressure Injury Stage 2 04/11/24 1453   Dressing Appearance intact;dried drainage 04/11/24 1453   Closure None 04/11/24 1453   Base pink;moist 04/11/24 1453   Periwound intact;dry;pink 04/11/24 1453   Periwound Temperature warm 04/11/24 1453   Periwound Skin Turgor soft 04/11/24 1453   Edges open 04/11/24 1453   Drainage Characteristics/Odor serous 04/11/24 1453   Drainage Amount scant 04/11/24 1453   Care, Wound cleansed with;sterile normal saline 04/11/24 1453   Dressing Care dressing applied;border dressing;gauze;petroleum-based;silicone 04/11/24 1453   Periwound Care absorptive dressing applied 04/11/24 1453       Wound 04/11/24 0022 Bilateral lower leg (Active)   Wound Image   04/11/24 0026   Dressing Appearance open to air 04/11/24 1453   Closure None 04/11/24 1453   Base dry;scab;red 04/11/24 1453   Periwound intact;dry;redness 04/11/24 1453   Periwound Temperature warm 04/11/24 1453   Periwound Skin Turgor soft 04/11/24 1453   Edges rolled/closed 04/11/24 1453   Drainage Characteristics/Odor serosanguineous 04/11/24 0026   Drainage Amount none 04/11/24 1453   Care, Wound cleansed with;soap and water 04/11/24 1453   Dressing Care open to air;skin barrier agent applied 04/11/24 1453   Periwound Care moisturizer applied;barrier ointment applied 04/11/24 1453       Wound 04/11/24 0023 Right anterior knee Skin Tear (Active)   Wound Image   04/11/24 1453   Pressure Injury Stage 2 04/11/24 1453   Dressing Appearance intact;moist drainage  04/11/24 1453   Closure None 04/11/24 1453   Base moist;red;purple 04/11/24 1453   Periwound intact;ecchymotic;redness 04/11/24 1453   Periwound Temperature warm 04/11/24 1453   Periwound Skin Turgor soft 04/11/24 1453   Edges open 04/11/24 1453   Wound Length (cm) 3.8 cm 04/11/24 1453   Wound Width (cm) 2.8 cm 04/11/24 1453   Wound Depth (cm) 0 cm 04/11/24 1453   Wound Surface Area (cm^2) 10.64 cm^2 04/11/24 1453   Wound Volume (cm^3) 0 cm^3 04/11/24 1453   Drainage Characteristics/Odor serous;yellow 04/11/24 1453   Drainage Amount small 04/11/24 1453   Care, Wound cleansed with;sterile normal saline 04/11/24 1453   Dressing Care dressing applied;border dressing;gauze;petroleum-based;silicone 04/11/24 1453   Periwound Care absorptive dressing applied 04/11/24 1453       Wound 04/11/24 0023 Left anterior knee Skin Tear (Active)   Wound Image   04/11/24 1453   Pressure Injury Stage 2 04/11/24 1453   Dressing Appearance intact;moist drainage 04/11/24 1453   Closure None 04/11/24 1453   Base moist;pink;red 04/11/24 1453   Periwound intact;dry;redness 04/11/24 1453   Periwound Temperature warm 04/11/24 1453   Periwound Skin Turgor soft 04/11/24 1453   Edges open 04/11/24 1453   Wound Length (cm) 1.2 cm 04/11/24 1453   Wound Width (cm) 1.5 cm 04/11/24 1453   Wound Depth (cm) 0 cm 04/11/24 1453   Wound Surface Area (cm^2) 1.8 cm^2 04/11/24 1453   Wound Volume (cm^3) 0 cm^3 04/11/24 1453   Drainage Characteristics/Odor serous;yellow 04/11/24 1453   Drainage Amount scant 04/11/24 1453   Care, Wound cleansed with;sterile normal saline 04/11/24 1453   Dressing Care dressing applied;border dressing;gauze;petroleum-based;silicone 04/11/24 1453   Periwound Care absorptive dressing applied 04/11/24 1453       Wound 04/11/24 0024 Right anterior greater trochanter Skin Tear (Active)   Wound Image   04/11/24 1453   Pressure Injury Stage 2 04/11/24 1453   Dressing Appearance intact;moist drainage 04/11/24 1453   Closure None 04/11/24  1453   Base moist;pink;red 04/11/24 1453   Periwound intact;dry;redness 04/11/24 1453   Periwound Temperature warm 04/11/24 1453   Periwound Skin Turgor soft 04/11/24 1453   Edges open 04/11/24 1453   Wound Length (cm) 1.8 cm 04/11/24 1453   Wound Width (cm) 1.4 cm 04/11/24 1453   Wound Depth (cm) 0 cm 04/11/24 1453   Wound Surface Area (cm^2) 2.52 cm^2 04/11/24 1453   Wound Volume (cm^3) 0 cm^3 04/11/24 1453   Drainage Characteristics/Odor serous;yellow 04/11/24 1453   Drainage Amount scant 04/11/24 1453   Care, Wound cleansed with;sterile normal saline 04/11/24 1453   Dressing Care dressing applied;border dressing;gauze;petroleum-based;silicone 04/11/24 1453   Periwound Care absorptive dressing applied 04/11/24 1453       Wound 04/11/24 0024 Right scapula Skin Tear (Active)   Wound Image   04/11/24 1453   Pressure Injury Stage 2 04/11/24 1453   Dressing Appearance intact;moist drainage 04/11/24 1453   Closure None 04/11/24 1453   Base moist;pink;red 04/11/24 1453   Periwound intact;dry;pustules 04/11/24 1453   Periwound Temperature warm 04/11/24 1453   Periwound Skin Turgor soft 04/11/24 1453   Edges open 04/11/24 1453   Wound Length (cm) 5 cm 04/11/24 1453   Wound Width (cm) 6.5 cm 04/11/24 1453   Wound Depth (cm) 0 cm 04/11/24 1453   Wound Surface Area (cm^2) 32.5 cm^2 04/11/24 1453   Wound Volume (cm^3) 0 cm^3 04/11/24 1453   Drainage Characteristics/Odor serous;yellow 04/11/24 1453   Drainage Amount scant 04/11/24 1453   Care, Wound cleansed with;sterile normal saline 04/11/24 1453   Dressing Care dressing applied;border dressing;gauze;petroleum-based;silicone 04/11/24 1453   Periwound Care absorptive dressing applied 04/11/24 1453       Wound 04/11/24 0024 Bilateral coccyx Pressure Injury (Active)   Wound Image   04/11/24 1453   Dressing Appearance open to air 04/11/24 1453   Closure None 04/11/24 1453   Base blanchable;dry;red 04/11/24 1453   Periwound intact;dry;ecchymotic;redness 04/11/24 1453   Periwound  Temperature warm 04/11/24 1453   Periwound Skin Turgor soft 04/11/24 1453   Edges rolled/closed 04/11/24 1453   Drainage Amount none 04/11/24 1453   Care, Wound cleansed with;sterile normal saline 04/11/24 1453   Dressing Care open to air;skin barrier agent applied 04/11/24 1453   Periwound Care barrier ointment applied 04/11/24 1453       Wound 04/11/24 0025 Bilateral anterior groin MASD (Moisture associated skin damage) (Active)   Wound Image    04/11/24 1453   Dressing Appearance open to air 04/11/24 1453   Closure None 04/11/24 1453   Base moist;red 04/11/24 1453   Periwound intact;moist;redness;pink 04/11/24 1453   Periwound Temperature warm 04/11/24 1453   Periwound Skin Turgor soft 04/11/24 1453   Edges open 04/11/24 1453   Drainage Amount none 04/11/24 1453   Care, Wound cleansed with;sterile normal saline 04/11/24 1453   Dressing Care open to air 04/11/24 1453   Periwound Care dry periwound area maintained 04/11/24 1453       Wound 04/11/24 0046 medial parietal region Traumatic (Active)   Wound Image   04/11/24 0026   Dressing Appearance open to air 04/11/24 1453   Closure None 04/11/24 1453   Base dry;scab 04/11/24 1453   Periwound intact;dry;redness 04/11/24 1453   Periwound Temperature warm 04/11/24 1453   Periwound Skin Turgor soft 04/11/24 1453   Edges rolled/closed 04/11/24 1453   Drainage Amount none 04/11/24 1453   Care, Wound cleansed with;sterile normal saline 04/11/24 1453   Dressing Care open to air 04/11/24 1453   Periwound Care dry periwound area maintained 04/11/24 1453       Wound 04/11/24 0049 Right lower arm Skin Tear (Active)   Wound Image   04/11/24 1453   Dressing Appearance intact;moist drainage 04/11/24 1453   Closure None 04/11/24 1453   Base moist;pink;red;slough;yellow 04/11/24 1453   Periwound intact;dry;redness 04/11/24 1453   Periwound Temperature warm 04/11/24 1453   Periwound Skin Turgor soft 04/11/24 1453   Edges open 04/11/24 1453   Wound Length (cm) 6.2 cm 04/11/24 1453    Wound Width (cm) 2 cm 04/11/24 1453   Wound Depth (cm) 0 cm 04/11/24 1453   Wound Surface Area (cm^2) 12.4 cm^2 04/11/24 1453   Wound Volume (cm^3) 0 cm^3 04/11/24 1453   Drainage Characteristics/Odor serous;yellow 04/11/24 1453   Drainage Amount small 04/11/24 1453   Care, Wound cleansed with;sterile normal saline 04/11/24 1453   Dressing Care dressing applied;border dressing;hydrofiber;silver impregnated;silicone 04/11/24 1453   Periwound Care absorptive dressing applied 04/11/24 1453        Wound Check / Follow-up:  Patient seen today for new wound care consult. Patient awake and alert laying in his bed. Patient is very Council. Patient states that he lives alone and fell at home. He states that he laid on the floor for hours until his home health nurse found him. Patient was admitted for Rhabdomyolysis on 04/10/2024.     Patient is with multiple stage 2 pressure injuries to his right anterior foot, right anterior and lateral knee, left medial knee, right anterior greater trochanter, and right posterior scapula. All with pink, red, moist wound beds and dry, reddened periwounds. Patient's right posterior elbow is with a stage 2 pressure injury with a pink and yellow wound base and purple non-blanchable edges from 10-12 o'clock. Cleansed wounds with NS, blot dry and recommending application of silver impregnated hydrofiber to the wound beds and cover with a silicone border dressing daily.     Patient's BLE are with dry, scaly and reddened tissue. Cleansed BLE with soap and water, pat dry and applied a thin layer of blue top barrier ointment to bilateral heels and a layer of purple top moisturizer to bilateral legs. Recommending application of purple top moisturizer to BLE BID to rehydrate skin.     Patient's bilateral gluteal aspects are with intact blanchable redness, dry rolled skin and scattered ecchymosis. Cleansed skin with NS, blot dry and applied a thin layer of blue top barrier ointment to bilateral gluteal  aspects. Recommending application of a thin layer of blue top barrier ointment to patient's bilateral gluteal aspects and bony prominences BID. Also recommending q2h turns to prevent future skin breakdown.     Patient's bilateral groin are with red, moist, open MASD. Periwound is intact with moist redness. Cleansed skin with NS, blot dry and recommending application of a thin layer of calazime barrier ointment to bilateral groin TID. Cleanse skin with soap and water and pat dry between applications.     Patient's posterior scalp is with a small abrasion, with hard crusting noted. No drainage at this time. Periwound is dry, intact and reddened. Cleansed with NS, blot dry and recommending bacitracin ointment TID and leave open to air.     Impression: multiple stage 2 pressure injuries to his right anterior foot, right anterior and lateral knee, left medial knee, right anterior greater trochanter, and right posterior scapula, right posterior elbow. BLE scaly dry, reddened skin. Bilateral gluteal dry, intact redness. Bilateral groin MASD, posterior scalp abrasion.     Short term goals:  Regain skin integrity, skin care, skin protections, daily dressing changes, pressure reduction and moisture management.     María Horta RN    4/11/2024    19:13 EDT

## 2024-04-11 NOTE — PLAN OF CARE
Goal Outcome Evaluation:  Plan of Care Reviewed With: patient        Progress: no change  Outcome Evaluation: Pt had no acute changes noted this shift. Pt up in chair during shift, ambulated with PT. No c/o pain or SOA. Pt daughter had concerns of infection in pts foot, wants wound and doctor to evaluate. Wound care consulted. Pt in no apparent distress at this time, denies any needs currently, call light in reach.

## 2024-04-11 NOTE — THERAPY EVALUATION
Patient Name: Enrique Wylie  : 1935    MRN: [id number]                              Today's Date: 2024       Admit Date: 4/10/2024    Visit Dx:     ICD-10-CM ICD-9-CM   1. Traumatic rhabdomyolysis, initial encounter  T79.6XXA 958.6   2. Fall, initial encounter  W19.XXXA E888.9   3. Decreased activities of daily living (ADL)  Z78.9 V49.89   4. Difficulty walking  R26.2 719.7     Patient Active Problem List   Diagnosis    Balance problem    Restless leg syndrome    Encounter for long-term (current) use of other medications    Lumbar spinal stenosis    Grief reaction    Hyperlipidemia    Unsteady gait when walking    Death of wife    Lumbar spinal stenosis    Advance directive in chart    Skin cancer    Nephrolithiasis    Left ureteral stone    Foreign body in genitourinary tract    Advance directive in chart    Bilateral sensorineural hearing loss    Cardiomegaly    Essential hypertension    Fall at home, initial encounter    Paroxysmal atrial fibrillation    Weight loss, unintentional    Adjustment disorder with depressed mood    Back pain    Rhabdomyolysis     Past Medical History:   Diagnosis Date    Advance directive in chart 2020    AF (paroxysmal atrial fibrillation)     Allergic rhinitis     occasionally    Arthritis     Back pain 2014    Balance problem     walking is hard, using cane    Cancer     squamous cell  face and ears    Cervical spinal stenosis 2016    Previous surgery for myelopathy    Cervical spondylosis with myelopathy 2012    C4-5 and C5-6 with edema in the cord at C4-5    Death of wife 2020    Fall at home, initial encounter 2020    Gait instability 2019    Grief reaction 2020    High cholesterol     Kidney stone     Lumbar spinal stenosis 2016    Worsening leg symptoms,  3/12/15    Medication management 2019    Restless leg syndrome 2019    Weight loss, unintentional 2020     Past Surgical History:  "  Procedure Laterality Date    CATARACT EXTRACTION Bilateral     CERVICAL FUSION      C4,5 and 6    KNEE ARTHROSCOPY      right knee    LUMBAR LAMINECTOMY  02/10/2016    L3-4    ROTATOR CUFF REPAIR Right     SKIN BIOPSY      face and ears, squamous cell     SPINE SURGERY      minimally invasive procedure \"closing in on spinal Cord\"  \"gave spinal cord more room\"    URETEROSCOPY LASER LITHOTRIPSY WITH STENT INSERTION Bilateral 11/13/2023    Procedure: Bilateral Retrograde Pyelogram, Left Ureteroscopy Lasertripsy Basket Stone Extraction and Stent Insertion, Right Stent Insertion, Bladder Stone Extraction;  Surgeon: Katerina Carpenter MD;  Location: [facility] MAIN OR;  Service: Urology;  Laterality: Bilateral;    URETEROSCOPY LASER LITHOTRIPSY WITH STENT INSERTION Bilateral 12/18/2023    Procedure: CYSTOSCOPY URETEROSCOPY RIGHT RETROGRADE PYELOGRAM HOLMIUM LASER STENT exchange; LEFT STENT REMOVAL;  Surgeon: Katerina Carpenter MD;  Location: [facility] MAIN OR;  Service: Urology;  Laterality: Bilateral;      General Information       Row Name 04/11/24 1142          OT Time and Intention    Document Type evaluation  -AV     Mode of Treatment individual therapy;occupational therapy  -AV       Row Name 04/11/24 1142          General Information    Patient Profile Reviewed yes  -AV     Prior Level of Function independent:;ADL's;transfer  Primarily independent at home.  Stands to shower (walk-in).  Stands at sink to groom.  Comfort height commode.  Ambulates with RW.  No home oxygen.  Neighbor assist with home management tasks: \"take ral good care of me\".  -AV     Existing Precautions/Restrictions fall  DNR  -AV     Barriers to Rehab none identified  -AV       Row Name 04/11/24 1142          Occupational Profile    Reason for Services/Referral (Occupational Profile) Patient is a 88 year old male admitted to [facility] on April 10, 2024 after falling at home.  He is currently on [address] [place]/room air.   OT consulted due to " recent decline in ADL/transfer independence.  No previous OT services for current condition.  -[facility] Name 04/11/24 1142          Living Environment    People in Home alone  -[facility] Name 04/11/24 1142          Cognition    Orientation Status (Cognition) --  Alert, pleasant and cooperative.  Follows commands with some repeated direction required.  Questionable ability to retain information/safety awareness.  -[facility] Name 04/11/24 1142          Safety Issues, Functional Mobility    Impairments Affecting Function (Mobility) balance;cognition;endurance/activity tolerance;strength  -AV               User Key  (r) = Recorded By, (t) = Taken By, (c) = Cosigned By      Initials Name Provider Type    AV Morgan Colon, OT Occupational Therapist                     Mobility/ADL's       Row Name 04/11/24 1144          Transfers    Comment, (Transfers) Supine to long sitting moderate assistance with cues  -[facility] Name 04/11/24 1144          Activities of Daily Living    BADL Assessment/Intervention --  Independent feeding with set up.  Mod assist grooming.  Mod-max assist bathing/dressing.  Dependent toilet hygiene; pure wick catheter.  -AV               User Key  (r) = Recorded By, (t) = Taken By, (c) = Cosigned By      Initials Name Provider Type    AV Morgan Colon, OT Occupational Therapist                   Obj/Interventions       [facility] Name 04/11/24 1145          Sensory Assessment (Somatosensory)    Sensory Assessment (Somatosensory) UE sensation intact  -[facility] Name 04/11/24 1145          Vision Assessment/Intervention    Visual Impairment/Limitations WFL;corrective lenses for reading  -[facility] Name 04/11/24 1145          Range of Motion Comprehensive    General Range of Motion upper extremity range of motion deficits identified  -AV     Comment, General Range of Motion Left shoulder flexion AROM <45/ AAROM ~90.  Further elbow, wrist and hand AROM WFL.  -[facility] Name 04/11/24 1145           Strength Comprehensive (MMT)    Comment, General Manual Muscle Testing (MMT) Assessment 4(-)//5 bilateral biceps, triceps and   -AV       Row Name 04/11/24 1145          Motor Skills    Motor Skills coordination;functional endurance  -AV     Coordination WFL  Right dominant  -AV     Functional Endurance poor plus  -AV       Row Name 04/11/24 1145          Balance    Comment, Balance CGA/min assist sitting balance  -AV               User Key  (r) = Recorded By, (t) = Taken By, (c) = Cosigned By      Initials Name Provider Type    Morgan Grijalva OT Occupational Therapist                   Goals/Plan       Row Name 04/11/24 1147          Transfer Goal 1 (OT)    Activity/Assistive Device (Transfer Goal 1, OT) transfers, all;walker, rolling  -AV     [place] Level/Cues Needed (Transfer Goal 1, OT) supervision required;verbal cues required  -AV     Time Frame (Transfer Goal 1, OT) long term goal (LTG);10 days  -AV       Row Name 04/11/24 1147          Bathing Goal 1 (OT)    Activity/Device (Bathing Goal 1, OT) bathing skills, all;shower chair  -AV     [place] Level/Cues Needed (Bathing Goal 1, OT) supervision required;set-up required;verbal cues required  -AV     Time Frame (Bathing Goal 1, OT) long term goal (LTG);10 days  -AV       Row Name 04/11/24 1147          Dressing Goal 1 (OT)    Activity/Device (Dressing Goal 1, OT) dressing skills, all  -AV     [place]/Cues Needed (Dressing Goal 1, OT) supervision required;set-up required;verbal cues required  -AV     Time Frame (Dressing Goal 1, OT) long term goal (LTG);10 days  -AV       Row Name 04/11/24 1147          Toileting Goal 1 (OT)    Activity/Device (Toileting Goal 1, OT) toileting skills, all  -AV     [place] Level/Cues Needed (Toileting Goal 1, OT) supervision required;set-up required;verbal cues required  -AV     Time Frame (Toileting Goal 1, OT) long term goal (LTG);10 days  -AV       Row Name 04/11/24 1147          Grooming  Goal 1 (OT)    Activity/Device (Grooming Goal 1, OT) grooming skills, all  -AV     [place] (Grooming Goal 1, OT) supervision required;set-up required;verbal cues required  -AV     Time Frame (Grooming Goal 1, OT) long term goal (LTG);10 days  -AV       Row Name 04/11/24 1147          Strength Goal 1 (OT)    Strength Goal 1 (OT) Patient will demonstrate 4/5 bilateral biceps, triceps and  to increase ADL and transfer independence.  -AV     Time Frame (Strength Goal 1, OT) long term goal (LTG);10 days  -AV       Row Name 04/11/24 1147          Problem Specific Goal 1 (OT)    Problem Specific Goal 1 (OT) Patient will demonstrate fair endurance/activity tolerance needed to support ADLs.  -AV     Time Frame (Problem Specific Goal 1, OT) long term goal (LTG);10 days  -AV       Row Name 04/11/24 1146          Therapy Assessment/Plan (OT)    Planned Therapy Interventions (OT) activity tolerance training;BADL retraining;functional balance retraining;occupation/activity based interventions;patient/caregiver education/training;ROM/therapeutic exercise;strengthening exercise;transfer/mobility retraining  -AV               User Key  (r) = Recorded By, (t) = Taken By, (c) = Cosigned By      Initials Name Provider Type    AV Morgan Colon, OT Occupational Therapist                   Clinical Impression       Row Name 04/11/24 1146          Pain Assessment    Additional Documentation Pain Scale: FACES Pre/Post-Treatment (Group)  -AV       Row Name 04/11/24 1146          Pain Scale: FACES Pre/Post-Treatment    Pain: FACES Scale, Pretreatment 2-->hurts little bit  -AV     Posttreatment Pain Rating 2-->hurts little bit  -AV       Row Name 04/11/24 1140          Plan of Care Review    Plan of Care Reviewed With patient  -AV     Progress no change  First session for evaluation  -AV     Outcome Evaluation Patient presents with limitations of balance, strength, endurance/activity tolerance and cognition/safety awareness which  impede his ability to perform ADL and transfers as prior.  The skills of a therapist will be required to safely and effectively implement treatment plan to restore maximal level of function.  -AV       Row Name 04/11/24 1146          Therapy Assessment/Plan (OT)    Patient/Family Therapy Goal Statement (OT) None stated  -AV     Rehab Potential (OT) good, to achieve stated therapy goals  -AV     Criteria for Skilled Therapeutic Interventions Met (OT) yes;meets criteria;skilled treatment is necessary  -AV     Therapy Frequency (OT) 5 times/wk  -AV       Row Name 04/11/24 1146          Therapy Plan Review/Discharge Plan (OT)    Equipment Needs Upon Discharge (OT) commode chair;shower chair  Reports having RW at home  -AV     Anticipated Discharge Disposition (OT) inpatient rehabilitation facility  -AV       Row Name 04/11/24 1146          Vital Signs    O2 Delivery Pre Treatment room air  -AV     O2 Delivery Intra Treatment room air  -AV     O2 Delivery Post Treatment room air  -AV       Row Name 04/11/24 1146          Positioning and Restraints    Pre-Treatment Position in bed  -AV     Post Treatment Position bed  -AV     In Bed call light within reach;encouraged to call for assist;exit alarm on  -AV               User Key  (r) = Recorded By, (t) = Taken By, (c) = Cosigned By      Initials Name Provider Type    AV Morgan Colon, OT Occupational Therapist                   Outcome Measures       Row Name 04/11/24 3796          How much help from another is currently needed...    Putting on and taking off regular lower body clothing? 2  -AV     Bathing (including washing, rinsing, and drying) 2  -AV     Toileting (which includes using toilet bed pan or urinal) 2  -AV     Putting on and taking off regular upper body clothing 2  -AV     Taking care of personal grooming (such as brushing teeth) 2  -AV     Eating meals 4  -AV     AM-PAC 6 Clicks Score (OT) 14  -AV       Row Name 04/11/24 6020          How much help from  another person do you currently need...    Turning from your back to your side while in flat bed without using bedrails? 2  -BB     Moving from lying on back to sitting on the side of a flat bed without bedrails? 2  -BB     Moving to and from a bed to a chair (including a wheelchair)? 1  -BB     Standing up from a chair using your arms (e.g., wheelchair, bedside chair)? 1  -BB     Climbing 3-5 steps with a railing? 1  -BB     To walk in hospital room? 1  -BB     AM-PAC 6 Clicks Score (PT) 8  -BB     Highest Level of Mobility Goal 3 --> Sit at edge of bed  -BB       Row Name 04/11/24 1149          Functional Assessment    Outcome Measure Options AM-PAC 6 Clicks Daily Activity (OT);Optimal Instrument  -AV       Row Name 04/11/24 1149          Optimal Instrument    Optimal Instrument Optimal - 3  -AV     Bending/Stooping 4  -AV     Standing 5  -AV     Reaching 3  -AV     From the list, choose the 3 activities you would most like to be able to do without any difficulty Bending/stooping;Standing;Reaching  -AV     Total Score Optimal - 3 12  -AV               User Key  (r) = Recorded By, (t) = Taken By, (c) = Cosigned By      Initials Name Provider Type    Morgan Grijalva OT Occupational Therapist    BB Katerina Alfonso, RN Registered Nurse                    Occupational Therapy Education       Title: PT OT SLP Therapies (Done)       Topic: Occupational Therapy (Done)       Point: ADL training (Done)       Description:   Instruct learner(s) on proper safety adaptation and remediation techniques during self care or transfers.   Instruct in proper use of assistive devices.                  Learning Progress Summary             Patient Acceptance, E, VU by AV at 4/11/2024 1149                         Point: Home exercise program (Done)       Description:   Instruct learner(s) on appropriate technique for monitoring, assisting and/or progressing therapeutic exercises/activities.                  Learning Progress  Summary             Patient Acceptance, E, VU by LIBERTY at 4/11/2024 1149                         Point: Precautions (Done)       Description:   Instruct learner(s) on prescribed precautions during self-care and functional transfers.                  Learning Progress Summary             Patient Acceptance, E, VU by AV at 4/11/2024 1149                         Point: Body mechanics (Done)       Description:   Instruct learner(s) on proper positioning and spine alignment during self-care, functional mobility activities and/or exercises.                  Learning Progress Summary             Patient Acceptance, E, VU by AV at 4/11/2024 1149                                         User Key       Initials Effective Dates Name Provider Type Discipline     06/16/21 -  Morgan Colon OT Occupational Therapist OT                  OT Recommendation and Plan  Planned Therapy Interventions (OT): activity tolerance training, BADL retraining, functional balance retraining, occupation/activity based interventions, patient/caregiver education/training, ROM/therapeutic exercise, strengthening exercise, transfer/mobility retraining  Therapy Frequency (OT): 5 times/wk  Plan of Care Review  Plan of Care Reviewed With: patient  Progress: no change (First session for evaluation)  Outcome Evaluation: Patient presents with limitations of balance, strength, endurance/activity tolerance and cognition/safety awareness which impede his ability to perform ADL and transfers as prior.  The skills of a therapist will be required to safely and effectively implement treatment plan to restore maximal level of function.     Time Calculation:   Evaluation Complexity (OT)  Review Occupational Profile/Medical/Therapy History Complexity: expanded/moderate complexity  Assessment, Occupational Performance/Identification of Deficit Complexity: 3-5 performance deficits  Clinical Decision Making Complexity (OT): detailed assessment/moderate complexity  Overall  Complexity of Evaluation (OT): moderate complexity     Time Calculation- OT       Row Name 04/11/24 1150             Time Calculation- OT    OT Received On 04/11/24  -AV      OT Goal Re-Cert Due Date 04/20/24  -AV         Untimed Charges    OT Eval/Re-eval Minutes 35  -AV         Total Minutes    Untimed Charges Total Minutes 35  -AV       Total Minutes 35  -AV                User Key  (r) = Recorded By, (t) = Taken By, (c) = Cosigned By      Initials Name Provider Type    AV Morgan Colon OT Occupational Therapist                  Therapy Charges for Today       Code Description Service Date Service Provider Modifiers Qty    [id number] HC OT EVAL MOD COMPLEXITY 3 4/11/2024 Morgan Colon OT GO 1                 Morgan Colon OT  4/11/2024

## 2024-04-11 NOTE — PLAN OF CARE
Goal Outcome Evaluation:  Plan of Care Reviewed With: (P) patient        Progress: (P) no change  Outcome Evaluation: (P) Pt presents to PT with deficits associated with Rhabdomyolysis. Pt demonstrates adequate functional mobility and strength with significant fear of falling. Skilled services needed at this time. Plan to DC to IRF.      Anticipated Discharge Disposition (PT): (P) inpatient rehabilitation facility

## 2024-04-11 NOTE — THERAPY EVALUATION
Acute Care - Physical Therapy Initial Evaluation  KARTHIK Alegria     Patient Name: Enrique Wylie  : 1935  MRN: [id number]  Today's Date: 2024      Visit Dx:     ICD-10-CM ICD-9-CM   1. Traumatic rhabdomyolysis, initial encounter  T79.6XXA 958.6   2. Fall, initial encounter  W19.XXXA E888.9   3. Decreased activities of daily living (ADL)  Z78.9 V49.89   4. Difficulty walking  R26.2 719.7     Patient Active Problem List   Diagnosis    Balance problem    Restless leg syndrome    Encounter for long-term (current) use of other medications    Lumbar spinal stenosis    Grief reaction    Hyperlipidemia    Unsteady gait when walking    Death of wife    Lumbar spinal stenosis    Advance directive in chart    Skin cancer    Nephrolithiasis    Left ureteral stone    Foreign body in genitourinary tract    Advance directive in chart    Bilateral sensorineural hearing loss    Cardiomegaly    Essential hypertension    Fall at home, initial encounter    Paroxysmal atrial fibrillation    Weight loss, unintentional    Adjustment disorder with depressed mood    Back pain    Rhabdomyolysis     Past Medical History:   Diagnosis Date    Advance directive in chart 2020    AF (paroxysmal atrial fibrillation)     Allergic rhinitis     occasionally    Arthritis     Back pain 2014    Balance problem     walking is hard, using cane    Cancer     squamous cell  face and ears    Cervical spinal stenosis 2016    Previous surgery for myelopathy    Cervical spondylosis with myelopathy 2012    C4-5 and C5-6 with edema in the cord at C4-5    Death of wife 2020    Fall at home, initial encounter 2020    Gait instability 2019    Grief reaction 2020    High cholesterol     Kidney stone     Lumbar spinal stenosis 2016    Worsening leg symptoms,  3/12/15    Medication management 2019    Restless leg syndrome 2019    Weight loss, unintentional 2020     Past Surgical  "History:   Procedure Laterality Date    CATARACT EXTRACTION Bilateral     CERVICAL FUSION      C4,5 and 6    KNEE ARTHROSCOPY      right knee    LUMBAR LAMINECTOMY  02/10/2016    L3-4    ROTATOR CUFF REPAIR Right     SKIN BIOPSY      face and ears, squamous cell     SPINE SURGERY      minimally invasive procedure \"closing in on spinal Cord\"  \"gave spinal cord more room\"    URETEROSCOPY LASER LITHOTRIPSY WITH STENT INSERTION Bilateral 11/13/2023    Procedure: Bilateral Retrograde Pyelogram, Left Ureteroscopy Lasertripsy Basket Stone Extraction and Stent Insertion, Right Stent Insertion, Bladder Stone Extraction;  Surgeon: Katerina Carpenter MD;  Location: [facility];  Service: Urology;  Laterality: Bilateral;    URETEROSCOPY LASER LITHOTRIPSY WITH STENT INSERTION Bilateral 12/18/2023    Procedure: CYSTOSCOPY URETEROSCOPY RIGHT RETROGRADE PYELOGRAM HOLMIUM LASER STENT exchange; LEFT STENT REMOVAL;  Surgeon: Katerina Carpenter MD;  Location: [facility] OR;  Service: Urology;  Laterality: Bilateral;     PT Assessment (Last 12 Hours)       PT Evaluation and Treatment       Row Name 04/11/24 1148          Physical Therapy Time and Intention    Subjective Information complains of;fatigue;weakness (P)   -     Document Type evaluation (P)   -     Mode of Treatment individual therapy;physical therapy (P)   -     Patient Effort adequate (P)   -     Symptoms Noted During/After Treatment fatigue (P)   -       Row Name 04/11/24 1144          General Information    Patient Profile Reviewed yes (P)   -     Patient Observations alert;cooperative;agree to therapy (P)   -     Prior Level of Function independent:;all household mobility;community mobility (P)   -     Equipment Currently Used at Home -- (P)   triangle3 wheeled walker  -     Existing Precautions/Restrictions fall (P)   -     Barriers to Rehab none identified (P)   -       Row Name 04/11/24 1144          Living Environment    Current Living " Arrangements home (P)   -     Home Accessibility stairs within home (P)   -     People in Home alone (P)   -     Primary Care Provided by self (P)   -       Row Name 04/11/24 1146          Stairs Within Home, Primary    Stairs, Within Home, Primary basement steps (P)   -     Number of Stairs, Within Home, Primary twelve (P)   -     Stair Railings, Within Home, Primary none (P)   used to be a railing, was broken on sunday. Family reports intention to add chair lift.  -       Row Name 04/11/24 1146          Range of Motion (ROM)    Range of Motion bilateral lower extremities (P)   reduced extension of R knee in standing. AROM and PROM in sitting WFL  -       Row Name 04/11/24 1146          Strength (Manual Muscle Testing)    Strength (Manual Muscle Testing) bilateral lower extremities (P)   4/5 for all B LE MMT performed.  -       Row Name 04/11/24 1146          Bed Mobility    Bed Mobility supine-sit;sit-supine (P)   -     Supine-Sit [place] (Bed Mobility) minimum assist (75% patient effort) (P)   -     Sit-Supine [place] (Bed Mobility) minimum assist (75% patient effort) (P)   -     Bed Mobility, Safety Issues decreased use of legs for bridging/pushing;decreased use of arms for pushing/pulling (P)   -     Assistive Device (Bed Mobility) bed rails;draw sheet;head of bed elevated (P)   -     Comment, (Bed Mobility) Pt able to get most the way to sitting, with min A to initiate forward weight shift. (P)   -       Row Name 04/11/24 1146          Transfers    Transfers sit-stand transfer;stand-sit transfer (P)   -     Maintains Weight-bearing Status (Transfers) able to maintain (P)   -       Row Name 04/11/24 1146          Sit-Stand Transfer    Sit-Stand [place] (Transfers) contact guard (P)   -     Assistive Device (Sit-Stand Transfers) walker, front-wheeled (P)   -     Comment, (Sit-Stand Transfer) cues to stand talk/ straighten knees. (P)   -       Row Name  04/11/24 1146          Stand-Sit Transfer    Stand-Sit [place] (Transfers) contact guard (P)   -     Assistive Device (Stand-Sit Transfers) walker, front-wheeled (P)   -       Row Name 04/11/24 1146          Gait/Stairs (Locomotion)    Gait/Stairs Locomotion gait/ambulation assistive device (P)   -     [place] Level (Gait) minimum assist (75% patient effort) (P)   -     Assistive Device (Gait) walker, front-wheeled (P)   -     Patient was able to Ambulate yes (P)   -     Distance in Feet (Gait) 20 (P)   -     Pattern (Gait) 3-point;step-through (P)   -     Deviations/Abnormal Patterns (Gait) base of support, wide;gait speed decreased;stride length decreased (P)   -     Bilateral Gait Deviations forward flexed posture (P)   -     Comment, (Gait/Stairs) Pt repeatedly reports 'the fall took a lot out of me'. Pt able to walk well for short distances with no LOB noted. Pt tends to have RW in front and to left side during gait. (P)   -       Row Name 04/11/24 1146          Safety Issues, Functional Mobility    Safety Issues Affecting Function (Mobility) awareness of need for assistance;insight into deficits/self-awareness;safety precaution awareness;safety precautions follow-through/compliance;positioning of assistive device (P)   [facility]  -     Impairments Affecting Function (Mobility) balance;cognition;endurance/activity tolerance;strength (P)   -       Row Name 04/11/24 1146          Balance    Balance Assessment sitting static balance;standing static balance (P)   -     Static Sitting Balance contact guard (P)   -     Position, Sitting Balance sitting edge of bed;unsupported (P)   -     Static Standing Balance contact guard (P)   -     Position/Device Used, Standing Balance walker, front-wheeled;supported (P)   -     Comment, Balance Pt able to balance well once in sitting posture. transitions require more assistance. (P)   -       Row Name             Wound 04/11/24 0022  Right anterior foot Skin Tear    Wound - Properties Group Placement Date: 04/11/24  -AS Placement Time: 0022  -AS Present on Original Admission: Y  -AS Side: Right  -AS Orientation: anterior  -AS Location: foot  -AS Primary Wound Type: Skin tear  -AS    Retired Wound - Properties Group Placement Date: 04/11/24  -AS Placement Time: 0022  -AS Present on Original Admission: Y  -AS Side: Right  -AS Orientation: anterior  -AS Location: foot  -AS Primary Wound Type: Skin tear  -AS    Retired Wound - Properties Group Date first assessed: 04/11/24  -AS Time first assessed: 0022  -AS Present on Original Admission: Y  -AS Side: Right  -AS Location: foot  -AS Primary Wound Type: Skin tear  -AS      Row Name             Wound 04/11/24 0022 Bilateral lower leg    Wound - Properties Group Placement Date: 04/11/24  -AS Placement Time: 0022  -AS Present on Original Admission: Y  -AS Side: Bilateral  -AS Orientation: lower  -AS Location: leg  -AS    Retired Wound - Properties Group Placement Date: 04/11/24  -AS Placement Time: 0022  -AS Present on Original Admission: Y  -AS Side: Bilateral  -AS Orientation: lower  -AS Location: leg  -AS    Retired Wound - Properties Group Date first assessed: 04/11/24  -AS Time first assessed: 0022  -AS Present on Original Admission: Y  -AS Side: Bilateral  -AS Location: leg  -AS      Row Name             Wound 04/11/24 0023 Right anterior knee Skin Tear    Wound - Properties Group Placement Date: 04/11/24  -AS Placement Time: 0023  -AS Present on Original Admission: Y  -AS Side: Right  -AS Orientation: anterior  -AS Location: knee  -AS Primary Wound Type: Skin tear  -AS    Retired Wound - Properties Group Placement Date: 04/11/24  -AS Placement Time: 0023  -AS Present on Original Admission: Y  -AS Side: Right  -AS Orientation: anterior  -AS Location: knee  -AS Primary Wound Type: Skin tear  -AS    Retired Wound - Properties Group Date first assessed: 04/11/24  -AS Time first assessed: 0023  -AS  Present on Original Admission: Y  -AS Side: Right  -AS Location: knee  -AS Primary Wound Type: Skin tear  -AS      Row Name             Wound 04/11/24 0023 Left anterior knee Skin Tear    Wound - Properties Group Placement Date: 04/11/24  -AS Placement Time: 0023  -AS Present on Original Admission: Y  -AS Side: Left  -AS Orientation: anterior  -AS Location: knee  -AS Primary Wound Type: Skin tear  -AS    Retired Wound - Properties Group Placement Date: 04/11/24  -AS Placement Time: 0023  -AS Present on Original Admission: Y  -AS Side: Left  -AS Orientation: anterior  -AS Location: knee  -AS Primary Wound Type: Skin tear  -AS    Retired Wound - Properties Group Date first assessed: 04/11/24  -AS Time first assessed: 0023  -AS Present on Original Admission: Y  -AS Side: Left  -AS Location: knee  -AS Primary Wound Type: Skin tear  -AS      Row Name             Wound 04/11/24 0023 Right anterior thigh Skin Tear    Wound - Properties Group Placement Date: 04/11/24  -AS Placement Time: 0023  -AS Present on Original Admission: Y  -AS Side: Right  -AS Orientation: anterior  -AS Location: thigh  -AS Primary Wound Type: Skin tear  -AS    Retired Wound - Properties Group Placement Date: 04/11/24  -AS Placement Time: 0023  -AS Present on Original Admission: Y  -AS Side: Right  -AS Orientation: anterior  -AS Location: thigh  -AS Primary Wound Type: Skin tear  -AS    Retired Wound - Properties Group Date first assessed: 04/11/24  -AS Time first assessed: 0023  -AS Present on Original Admission: Y  -AS Side: Right  -AS Location: thigh  -AS Primary Wound Type: Skin tear  -AS      Row Name             Wound 04/11/24 0024 Right anterior greater trochanter Skin Tear    Wound - Properties Group Placement Date: 04/11/24  -AS Placement Time: 0024  -AS Present on Original Admission: Y  -AS Side: Right  -AS Orientation: anterior  -AS Location: greater trochanter  -AS Primary Wound Type: Skin tear  -AS    Retired Wound - Properties Group  Placement Date: 04/11/24  -AS Placement Time: 0024  -AS Present on Original Admission: Y  -AS Side: Right  -AS Orientation: anterior  -AS Location: greater trochanter  -AS Primary Wound Type: Skin tear  -AS    Retired Wound - Properties Group Date first assessed: 04/11/24  -AS Time first assessed: 0024  -AS Present on Original Admission: Y  -AS Side: Right  -AS Location: greater trochanter  -AS Primary Wound Type: Skin tear  -AS      Row Name             [REMOVED] Wound 04/11/24 0024 Right upper arm Skin Tear    Wound - Properties Group Placement Date: 04/11/24  -AS Placement Time: 0024  -AS Present on Original Admission: Y  -AS Side: Right  -AS Orientation: upper  -AS Location: arm  -AS Primary Wound Type: Skin tear  -AS Removal Date: 04/11/24  -AS Removal Time: 0049  -AS    Retired Wound - Properties Group Placement Date: 04/11/24  -AS Placement Time: 0024  -AS Present on Original Admission: Y  -AS Side: Right  -AS Orientation: upper  -AS Location: arm  -AS Primary Wound Type: Skin tear  -AS Removal Date: 04/11/24  -AS Removal Time: 0049  -AS    Retired Wound - Properties Group Date first assessed: 04/11/24  -AS Time first assessed: 0024  -AS Present on Original Admission: Y  -AS Side: Right  -AS Location: arm  -AS Primary Wound Type: Skin tear  -AS Resolution Date: 04/11/24  -AS Resolution Time: 0049  -AS      Row Name             Wound 04/11/24 0024 Right scapula Skin Tear    Wound - Properties Group Placement Date: 04/11/24  -AS Placement Time: 0024  -AS Present on Original Admission: Y  -AS Side: Right  -AS Location: scapula  -AS Primary Wound Type: Skin tear  -AS    Retired Wound - Properties Group Placement Date: 04/11/24  -AS Placement Time: 0024  -AS Present on Original Admission: Y  -AS Side: Right  -AS Location: scapula  -AS Primary Wound Type: Skin tear  -AS    Retired Wound - Properties Group Date first assessed: 04/11/24  -AS Time first assessed: 0024  -AS Present on Original Admission: Y  -AS Side:  Right  -AS Location: scapula  -AS Primary Wound Type: Skin tear  -AS      Row Name             Wound 04/11/24 0024 Bilateral coccyx Pressure Injury    Wound - Properties Group Placement Date: 04/11/24  -AS Placement Time: 0024  -AS Present on Original Admission: Y  -AS Side: Bilateral  -AS Location: coccyx  -AS Primary Wound Type: Pressure inj  -AS    Retired Wound - Properties Group Placement Date: 04/11/24  -AS Placement Time: 0024  -AS Present on Original Admission: Y  -AS Side: Bilateral  -AS Location: coccyx  -AS Primary Wound Type: Pressure inj  -AS    Retired Wound - Properties Group Date first assessed: 04/11/24  -AS Time first assessed: 0024  -AS Present on Original Admission: Y  -AS Side: Bilateral  -AS Location: coccyx  -AS Primary Wound Type: Pressure inj  -AS      Row Name             Wound 04/11/24 0025 Bilateral anterior groin MASD (Moisture associated skin damage)    Wound - Properties Group Placement Date: 04/11/24  -AS Placement Time: 0025  -AS Present on Original Admission: Y  -AS Side: Bilateral  -AS Orientation: anterior  -AS Location: groin  -AS Primary Wound Type: MASD  -AS    Retired Wound - Properties Group Placement Date: 04/11/24  -AS Placement Time: 0025  -AS Present on Original Admission: Y  -AS Side: Bilateral  -AS Orientation: anterior  -AS Location: groin  -AS Primary Wound Type: MASD  -AS    Retired Wound - Properties Group Date first assessed: 04/11/24  -AS Time first assessed: 0025  -AS Present on Original Admission: Y  -AS Side: Bilateral  -AS Location: groin  -AS Primary Wound Type: MASD  -AS      Row Name             Wound 04/11/24 0026 Left lower back Abrasion    Wound - Properties Group Placement Date: 04/11/24  -AS Placement Time: 0026  -AS Present on Original Admission: Y  -AS Side: Left  -AS Orientation: lower  -AS Location: back  -AS Primary Wound Type: Abrasion  -AS    Retired Wound - Properties Group Placement Date: 04/11/24  -AS Placement Time: 0026  -AS Present on  Original Admission: Y  -AS Side: Left  -AS Orientation: lower  -AS Location: back  -AS Primary Wound Type: Abrasion  -AS    Retired Wound - Properties Group Date first assessed: 04/11/24  -AS Time first assessed: 0026  -AS Present on Original Admission: Y  -AS Side: Left  -AS Location: back  -AS Primary Wound Type: Abrasion  -AS      Row Name             Wound 04/11/24 0046 medial parietal region Traumatic    Wound - Properties Group Placement Date: 04/11/24  -AS Placement Time: 0046  -AS Present on Original Admission: Y  -AS Orientation: medial  -AS Location: parietal region  -AS Primary Wound Type: Traumatic  -AS    Retired Wound - Properties Group Placement Date: 04/11/24  -AS Placement Time: 0046  -AS Present on Original Admission: Y  -AS Orientation: medial  -AS Location: parietal region  -AS Primary Wound Type: Traumatic  -AS    Retired Wound - Properties Group Date first assessed: 04/11/24  -AS Time first assessed: 0046  -AS Present on Original Admission: Y  -AS Location: parietal region  -AS Primary Wound Type: Traumatic  -AS      Row Name             Wound 04/11/24 0049 Right lower arm Skin Tear    Wound - Properties Group Placement Date: 04/11/24  -AS Placement Time: 0049  -AS Side: Right  -AS Orientation: lower  -AS Location: arm  -AS Primary Wound Type: Skin tear  -AS    Retired Wound - Properties Group Placement Date: 04/11/24  -AS Placement Time: 0049  -AS Side: Right  -AS Orientation: lower  -AS Location: arm  -AS Primary Wound Type: Skin tear  -AS    Retired Wound - Properties Group Date first assessed: 04/11/24  -AS Time first assessed: 0049  -AS Side: Right  -AS Location: arm  -AS Primary Wound Type: Skin tear  -AS      Row Name 04/11/24 1159          Plan of Care Review    Plan of Care Reviewed With patient (P)   -MH     Progress no change (P)   -     Outcome Evaluation Pt presents to PT with deficits associated with Rhabdomyolysis. Pt demonstrates adequate functional mobility and strength with  significant fear of falling. Skilled services needed at this time. Plan to DC to IRF. (P)   -       Row Name 04/11/24 1154          Positioning and Restraints    Pre-Treatment Position in bed (P)   -     Post Treatment Position bed (P)   -     In Bed fowlers;call light within reach;exit alarm on;with nsg (P)   -       Row Name 04/11/24 1155          Therapy Assessment/Plan (PT)    Patient/Family Therapy Goals Statement (PT) walk better (P)   -     Rehab Potential (PT) good, to achieve stated therapy goals (P)   -     Criteria for Skilled Interventions Met (PT) meets criteria (P)   -     Therapy Frequency (PT) daily (P)   -     Predicted Duration of Therapy Intervention (PT) 10 days (P)   -     Problem List (PT) problems related to;balance;cognition;mobility;range of motion (ROM);strength;pain (P)   -     Activity Limitations Related to Problem List (PT) unable to ambulate safely;unable to transfer safely (P)   -       Row Name 04/11/24 1156          PT Evaluation Complexity    History, PT Evaluation Complexity 1-2 personal factors and/or comorbidities (P)   -     Examination of Body Systems (PT Eval Complexity) total of 3 or more elements (P)   -     Clinical Presentation (PT Evaluation Complexity) stable (P)   -     Clinical Decision Making (PT Evaluation Complexity) low complexity (P)   -     Overall Complexity (PT Evaluation Complexity) low complexity (P)   -       Row Name 04/11/24 1152          Therapy Plan Review/Discharge Plan (PT)    Therapy Plan Review (PT) evaluation/treatment results reviewed (P)   -       Row Name 04/11/24 1151          Physical Therapy Goals    Bed Mobility Goal Selection (PT) bed mobility, PT goal 1 (P)   -     Transfer Goal Selection (PT) transfer, PT goal 1 (P)   -     Gait Training Goal Selection (PT) gait training, PT goal 1 (P)   -       Row Name 04/11/24 1155          Bed Mobility Goal 1 (PT)    Activity/Assistive Device (Bed Mobility Goal  1, PT) bed mobility activities, all (P)   -MH     [place] Level/Cues Needed (Bed Mobility Goal 1, PT) standby assist (P)   -MH     Time Frame (Bed Mobility Goal 1, PT) 10 days (P)   -MH     Progress/Outcomes (Bed Mobility Goal 1, PT) new goal (P)   -       Row Name 04/11/24 1159          Transfer Goal 1 (PT)    Activity/Assistive Device (Transfer Goal 1, PT) sit-to-stand/stand-to-sit (P)   -MH     [place] Level/Cues Needed (Transfer Goal 1, PT) standby assist (P)   -MH     Time Frame (Transfer Goal 1, PT) 10 days (P)   -MH     Progress/Outcome (Transfer Goal 1, PT) new goal (P)   -       Row Name 04/11/24 1159          Gait Training Goal 1 (PT)    Activity/Assistive Device (Gait Training Goal 1, PT) assistive device use;decrease fall risk;improve balance and speed;increase endurance/gait distance (P)   -MH     [place] Level (Gait Training Goal 1, PT) standby assist (P)   -MH     Distance (Gait Training Goal 1, PT) 200 (P)   -MH     Time Frame (Gait Training Goal 1, PT) 10 days (P)   -MH     Progress/Outcome (Gait Training Goal 1, PT) new goal (P)   -               User Key  (r) = Recorded By, (t) = Taken By, (c) = Cosigned By      Initials Name Provider Type    AS Princess De León RN Registered Nurse     Justin Barclay, PT Student PT Student                    Physical Therapy Education       Title: PT OT SLP Therapies (Done)       Topic: Physical Therapy (Done)       Point: Mobility training (Done)       Learning Progress Summary             Patient Acceptance, E,TB, VU by  at 4/11/2024 1205                         Point: Precautions (Done)       Learning Progress Summary             Patient Acceptance, E,TB, VU by  at 4/11/2024 1205                                         User Key       Initials Effective Dates Name Provider Type Discipline     10/12/23 -  Justin Barclay, PT Student PT Student PT                  PT Recommendation and Plan  Anticipated Discharge Disposition (PT): (P)  inpatient rehabilitation facility  Planned Therapy Interventions (PT): (P) balance training, bed mobility training, gait training, patient/family education, stair training, strengthening, transfer training  Therapy Frequency (PT): (P) daily  Plan of Care Reviewed With: (P) patient  Progress: (P) no change  Outcome Evaluation: (P) Pt presents to PT with deficits associated with Rhabdomyolysis. Pt demonstrates adequate functional mobility and strength with significant fear of falling. Skilled services needed at this time. Plan to DC to IRF.   Outcome Measures       Row Name 04/11/24 1200             How much help from another person do you currently need...    Turning from your back to your side while in flat bed without using bedrails? 2 (P)   -MH      Moving from lying on back to sitting on the side of a flat bed without bedrails? 2 (P)   -MH      Moving to and from a bed to a chair (including a wheelchair)? 3 (P)   -MH      Standing up from a chair using your arms (e.g., wheelchair, bedside chair)? 3 (P)   -      Climbing 3-5 steps with a railing? 2 (P)   -      To walk in hospital room? 3 (P)   -      AM-PAC 6 Clicks Score (PT) 15 (P)   -      Highest Level of Mobility Goal 4 --> Transfer to chair/commode (P)   -         Functional Assessment    Outcome Measure Options AM-PAC 6 Clicks Basic Mobility (PT) (P)   -                User Key  (r) = Recorded By, (t) = Taken By, (c) = Cosigned By      Initials Name Provider Type     Justin Barclay, PT Student PT Student                     Time Calculation:    PT Charges       Row Name 04/11/24 1149             Time Calculation    PT Received On 04/11/24 (P)   -      PT Goal Re-Cert Due Date 04/20/24 (P)   -         Untimed Charges    PT Eval/Re-eval Minutes 30 (P)   -         Total Minutes    Untimed Charges Total Minutes 30 (P)   -       Total Minutes 30 (P)   -                User Key  (r) = Recorded By, (t) = Taken By, (c) = Cosigned By       Initials Name Provider Type     Justin Barclay, PT Student PT Student                  Therapy Charges for Today       Code Description Service Date Service Provider Modifiers Qty    [id number] HC PT EVAL LOW COMPLEXITY 2 4/11/2024 Justin Barclay, PT Student GP 1            PT G-Codes  Outcome Measure Options: (P) AM-PAC 6 Clicks Basic Mobility (PT)  AM-PAC 6 Clicks Score (PT): (P) 15  AM-PAC 6 Clicks Score (OT): 14    Justin Barclay, PT Student  4/11/2024

## 2024-04-11 NOTE — PLAN OF CARE
Goal Outcome Evaluation:  Plan of Care Reviewed With: patient, caregiver        Progress: no change  Outcome Evaluation: pt is alert x4. no c/o of pain, pt does grimace when turning. spoke with POA, she is concerned about placement. no other concerns noted.

## 2024-04-11 NOTE — PROGRESS NOTES
[facility]   Hospitalist Progress Note    Date of admission: 4/10/2024  Patient Name: Enrique Wylie  1935  Date: 4/11/2024      Subjective     Chief Complaint   Patient presents with    Fall       Interval Followup: Still feels weak with debility.  Please he was taking the Bactrim for lower extremity cellulitis unclear if this is had any significant change for him.  Thinks it may be his skin is less irritated after having completed this.  Notes he is working on getting a life alert for future falls but does live alone.  Had fever overnight denies any acute symptoms currently and shortness of air dysuria or otherwise.      Objective     Vitals:   Temp:  [97.2 °F (36.2 °C)-101.3 °F (38.5 °C)] 98.2 °F (36.8 °C)  Heart Rate:  [74-97] 97  Resp:  [20-22] 20  BP: (104-131)/(56-71) 131/71    Physical Exam  Awake conversant tired generalized weakness  Toe onychomycosis, chronic stasis changes in legs, also appearance of venous stasis ulcer,  Ctab no wrr somewhat poor effort  Ir/ir nl rate le stasis changes  Abd soft     Result Review:  Vital signs, labs and recent relevant imaging reviewed.        acetaminophen    aluminum-magnesium hydroxide-simethicone    senna-docusate sodium **AND** polyethylene glycol **AND** bisacodyl **AND** bisacodyl    Diclofenac Sodium    hydrOXYzine    Lidocaine    melatonin    nicotine    nitroglycerin    ondansetron    prochlorperazine    sodium chloride    sodium chloride    sodium chloride    [Held by provider] atorvastatin, 10 mg, Oral, Daily  bacitracin, 1 Application, Topical, 3 times per day  [Held by provider] carbidopa-levodopa, 1 tablet, Oral, BID  [Held by provider] furosemide, 20 mg, Oral, BID  magnesium oxide, 400 mg, Oral, Daily  mirtazapine, 15 mg, Oral, Nightly  nystatin, , Topical, Q12H  sodium chloride, 10 mL, Intravenous, Q12H        CT Head Without Contrast    Result Date: 4/11/2024  1. No intracranial hemorrhage. 2. Stable marked ventricular dilatation/chronic  hydrocephalus.  Electronically Signed By-Michele Ruelas MD On:4/11/2024 4:21 PM      XR Shoulder 2+ View Right    Result Date: 4/10/2024  1.  No evidence for displaced fracture or dislocation. 2.  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 3.  Postoperative changes are noted involving the right shoulder.  Electronically Signed By-Christiano Camargo MD On:4/10/2024 10:12 PM      XR Shoulder 2+ View Left    Result Date: 4/10/2024  1.  No evidence for displaced fracture or dislocation. 2.  Mild  osteoarthritic degenerative changes of the glenohumeral joint. 3.  Moderate hypertrophic age-related changes of the acromioclavicular joint.  Electronically Signed By-Christiano Camargo MD On:4/10/2024 10:11 PM      XR Hips Bilateral With or Without Pelvis 2 View    Result Date: 4/10/2024  1.  No evidence for displaced fracture or dislocation. 2.  Mild to moderate degenerative changes are noted. 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   Electronically Signed By-Christiano Camargo MD On:4/10/2024 7:50 PM      XR Spine Lumbar 2 or 3 View    Result Date: 4/10/2024  1.  No evidence for acute fracture or subluxation. No evidence for compression fracture deformity. 2.  Advanced discogenic degenerative changes and degenerative posterior facet changes are seen throughout the lumbar spine.   Electronically Signed By-Christiano Camargo MD On:4/10/2024 7:49 PM      CT Head Without Contrast    Result Date: 4/10/2024  Impression:  1. Chronic severe hydrocephalus, no acute intracranial pathology.    Electronically Signed By-Bradley Watt MD On:4/10/2024 4:28 PM      XR Chest 1 View    Result Date: 4/10/2024  Impression: No active disease.   Electronically Signed By-HUA NEWBY MD On:4/10/2024 12:27 PM       Assessment / Plan     Summary: 88 y.o. male past medical history of A-fib with unknown Eliquis adherence, frequent falls, chronic lumbar spinal stenosis, restless leg syndrome, hypertension,  chronic venous stasis, hyperlipidemia who presents to the ER due to a fall and being found on the ground.    Assessment/Plan (clinically significant if listed here)  Rhabdomyolysis in setting of fall with prolonged period on the ground  Fall in setting of anticoagulation use with head trauma  JOSE secondary to above  Paroxysmal A-fib on Eliquis  Hypertension  Mild cognitive impairment  Chronic severe hydrocephalus  Hypomagnesemia  Medication nonadherence  Chronic lumbar spinal stenosis  RLS  Hyperlipidemia  Chronic venous stasis   Recent LE cellulitis ? S/p 20 d bactrim    Repeat ct head given home blood thinner use negative for bleed  Resume apixaban for afib, discuss further regarding long term use given falls.  Not on rate control agents outpt, place on low dose metoprolol and monitor  Cont ivf for rhabdo. Initial mild transaminitis from rhabdo most likely.  Hold statin for now  Creatinine improving with fluids, monitor I's and O's and p.o. intake. BNP wnl, outpt bid lasix but unclear adherence   Tsh wnl, b12 323 mildly low, placed on multivitamin  Replace magnesium  3/25 and again on 4/4 10d of bactrim prescribed for le cellulitis. Appears patient with chronic stasis changes.  Mildly elevated white count on admission proved repeat today suspect elevated/reactive in setting of recent trauma, checking cultures did have fever overnight, viral studies negative, defer antibiotics for now.  If further fevers would likely consider CT chest in case of developing pneumonia/aspiration  Lactic within normal limits, Pro-Andriy 0.17  Continue wound care/appreciate wound care consult, case discussed at bedside  Has some likely tinea related issues with skin irritation, toe onychomycosis, and irritation at buttocks.  Nystatin  Leg elevation, wound care for le chronic venous stasis/ulcers, has toe onychomycosis  Appears patient may be is taking Sinemet just at night for RLS.  Hold, start requip, check am ferritin  Chronic severe  hydrocephalus - had prior workup 2016 with large volume LP without improvement in symptoms.  No drain/shunt recommended.  Suspect underlying dementia with advancing age/debility contributing to falls.  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 CBC, BMP, magnesium, phosphorus  Continue hospitalization at current level of care    DVT prophylaxis:  Medical DVT prophylaxis orders are present.    Medical Intervention Limits: NO intubation (DNI)  Level Of Support Discussed With: Patient  Code Status (Patient has no pulse and is not breathing): No CPR (Do Not Attempt to Resuscitate)  Medical Interventions (Patient has pulse or is breathing): Limited Support    Greater than 50 minutes on this encounter including review of labs, imaging, documentation, orders, vitals, monitoring and adjusting treatment and orders as indicated, discussion with the patient, rn, wound care, and documenting.       CBC          3/18/2024    14:24 4/10/2024    14:03 4/11/2024    06:38   CBC   WBC 6.59  11.91  10.57    RBC 4.40  4.87  4.51    Hemoglobin 12.1  14.1  12.8    Hematocrit 38.1  42.8  39.9    MCV 86.6  87.9  88.5    MCH 27.5  29.0  28.4    MCHC 31.8  32.9  32.1    RDW 12.4  14.3  14.4    Platelets 291  287  244        CMP          3/18/2024    14:24 4/10/2024    14:03 4/11/2024    06:38   CMP   Glucose 94  121  99    BUN 15  18  18    Creatinine 1.03  1.35  1.25    EGFR 70.7  51.1  56.1    Sodium 142  138  136    Potassium 4.3  4.4  4.1    Chloride 106  100  103    Calcium 8.4  8.6  8.2    Total Protein 7.0  6.8     Albumin 4.0  3.9     Globulin 3.0  2.9     Total Bilirubin 0.6  1.1     Alkaline Phosphatase 113  93     AST (SGOT) 15  175     ALT (SGPT) 8  49     Albumin/Globulin Ratio 1.3  1.3     BUN/Creatinine Ratio 14.6  13.3  14.4    Anion Gap 10.2  13.4  10.2

## 2024-04-12 LAB
ANION GAP SERPL CALCULATED.3IONS-SCNC: 9.7 MMOL/L (ref 5–15)
BASOPHILS # BLD AUTO: 0.05 10*3/MM3 (ref 0–0.2)
BASOPHILS NFR BLD AUTO: 0.6 % (ref 0–1.5)
BUN SERPL-MCNC: 19 MG/DL (ref 8–23)
BUN/CREAT SERPL: 17.3 (ref 7–25)
CALCIUM SPEC-SCNC: 7.8 MG/DL (ref 8.6–10.5)
CHLORIDE SERPL-SCNC: 106 MMOL/L (ref 98–107)
CK SERPL-CCNC: 2341 U/L (ref 20–200)
CO2 SERPL-SCNC: 23.3 MMOL/L (ref 22–29)
CREAT SERPL-MCNC: 1.1 MG/DL (ref 0.76–1.27)
DEPRECATED RDW RBC AUTO: 48.4 FL (ref 37–54)
EGFRCR SERPLBLD CKD-EPI 2021: 65.4 ML/MIN/1.73
EOSINOPHIL # BLD AUTO: 0.2 10*3/MM3 (ref 0–0.4)
EOSINOPHIL NFR BLD AUTO: 2.5 % (ref 0.3–6.2)
ERYTHROCYTE [DISTWIDTH] IN BLOOD BY AUTOMATED COUNT: 14.5 % (ref 12.3–15.4)
FERRITIN SERPL-MCNC: 128.2 NG/ML (ref 30–400)
GLUCOSE SERPL-MCNC: 97 MG/DL (ref 65–99)
HCT VFR BLD AUTO: 40.9 % (ref 37.5–51)
HGB BLD-MCNC: 12.7 G/DL (ref 13–17.7)
IMM GRANULOCYTES # BLD AUTO: 0.02 10*3/MM3 (ref 0–0.05)
IMM GRANULOCYTES NFR BLD AUTO: 0.3 % (ref 0–0.5)
LYMPHOCYTES # BLD AUTO: 1.13 10*3/MM3 (ref 0.7–3.1)
LYMPHOCYTES NFR BLD AUTO: 14.4 % (ref 19.6–45.3)
MAGNESIUM SERPL-MCNC: 2 MG/DL (ref 1.6–2.4)
MCH RBC QN AUTO: 28.3 PG (ref 26.6–33)
MCHC RBC AUTO-ENTMCNC: 31.1 G/DL (ref 31.5–35.7)
MCV RBC AUTO: 91.1 FL (ref 79–97)
MONOCYTES # BLD AUTO: 1.13 10*3/MM3 (ref 0.1–0.9)
MONOCYTES NFR BLD AUTO: 14.4 % (ref 5–12)
NEUTROPHILS NFR BLD AUTO: 5.33 10*3/MM3 (ref 1.7–7)
NEUTROPHILS NFR BLD AUTO: 67.8 % (ref 42.7–76)
NRBC BLD AUTO-RTO: 0 /100 WBC (ref 0–0.2)
PHOSPHATE SERPL-MCNC: 2.5 MG/DL (ref 2.5–4.5)
PLATELET # BLD AUTO: 253 10*3/MM3 (ref 140–450)
PMV BLD AUTO: 9.1 FL (ref 6–12)
POTASSIUM SERPL-SCNC: 4.2 MMOL/L (ref 3.5–5.2)
RBC # BLD AUTO: 4.49 10*6/MM3 (ref 4.14–5.8)
SODIUM SERPL-SCNC: 139 MMOL/L (ref 136–145)
WBC NRBC COR # BLD AUTO: 7.86 10*3/MM3 (ref 3.4–10.8)

## 2024-04-12 PROCEDURE — 82550 ASSAY OF CK (CPK): CPT | Performed by: INTERNAL MEDICINE

## 2024-04-12 PROCEDURE — 99233 SBSQ HOSP IP/OBS HIGH 50: CPT | Performed by: INTERNAL MEDICINE

## 2024-04-12 PROCEDURE — 80048 BASIC METABOLIC PNL TOTAL CA: CPT | Performed by: INTERNAL MEDICINE

## 2024-04-12 PROCEDURE — 83735 ASSAY OF MAGNESIUM: CPT | Performed by: INTERNAL MEDICINE

## 2024-04-12 PROCEDURE — 85025 COMPLETE CBC W/AUTO DIFF WBC: CPT | Performed by: INTERNAL MEDICINE

## 2024-04-12 PROCEDURE — 82728 ASSAY OF FERRITIN: CPT | Performed by: INTERNAL MEDICINE

## 2024-04-12 PROCEDURE — 84100 ASSAY OF PHOSPHORUS: CPT | Performed by: INTERNAL MEDICINE

## 2024-04-12 RX ADMIN — Medication 10 ML: at 09:44

## 2024-04-12 RX ADMIN — APIXABAN 5 MG: 5 TABLET, FILM COATED ORAL at 09:44

## 2024-04-12 RX ADMIN — ACETAMINOPHEN 650 MG: 325 TABLET ORAL at 09:43

## 2024-04-12 RX ADMIN — BACITRACIN 0.9 G: 500 OINTMENT TOPICAL at 16:31

## 2024-04-12 RX ADMIN — Medication 400 MG: at 09:44

## 2024-04-12 RX ADMIN — MIRTAZAPINE 15 MG: 15 TABLET, FILM COATED ORAL at 20:41

## 2024-04-12 RX ADMIN — NYSTATIN: 100000 POWDER TOPICAL at 09:43

## 2024-04-12 RX ADMIN — NYSTATIN: 100000 POWDER TOPICAL at 20:41

## 2024-04-12 RX ADMIN — ACETAMINOPHEN 650 MG: 325 TABLET ORAL at 20:41

## 2024-04-12 RX ADMIN — APIXABAN 5 MG: 5 TABLET, FILM COATED ORAL at 20:41

## 2024-04-12 RX ADMIN — Medication 10 ML: at 20:41

## 2024-04-12 RX ADMIN — BACITRACIN 0.9 G: 500 OINTMENT TOPICAL at 09:43

## 2024-04-12 RX ADMIN — Medication 1 TABLET: at 09:44

## 2024-04-12 RX ADMIN — BACITRACIN 0.9 G: 500 OINTMENT TOPICAL at 20:41

## 2024-04-12 RX ADMIN — ROPINIROLE HYDROCHLORIDE 0.5 MG: 0.25 TABLET, FILM COATED ORAL at 20:41

## 2024-04-12 NOTE — SIGNIFICANT NOTE
04/12/24 0833   OTHER   Discipline occupational therapist   Rehab Time/Intention   Session Not Performed patient unavailable for treatment  (with nurse)

## 2024-04-12 NOTE — PLAN OF CARE
Goal Outcome Evaluation:  Plan of Care Reviewed With: patient        Progress: improving  Outcome Evaluation: patient had remained alert and oriented x4, on room air. patient up in chair for most of shift. wound care performed per orders. no complaints of pain or discomfort this shift. no new issues at this time.

## 2024-04-13 PROCEDURE — 99232 SBSQ HOSP IP/OBS MODERATE 35: CPT | Performed by: STUDENT IN AN ORGANIZED HEALTH CARE EDUCATION/TRAINING PROGRAM

## 2024-04-13 RX ADMIN — Medication 10 ML: at 08:33

## 2024-04-13 RX ADMIN — BACITRACIN 0.9 G: 500 OINTMENT TOPICAL at 15:08

## 2024-04-13 RX ADMIN — NYSTATIN: 100000 POWDER TOPICAL at 08:32

## 2024-04-13 RX ADMIN — METOPROLOL SUCCINATE 12.5 MG: 25 TABLET, EXTENDED RELEASE ORAL at 08:32

## 2024-04-13 RX ADMIN — ACETAMINOPHEN 650 MG: 325 TABLET ORAL at 21:23

## 2024-04-13 RX ADMIN — MIRTAZAPINE 15 MG: 15 TABLET, FILM COATED ORAL at 21:23

## 2024-04-13 RX ADMIN — Medication 1 TABLET: at 08:32

## 2024-04-13 RX ADMIN — APIXABAN 2.5 MG: 2.5 TABLET, FILM COATED ORAL at 08:32

## 2024-04-13 RX ADMIN — BACITRACIN 0.9 G: 500 OINTMENT TOPICAL at 21:23

## 2024-04-13 RX ADMIN — BACITRACIN 0.9 G: 500 OINTMENT TOPICAL at 08:32

## 2024-04-13 RX ADMIN — Medication 10 ML: at 21:23

## 2024-04-13 RX ADMIN — APIXABAN 2.5 MG: 2.5 TABLET, FILM COATED ORAL at 21:23

## 2024-04-13 RX ADMIN — ROPINIROLE HYDROCHLORIDE 0.5 MG: 0.25 TABLET, FILM COATED ORAL at 21:23

## 2024-04-13 RX ADMIN — NYSTATIN: 100000 POWDER TOPICAL at 21:23

## 2024-04-13 RX ADMIN — Medication 400 MG: at 08:32

## 2024-04-13 NOTE — PLAN OF CARE
Goal Outcome Evaluation:           Progress: improving  Outcome Evaluation: Pt AOx4 throughout the shift, on room air. Pt up to chair for the majority of the shift, wound/skin care provided per orders. Pt appears to be in no apparent distress at this time, denies any current needs and has call light within reach.

## 2024-04-13 NOTE — PROGRESS NOTES
[facility]   Hospitalist Progress Note  Date: 2024  Patient Name: Enrique Wylie  : 1935  MRN: [id number]  Date of admission: 4/10/2024  Room/Bed: Formerly named [facility]      Subjective   Subjective     Chief Complaint: Fall    Summary:Enrique Wylie is a 88 y.o. male  past medical history of A-fib with unknown Eliquis adherence, frequent falls, chronic lumbar spinal stenosis, restless leg syndrome, hypertension, chronic venous stasis, hyperlipidemia who presents to the ER due to a fall and being found on the ground.  Brought in found have rhabdomyolysis, JOSE from dehydration and generalized weakness working on rehab placement.  Of note patient had recently completed 20 days of Bactrim for lower extremity cellulitis     Interval Followup: No acute overnight events.  No acute distress.  Patient was sitting in the bedside chair today.  He is refusing rehab and wants to go home.    Review of Systems    All systems reviewed and negative except for what is outlined above.      Objective   Objective     Vitals:   Temp:  [97.5 °F (36.4 °C)-98.8 °F (37.1 °C)] 97.5 °F (36.4 °C)  Heart Rate:  [88-93] 90  Resp:  [20] 20  BP: (105-138)/(57-85) 105/60    Physical Exam   Gen: NAD, Alert and Oriented  Cards: RRR, no murmur   Pulm: CTA b/l, no wheezing  Abd: soft, nondistended  Extremities: no pitting edema      Result Review    Result Review:  I have personally reviewed these results:  [x]  Laboratory      Lab 24  0606 24  1026 24  0840 24  0638 04/10/24  1403   WBC 7.86  --   --  10.57 11.91*   HEMOGLOBIN 12.7*  --   --  12.8* 14.1   HEMATOCRIT 40.9  --   --  39.9 42.8   PLATELETS 253  --   --  244 287   NEUTROS ABS 5.33  --   --  8.30* 9.83*   IMMATURE GRANS (ABS) 0.02  --   --  0.05 0.04   LYMPHS ABS 1.13  --   --  0.83 0.69*   MONOS ABS 1.13*  --   --  1.27* 1.31*   EOS ABS 0.20  --   --  0.08 0.00   MCV 91.1  --   --  88.5 87.9   PROCALCITONIN  --   --  0.17  --   --    LACTATE  --  1.7  --    --   --    PROTIME  --   --   --   --  14.9         Lab 04/12/24  0606 04/11/24  0638 04/10/24  1403   SODIUM 139 136 138   POTASSIUM 4.2 4.1 4.4   CHLORIDE 106 103 100   CO2 23.3 22.8 24.6   ANION GAP 9.7 10.2 13.4   BUN 19 18 18   CREATININE 1.10 1.25 1.35*   EGFR 65.4 56.1* 51.1*   GLUCOSE 97 99 121*   CALCIUM 7.8* 8.2* 8.6   MAGNESIUM 2.0 1.7 1.8   PHOSPHORUS 2.5  --   --    TSH  --   --  0.744         Lab 04/10/24  1403   TOTAL PROTEIN 6.8   ALBUMIN 3.9   GLOBULIN 2.9   ALT (SGPT) 49*   AST (SGOT) 175*   BILIRUBIN 1.1   ALK PHOS 93         Lab 04/11/24  0840 04/11/24  0638 04/10/24  1403   PROBNP 889.4  --   --    HSTROP T 46* 48* 49*   PROTIME  --   --  14.9   INR  --   --  1.15             Lab 04/12/24  0606 04/10/24  1403   FERRITIN 128.20  --    VITAMIN B 12  --  323         Brief Urine Lab Results  (Last result in the past 365 days)        Color   Clarity   Blood   Leuk Est   Nitrite   Protein   CREAT   Urine HCG        04/10/24 1837 Yellow   Clear   Moderate (2+)   Negative   Negative   30 mg/dL (1+)                 [x]  Microbiology   Microbiology Results (last 10 days)       Procedure Component Value - Date/Time    COVID-19, FLU A/B, RSV PCR 1 HR TAT - Swab, Nasopharynx [[id number]]  (Normal) Collected: 04/11/24 1044    Lab Status: Final result Specimen: Swab from Nasopharynx Updated: 04/11/24 1143     COVID19 Not Detected     Influenza A PCR Not Detected     Influenza B PCR Not Detected     RSV, PCR Not Detected    Narrative:      Fact sheet for providers: https://www.fda.gov/media/850487/download    Fact sheet for patients: https://www.fda.gov/media/250491/download    Test performed by PCR.    Blood Culture - Blood, Arm, Left [[id number]]  (Normal) Collected: 04/11/24 1026    Lab Status: Preliminary result Specimen: Blood from Arm, Left Updated: 04/13/24 1045     Blood Culture No growth at 2 days    Blood Culture - Blood, Hand, Left [[id number]]  (Normal) Collected: 04/11/24 1026    Lab Status:  Preliminary result Specimen: Blood from Hand, Left Updated: 04/13/24 1045     Blood Culture No growth at 2 days          [x]  Radiology  CT Head Without Contrast    Result Date: 4/11/2024  1. No intracranial hemorrhage. 2. Stable marked ventricular dilatation/chronic hydrocephalus.  Electronically Signed By-Michele Ruelas MD On:4/11/2024 4:21 PM      XR Shoulder 2+ View Right    Result Date: 4/10/2024  1.  No evidence for displaced fracture or dislocation. 2.  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 3.  Postoperative changes are noted involving the right shoulder.  Electronically Signed By-Christiano Camargo MD On:4/10/2024 10:12 PM      XR Shoulder 2+ View Left    Result Date: 4/10/2024  1.  No evidence for displaced fracture or dislocation. 2.  Mild  osteoarthritic degenerative changes of the glenohumeral joint. 3.  Moderate hypertrophic age-related changes of the acromioclavicular joint.  Electronically Signed ByJune Camargo MD On:4/10/2024 10:11 PM      XR Hips Bilateral With or Without Pelvis 2 View    Result Date: 4/10/2024  1.  No evidence for displaced fracture or dislocation. 2.  Mild to moderate degenerative changes are noted. 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   Electronically Signed ByJune Camargo MD On:4/10/2024 7:50 PM      XR Spine Lumbar 2 or 3 View    Result Date: 4/10/2024  1.  No evidence for acute fracture or subluxation. No evidence for compression fracture deformity. 2.  Advanced discogenic degenerative changes and degenerative posterior facet changes are seen throughout the lumbar spine.   Electronically Signed By-Christiano Camargo MD On:4/10/2024 7:49 PM      CT Head Without Contrast    Result Date: 4/10/2024  Impression:  1. Chronic severe hydrocephalus, no acute intracranial pathology.    Electronically Signed ByDuane Watt MD On:4/10/2024 4:28 PM      XR Chest 1 View    Result Date: 4/10/2024  Impression: No active  disease.   Electronically Signed By-HUA NEWBY MD On:4/10/2024 12:27 PM     []  EKG/Telemetry   []  Cardiology/Vascular   []  Pathology  []  Old records  []  Other:    Assessment & Plan   Assessment / Plan     Assessment:  Rhabdomyolysis in setting of fall with prolonged period on the ground  Fall in setting of anticoagulation use with head trauma  JOSE secondary to above  Paroxysmal A-fib on Eliquis  Hypertension  Mild cognitive impairment  Chronic severe hydrocephalus  Hypomagnesemia  Medication nonadherence  Chronic lumbar spinal stenosis  RLS  Hyperlipidemia  Chronic venous stasis   Recent LE cellulitis ? S/p 20 d bactri    Plan:  Continue inpatient admission  CT scan negative for hemorrhage  Continue p.o. Eliquis at reduced dose.  Pros/cons discussed with patient by previous hospitalist given his fall risk.  Continue metoprolol for rate control  CK continues to trend down.  Discontinue IV fluids.  Recent antibiotics for cellulitis x 2 with Bactrim.  Currently does not appear to have cellulitis, chronic stasis dermatitis per media photos in chart that I reviewed.  Wound care  Continue Requip at bedtime.  Switch from Sinemet.  Recheck LFTs.  Continue to hold statin.  Continue to hold home Lasix.  PT/OT    Dispo: Possible rehab placement.  Patient is currently refusing and wants to go home.  I called and discussed with his POA and she is attempting to get in-home care and a chairlift for him.  She would really like for him to go to rehab in the meantime.       Discussed with RN.    DVT prophylaxis:  Medical DVT prophylaxis orders are present.        CODE STATUS:   Medical Intervention Limits: NO intubation (DNI)  Level Of Support Discussed With: Patient  Code Status (Patient has no pulse and is not breathing): No CPR (Do Not Attempt to Resuscitate)  Medical Interventions (Patient has pulse or is breathing): Limited Support      Electronically signed by Estela Orellana DO, 4/13/2024, 15:30  EDT.

## 2024-04-13 NOTE — PROGRESS NOTES
[facility]   Hospitalist Progress Note    Date of admission: 4/10/2024  Patient Name: Enrique Wylie  1935  Date: 4/12/2024      Subjective     Chief Complaint   Patient presents with    Fall       Interval Followup: No fevers or infectious symptoms past 24 hours.  Discussed with patient he has concerns about his home Eliquis and easy bruising and bleeding and pain contract with us.  Discussed would be reasonable to either stop consider aspirin or consider using half dose, he wants to use 2.5 twice daily dosing     Working on rehab placement.  No myalgias      Objective     Vitals:   Temp:  [98.2 °F (36.8 °C)-98.8 °F (37.1 °C)] 98.2 °F (36.8 °C)  Heart Rate:  [83-94] 92  Resp:  [20] 20  BP: (106-137)/(54-72) 127/57    Physical Exam  Awake conversant in chair, generalized weakness  Toe onychomycosis, chronic stasis changes in legs, also appearance of venous stasis ulcer, no acute discharge or drainage  Ctab no wrr somewhat poor effort  Ir/ir nl rate le stasis changes  Abd soft     Result Review:  Vital signs, labs and recent relevant imaging reviewed.        acetaminophen    aluminum-magnesium hydroxide-simethicone    senna-docusate sodium **AND** polyethylene glycol **AND** bisacodyl **AND** bisacodyl    Diclofenac Sodium    hydrOXYzine    Lidocaine    melatonin    nicotine    nitroglycerin    ondansetron    prochlorperazine    sodium chloride    sodium chloride    sodium chloride    apixaban, 5 mg, Oral, Q12H  [Held by provider] atorvastatin, 10 mg, Oral, Daily  bacitracin, 1 Application, Topical, 3 times per day  [Held by provider] carbidopa-levodopa, 1 tablet, Oral, BID  [Held by provider] furosemide, 20 mg, Oral, BID  magnesium oxide, 400 mg, Oral, Daily  metoprolol succinate XL, 12.5 mg, Oral, Q24H  mirtazapine, 15 mg, Oral, Nightly  multivitamin with minerals, 1 tablet, Oral, Daily  nystatin, , Topical, Q12H  rOPINIRole, 0.5 mg, Oral, Nightly  sodium chloride, 10 mL, Intravenous, Q12H        CT  Head Without Contrast    Result Date: 4/11/2024  1. No intracranial hemorrhage. 2. Stable marked ventricular dilatation/chronic hydrocephalus.  Electronically Signed By-Michele Ruelas MD On:4/11/2024 4:21 PM      XR Shoulder 2+ View Right    Result Date: 4/10/2024  1.  No evidence for displaced fracture or dislocation. 2.  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 3.  Postoperative changes are noted involving the right shoulder.  Electronically Signed By-Christiano Camargo MD On:4/10/2024 10:12 PM      XR Shoulder 2+ View Left    Result Date: 4/10/2024  1.  No evidence for displaced fracture or dislocation. 2.  Mild  osteoarthritic degenerative changes of the glenohumeral joint. 3.  Moderate hypertrophic age-related changes of the acromioclavicular joint.  Electronically Signed By-Christiano Camargo MD On:4/10/2024 10:11 PM      XR Hips Bilateral With or Without Pelvis 2 View    Result Date: 4/10/2024  1.  No evidence for displaced fracture or dislocation. 2.  Mild to moderate degenerative changes are noted. 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   Electronically Signed By-Christiano Camargo MD On:4/10/2024 7:50 PM      XR Spine Lumbar 2 or 3 View    Result Date: 4/10/2024  1.  No evidence for acute fracture or subluxation. No evidence for compression fracture deformity. 2.  Advanced discogenic degenerative changes and degenerative posterior facet changes are seen throughout the lumbar spine.   Electronically Signed By-Christiano Camargo MD On:4/10/2024 7:49 PM      CT Head Without Contrast    Result Date: 4/10/2024  Impression:  1. Chronic severe hydrocephalus, no acute intracranial pathology.    Electronically Signed By-Bradley Watt MD On:4/10/2024 4:28 PM      XR Chest 1 View    Result Date: 4/10/2024  Impression: No active disease.   Electronically Signed By-HUA NEWBY MD On:4/10/2024 12:27 PM       Assessment / Plan     Summary: 88 y.o. male past medical  history of A-fib with unknown Eliquis adherence, frequent falls, chronic lumbar spinal stenosis, restless leg syndrome, hypertension, chronic venous stasis, hyperlipidemia who presents to the ER due to a fall and being found on the ground.  Brought in found have rhabdomyolysis, JOSE from dehydration and generalized weakness working on rehab placement.  Of note patient had recently completed 20 days of Bactrim for lower extremity cellulitis    Assessment/Plan (clinically significant if listed here)  Rhabdomyolysis in setting of fall with prolonged period on the ground  Fall in setting of anticoagulation use with head trauma  JOSE secondary to above  Paroxysmal A-fib on Eliquis  Hypertension  Mild cognitive impairment  Chronic severe hydrocephalus  Hypomagnesemia  Medication nonadherence  Chronic lumbar spinal stenosis  RLS  Hyperlipidemia  Chronic venous stasis   Recent LE cellulitis ? S/p 20 d bactrim    Repeat CT head negative for bleed resuming on Eliquis,  Given risk/benefit d/w pt will use 2.5 mg twice daily dosing; he had otherwise would qualify for the higher dose but given his age and falls reduction is reasonable if further falls even with rehab would likely discontinue altogether  Patient is also not on any rate controlling agents for his A-fib, continue on metoprolol succinate low-dose monitor blood pressure possibly uptitrate further based on trend  CK downtrending, after current fluids finished.  And will continue to p.o. intake  Tsh wnl, b12 323 mildly low, placed on multivitamin  Continue magnesium  3/25 and again on 4/4 10d of bactrim prescribed for le cellulitis. Appears patient with chronic stasis changes.  Mildly elevated white count on admission improved on repeat with supportive care.  Continue local wound care, cultures otherwise negative.    Continue wound care/appreciate wound care consult.  Leg elevation was able  Nystatin for gluteal fold/fungal irritation  Continue Requip at night for RLS in  place of Sinemet.  Ferritin was adequate.  Does not have Parkinson's from what I can tell over his history  Chronic severe hydrocephalus - had prior workup 2016 with large volume LP without improvement in symptoms.  No drain/shunt recommended.  Suspect underlying dementia with advancing age/debility contributing to falls.  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  PT/OT  Will give lab monitoring as otherwise clinically improving   Continue hospitalization at current level of care    Working on rehab placement      DVT prophylaxis:  Medical DVT prophylaxis orders are present.    Medical Intervention Limits: NO intubation (DNI)  Level Of Support Discussed With: Patient  Code Status (Patient has no pulse and is not breathing): No CPR (Do Not Attempt to Resuscitate)  Medical Interventions (Patient has pulse or is breathing): Limited Support    Greater than 50 minutes on this encounter including review of labs, imaging, documentation, orders, vitals, monitoring and adjusting treatment and orders as indicated, discussion with the patient, rn, and documenting.       CBC          4/10/2024    14:03 4/11/2024    06:38 4/12/2024    06:06   CBC   WBC 11.91  10.57  7.86    RBC 4.87  4.51  4.49    Hemoglobin 14.1  12.8  12.7    Hematocrit 42.8  39.9  40.9    MCV 87.9  88.5  91.1    MCH 29.0  28.4  28.3    MCHC 32.9  32.1  31.1    RDW 14.3  14.4  14.5    Platelets 287  244  253        CMP          4/10/2024    14:03 4/11/2024    06:38 4/12/2024    06:06   CMP   Glucose 121  99  97    BUN 18  18  19    Creatinine 1.35  1.25  1.10    EGFR 51.1  56.1  65.4    Sodium 138  136  139    Potassium 4.4  4.1  4.2     Chloride 100  103  106    Calcium 8.6  8.2  7.8    Total Protein 6.8      Albumin 3.9      Globulin 2.9      Total Bilirubin 1.1      Alkaline Phosphatase 93      AST (SGOT) 175      ALT (SGPT) 49      Albumin/Globulin Ratio 1.3      BUN/Creatinine Ratio 13.3  14.4  17.3    Anion Gap 13.4  10.2  9.7

## 2024-04-14 PROCEDURE — 99232 SBSQ HOSP IP/OBS MODERATE 35: CPT | Performed by: STUDENT IN AN ORGANIZED HEALTH CARE EDUCATION/TRAINING PROGRAM

## 2024-04-14 RX ADMIN — NYSTATIN: 100000 POWDER TOPICAL at 09:12

## 2024-04-14 RX ADMIN — ROPINIROLE HYDROCHLORIDE 0.5 MG: 0.25 TABLET, FILM COATED ORAL at 21:32

## 2024-04-14 RX ADMIN — Medication 400 MG: at 09:11

## 2024-04-14 RX ADMIN — APIXABAN 2.5 MG: 2.5 TABLET, FILM COATED ORAL at 21:32

## 2024-04-14 RX ADMIN — Medication 10 ML: at 21:33

## 2024-04-14 RX ADMIN — ACETAMINOPHEN 650 MG: 325 TABLET ORAL at 21:32

## 2024-04-14 RX ADMIN — BACITRACIN 0.9 G: 500 OINTMENT TOPICAL at 21:32

## 2024-04-14 RX ADMIN — MIRTAZAPINE 15 MG: 15 TABLET, FILM COATED ORAL at 21:32

## 2024-04-14 RX ADMIN — NYSTATIN: 100000 POWDER TOPICAL at 21:34

## 2024-04-14 RX ADMIN — APIXABAN 2.5 MG: 2.5 TABLET, FILM COATED ORAL at 09:11

## 2024-04-14 RX ADMIN — Medication 10 ML: at 09:12

## 2024-04-14 RX ADMIN — BACITRACIN 0.9 G: 500 OINTMENT TOPICAL at 09:11

## 2024-04-14 RX ADMIN — Medication 1 TABLET: at 09:11

## 2024-04-14 RX ADMIN — METOPROLOL SUCCINATE 12.5 MG: 25 TABLET, EXTENDED RELEASE ORAL at 09:11

## 2024-04-14 NOTE — PLAN OF CARE
Goal Outcome Evaluation:           Progress: improving  Outcome Evaluation: Pt is AOx4 throughout the shift, on room air. Pt is up to chair for most of the shift. Wound and skin care provided per orders. No acute changes noted this shift. Pt appears to be in no apparent distress at this time, denies any current needs and has call light within reach.

## 2024-04-14 NOTE — PROGRESS NOTES
[facility]   Hospitalist Progress Note  Date: 2024  Patient Name: Enrique Wylie  : 1935  MRN: [id number]  Date of admission: 4/10/2024  Room/Bed: [facility]      Subjective   Subjective     Chief Complaint: Fall    Summary:Enrique Wylie is a 88 y.o. male  past medical history of A-fib with unknown Eliquis adherence, frequent falls, chronic lumbar spinal stenosis, restless leg syndrome, hypertension, chronic venous stasis, hyperlipidemia who presents to the ER due to a fall and being found on the ground.  Brought in found have rhabdomyolysis, JOSE from dehydration and generalized weakness working on rehab placement.  Of note patient had recently completed 20 days of Bactrim for lower extremity cellulitis     Interval Followup: No acute overnight events.  No acute distress.  Patient sitting in the bedside chair.  Friends are visiting him.  Discussed care plan with him.  He is still insistent that he wants to go home.    Review of Systems    All systems reviewed and negative except for what is outlined above.      Objective   Objective     Vitals:   Temp:  [97.3 °F (36.3 °C)-98.6 °F (37 °C)] 98.6 °F (37 °C)  Heart Rate:  [84-92] 90  Resp:  [18-20] 18  BP: (116-136)/(60-75) 118/60    Physical Exam   Gen: NAD, Alert and Oriented  Cards: RRR, no murmur   Pulm: CTA b/l, no wheezing  Abd: soft, nondistended  Extremities: no pitting edema.  No lower extremity cellulitis, chronic venous stasis changes.      Result Review    Result Review:  I have personally reviewed these results:  [x]  Laboratory      Lab 24  0606 24  1026 24  0840 24  0638 04/10/24  1403   WBC 7.86  --   --  10.57 11.91*   HEMOGLOBIN 12.7*  --   --  12.8* 14.1   HEMATOCRIT 40.9  --   --  39.9 42.8   PLATELETS 253  --   --  244 287   NEUTROS ABS 5.33  --   --  8.30* 9.83*   IMMATURE GRANS (ABS) 0.02  --   --  0.05 0.04   LYMPHS ABS 1.13  --   --  0.83 0.69*   MONOS ABS 1.13*  --   --  1.27* 1.31*   EOS ABS 0.20   --   --  0.08 0.00   MCV 91.1  --   --  88.5 87.9   PROCALCITONIN  --   --  0.17  --   --    LACTATE  --  1.7  --   --   --    PROTIME  --   --   --   --  14.9         Lab 04/12/24  0606 04/11/24  0638 04/10/24  1403   SODIUM 139 136 138   POTASSIUM 4.2 4.1 4.4   CHLORIDE 106 103 100   CO2 23.3 22.8 24.6   ANION GAP 9.7 10.2 13.4   BUN 19 18 18   CREATININE 1.10 1.25 1.35*   EGFR 65.4 56.1* 51.1*   GLUCOSE 97 99 121*   CALCIUM 7.8* 8.2* 8.6   MAGNESIUM 2.0 1.7 1.8   PHOSPHORUS 2.5  --   --    TSH  --   --  0.744         Lab 04/10/24  1403   TOTAL PROTEIN 6.8   ALBUMIN 3.9   GLOBULIN 2.9   ALT (SGPT) 49*   AST (SGOT) 175*   BILIRUBIN 1.1   ALK PHOS 93         Lab 04/11/24  0840 04/11/24  0638 04/10/24  1403   PROBNP 889.4  --   --    HSTROP T 46* 48* 49*   PROTIME  --   --  14.9   INR  --   --  1.15             Lab 04/12/24  0606 04/10/24  1403   FERRITIN 128.20  --    VITAMIN B 12  --  323         Brief Urine Lab Results  (Last result in the past 365 days)        Color   Clarity   Blood   Leuk Est   Nitrite   Protein   CREAT   Urine HCG        04/10/24 1837 Yellow   Clear   Moderate (2+)   Negative   Negative   30 mg/dL (1+)                 [x]  Microbiology   Microbiology Results (last 10 days)       Procedure Component Value - Date/Time    COVID-19, FLU A/B, RSV PCR 1 HR TAT - Swab, Nasopharynx [[id number]]  (Normal) Collected: 04/11/24 1044    Lab Status: Final result Specimen: Swab from Nasopharynx Updated: 04/11/24 1143     COVID19 Not Detected     Influenza A PCR Not Detected     Influenza B PCR Not Detected     RSV, PCR Not Detected    Narrative:      Fact sheet for providers: https://www.fda.gov/media/269590/download    Fact sheet for patients: https://www.fda.gov/media/739416/download    Test performed by PCR.    Blood Culture - Blood, Arm, Left [[id number]]  (Normal) Collected: 04/11/24 1026    Lab Status: Preliminary result Specimen: Blood from Arm, Left Updated: 04/14/24 1045     Blood Culture No growth  at 3 days    Blood Culture - Blood, Hand, Left [[id number]]  (Normal) Collected: 04/11/24 1026    Lab Status: Preliminary result Specimen: Blood from Hand, Left Updated: 04/14/24 1045     Blood Culture No growth at 3 days          [x]  Radiology  CT Head Without Contrast    Result Date: 4/11/2024  1. No intracranial hemorrhage. 2. Stable marked ventricular dilatation/chronic hydrocephalus.  Electronically Signed By-Michele Ruelas MD On:4/11/2024 4:21 PM      XR Shoulder 2+ View Right    Result Date: 4/10/2024  1.  No evidence for displaced fracture or dislocation. 2.  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 3.  Postoperative changes are noted involving the right shoulder.  Electronically Signed By-Christiano Camargo MD On:4/10/2024 10:12 PM      XR Shoulder 2+ View Left    Result Date: 4/10/2024  1.  No evidence for displaced fracture or dislocation. 2.  Mild  osteoarthritic degenerative changes of the glenohumeral joint. 3.  Moderate hypertrophic age-related changes of the acromioclavicular joint.  Electronically Signed By-Christiano Camargo MD On:4/10/2024 10:11 PM      XR Hips Bilateral With or Without Pelvis 2 View    Result Date: 4/10/2024  1.  No evidence for displaced fracture or dislocation. 2.  Mild to moderate degenerative changes are noted. 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   Electronically Signed ByJune Camargo MD On:4/10/2024 7:50 PM      XR Spine Lumbar 2 or 3 View    Result Date: 4/10/2024  1.  No evidence for acute fracture or subluxation. No evidence for compression fracture deformity. 2.  Advanced discogenic degenerative changes and degenerative posterior facet changes are seen throughout the lumbar spine.   Electronically Signed ByJune Camargo MD On:4/10/2024 7:49 PM      CT Head Without Contrast    Result Date: 4/10/2024  Impression:  1. Chronic severe hydrocephalus, no acute intracranial pathology.    Electronically Signed  By-Bradley Watt MD On:4/10/2024 4:28 PM      XR Chest 1 View    Result Date: 4/10/2024  Impression: No active disease.   Electronically Signed By-HUA NEWBY MD On:4/10/2024 12:27 PM     []  EKG/Telemetry   []  Cardiology/Vascular   []  Pathology  []  Old records  []  Other:    Assessment & Plan   Assessment / Plan     Assessment:  Rhabdomyolysis in setting of fall with prolonged period on the ground  Fall in setting of anticoagulation use with head trauma  JOSE secondary to above  Paroxysmal A-fib on Eliquis  Hypertension  Mild cognitive impairment  Chronic severe hydrocephalus - workup in 2016  Suspicion for underlying dementia   Hypomagnesemia  Medication nonadherence  Chronic lumbar spinal stenosis  RLS  Hyperlipidemia  Chronic venous stasis   Recent LE cellulitis ? S/p 20 d bactrim    Plan:  Continue inpatient admission  CT scan negative for hemorrhage  Continue p.o. Eliquis at reduced dose.  Pros/cons discussed with patient by previous hospitalist given his fall risk.  Continue metoprolol for rate control  CK continues to trend down.  Discontinue IV fluids.  Recent antibiotics for cellulitis x 2 with Bactrim.  Currently does not appear to have cellulitis, chronic stasis dermatitis per exam.  Wound care  Continue Requip at bedtime.  Switch from Sinemet.  Nystatin as needed  Recheck LFTs.  Continue to hold statin.  Continue to hold home Lasix.  PT/OT    Dispo: Possible rehab placement.  Patient is currently refusing and wants to go home.  I called and discussed with his POA and she is attempting to get in-home care and a chairlift for him.  She would really like for him to go to rehab in the meantime.       Discussed with RN.    DVT prophylaxis:  Medical DVT prophylaxis orders are present.        CODE STATUS:   Medical Intervention Limits: NO intubation (DNI)  Level Of Support Discussed With: Patient  Code Status (Patient has no pulse and is not breathing): No CPR (Do Not Attempt to Resuscitate)  Medical  Interventions (Patient has pulse or is breathing): Limited Support      Electronically signed by Estela Orellana DO, 4/14/2024, 16:02 EDT.

## 2024-04-15 LAB
ALBUMIN SERPL-MCNC: 3.4 G/DL (ref 3.5–5.2)
ALBUMIN/GLOB SERPL: 1.2 G/DL
ALP SERPL-CCNC: 92 U/L (ref 39–117)
ALT SERPL W P-5'-P-CCNC: 43 U/L (ref 1–41)
ANION GAP SERPL CALCULATED.3IONS-SCNC: 9.7 MMOL/L (ref 5–15)
AST SERPL-CCNC: 44 U/L (ref 1–40)
BASOPHILS # BLD AUTO: 0.08 10*3/MM3 (ref 0–0.2)
BASOPHILS NFR BLD AUTO: 1 % (ref 0–1.5)
BILIRUB SERPL-MCNC: 0.5 MG/DL (ref 0–1.2)
BUN SERPL-MCNC: 21 MG/DL (ref 8–23)
BUN/CREAT SERPL: 21.6 (ref 7–25)
CALCIUM SPEC-SCNC: 8.4 MG/DL (ref 8.6–10.5)
CHLORIDE SERPL-SCNC: 103 MMOL/L (ref 98–107)
CO2 SERPL-SCNC: 26.3 MMOL/L (ref 22–29)
CREAT SERPL-MCNC: 0.97 MG/DL (ref 0.76–1.27)
DEPRECATED RDW RBC AUTO: 47.7 FL (ref 37–54)
EGFRCR SERPLBLD CKD-EPI 2021: 76 ML/MIN/1.73
EOSINOPHIL # BLD AUTO: 0.37 10*3/MM3 (ref 0–0.4)
EOSINOPHIL NFR BLD AUTO: 4.7 % (ref 0.3–6.2)
ERYTHROCYTE [DISTWIDTH] IN BLOOD BY AUTOMATED COUNT: 14.2 % (ref 12.3–15.4)
GLOBULIN UR ELPH-MCNC: 2.8 GM/DL
GLUCOSE SERPL-MCNC: 104 MG/DL (ref 65–99)
HCT VFR BLD AUTO: 43.1 % (ref 37.5–51)
HGB BLD-MCNC: 13 G/DL (ref 13–17.7)
IMM GRANULOCYTES # BLD AUTO: 0.04 10*3/MM3 (ref 0–0.05)
IMM GRANULOCYTES NFR BLD AUTO: 0.5 % (ref 0–0.5)
LYMPHOCYTES # BLD AUTO: 1.84 10*3/MM3 (ref 0.7–3.1)
LYMPHOCYTES NFR BLD AUTO: 23.3 % (ref 19.6–45.3)
MCH RBC QN AUTO: 27.7 PG (ref 26.6–33)
MCHC RBC AUTO-ENTMCNC: 30.2 G/DL (ref 31.5–35.7)
MCV RBC AUTO: 91.9 FL (ref 79–97)
MONOCYTES # BLD AUTO: 1.04 10*3/MM3 (ref 0.1–0.9)
MONOCYTES NFR BLD AUTO: 13.1 % (ref 5–12)
NEUTROPHILS NFR BLD AUTO: 4.54 10*3/MM3 (ref 1.7–7)
NEUTROPHILS NFR BLD AUTO: 57.4 % (ref 42.7–76)
NRBC BLD AUTO-RTO: 0 /100 WBC (ref 0–0.2)
PLATELET # BLD AUTO: 343 10*3/MM3 (ref 140–450)
PMV BLD AUTO: 8.9 FL (ref 6–12)
POTASSIUM SERPL-SCNC: 4 MMOL/L (ref 3.5–5.2)
PROT SERPL-MCNC: 6.2 G/DL (ref 6–8.5)
QT INTERVAL: 370 MS
QTC INTERVAL: 472 MS
RBC # BLD AUTO: 4.69 10*6/MM3 (ref 4.14–5.8)
SODIUM SERPL-SCNC: 139 MMOL/L (ref 136–145)
WBC NRBC COR # BLD AUTO: 7.91 10*3/MM3 (ref 3.4–10.8)

## 2024-04-15 PROCEDURE — 80053 COMPREHEN METABOLIC PANEL: CPT | Performed by: STUDENT IN AN ORGANIZED HEALTH CARE EDUCATION/TRAINING PROGRAM

## 2024-04-15 PROCEDURE — 97530 THERAPEUTIC ACTIVITIES: CPT

## 2024-04-15 PROCEDURE — 85025 COMPLETE CBC W/AUTO DIFF WBC: CPT | Performed by: STUDENT IN AN ORGANIZED HEALTH CARE EDUCATION/TRAINING PROGRAM

## 2024-04-15 PROCEDURE — 99232 SBSQ HOSP IP/OBS MODERATE 35: CPT | Performed by: INTERNAL MEDICINE

## 2024-04-15 PROCEDURE — 97110 THERAPEUTIC EXERCISES: CPT

## 2024-04-15 RX ADMIN — Medication 1 TABLET: at 08:35

## 2024-04-15 RX ADMIN — Medication 10 ML: at 08:35

## 2024-04-15 RX ADMIN — APIXABAN 2.5 MG: 2.5 TABLET, FILM COATED ORAL at 20:32

## 2024-04-15 RX ADMIN — ACETAMINOPHEN 650 MG: 325 TABLET ORAL at 20:32

## 2024-04-15 RX ADMIN — ROPINIROLE HYDROCHLORIDE 0.5 MG: 0.25 TABLET, FILM COATED ORAL at 20:32

## 2024-04-15 RX ADMIN — MIRTAZAPINE 15 MG: 15 TABLET, FILM COATED ORAL at 20:32

## 2024-04-15 RX ADMIN — METOPROLOL SUCCINATE 12.5 MG: 25 TABLET, EXTENDED RELEASE ORAL at 08:35

## 2024-04-15 RX ADMIN — NYSTATIN: 100000 POWDER TOPICAL at 13:37

## 2024-04-15 RX ADMIN — NYSTATIN: 100000 POWDER TOPICAL at 20:34

## 2024-04-15 RX ADMIN — Medication 400 MG: at 08:35

## 2024-04-15 RX ADMIN — Medication 10 ML: at 20:32

## 2024-04-15 RX ADMIN — BACITRACIN 0.9 G: 500 OINTMENT TOPICAL at 17:27

## 2024-04-15 RX ADMIN — BACITRACIN 0.9 G: 500 OINTMENT TOPICAL at 20:32

## 2024-04-15 RX ADMIN — BACITRACIN 0.9 G: 500 OINTMENT TOPICAL at 13:37

## 2024-04-15 RX ADMIN — APIXABAN 2.5 MG: 2.5 TABLET, FILM COATED ORAL at 08:35

## 2024-04-15 NOTE — THERAPY TREATMENT NOTE
Acute Care - Physical Therapy Treatment Note  KARTHIK Alegria     Patient Name: Enrique Wylie  : 1935  MRN: [id number]  Today's Date: 4/15/2024      Visit Dx:     ICD-10-CM ICD-9-CM   1. Traumatic rhabdomyolysis, initial encounter  T79.6XXA 958.6   2. Fall, initial encounter  W19.XXXA E888.9   3. Decreased activities of daily living (ADL)  Z78.9 V49.89   4. Difficulty walking  R26.2 719.7     Patient Active Problem List   Diagnosis    Balance problem    Restless leg syndrome    Encounter for long-term (current) use of other medications    Lumbar spinal stenosis    Grief reaction    Hyperlipidemia    Unsteady gait when walking    Death of wife    Lumbar spinal stenosis    Advance directive in chart    Skin cancer    Nephrolithiasis    Left ureteral stone    Foreign body in genitourinary tract    Advance directive in chart    Bilateral sensorineural hearing loss    Cardiomegaly    Essential hypertension    Fall at home, initial encounter    Paroxysmal atrial fibrillation    Weight loss, unintentional    Adjustment disorder with depressed mood    Back pain    Rhabdomyolysis     Past Medical History:   Diagnosis Date    Advance directive in chart 2020    AF (paroxysmal atrial fibrillation)     Allergic rhinitis     occasionally    Arthritis     Back pain 2014    Balance problem     walking is hard, using cane    Cancer     squamous cell  face and ears    Cervical spinal stenosis 2016    Previous surgery for myelopathy    Cervical spondylosis with myelopathy 2012    C4-5 and C5-6 with edema in the cord at C4-5    Death of wife 2020    Fall at home, initial encounter 2020    Gait instability 2019    Grief reaction 2020    High cholesterol     Kidney stone     Lumbar spinal stenosis 2016    Worsening leg symptoms,  3/12/15    Medication management 2019    Restless leg syndrome 2019    Weight loss, unintentional 2020     Past Surgical History:  "  Procedure Laterality Date    CATARACT EXTRACTION Bilateral     CERVICAL FUSION      C4,5 and 6    KNEE ARTHROSCOPY      right knee    LUMBAR LAMINECTOMY  02/10/2016    L3-4    ROTATOR CUFF REPAIR Right     SKIN BIOPSY      face and ears, squamous cell     SPINE SURGERY      minimally invasive procedure \"closing in on spinal Cord\"  \"gave spinal cord more room\"    URETEROSCOPY LASER LITHOTRIPSY WITH STENT INSERTION Bilateral 11/13/2023    Procedure: Bilateral Retrograde Pyelogram, Left Ureteroscopy Lasertripsy Basket Stone Extraction and Stent Insertion, Right Stent Insertion, Bladder Stone Extraction;  Surgeon: Katerina Carpenter MD;  Location: [facility];  Service: Urology;  Laterality: Bilateral;    URETEROSCOPY LASER LITHOTRIPSY WITH STENT INSERTION Bilateral 12/18/2023    Procedure: CYSTOSCOPY URETEROSCOPY RIGHT RETROGRADE PYELOGRAM HOLMIUM LASER STENT exchange; LEFT STENT REMOVAL;  Surgeon: Katerina Carpenter MD;  Location: [facility];  Service: Urology;  Laterality: Bilateral;     PT Assessment (Last 12 Hours)       PT Evaluation and Treatment       Row Name 04/15/24 0914          Physical Therapy Time and Intention    Subjective Information complains of;weakness;fatigue  -DK     Document Type therapy note (daily note)  -DK     Mode of Treatment individual therapy;physical therapy  -DK     Patient Effort good  -DK     Symptoms Noted During/After Treatment fatigue  -DK     Comment Pt is somewhat slow moving, has a sore left shoulder.  -DK       Row Name 04/15/24 0914          Pain    Pretreatment Pain Rating 1/10  -DK     Posttreatment Pain Rating 4/10  -DK     Pain Location - Side/Orientation Left  -DK     Pain Location generalized  -DK     Pain Location - shoulder  -DK     Pain Intervention(s) Repositioned;Ambulation/increased activity;Distraction;Therapeutic presence  -DK       Row Name 04/15/24 0914          Cognition    Affect/Mental Status (Cognition) WFL  -DK     Orientation Status (Cognition) " oriented to;person;situation  -DK     Follows Commands (Cognition) physical/tactile prompts required;repetition of directions required;verbal cues/prompting required  -DK     Cognitive Function attention deficit  -DK     Attention Deficit (Cognition) minimal deficit;concentration;arousal/alertness;focused/sustained attention  -DK     Personal Safety Interventions gait belt;nonskid shoes/slippers when out of bed;supervised activity  -DK       Row Name 04/15/24 0914          Bed Mobility    Bed Mobility supine-sit  -DK     Supine-Sit [place] (Bed Mobility) contact guard;minimum assist (75% patient effort);1 person assist  -DK     Bed Mobility, Safety Issues decreased use of arms for pushing/pulling;decreased use of legs for bridging/pushing  -DK     Assistive Device (Bed Mobility) bed rails  -       Row Name 04/15/24 0914          Transfers    Transfers sit-stand transfer;stand-sit transfer  -       Row Name 04/15/24 0914          Sit-Stand Transfer    Sit-Stand [place] (Transfers) contact guard;minimum assist (75% patient effort);1 person assist  -DK     Assistive Device (Sit-Stand Transfers) walker, front-wheeled  -       Row Name 04/15/24 0914          Stand-Sit Transfer    Stand-Sit [place] (Transfers) contact guard;minimum assist (75% patient effort);1 person assist  -     Assistive Device (Stand-Sit Transfers) walker, front-wheeled  -       Row Name 04/15/24 0914          Gait/Stairs (Locomotion)    Gait/Stairs Locomotion gait/ambulation independence;gait/ambulation assistive device;distance ambulated;gait pattern  -DK     [place] Level (Gait) standby assist;contact guard;1 person assist  -DK     Assistive Device (Gait) walker, front-wheeled  -     Distance in Feet (Gait) 30  -DK     Pattern (Gait) step-to  -DK     Deviations/Abnormal Patterns (Gait) sukumar decreased;festinating/shuffling;gait speed decreased;stride length decreased  -DK     Bilateral Gait Deviations forward  flexed posture  -     Comment, (Gait/Stairs) Pt ambulated on room air with a rolling walker.  He exhibits a slow pace.  Pt was left in the recliner on alert post treatment.  -       Row Name 04/15/24 0914          Safety Issues, Functional Mobility    Safety Issues Affecting Function (Mobility) ability to follow commands  -     Impairments Affecting Function (Mobility) balance;endurance/activity tolerance;pain;strength  -     Cognitive Impairments, Mobility Safety/Performance awareness, need for assistance;judgment;safety precaution awareness  -       Row Name 04/15/24 0914          Balance    Balance Assessment sitting static balance;sitting dynamic balance;standing static balance;standing dynamic balance  -     Static Sitting Balance standby assist  -     Dynamic Sitting Balance standby assist  -     Position, Sitting Balance unsupported;sitting in chair;sitting edge of bed  -     Static Standing Balance standby assist;contact guard;1-person assist  -DK     Dynamic Standing Balance standby assist;contact guard;1-person assist  -DK     Position/Device Used, Standing Balance walker, front-wheeled  -     Balance Interventions standing;dynamic;tandem gait  -       Row Name 04/15/24 0914          Motor Skills    Motor Skills --  therapeutic exercises  -     Coordination WFL  -     Therapeutic Exercise hip;knee;ankle  -       Row Name 04/15/24 0914          Hip (Therapeutic Exercise)    Hip (Therapeutic Exercise) AAROM (active assistive range of motion)  -     Hip AAROM (Therapeutic Exercise) bilateral;flexion;extension;aBduction;aDduction;supine;10 repetitions;2 sets  -       Row Name 04/15/24 0914          Knee (Therapeutic Exercise)    Knee (Therapeutic Exercise) AAROM (active assistive range of motion)  -     Knee AAROM (Therapeutic Exercise) flexion;extension;supine;10 repetitions;2 sets  -       Row Name 04/15/24 0914          Ankle (Therapeutic Exercise)    Ankle (Therapeutic  Exercise) AAROM (active assistive range of motion)  -DK     Ankle AAROM (Therapeutic Exercise) bilateral;dorsiflexion;plantarflexion;supine;10 repetitions;2 sets  -DK       Row Name             Wound 04/11/24 0022 Right anterior foot Skin Tear    Wound - Properties Group Placement Date: 04/11/24  -AS Placement Time: 0022  -AS Present on Original Admission: Y  -AS Side: Right  -AS Orientation: anterior  -AS Location: foot  -AS Primary Wound Type: Skin tear  -AS    Retired Wound - Properties Group Placement Date: 04/11/24  -AS Placement Time: 0022  -AS Present on Original Admission: Y  -AS Side: Right  -AS Orientation: anterior  -AS Location: foot  -AS Primary Wound Type: Skin tear  -AS    Retired Wound - Properties Group Date first assessed: 04/11/24  -AS Time first assessed: 0022  -AS Present on Original Admission: Y  -AS Side: Right  -AS Location: foot  -AS Primary Wound Type: Skin tear  -AS      Row Name             Wound 04/11/24 0022 Bilateral lower leg    Wound - Properties Group Placement Date: 04/11/24  -AS Placement Time: 0022  -AS Present on Original Admission: Y  -AS Side: Bilateral  -AS Orientation: lower  -AS Location: leg  -AS    Retired Wound - Properties Group Placement Date: 04/11/24  -AS Placement Time: 0022  -AS Present on Original Admission: Y  -AS Side: Bilateral  -AS Orientation: lower  -AS Location: leg  -AS    Retired Wound - Properties Group Date first assessed: 04/11/24  -AS Time first assessed: 0022  -AS Present on Original Admission: Y  -AS Side: Bilateral  -AS Location: leg  -AS      Row Name             Wound 04/11/24 0023 Right anterior knee Skin Tear    Wound - Properties Group Placement Date: 04/11/24  -AS Placement Time: 0023  -AS Present on Original Admission: Y  -AS Side: Right  -AS Orientation: anterior  -AS Location: knee  -AS Primary Wound Type: Skin tear  -AS    Retired Wound - Properties Group Placement Date: 04/11/24  -AS Placement Time: 0023  -AS Present on Original  Admission: Y  -AS Side: Right  -AS Orientation: anterior  -AS Location: knee  -AS Primary Wound Type: Skin tear  -AS    Retired Wound - Properties Group Date first assessed: 04/11/24  -AS Time first assessed: 0023  -AS Present on Original Admission: Y  -AS Side: Right  -AS Location: knee  -AS Primary Wound Type: Skin tear  -AS      Row Name             Wound 04/11/24 0023 Left anterior knee Skin Tear    Wound - Properties Group Placement Date: 04/11/24  -AS Placement Time: 0023  -AS Present on Original Admission: Y  -AS Side: Left  -AS Orientation: anterior  -AS Location: knee  -AS Primary Wound Type: Skin tear  -AS    Retired Wound - Properties Group Placement Date: 04/11/24  -AS Placement Time: 0023  -AS Present on Original Admission: Y  -AS Side: Left  -AS Orientation: anterior  -AS Location: knee  -AS Primary Wound Type: Skin tear  -AS    Retired Wound - Properties Group Date first assessed: 04/11/24  -AS Time first assessed: 0023  -AS Present on Original Admission: Y  -AS Side: Left  -AS Location: knee  -AS Primary Wound Type: Skin tear  -AS      Row Name             Wound 04/11/24 0023 Right anterior thigh Skin Tear    Wound - Properties Group Placement Date: 04/11/24  -AS Placement Time: 0023  -AS Present on Original Admission: Y  -AS Side: Right  -AS Orientation: anterior  -AS Location: thigh  -AS Primary Wound Type: Skin tear  -AS    Retired Wound - Properties Group Placement Date: 04/11/24  -AS Placement Time: 0023  -AS Present on Original Admission: Y  -AS Side: Right  -AS Orientation: anterior  -AS Location: thigh  -AS Primary Wound Type: Skin tear  -AS    Retired Wound - Properties Group Date first assessed: 04/11/24  -AS Time first assessed: 0023  -AS Present on Original Admission: Y  -AS Side: Right  -AS Location: thigh  -AS Primary Wound Type: Skin tear  -AS      Row Name             Wound 04/11/24 0024 Right anterior greater trochanter Skin Tear    Wound - Properties Group Placement Date: 04/11/24   -AS Placement Time: 0024  -AS Present on Original Admission: Y  -AS Side: Right  -AS Orientation: anterior  -AS Location: greater trochanter  -AS Primary Wound Type: Skin tear  -AS    Retired Wound - Properties Group Placement Date: 04/11/24  -AS Placement Time: 0024  -AS Present on Original Admission: Y  -AS Side: Right  -AS Orientation: anterior  -AS Location: greater trochanter  -AS Primary Wound Type: Skin tear  -AS    Retired Wound - Properties Group Date first assessed: 04/11/24  -AS Time first assessed: 0024  -AS Present on Original Admission: Y  -AS Side: Right  -AS Location: greater trochanter  -AS Primary Wound Type: Skin tear  -AS      Row Name             Wound 04/11/24 0024 Right scapula Skin Tear    Wound - Properties Group Placement Date: 04/11/24  -AS Placement Time: 0024  -AS Present on Original Admission: Y  -AS Side: Right  -AS Location: scapula  -AS Primary Wound Type: Skin tear  -AS    Retired Wound - Properties Group Placement Date: 04/11/24  -AS Placement Time: 0024  -AS Present on Original Admission: Y  -AS Side: Right  -AS Location: scapula  -AS Primary Wound Type: Skin tear  -AS    Retired Wound - Properties Group Date first assessed: 04/11/24  -AS Time first assessed: 0024  -AS Present on Original Admission: Y  -AS Side: Right  -AS Location: scapula  -AS Primary Wound Type: Skin tear  -AS      Row Name             Wound 04/11/24 0024 Bilateral coccyx Pressure Injury    Wound - Properties Group Placement Date: 04/11/24  -AS Placement Time: 0024  -AS Present on Original Admission: Y  -AS Side: Bilateral  -AS Location: coccyx  -AS Primary Wound Type: Pressure inj  -AS    Retired Wound - Properties Group Placement Date: 04/11/24  -AS Placement Time: 0024  -AS Present on Original Admission: Y  -AS Side: Bilateral  -AS Location: coccyx  -AS Primary Wound Type: Pressure inj  -AS    Retired Wound - Properties Group Date first assessed: 04/11/24  -AS Time first assessed: 0024  -AS Present on  Original Admission: Y  -AS Side: Bilateral  -AS Location: coccyx  -AS Primary Wound Type: Pressure inj  -AS      Row Name             Wound 04/11/24 0025 Bilateral anterior groin MASD (Moisture associated skin damage)    Wound - Properties Group Placement Date: 04/11/24  -AS Placement Time: 0025  -AS Present on Original Admission: Y  -AS Side: Bilateral  -AS Orientation: anterior  -AS Location: groin  -AS Primary Wound Type: MASD  -AS    Retired Wound - Properties Group Placement Date: 04/11/24  -AS Placement Time: 0025  -AS Present on Original Admission: Y  -AS Side: Bilateral  -AS Orientation: anterior  -AS Location: groin  -AS Primary Wound Type: MASD  -AS    Retired Wound - Properties Group Date first assessed: 04/11/24  -AS Time first assessed: 0025  -AS Present on Original Admission: Y  -AS Side: Bilateral  -AS Location: groin  -AS Primary Wound Type: MASD  -AS      Row Name             Wound 04/11/24 0026 Left lower back Abrasion    Wound - Properties Group Placement Date: 04/11/24  -AS Placement Time: 0026  -AS Present on Original Admission: Y  -AS Side: Left  -AS Orientation: lower  -AS Location: back  -AS Primary Wound Type: Abrasion  -AS    Retired Wound - Properties Group Placement Date: 04/11/24  -AS Placement Time: 0026  -AS Present on Original Admission: Y  -AS Side: Left  -AS Orientation: lower  -AS Location: back  -AS Primary Wound Type: Abrasion  -AS    Retired Wound - Properties Group Date first assessed: 04/11/24  -AS Time first assessed: 0026  -AS Present on Original Admission: Y  -AS Side: Left  -AS Location: back  -AS Primary Wound Type: Abrasion  -AS      Row Name             Wound 04/11/24 0046 medial parietal region Traumatic    Wound - Properties Group Placement Date: 04/11/24  -AS Placement Time: 0046  -AS Present on Original Admission: Y  -AS Orientation: medial  -AS Location: parietal region  -AS Primary Wound Type: Traumatic  -AS    Retired Wound - Properties Group Placement Date:  04/11/24  -AS Placement Time: 0046  -AS Present on Original Admission: Y  -AS Orientation: medial  -AS Location: parietal region  -AS Primary Wound Type: Traumatic  -AS    Retired Wound - Properties Group Date first assessed: 04/11/24  -AS Time first assessed: 0046  -AS Present on Original Admission: Y  -AS Location: parietal region  -AS Primary Wound Type: Traumatic  -AS      Row Name             Wound 04/11/24 0049 Right lower arm Skin Tear    Wound - Properties Group Placement Date: 04/11/24  -AS Placement Time: 0049  -AS Side: Right  -AS Orientation: lower  -AS Location: arm  -AS Primary Wound Type: Skin tear  -AS    Retired Wound - Properties Group Placement Date: 04/11/24  -AS Placement Time: 0049  -AS Side: Right  -AS Orientation: lower  -AS Location: arm  -AS Primary Wound Type: Skin tear  -AS    Retired Wound - Properties Group Date first assessed: 04/11/24  -AS Time first assessed: 0049 -AS Side: Right  -AS Location: arm  -AS Primary Wound Type: Skin tear  -AS      Row Name 04/15/24 0914          Plan of Care Review    Plan of Care Reviewed With patient  -DK     Progress improving  -       Row Name 04/15/24 0914          Positioning and Restraints    Pre-Treatment Position in bed  -DK     Post Treatment Position chair  -DK     In Chair reclined;call light within reach;encouraged to call for assist;exit alarm on;legs elevated;heels elevated  -DK       Row Name 04/15/24 0914          Therapy Assessment/Plan (PT)    Rehab Potential (PT) good, to achieve stated therapy goals  -DK     Criteria for Skilled Interventions Met (PT) skilled treatment is necessary  -DK     Therapy Frequency (PT) daily  -DK     Problem List (PT) problems related to;balance;cognition;mobility;strength;range of motion (ROM);pain;hearing  -DK     Activity Limitations Related to Problem List (PT) unable to ambulate safely;unable to transfer safely  -       Row Name 04/15/24 0914          Progress Summary (PT)    Progress Toward  Functional Goals (PT) progress toward functional goals is good  -               User Key  (r) = Recorded By, (t) = Taken By, (c) = Cosigned By      Initials Name Provider Type    DK Johanne Salomon PTA Physical Therapist Assistant    AS Princess De León, RN Registered Nurse                    Physical Therapy Education       Title: PT OT SLP Therapies (Done)       Topic: Physical Therapy (Done)       Point: Mobility training (Done)       Learning Progress Summary             Patient Acceptance, E,TB, VU by  at 4/11/2024 1205                         Point: Precautions (Done)       Learning Progress Summary             Patient Acceptance, E,TB, VU by  at 4/11/2024 1205                                         User Key       Initials Effective Dates Name Provider Type [facility] 10/12/23 -  Justin Barclay, PT Student PT Student PT                  PT Recommendation and Plan  Planned Therapy Interventions (PT): balance training, bed mobility training, gait training, strengthening, transfer training  Therapy Frequency (PT): daily  Progress Summary (PT)  Progress Toward Functional Goals (PT): progress toward functional goals is good  Plan of Care Reviewed With: patient  Progress: improving   Outcome Measures       Row Name 04/15/24 0914             How much help from another person do you currently need...    Turning from your back to your side while in flat bed without using bedrails? 3  -DK      Moving from lying on back to sitting on the side of a flat bed without bedrails? 3  -DK      Moving to and from a bed to a chair (including a wheelchair)? 3  -DK      Standing up from a chair using your arms (e.g., wheelchair, bedside chair)? 3  -DK      Climbing 3-5 steps with a railing? 2  -DK      To walk in hospital room? 3  -DK      AM-PAC 6 Clicks Score (PT) 17  -DK      Highest Level of Mobility Goal 5 --> Static standing  -DK         Functional Assessment    Outcome Measure Options AM-PAC 6 Clicks Basic  Mobility (PT)  -DK                User Key  (r) = Recorded By, (t) = Taken By, (c) = Cosigned By      Initials Name Provider Type    Johanne Washington PTA Physical Therapist Assistant                     Time Calculation:    PT Charges       Row Name 04/15/24 0919             Time Calculation    PT Received On 04/15/24  -DK      PT Goal Re-Cert Due Date 04/20/24  -DK         Timed Charges    89863 - PT Therapeutic Exercise Minutes 14  -DK      43418 - Gait Training Minutes  6  -DK      49183 - PT Therapeutic Activity Minutes 8  -DK         Total Minutes    Timed Charges Total Minutes 28  -DK       Total Minutes 28  -DK                User Key  (r) = Recorded By, (t) = Taken By, (c) = Cosigned By      Initials Name Provider Type    Johanne Washington PTA Physical Therapist Assistant                  Therapy Charges for Today       Code Description Service Date Service Provider Modifiers Qty    80762589467 HC PT THER PROC EA 15 MIN 4/15/2024 Johanne Salomon PTA GP 1    83168114102 HC PT THERAPEUTIC ACT EA 15 MIN 4/15/2024 Johanne Salomon PTA GP 1            PT G-Codes  Outcome Measure Options: AM-PAC 6 Clicks Basic Mobility (PT)  AM-PAC 6 Clicks Score (PT): 17  AM-PAC 6 Clicks Score (OT): 14    Johanne Salomon PTA  4/15/2024

## 2024-04-15 NOTE — PLAN OF CARE
Goal Outcome Evaluation:   Pt A/Ox4 on RA. Skin care completed, wound dressings clean, dry, intact. PRN tylenol administered for back pain.

## 2024-04-15 NOTE — PLAN OF CARE
Goal Outcome Evaluation:  Plan of Care Reviewed With: patient        Progress: improving  Outcome Evaluation: pt aox4 on room air. No complains of pain or discomfort throughout shift. Pt up in chair most of the shift. Wound care and skin care provided as ordered. Medicated per the MAR. Pt has resting majority of shift. No complaints at this time

## 2024-04-16 LAB
BACTERIA SPEC AEROBE CULT: NORMAL
BACTERIA SPEC AEROBE CULT: NORMAL

## 2024-04-16 PROCEDURE — 97110 THERAPEUTIC EXERCISES: CPT

## 2024-04-16 PROCEDURE — 99232 SBSQ HOSP IP/OBS MODERATE 35: CPT | Performed by: INTERNAL MEDICINE

## 2024-04-16 RX ADMIN — ATORVASTATIN CALCIUM 10 MG: 10 TABLET, FILM COATED ORAL at 09:30

## 2024-04-16 RX ADMIN — BACITRACIN 0.9 G: 500 OINTMENT TOPICAL at 16:25

## 2024-04-16 RX ADMIN — Medication 400 MG: at 09:30

## 2024-04-16 RX ADMIN — BACITRACIN 0.9 G: 500 OINTMENT TOPICAL at 22:05

## 2024-04-16 RX ADMIN — NYSTATIN: 100000 POWDER TOPICAL at 22:05

## 2024-04-16 RX ADMIN — APIXABAN 2.5 MG: 2.5 TABLET, FILM COATED ORAL at 22:05

## 2024-04-16 RX ADMIN — BACITRACIN 0.9 G: 500 OINTMENT TOPICAL at 09:30

## 2024-04-16 RX ADMIN — METOPROLOL SUCCINATE 12.5 MG: 25 TABLET, EXTENDED RELEASE ORAL at 09:30

## 2024-04-16 RX ADMIN — Medication 10 ML: at 22:05

## 2024-04-16 RX ADMIN — MIRTAZAPINE 15 MG: 15 TABLET, FILM COATED ORAL at 22:05

## 2024-04-16 RX ADMIN — APIXABAN 2.5 MG: 2.5 TABLET, FILM COATED ORAL at 09:30

## 2024-04-16 RX ADMIN — Medication 1 TABLET: at 09:30

## 2024-04-16 RX ADMIN — NYSTATIN: 100000 POWDER TOPICAL at 16:26

## 2024-04-16 RX ADMIN — Medication 10 ML: at 09:31

## 2024-04-16 RX ADMIN — ROPINIROLE HYDROCHLORIDE 0.5 MG: 0.25 TABLET, FILM COATED ORAL at 22:05

## 2024-04-16 NOTE — PLAN OF CARE
Goal Outcome Evaluation:  Plan of Care Reviewed With: patient        Progress: no change  Outcome Evaluation: pt aox4 on room air. no complaints of pain or discomfort throughout shift. pt using urinal at bedside. pt up in chair most of the shift. wound care and skin care provided as ordered. medicated per the mar. no complaints at this time.

## 2024-04-16 NOTE — PROGRESS NOTES
[facility]   Hospitalist Progress Note    Date of admission: 4/10/2024  Patient Name: Enrique Wylie  1935  Date: 4/15/2024      Subjective     Chief Complaint   Patient presents with    Fall       Interval Followup: No acute events overnight.  Still with weakness working on placement.  No syncopal symptoms no myalgias      Objective     Vitals:   Temp:  [97.2 °F (36.2 °C)-98.1 °F (36.7 °C)] 98.1 °F (36.7 °C)  Heart Rate:  [80-94] 94  Resp:  [18] 18  BP: (102-126)/(53-77) 118/53    Physical Exam  Awake conversant in chair, generalized weakness resting comfortably otherwise just ate lunch  Toe onychomycosis, chronic stasis changes in legs,   Ctab no wrr somewhat poor effort but otherwise breathing comfortably on room air  Ir/ir nl rate le stasis changes  Abd soft     Result Review:  Vital signs, labs and recent relevant imaging reviewed.        acetaminophen    aluminum-magnesium hydroxide-simethicone    senna-docusate sodium **AND** polyethylene glycol **AND** bisacodyl **AND** bisacodyl    Diclofenac Sodium    hydrOXYzine    Lidocaine    melatonin    nicotine    ondansetron    prochlorperazine    sodium chloride    sodium chloride    sodium chloride    apixaban, 2.5 mg, Oral, Q12H  atorvastatin, 10 mg, Oral, Daily  bacitracin, 1 Application, Topical, 3 times per day  [Held by provider] furosemide, 20 mg, Oral, BID  magnesium oxide, 400 mg, Oral, Daily  metoprolol succinate XL, 12.5 mg, Oral, Q24H  mirtazapine, 15 mg, Oral, Nightly  multivitamin with minerals, 1 tablet, Oral, Daily  nystatin, , Topical, Q12H  rOPINIRole, 0.5 mg, Oral, Nightly  sodium chloride, 10 mL, Intravenous, Q12H        CT Head Without Contrast    Result Date: 4/11/2024  1. No intracranial hemorrhage. 2. Stable marked ventricular dilatation/chronic hydrocephalus.  Electronically Signed By-Michele Ruelas MD On:4/11/2024 4:21 PM      XR Shoulder 2+ View Right    Result Date: 4/10/2024  1.  No evidence for displaced fracture or  dislocation. 2.  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 3.  Postoperative changes are noted involving the right shoulder.  Electronically Signed By-Christiano Camargo MD On:4/10/2024 10:12 PM      XR Shoulder 2+ View Left    Result Date: 4/10/2024  1.  No evidence for displaced fracture or dislocation. 2.  Mild  osteoarthritic degenerative changes of the glenohumeral joint. 3.  Moderate hypertrophic age-related changes of the acromioclavicular joint.  Electronically Signed By-Christiano Camargo MD On:4/10/2024 10:11 PM      XR Hips Bilateral With or Without Pelvis 2 View    Result Date: 4/10/2024  1.  No evidence for displaced fracture or dislocation. 2.  Mild to moderate degenerative changes are noted. 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   Electronically Signed By-Christiano Camargo MD On:4/10/2024 7:50 PM      XR Spine Lumbar 2 or 3 View    Result Date: 4/10/2024  1.  No evidence for acute fracture or subluxation. No evidence for compression fracture deformity. 2.  Advanced discogenic degenerative changes and degenerative posterior facet changes are seen throughout the lumbar spine.   Electronically Signed By-Christiano Camargo MD On:4/10/2024 7:49 PM      CT Head Without Contrast    Result Date: 4/10/2024  Impression:  1. Chronic severe hydrocephalus, no acute intracranial pathology.    Electronically Signed By-Bradley Watt MD On:4/10/2024 4:28 PM      XR Chest 1 View    Result Date: 4/10/2024  Impression: No active disease.   Electronically Signed By-HUA NEWBY MD On:4/10/2024 12:27 PM       Assessment / Plan     Summary: 88 y.o. male past medical history of A-fib with unknown Eliquis adherence, frequent falls, chronic lumbar spinal stenosis, restless leg syndrome, hypertension, chronic venous stasis, hyperlipidemia who presents to the ER due to a fall and being found on the ground.  Brought in found have rhabdomyolysis, JOSE from dehydration and  generalized weakness working on rehab placement.  Of note patient had recently completed 20 days of Bactrim for lower extremity cellulitis    Assessment/Plan (clinically significant if listed here)  Rhabdomyolysis in setting of fall with prolonged period on the ground  Fall in setting of anticoagulation use with head trauma  JOSE secondary to above  Paroxysmal A-fib on Eliquis  Hypertension  Mild cognitive impairment  Chronic severe hydrocephalus  Hypomagnesemia  Medication nonadherence  Chronic lumbar spinal stenosis  RLS  Hyperlipidemia  Chronic venous stasis   Recent LE cellulitis ? S/p 20 d bactrim    Given risk/benefit d/w pt will use 2.5 mg twice daily dosing; he had otherwise would qualify for the higher dose but given his age and falls reduction is reasonable if further falls even with rehab would likely discontinue altogether  Patient is also not on any rate controlling agents for his A-fib, continue on metoprolol succinate low-dose monitor blood pressure possibly uptitrate further based on trend.  Unable to adjust further given blood pressure currently  LFTs resolving with resolution of rhabdomyolysis  Home Lasix on hold given initial need for fluids we will need to resume the next day or 2  Continue local wound care/spontaneous stasis changes.  Completed extended course of antibiotics do not suspect acute infection currently  Nystatin for gluteal fold/fungal irritation  Continue Requip at night for RLS in place of Sinemet.  Ferritin was adequate.  Does not have Parkinson's from what I can tell over his history  Chronic severe hydrocephalus - had prior workup 2016 with large volume LP without improvement in symptoms.  No drain/shunt recommended.  Suspect underlying dementia with advancing age/debility contributing to falls.  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    Medical Intervention Limits: NO intubation (DNI)  Level Of Support Discussed With: Patient  Code Status (Patient has no pulse and is not breathing): No CPR (Do Not Attempt to Resuscitate)  Medical Interventions (Patient has pulse or is breathing): Limited Support      CBC          4/11/2024    06:38 4/12/2024    06:06 4/15/2024    04:51   CBC   WBC 10.57  7.86  7.91    RBC 4.51  4.49  4.69    Hemoglobin 12.8  12.7  13.0    Hematocrit 39.9  40.9  43.1    MCV 88.5  91.1  91.9    MCH 28.4  28.3  27.7    MCHC 32.1  31.1  30.2    RDW 14.4  14.5  14.2    Platelets 244  253  343        CMP          4/11/2024    06:38 4/12/2024    06:06 4/15/2024    04:51   CMP   Glucose 99  97  104    BUN 18  19  21    Creatinine 1.25  1.10  0.97    EGFR 56.1  65.4  76.0    Sodium 136  139  139    Potassium 4.1  4.2  4.0    Chloride 103  106  103    Calcium 8.2  7.8  8.4    Total Protein   6.2    Albumin   3.4    Globulin   2.8    Total Bilirubin   0.5    Alkaline Phosphatase   92    AST (SGOT)   44    ALT (SGPT)   43    Albumin/Globulin Ratio   1.2    BUN/Creatinine Ratio 14.4  17.3  21.6    Anion Gap 10.2  9.7  9.7

## 2024-04-16 NOTE — THERAPY TREATMENT NOTE
Patient Name: Enrique Wylie  : 1935    MRN: [id number]                              Today's Date: 2024       Admit Date: 4/10/2024    Visit Dx:     ICD-10-CM ICD-9-CM   1. Traumatic rhabdomyolysis, initial encounter  T79.6XXA 958.6   2. Fall, initial encounter  W19.XXXA E888.9   3. Decreased activities of daily living (ADL)  Z78.9 V49.89   4. Difficulty walking  R26.2 719.7     Patient Active Problem List   Diagnosis    Balance problem    Restless leg syndrome    Encounter for long-term (current) use of other medications    Lumbar spinal stenosis    Grief reaction    Hyperlipidemia    Unsteady gait when walking    Death of wife    Lumbar spinal stenosis    Advance directive in chart    Skin cancer    Nephrolithiasis    Left ureteral stone    Foreign body in genitourinary tract    Advance directive in chart    Bilateral sensorineural hearing loss    Cardiomegaly    Essential hypertension    Fall at home, initial encounter    Paroxysmal atrial fibrillation    Weight loss, unintentional    Adjustment disorder with depressed mood    Back pain    Rhabdomyolysis     Past Medical History:   Diagnosis Date    Advance directive in chart 2020    AF (paroxysmal atrial fibrillation)     Allergic rhinitis     occasionally    Arthritis     Back pain 2014    Balance problem     walking is hard, using cane    Cancer     squamous cell  face and ears    Cervical spinal stenosis 2016    Previous surgery for myelopathy    Cervical spondylosis with myelopathy 2012    C4-5 and C5-6 with edema in the cord at C4-5    Death of wife 2020    Fall at home, initial encounter 2020    Gait instability 2019    Grief reaction 2020    High cholesterol     Kidney stone     Lumbar spinal stenosis 2016    Worsening leg symptoms,  3/12/15    Medication management 2019    Restless leg syndrome 2019    Weight loss, unintentional 2020     Past Surgical History:  "  Procedure Laterality Date    CATARACT EXTRACTION Bilateral     CERVICAL FUSION      C4,5 and 6    KNEE ARTHROSCOPY      right knee    LUMBAR LAMINECTOMY  02/10/2016    L3-4    ROTATOR CUFF REPAIR Right     SKIN BIOPSY      face and ears, squamous cell     SPINE SURGERY      minimally invasive procedure \"closing in on spinal Cord\"  \"gave spinal cord more room\"    URETEROSCOPY LASER LITHOTRIPSY WITH STENT INSERTION Bilateral 11/13/2023    Procedure: Bilateral Retrograde Pyelogram, Left Ureteroscopy Lasertripsy Basket Stone Extraction and Stent Insertion, Right Stent Insertion, Bladder Stone Extraction;  Surgeon: Katerina Carpenter MD;  Location: [facility];  Service: Urology;  Laterality: Bilateral;    URETEROSCOPY LASER LITHOTRIPSY WITH STENT INSERTION Bilateral 12/18/2023    Procedure: CYSTOSCOPY URETEROSCOPY RIGHT RETROGRADE PYELOGRAM HOLMIUM LASER STENT exchange; LEFT STENT REMOVAL;  Surgeon: Katerina Carpenter MD;  Location: [facility] OR;  Service: Urology;  Laterality: Bilateral;      General Information       Row Name 04/16/24 1032          OT Time and Intention    Document Type therapy note (daily note)  -AV     Mode of Treatment individual therapy;occupational therapy  -AV       Row Name 04/16/24 1032          General Information    Existing Precautions/Restrictions fall  -AV     Barriers to Rehab none identified  -AV       Row Name 04/16/24 1032          Cognition    Orientation Status (Cognition) --  Alert, pleasant and cooperative.  Able to perform upper extremity exercises with moderate cues/demonstration.  -AV       Row Name 04/16/24 1032          Safety Issues, Functional Mobility    Impairments Affecting Function (Mobility) balance;range of motion (ROM);strength;endurance/activity tolerance  -AV               User Key  (r) = Recorded By, (t) = Taken By, (c) = Cosigned By      Initials Name Provider Type    AV Morgan Colon OT Occupational Therapist                     Mobility/ADL's    No " documentation.                  Obj/Interventions       Row Name 04/16/24 1032          Shoulder (Therapeutic Exercise)    Shoulder (Therapeutic Exercise) AROM (active range of motion);AAROM (active assistive range of motion)  -AV     Shoulder AROM (Therapeutic Exercise) right;flexion;aBduction;aDduction;horizontal aBduction/aDduction;sitting  X 12  -AV     Shoulder AAROM (Therapeutic Exercise) left;flexion;aBduction;aDduction;horizontal aBduction/aDduction;sitting  X 12.  Also SROM x 5 shoulder flexion  -AV       Row Name 04/16/24 1032          Motor Skills    Therapeutic Exercise shoulder  Performed while seated in recliner/room air  -AV               User Key  (r) = Recorded By, (t) = Taken By, (c) = Cosigned By      Initials Name Provider Type    Morgan Grijalva OT Occupational Therapist                   Goals/Plan    No documentation.                  Clinical Impression       Row Name 04/16/24 1034          Pain Scale: FACES Pre/Post-Treatment    Pain: FACES Scale, Pretreatment 2-->hurts little bit  -AV     Posttreatment Pain Rating 2-->hurts little bit  -AV     Pain Location - Side/Orientation Left  -AV     Pain Location - shoulder  -AV       Row Name 04/16/24 1034          Plan of Care Review    Progress no change  -AV     Outcome Evaluation Patient performed bilateral shoulder exercises (right AROM/left AAROM, SROM).  Encouraged independent exercise throughout the day while seated.  Continued OT indicated to remediate/compensate for deficits to maximize independence.  -AV       Row Name 04/16/24 1034          Vital Signs    O2 Delivery Pre Treatment room air  -AV     O2 Delivery Intra Treatment room air  -AV     O2 Delivery Post Treatment room air  -AV       Row Name 04/16/24 1034          Positioning and Restraints    Pre-Treatment Position sitting in chair/recliner  -AV     Post Treatment Position chair  -AV     In Chair call light within reach;encouraged to call for assist;exit alarm on;reclined  -AV                User Key  (r) = Recorded By, (t) = Taken By, (c) = Cosigned By      Initials Name Provider Type    Morgan Grijalva OT Occupational Therapist                   Outcome Measures       Row Name 04/16/24 1035          How much help from another is currently needed...    Putting on and taking off regular lower body clothing? 2  -AV     Bathing (including washing, rinsing, and drying) 2  -AV     Toileting (which includes using toilet bed pan or urinal) 2  -AV     Putting on and taking off regular upper body clothing 2  -AV     Taking care of personal grooming (such as brushing teeth) 2  -AV     Eating meals 4  -AV     AM-PAC 6 Clicks Score (OT) 14  -AV       Row Name 04/16/24 0730          How much help from another person do you currently need...    Turning from your back to your side while in flat bed without using bedrails? 3  -AG     Moving from lying on back to sitting on the side of a flat bed without bedrails? 3  -AG     Moving to and from a bed to a chair (including a wheelchair)? 3  -AG     Standing up from a chair using your arms (e.g., wheelchair, bedside chair)? 3  -AG     Climbing 3-5 steps with a railing? 2  -AG     To walk in hospital room? 2  -AG     AM-PAC 6 Clicks Score (PT) 16  -AG     Highest Level of Mobility Goal 5 --> Static standing  -AG       Row Name 04/16/24 1035          Optimal Instrument    Bending/Stooping 4  -AV     Standing 2  -AV     Reaching 3  -AV               User Key  (r) = Recorded By, (t) = Taken By, (c) = Cosigned By      Initials Name Provider Type    Morgan Grijalva OT Occupational Therapist    Chai Colindres, RN Registered Nurse                    Occupational Therapy Education       Title: PT OT SLP Therapies (Done)       Topic: Occupational Therapy (Done)       Point: ADL training (Done)       Description:   Instruct learner(s) on proper safety adaptation and remediation techniques during self care or transfers.   Instruct in proper use of assistive  devices.                  Learning Progress Summary             Patient Acceptance, E, VU by AV at 4/11/2024 1149                         Point: Home exercise program (Done)       Description:   Instruct learner(s) on appropriate technique for monitoring, assisting and/or progressing therapeutic exercises/activities.                  Learning Progress Summary             Patient Acceptance, E, VU by AV at 4/11/2024 1149                         Point: Precautions (Done)       Description:   Instruct learner(s) on prescribed precautions during self-care and functional transfers.                  Learning Progress Summary             Patient Acceptance, E, VU by AV at 4/11/2024 1149                         Point: Body mechanics (Done)       Description:   Instruct learner(s) on proper positioning and spine alignment during self-care, functional mobility activities and/or exercises.                  Learning Progress Summary             Patient Acceptance, E, VU by AV at 4/11/2024 1149                                         User Key       Initials Effective Dates Name Provider Type Discipline    LIBERTY 06/16/21 -  Morgan Colon OT Occupational Therapist OT                  OT Recommendation and Plan  Planned Therapy Interventions (OT): activity tolerance training, BADL retraining, functional balance retraining, occupation/activity based interventions, patient/caregiver education/training, ROM/therapeutic exercise, strengthening exercise, transfer/mobility retraining  Therapy Frequency (OT): 5 times/wk  Plan of Care Review  Plan of Care Reviewed With: patient  Progress: no change  Outcome Evaluation: Patient performed bilateral shoulder exercises (right AROM/left AAROM, SROM).  Encouraged independent exercise throughout the day while seated.  Continued OT indicated to remediate/compensate for deficits to maximize independence.     Time Calculation:   Evaluation Complexity (OT)  Review Occupational Profile/Medical/Therapy  History Complexity: expanded/moderate complexity  Assessment, Occupational Performance/Identification of Deficit Complexity: 3-5 performance deficits  Clinical Decision Making Complexity (OT): detailed assessment/moderate complexity  Overall Complexity of Evaluation (OT): moderate complexity     Time Calculation- OT       Row Name 04/16/24 1036             Time Calculation- OT    OT Received On 04/16/24  -AV      OT Goal Re-Cert Due Date 04/20/24  -AV         Timed Charges    07316 - OT Therapeutic Exercise Minutes 10  -AV         Total Minutes    Timed Charges Total Minutes 10  -AV       Total Minutes 10  -AV                User Key  (r) = Recorded By, (t) = Taken By, (c) = Cosigned By      Initials Name Provider Type    AV Morgan Colon OT Occupational Therapist                  Therapy Charges for Today       Code Description Service Date Service Provider Modifiers Qty    [id number] HC OT THER PROC EA 15 MIN 4/16/2024 Morgan Colon OT GO 1                 Morgan Colon OT  4/16/2024

## 2024-04-17 PROCEDURE — 99232 SBSQ HOSP IP/OBS MODERATE 35: CPT | Performed by: INTERNAL MEDICINE

## 2024-04-17 PROCEDURE — 97530 THERAPEUTIC ACTIVITIES: CPT

## 2024-04-17 PROCEDURE — 97110 THERAPEUTIC EXERCISES: CPT

## 2024-04-17 PROCEDURE — 97116 GAIT TRAINING THERAPY: CPT

## 2024-04-17 RX ADMIN — BACITRACIN 0.9 G: 500 OINTMENT TOPICAL at 19:49

## 2024-04-17 RX ADMIN — Medication 1 TABLET: at 09:42

## 2024-04-17 RX ADMIN — NYSTATIN: 100000 POWDER TOPICAL at 22:56

## 2024-04-17 RX ADMIN — APIXABAN 2.5 MG: 2.5 TABLET, FILM COATED ORAL at 19:49

## 2024-04-17 RX ADMIN — METOPROLOL SUCCINATE 12.5 MG: 25 TABLET, EXTENDED RELEASE ORAL at 09:42

## 2024-04-17 RX ADMIN — ROPINIROLE HYDROCHLORIDE 0.5 MG: 0.25 TABLET, FILM COATED ORAL at 19:49

## 2024-04-17 RX ADMIN — Medication 10 ML: at 09:42

## 2024-04-17 RX ADMIN — DOCUSATE SODIUM 50MG AND SENNOSIDES 8.6MG 2 TABLET: 8.6; 5 TABLET, FILM COATED ORAL at 04:24

## 2024-04-17 RX ADMIN — NYSTATIN: 100000 POWDER TOPICAL at 14:21

## 2024-04-17 RX ADMIN — BACITRACIN 0.9 G: 500 OINTMENT TOPICAL at 14:21

## 2024-04-17 RX ADMIN — MIRTAZAPINE 15 MG: 15 TABLET, FILM COATED ORAL at 19:49

## 2024-04-17 RX ADMIN — Medication 400 MG: at 09:42

## 2024-04-17 RX ADMIN — APIXABAN 2.5 MG: 2.5 TABLET, FILM COATED ORAL at 09:42

## 2024-04-17 RX ADMIN — ATORVASTATIN CALCIUM 10 MG: 10 TABLET, FILM COATED ORAL at 09:42

## 2024-04-17 RX ADMIN — Medication 10 ML: at 19:51

## 2024-04-17 NOTE — THERAPY TREATMENT NOTE
Acute Care - Physical Therapy Treatment Note  KARTHIK Alegria     Patient Name: Enrique Wylie  : 1935  MRN: [id number]  Today's Date: 2024      Visit Dx:     ICD-10-CM ICD-9-CM   1. Traumatic rhabdomyolysis, initial encounter  T79.6XXA 958.6   2. Fall, initial encounter  W19.XXXA E888.9   3. Decreased activities of daily living (ADL)  Z78.9 V49.89   4. Difficulty walking  R26.2 719.7     Patient Active Problem List   Diagnosis    Balance problem    Restless leg syndrome    Encounter for long-term (current) use of other medications    Lumbar spinal stenosis    Grief reaction    Hyperlipidemia    Unsteady gait when walking    Death of wife    Lumbar spinal stenosis    Advance directive in chart    Skin cancer    Nephrolithiasis    Left ureteral stone    Foreign body in genitourinary tract    Advance directive in chart    Bilateral sensorineural hearing loss    Cardiomegaly    Essential hypertension    Fall at home, initial encounter    Paroxysmal atrial fibrillation    Weight loss, unintentional    Adjustment disorder with depressed mood    Back pain    Rhabdomyolysis     Past Medical History:   Diagnosis Date    Advance directive in chart 2020    AF (paroxysmal atrial fibrillation)     Allergic rhinitis     occasionally    Arthritis     Back pain 2014    Balance problem     walking is hard, using cane    Cancer     squamous cell  face and ears    Cervical spinal stenosis 2016    Previous surgery for myelopathy    Cervical spondylosis with myelopathy 2012    C4-5 and C5-6 with edema in the cord at C4-5    Death of wife 2020    Fall at home, initial encounter 2020    Gait instability 2019    Grief reaction 2020    High cholesterol     Kidney stone     Lumbar spinal stenosis 2016    Worsening leg symptoms,  3/12/15    Medication management 2019    Restless leg syndrome 2019    Weight loss, unintentional 2020     Past Surgical History:  "  Procedure Laterality Date    CATARACT EXTRACTION Bilateral     CERVICAL FUSION      C4,5 and 6    KNEE ARTHROSCOPY      right knee    LUMBAR LAMINECTOMY  02/10/2016    L3-4    ROTATOR CUFF REPAIR Right     SKIN BIOPSY      face and ears, squamous cell     SPINE SURGERY      minimally invasive procedure \"closing in on spinal Cord\"  \"gave spinal cord more room\"    URETEROSCOPY LASER LITHOTRIPSY WITH STENT INSERTION Bilateral 11/13/2023    Procedure: Bilateral Retrograde Pyelogram, Left Ureteroscopy Lasertripsy Basket Stone Extraction and Stent Insertion, Right Stent Insertion, Bladder Stone Extraction;  Surgeon: Katerina Carpenter MD;  Location: [facility];  Service: Urology;  Laterality: Bilateral;    URETEROSCOPY LASER LITHOTRIPSY WITH STENT INSERTION Bilateral 12/18/2023    Procedure: CYSTOSCOPY URETEROSCOPY RIGHT RETROGRADE PYELOGRAM HOLMIUM LASER STENT exchange; LEFT STENT REMOVAL;  Surgeon: Katerina Carpneter MD;  Location: [facility];  Service: Urology;  Laterality: Bilateral;     PT Assessment (Last 12 Hours)       PT Evaluation and Treatment       Row Name 04/17/24 0841          Physical Therapy Time and Intention    Subjective Information no complaints  -DK     Document Type therapy note (daily note)  -DK     Mode of Treatment individual therapy;physical therapy  -DK     Patient Effort good  -DK     Symptoms Noted During/After Treatment none  -DK     Comment Pt ambulates with a slow pace, taking short shuffling steps.  -DK       Row Name 04/17/24 0841          Pain    Pretreatment Pain Rating 0/10 - no pain  -DK     Posttreatment Pain Rating 0/10 - no pain  -DK       Row Name 04/17/24 0841          Cognition    Affect/Mental Status (Cognition) WFL  -DK     Orientation Status (Cognition) oriented to;person;situation  -DK     Follows Commands (Cognition) physical/tactile prompts required;repetition of directions required;verbal cues/prompting required  -DK     Cognitive Function attention deficit  " -DK     Attention Deficit (Cognition) minimal deficit;concentration;arousal/alertness;focused/sustained attention  -DK     Personal Safety Interventions gait belt;nonskid shoes/slippers when out of bed;supervised activity  -DK       Row Name 04/17/24 0841          Bed Mobility    Bed Mobility supine-sit  -DK     Supine-Sit Blanco (Bed Mobility) contact guard;minimum assist (75% patient effort);1 person assist  -DK     Bed Mobility, Safety Issues decreased use of arms for pushing/pulling;decreased use of legs for bridging/pushing  -DK     Assistive Device (Bed Mobility) bed rails  -DK       Row Name 04/17/24 0841          Transfers    Transfers sit-stand transfer;stand-sit transfer  -DK       Row Name 04/17/24 0841          Sit-Stand Transfer    Sit-Stand Blanco (Transfers) contact guard;1 person assist  -DK     Assistive Device (Sit-Stand Transfers) walker, front-wheeled  -DK       Row Name 04/17/24 0841          Stand-Sit Transfer    Stand-Sit Blanco (Transfers) contact guard;1 person assist  -DK     Assistive Device (Stand-Sit Transfers) walker, front-wheeled  -DK       Row Name 04/17/24 0841          Gait/Stairs (Locomotion)    Gait/Stairs Locomotion gait/ambulation independence;gait/ambulation assistive device;distance ambulated;gait pattern  -DK     Blanco Level (Gait) standby assist;contact guard;1 person assist  -DK     Assistive Device (Gait) walker, front-wheeled  -     Distance in Feet (Gait) 60  -DK     Pattern (Gait) step-to  -DK     Deviations/Abnormal Patterns (Gait) sukumar decreased;festinating/shuffling;gait speed decreased;stride length decreased  -DK     Bilateral Gait Deviations forward flexed posture  -DK     Comment, (Gait/Stairs) Pt ambulated on room air with a rolling walker.  He ambulates with a slow pace, taking short shuffling steps.  Pt was left in the recliner on alert post treatment.  -       Row Name 04/17/24 0841          Safety Issues, Functional Mobility     Safety Issues Affecting Function (Mobility) ability to follow commands;impulsivity;judgment;safety precaution awareness  -     Impairments Affecting Function (Mobility) balance;endurance/activity tolerance;pain;strength  -     Cognitive Impairments, Mobility Safety/Performance awareness, need for assistance;impulsivity;judgment;safety precaution awareness  -       Row Name 04/17/24 0841          Balance    Balance Assessment sitting static balance;sitting dynamic balance;standing static balance;standing dynamic balance  -     Static Sitting Balance standby assist  -     Dynamic Sitting Balance standby assist  -DK     Position, Sitting Balance unsupported;sitting in chair  -DK     Static Standing Balance standby assist;contact guard;1-person assist  -DK     Dynamic Standing Balance standby assist;contact guard;1-person assist  -DK     Position/Device Used, Standing Balance walker, front-wheeled  -     Balance Interventions standing;dynamic;tandem gait  -       Row Name 04/17/24 0841          Motor Skills    Motor Skills --  therapeutic exercises  -     Coordination WFL  -     Therapeutic Exercise hip;knee;ankle  -       Row Name 04/17/24 0841          Hip (Therapeutic Exercise)    Hip (Therapeutic Exercise) AROM (active range of motion)  -     Hip AROM (Therapeutic Exercise) bilateral;flexion;extension;aBduction;aDduction;sitting;20 repititions  -       Row Name 04/17/24 0841          Knee (Therapeutic Exercise)    Knee (Therapeutic Exercise) AROM (active range of motion)  -     Knee AROM (Therapeutic Exercise) bilateral;flexion;extension;LAQ (long arc quad);sitting;20 repititions  -       Row Name 04/17/24 0841          Ankle (Therapeutic Exercise)    Ankle (Therapeutic Exercise) AROM (active range of motion)  -     Ankle AROM (Therapeutic Exercise) bilateral;dorsiflexion;plantarflexion;sitting;20 repititions  -       Row Name             Wound 04/11/24 0022 Right anterior foot  Skin Tear    Wound - Properties Group Placement Date: 04/11/24  -AS Placement Time: 0022  -AS Present on Original Admission: Y  -AS Side: Right  -AS Orientation: anterior  -AS Location: foot  -AS Primary Wound Type: Skin tear  -AS    Retired Wound - Properties Group Placement Date: 04/11/24  -AS Placement Time: 0022  -AS Present on Original Admission: Y  -AS Side: Right  -AS Orientation: anterior  -AS Location: foot  -AS Primary Wound Type: Skin tear  -AS    Retired Wound - Properties Group Date first assessed: 04/11/24  -AS Time first assessed: 0022  -AS Present on Original Admission: Y  -AS Side: Right  -AS Location: foot  -AS Primary Wound Type: Skin tear  -AS      Row Name             Wound 04/11/24 0022 Bilateral lower leg    Wound - Properties Group Placement Date: 04/11/24  -AS Placement Time: 0022  -AS Present on Original Admission: Y  -AS Side: Bilateral  -AS Orientation: lower  -AS Location: leg  -AS    Retired Wound - Properties Group Placement Date: 04/11/24  -AS Placement Time: 0022  -AS Present on Original Admission: Y  -AS Side: Bilateral  -AS Orientation: lower  -AS Location: leg  -AS    Retired Wound - Properties Group Date first assessed: 04/11/24  -AS Time first assessed: 0022  -AS Present on Original Admission: Y  -AS Side: Bilateral  -AS Location: leg  -AS      Row Name             Wound 04/11/24 0023 Right anterior knee Skin Tear    Wound - Properties Group Placement Date: 04/11/24  -AS Placement Time: 0023  -AS Present on Original Admission: Y  -AS Side: Right  -AS Orientation: anterior  -AS Location: knee  -AS Primary Wound Type: Skin tear  -AS    Retired Wound - Properties Group Placement Date: 04/11/24  -AS Placement Time: 0023  -AS Present on Original Admission: Y  -AS Side: Right  -AS Orientation: anterior  -AS Location: knee  -AS Primary Wound Type: Skin tear  -AS    Retired Wound - Properties Group Date first assessed: 04/11/24  -AS Time first assessed: 0023  -AS Present on Original  Admission: Y  -AS Side: Right  -AS Location: knee  -AS Primary Wound Type: Skin tear  -AS      Row Name             Wound 04/11/24 0023 Left anterior knee Skin Tear    Wound - Properties Group Placement Date: 04/11/24  -AS Placement Time: 0023  -AS Present on Original Admission: Y  -AS Side: Left  -AS Orientation: anterior  -AS Location: knee  -AS Primary Wound Type: Skin tear  -AS    Retired Wound - Properties Group Placement Date: 04/11/24  -AS Placement Time: 0023  -AS Present on Original Admission: Y  -AS Side: Left  -AS Orientation: anterior  -AS Location: knee  -AS Primary Wound Type: Skin tear  -AS    Retired Wound - Properties Group Date first assessed: 04/11/24  -AS Time first assessed: 0023  -AS Present on Original Admission: Y  -AS Side: Left  -AS Location: knee  -AS Primary Wound Type: Skin tear  -AS      Row Name             Wound 04/11/24 0023 Right anterior thigh Skin Tear    Wound - Properties Group Placement Date: 04/11/24  -AS Placement Time: 0023  -AS Present on Original Admission: Y  -AS Side: Right  -AS Orientation: anterior  -AS Location: thigh  -AS Primary Wound Type: Skin tear  -AS    Retired Wound - Properties Group Placement Date: 04/11/24  -AS Placement Time: 0023  -AS Present on Original Admission: Y  -AS Side: Right  -AS Orientation: anterior  -AS Location: thigh  -AS Primary Wound Type: Skin tear  -AS    Retired Wound - Properties Group Date first assessed: 04/11/24  -AS Time first assessed: 0023  -AS Present on Original Admission: Y  -AS Side: Right  -AS Location: thigh  -AS Primary Wound Type: Skin tear  -AS      Row Name             Wound 04/11/24 0024 Right anterior greater trochanter Skin Tear    Wound - Properties Group Placement Date: 04/11/24  -AS Placement Time: 0024  -AS Present on Original Admission: Y  -AS Side: Right  -AS Orientation: anterior  -AS Location: greater trochanter  -AS Primary Wound Type: Skin tear  -AS    Retired Wound - Properties Group Placement Date:  04/11/24  -AS Placement Time: 0024  -AS Present on Original Admission: Y  -AS Side: Right  -AS Orientation: anterior  -AS Location: greater trochanter  -AS Primary Wound Type: Skin tear  -AS    Retired Wound - Properties Group Date first assessed: 04/11/24  -AS Time first assessed: 0024  -AS Present on Original Admission: Y  -AS Side: Right  -AS Location: greater trochanter  -AS Primary Wound Type: Skin tear  -AS      Row Name             Wound 04/11/24 0024 Right scapula Skin Tear    Wound - Properties Group Placement Date: 04/11/24  -AS Placement Time: 0024  -AS Present on Original Admission: Y  -AS Side: Right  -AS Location: scapula  -AS Primary Wound Type: Skin tear  -AS    Retired Wound - Properties Group Placement Date: 04/11/24  -AS Placement Time: 0024  -AS Present on Original Admission: Y  -AS Side: Right  -AS Location: scapula  -AS Primary Wound Type: Skin tear  -AS    Retired Wound - Properties Group Date first assessed: 04/11/24  -AS Time first assessed: 0024  -AS Present on Original Admission: Y  -AS Side: Right  -AS Location: scapula  -AS Primary Wound Type: Skin tear  -AS      Row Name             Wound 04/11/24 0024 Bilateral coccyx Pressure Injury    Wound - Properties Group Placement Date: 04/11/24  -AS Placement Time: 0024  -AS Present on Original Admission: Y  -AS Side: Bilateral  -AS Location: coccyx  -AS Primary Wound Type: Pressure inj  -AS    Retired Wound - Properties Group Placement Date: 04/11/24  -AS Placement Time: 0024  -AS Present on Original Admission: Y  -AS Side: Bilateral  -AS Location: coccyx  -AS Primary Wound Type: Pressure inj  -AS    Retired Wound - Properties Group Date first assessed: 04/11/24  -AS Time first assessed: 0024  -AS Present on Original Admission: Y  -AS Side: Bilateral  -AS Location: coccyx  -AS Primary Wound Type: Pressure inj  -AS      Row Name             Wound 04/11/24 0025 Bilateral anterior groin MASD (Moisture associated skin damage)    Wound - Properties  Group Placement Date: 04/11/24  -AS Placement Time: 0025  -AS Present on Original Admission: Y  -AS Side: Bilateral  -AS Orientation: anterior  -AS Location: groin  -AS Primary Wound Type: MASD  -AS    Retired Wound - Properties Group Placement Date: 04/11/24  -AS Placement Time: 0025  -AS Present on Original Admission: Y  -AS Side: Bilateral  -AS Orientation: anterior  -AS Location: groin  -AS Primary Wound Type: MASD  -AS    Retired Wound - Properties Group Date first assessed: 04/11/24  -AS Time first assessed: 0025  -AS Present on Original Admission: Y  -AS Side: Bilateral  -AS Location: groin  -AS Primary Wound Type: MASD  -AS      Row Name             Wound 04/11/24 0026 Left lower back Abrasion    Wound - Properties Group Placement Date: 04/11/24  -AS Placement Time: 0026  -AS Present on Original Admission: Y  -AS Side: Left  -AS Orientation: lower  -AS Location: back  -AS Primary Wound Type: Abrasion  -AS    Retired Wound - Properties Group Placement Date: 04/11/24  -AS Placement Time: 0026  -AS Present on Original Admission: Y  -AS Side: Left  -AS Orientation: lower  -AS Location: back  -AS Primary Wound Type: Abrasion  -AS    Retired Wound - Properties Group Date first assessed: 04/11/24  -AS Time first assessed: 0026  -AS Present on Original Admission: Y  -AS Side: Left  -AS Location: back  -AS Primary Wound Type: Abrasion  -AS      Row Name             Wound 04/11/24 0046 medial parietal region Traumatic    Wound - Properties Group Placement Date: 04/11/24  -AS Placement Time: 0046  -AS Present on Original Admission: Y  -AS Orientation: medial  -AS Location: parietal region  -AS Primary Wound Type: Traumatic  -AS    Retired Wound - Properties Group Placement Date: 04/11/24  -AS Placement Time: 0046  -AS Present on Original Admission: Y  -AS Orientation: medial  -AS Location: parietal region  -AS Primary Wound Type: Traumatic  -AS    Retired Wound - Properties Group Date first assessed: 04/11/24  -AS Time  first assessed: 0046  -AS Present on Original Admission: Y  -AS Location: parietal region  -AS Primary Wound Type: Traumatic  -AS      Row Name             Wound 04/11/24 0049 Right lower arm Skin Tear    Wound - Properties Group Placement Date: 04/11/24  -AS Placement Time: 0049  -AS Side: Right  -AS Orientation: lower  -AS Location: arm  -AS Primary Wound Type: Skin tear  -AS    Retired Wound - Properties Group Placement Date: 04/11/24  -AS Placement Time: 0049  -AS Side: Right  -AS Orientation: lower  -AS Location: arm  -AS Primary Wound Type: Skin tear  -AS    Retired Wound - Properties Group Date first assessed: 04/11/24  -AS Time first assessed: 0049  -AS Side: Right  -AS Location: arm  -AS Primary Wound Type: Skin tear  -AS      Row Name 04/17/24 0841          Plan of Care Review    Plan of Care Reviewed With patient  -DK     Progress improving  -DK       Row Name 04/17/24 0841          Positioning and Restraints    Pre-Treatment Position sitting in chair/recliner  -DK     Post Treatment Position chair  -DK     In Chair reclined;call light within reach;encouraged to call for assist;exit alarm on;legs elevated  -DK       Row Name 04/17/24 0841          Therapy Assessment/Plan (PT)    Rehab Potential (PT) good, to achieve stated therapy goals  -DK     Criteria for Skilled Interventions Met (PT) skilled treatment is necessary  -DK     Therapy Frequency (PT) daily  -DK     Problem List (PT) problems related to;balance;cognition;mobility;strength;range of motion (ROM);pain;hearing  -DK     Activity Limitations Related to Problem List (PT) unable to ambulate safely;unable to transfer safely  -DK       Row Name 04/17/24 0841          Progress Summary (PT)    Progress Toward Functional Goals (PT) progress toward functional goals is good  -DK               User Key  (r) = Recorded By, (t) = Taken By, (c) = Cosigned By      Initials Name Provider Type    Johanne Washington PTA Physical Therapist Assistant    AS Short,  Princess, RN Registered Nurse                    Physical Therapy Education       Title: PT OT SLP Therapies (Done)       Topic: Physical Therapy (Done)       Point: Mobility training (Done)       Learning Progress Summary             Patient Acceptance, E,TB, VU by  at 4/11/2024 1205                         Point: Precautions (Done)       Learning Progress Summary             Patient Acceptance, E,TB, VU by  at 4/11/2024 1205                                         User Key       Initials Effective Dates Name Provider Type Discipline     10/12/23 -  Justin Barclay, PT Student PT Student PT                  PT Recommendation and Plan  Planned Therapy Interventions (PT): balance training, bed mobility training, gait training, strengthening, transfer training  Therapy Frequency (PT): daily  Progress Summary (PT)  Progress Toward Functional Goals (PT): progress toward functional goals is good  Plan of Care Reviewed With: patient  Progress: improving   Outcome Measures       Row Name 04/17/24 0841 04/15/24 0914          How much help from another person do you currently need...    Turning from your back to your side while in flat bed without using bedrails? 3  -DK 3  -DK     Moving from lying on back to sitting on the side of a flat bed without bedrails? 3  -DK 3  -DK     Moving to and from a bed to a chair (including a wheelchair)? 3  -DK 3  -DK     Standing up from a chair using your arms (e.g., wheelchair, bedside chair)? 3  -DK 3  -DK     Climbing 3-5 steps with a railing? 2  -DK 2  -DK     To walk in hospital room? 3  -DK 3  -DK     AM-PAC 6 Clicks Score (PT) 17  -DK 17  -DK     Highest Level of Mobility Goal 5 --> Static standing  -DK 5 --> Static standing  -DK        Functional Assessment    Outcome Measure Options AM-PAC 6 Clicks Basic Mobility (PT)  -DK AM-PAC 6 Clicks Basic Mobility (PT)  -DK               User Key  (r) = Recorded By, (t) = Taken By, (c) = Cosigned By      Initials Name Provider Type     Johanne Washington PTA Physical Therapist Assistant                     Time Calculation:    PT Charges       Row Name 04/17/24 0846             Time Calculation    PT Received On 04/17/24  -SHITAL      PT Goal Re-Cert Due Date 04/20/24  -SHITAL         Timed Charges    84710 - PT Therapeutic Exercise Minutes 16  -DK      68534 - Gait Training Minutes  9  -DK      84701 - PT Therapeutic Activity Minutes 14  -DK         Total Minutes    Timed Charges Total Minutes 39  -DK       Total Minutes 39  -DK                User Key  (r) = Recorded By, (t) = Taken By, (c) = Cosigned By      Initials Name Provider Type    Johanne Washington PTA Physical Therapist Assistant                  Therapy Charges for Today       Code Description Service Date Service Provider Modifiers Qty    77912904524 HC PT THER PROC EA 15 MIN 4/17/2024 Johanne Salomon PTA GP 1    96742406476 HC GAIT TRAINING EA 15 MIN 4/17/2024 Johanne Salomon, JAELYN GP 1    28589652063 HC PT THERAPEUTIC ACT EA 15 MIN 4/17/2024 Johanne Salomon, JAELYN GP 1            PT G-Codes  Outcome Measure Options: AM-PAC 6 Clicks Basic Mobility (PT)  AM-PAC 6 Clicks Score (PT): 17  AM-PAC 6 Clicks Score (OT): 14    Johanne Salomon PTA  4/17/2024

## 2024-04-17 NOTE — PLAN OF CARE
Goal Outcome Evaluation:  Plan of Care Reviewed With: patient        Progress: no change  Outcome Evaluation: pt is alert. no c/o of pain. stool softner given this morning. skin care done per orders. no other concerns noted.

## 2024-04-17 NOTE — PROGRESS NOTES
[facility]   Hospitalist Progress Note    Date of admission: 4/10/2024  Patient Name: Enrique Wylie  1935  Date: 4/17/2024      Subjective     Chief Complaint   Patient presents with    Fall       Interval Followup: No acute events overnight. Patient is wanting to go home.   Vitals are stable       Objective     Vitals:   Temp:  [97.3 °F (36.3 °C)-98.2 °F (36.8 °C)] 97.7 °F (36.5 °C)  Heart Rate:  [77-90] 84  Resp:  [18] 18  BP: (111-131)/(54-93) 127/54    Physical Exam  Awake conversant in chair, answers basic questions appropriately poor insight poor memory  Ctab no wrr somewhat poor effort but otherwise breathing comfortably on room air  Ir/ir nl rate le stasis changes  Abd soft   Psych: normal mood     Result Review:  Vital signs, labs and recent relevant imaging reviewed.        acetaminophen    aluminum-magnesium hydroxide-simethicone    senna-docusate sodium **AND** polyethylene glycol **AND** bisacodyl **AND** bisacodyl    Diclofenac Sodium    hydrOXYzine    Lidocaine    melatonin    nicotine    ondansetron    prochlorperazine    sodium chloride    sodium chloride    sodium chloride    apixaban, 2.5 mg, Oral, Q12H  atorvastatin, 10 mg, Oral, Daily  bacitracin, 1 Application, Topical, 3 times per day  [Held by provider] furosemide, 20 mg, Oral, BID  magnesium oxide, 400 mg, Oral, Daily  metoprolol succinate XL, 12.5 mg, Oral, Q24H  mirtazapine, 15 mg, Oral, Nightly  multivitamin with minerals, 1 tablet, Oral, Daily  nystatin, , Topical, Q12H  rOPINIRole, 0.5 mg, Oral, Nightly  sodium chloride, 10 mL, Intravenous, Q12H        CT Head Without Contrast    Result Date: 4/11/2024  1. No intracranial hemorrhage. 2. Stable marked ventricular dilatation/chronic hydrocephalus.  Electronically Signed By-Michele Ruelas MD On:4/11/2024 4:21 PM      XR Shoulder 2+ View Right    Result Date: 4/10/2024  1.  No evidence for displaced fracture or dislocation. 2.  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 3.  Postoperative changes are noted involving the right shoulder.  Electronically Signed By-Christiano Camargo MD On:4/10/2024 10:12 PM      XR Shoulder 2+ View Left    Result Date: 4/10/2024  1.  No evidence for displaced fracture or dislocation. 2.  Mild  osteoarthritic degenerative changes of the glenohumeral joint. 3.  Moderate hypertrophic age-related changes of the acromioclavicular joint.  Electronically Signed By-Christiano Camargo MD On:4/10/2024 10:11 PM      XR Hips Bilateral With or Without Pelvis 2 View    Result Date: 4/10/2024  1.  No evidence for displaced fracture or dislocation. 2.  Mild to moderate degenerative changes are noted. 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   Electronically Signed By-Christiano Camargo MD On:4/10/2024 7:50 PM      XR Spine Lumbar 2 or 3 View    Result Date: 4/10/2024  1.  No evidence for acute fracture or subluxation. No evidence for compression fracture deformity. 2.  Advanced discogenic degenerative changes and degenerative posterior facet changes are seen throughout the lumbar spine.   Electronically Signed By-Christiano Camargo MD On:4/10/2024 7:49 PM      CT Head Without Contrast    Result Date: 4/10/2024  Impression:  1. Chronic severe hydrocephalus, no acute intracranial pathology.    Electronically Signed By-Bradley Watt MD On:4/10/2024 4:28 PM      XR Chest 1 View    Result Date: 4/10/2024  Impression: No active disease.   Electronically Signed By-HUA NEWBY MD On:4/10/2024 12:27 PM       Assessment / Plan     Summary: 88 y.o. male past medical history of A-fib with unknown Eliquis adherence, frequent falls, chronic lumbar spinal stenosis, restless leg syndrome, hypertension, chronic venous stasis, hyperlipidemia who presents to the ER due to a fall and being found on the ground.  Brought in found have rhabdomyolysis, JOSE from dehydration and generalized weakness working on rehab placement.  Of note patient had  recently completed 20 days of Bactrim for lower extremity cellulitis    Working on rehab placement versus home with caregiver support    Assessment/Plan (clinically significant if listed here)  Rhabdomyolysis in setting of fall with prolonged period on the ground  Fall in setting of anticoagulation use with head trauma  JOSE secondary to above  Paroxysmal A-fib on Eliquis  Hypertension  Mild cognitive impairment  Chronic severe hydrocephalus  Hypomagnesemia  Medication nonadherence  Chronic lumbar spinal stenosis  RLS  Hyperlipidemia  Chronic venous stasis   Recent LE cellulitis ? S/p 20 d bactrim    PLAN   Given risk/benefit of anticoagulation continue on 2.5 mg twice daily of Eliquis he would otherwise would qualify for the higher dose but given his age and falls reduction is reasonable if further falls even with rehab would likely discontinue altogether  Patient is also not on any rate controlling agents for his A-fib, initiated on metoprolol and rate seems to be reasonable at this time.  Monitor.  Repeat orthostatic vital signs negative no acute symptoms  LFTs resolving with resolution of rhabdomyolysis  Reassess for Lasix resumption in the morning did receive additional fluids for rhabdomyolysis  Continue local wound care/spontaneous stasis changes.  Completed extended course of antibiotics do not suspect acute infection currently  Nystatin for gluteal fold/fungal irritation  Continue Requip at night for RLS in place of Sinemet.  Ferritin was adequate.  Does not have Parkinson's from what I can tell and this seems to be doing fine on Requip.  Chronic severe hydrocephalus - had prior workup 2016 with large volume LP without improvement in symptoms.  No drain/shunt recommended.  Suspect underlying dementia with advancing age/debility contributing to falls.  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    Medical Intervention Limits: NO intubation (DNI)  Level Of Support Discussed With: Patient  Code Status (Patient has no pulse and is not breathing): No CPR (Do Not Attempt to Resuscitate)  Medical Interventions (Patient has pulse or is breathing): Limited Support      CBC          4/11/2024    06:38 4/12/2024    06:06 4/15/2024    04:51   CBC   WBC 10.57  7.86  7.91    RBC 4.51  4.49  4.69    Hemoglobin 12.8  12.7  13.0    Hematocrit 39.9  40.9  43.1    MCV 88.5  91.1  91.9    MCH 28.4  28.3  27.7    MCHC 32.1  31.1  30.2    RDW 14.4  14.5  14.2    Platelets 244  253  343        CMP          4/11/2024    06:38 4/12/2024    06:06 4/15/2024    04:51   CMP   Glucose 99  97  104    BUN 18  19  21    Creatinine 1.25  1.10  0.97    EGFR 56.1  65.4  76.0    Sodium 136  139  139    Potassium 4.1  4.2  4.0    Chloride 103  106  103    Calcium 8.2  7.8  8.4    Total Protein   6.2    Albumin   3.4    Globulin   2.8    Total Bilirubin   0.5    Alkaline Phosphatase   92    AST (SGOT)   44    ALT (SGPT)   43    Albumin/Globulin Ratio   1.2    BUN/Creatinine Ratio 14.4  17.3  21.6    Anion Gap 10.2  9.7  9.7

## 2024-04-17 NOTE — PROGRESS NOTES
[facility]   Hospitalist Progress Note    Date of admission: 4/10/2024  Patient Name: Enrique Wylie  1935  Date: 4/16/2024      Subjective     Chief Complaint   Patient presents with    Fall       Interval Followup: No acute events overnight.  Friend is at bedside today.  Patient still asking for going home discussed need for rehab.  Working on additional support for patient at home including a caregiver as ultimately rehab placement may be difficult and may be his next best option if otherwise unwilling.      Objective     Vitals:   Temp:  [97.3 °F (36.3 °C)-98.2 °F (36.8 °C)] 98.2 °F (36.8 °C)  Heart Rate:  [76-90] 90  Resp:  [18] 18  BP: ()/(46-93) 120/64    Physical Exam  Awake conversant in chair, answers basic questions appropriately poor insight poor memory  Toe onychomycosis, chronic stasis changes in legs,   Ctab no wrr somewhat poor effort but otherwise breathing comfortably on room air  Ir/ir nl rate le stasis changes  Abd soft     Result Review:  Vital signs, labs and recent relevant imaging reviewed.        acetaminophen    aluminum-magnesium hydroxide-simethicone    senna-docusate sodium **AND** polyethylene glycol **AND** bisacodyl **AND** bisacodyl    Diclofenac Sodium    hydrOXYzine    Lidocaine    melatonin    nicotine    ondansetron    prochlorperazine    sodium chloride    sodium chloride    sodium chloride    apixaban, 2.5 mg, Oral, Q12H  atorvastatin, 10 mg, Oral, Daily  bacitracin, 1 Application, Topical, 3 times per day  [Held by provider] furosemide, 20 mg, Oral, BID  magnesium oxide, 400 mg, Oral, Daily  metoprolol succinate XL, 12.5 mg, Oral, Q24H  mirtazapine, 15 mg, Oral, Nightly  multivitamin with minerals, 1 tablet, Oral, Daily  nystatin, , Topical, Q12H  rOPINIRole, 0.5 mg, Oral, Nightly  sodium chloride, 10 mL, Intravenous, Q12H        CT Head Without Contrast    Result Date: 4/11/2024  1. No intracranial hemorrhage. 2. Stable marked ventricular  dilatation/chronic hydrocephalus.  Electronically Signed By-Michele Ruelas MD On:4/11/2024 4:21 PM      XR Shoulder 2+ View Right    Result Date: 4/10/2024  1.  No evidence for displaced fracture or dislocation. 2.  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 3.  Postoperative changes are noted involving the right shoulder.  Electronically Signed By-Christiano Camargo MD On:4/10/2024 10:12 PM      XR Shoulder 2+ View Left    Result Date: 4/10/2024  1.  No evidence for displaced fracture or dislocation. 2.  Mild  osteoarthritic degenerative changes of the glenohumeral joint. 3.  Moderate hypertrophic age-related changes of the acromioclavicular joint.  Electronically Signed By-Christiano Camargo MD On:4/10/2024 10:11 PM      XR Hips Bilateral With or Without Pelvis 2 View    Result Date: 4/10/2024  1.  No evidence for displaced fracture or dislocation. 2.  Mild to moderate degenerative changes are noted. 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   Electronically Signed By-Christiano Camargo MD On:4/10/2024 7:50 PM      XR Spine Lumbar 2 or 3 View    Result Date: 4/10/2024  1.  No evidence for acute fracture or subluxation. No evidence for compression fracture deformity. 2.  Advanced discogenic degenerative changes and degenerative posterior facet changes are seen throughout the lumbar spine.   Electronically Signed By-Christiano Camargo MD On:4/10/2024 7:49 PM      CT Head Without Contrast    Result Date: 4/10/2024  Impression:  1. Chronic severe hydrocephalus, no acute intracranial pathology.    Electronically Signed By-Bradley Watt MD On:4/10/2024 4:28 PM      XR Chest 1 View    Result Date: 4/10/2024  Impression: No active disease.   Electronically Signed By-HUA NEWBY MD On:4/10/2024 12:27 PM       Assessment / Plan     Summary: 88 y.o. male past medical history of A-fib with unknown Eliquis adherence, frequent falls, chronic lumbar spinal stenosis, restless leg  syndrome, hypertension, chronic venous stasis, hyperlipidemia who presents to the ER due to a fall and being found on the ground.  Brought in found have rhabdomyolysis, JOSE from dehydration and generalized weakness working on rehab placement.  Of note patient had recently completed 20 days of Bactrim for lower extremity cellulitis    Working on rehab placement versus home with caregiver support    Assessment/Plan (clinically significant if listed here)  Rhabdomyolysis in setting of fall with prolonged period on the ground  Fall in setting of anticoagulation use with head trauma  JOSE secondary to above  Paroxysmal A-fib on Eliquis  Hypertension  Mild cognitive impairment  Chronic severe hydrocephalus  Hypomagnesemia  Medication nonadherence  Chronic lumbar spinal stenosis  RLS  Hyperlipidemia  Chronic venous stasis   Recent LE cellulitis ? S/p 20 d bactrim    Given risk/benefit of anticoagulation continue on 2.5 mg twice daily of Eliquis he would otherwise would qualify for the higher dose but given his age and falls reduction is reasonable if further falls even with rehab would likely discontinue altogether  Patient is also not on any rate controlling agents for his A-fib, initiated on metoprolol and rate seems to be reasonable at this time.  Monitor.  Repeat orthostatic vital signs negative no acute symptoms  LFTs resolving with resolution of rhabdomyolysis  Reassess for Lasix resumption in the morning did receive additional fluids for rhabdomyolysis  Continue local wound care/spontaneous stasis changes.  Completed extended course of antibiotics do not suspect acute infection currently  Nystatin for gluteal fold/fungal irritation  Continue Requip at night for RLS in place of Sinemet.  Ferritin was adequate.  Does not have Parkinson's from what I can tell and this seems to be doing fine on Requip.  Chronic severe hydrocephalus - had prior workup 2016 with large volume LP without improvement in symptoms.  No  drain/shunt recommended.  Suspect underlying dementia with advancing age/debility contributing to falls.  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    Medical Intervention Limits: NO intubation (DNI)  Level Of Support Discussed With: Patient  Code Status (Patient has no pulse and is not breathing): No CPR (Do Not Attempt to Resuscitate)  Medical Interventions (Patient has pulse or is breathing): Limited Support      CBC          4/11/2024    06:38 4/12/2024    06:06 4/15/2024    04:51   CBC   WBC 10.57  7.86  7.91    RBC 4.51  4.49  4.69    Hemoglobin 12.8  12.7  13.0    Hematocrit 39.9  40.9  43.1    MCV 88.5  91.1  91.9    MCH 28.4  28.3  27.7    MCHC 32.1  31.1  30.2    RDW 14.4  14.5  14.2    Platelets 244  253  343        CMP          4/11/2024    06:38 4/12/2024    06:06 4/15/2024    04:51   CMP   Glucose 99  97  104    BUN 18  19  21    Creatinine 1.25  1.10  0.97    EGFR 56.1  65.4  76.0    Sodium 136  139  139    Potassium 4.1  4.2  4.0    Chloride 103  106  103    Calcium 8.2  7.8  8.4    Total Protein   6.2    Albumin   3.4    Globulin   2.8    Total Bilirubin   0.5    Alkaline Phosphatase   92    AST (SGOT)   44    ALT (SGPT)   43    Albumin/Globulin Ratio   1.2    BUN/Creatinine Ratio 14.4  17.3  21.6    Anion Gap 10.2  9.7  9.7

## 2024-04-17 NOTE — PLAN OF CARE
Goal Outcome Evaluation:              Outcome Evaluation: aox4, forgetful at times. no c/o pain or discomfort. up to chair and bsc x1 assist. BM noted this shift. fall risk measures in place. skin and wound care provided per orders. frequently turned and repositioned. awaiting placement.

## 2024-04-17 NOTE — SIGNIFICANT NOTE
04/17/24 0948   OTHER   Discipline occupational therapist   Rehab Time/Intention   Session Not Performed patient unavailable for treatment  (with nurse)

## 2024-04-18 LAB
ANION GAP SERPL CALCULATED.3IONS-SCNC: 9.6 MMOL/L (ref 5–15)
BUN SERPL-MCNC: 21 MG/DL (ref 8–23)
BUN/CREAT SERPL: 22.3 (ref 7–25)
CALCIUM SPEC-SCNC: 8.4 MG/DL (ref 8.6–10.5)
CHLORIDE SERPL-SCNC: 101 MMOL/L (ref 98–107)
CK SERPL-CCNC: 118 U/L (ref 20–200)
CO2 SERPL-SCNC: 25.4 MMOL/L (ref 22–29)
CREAT SERPL-MCNC: 0.94 MG/DL (ref 0.76–1.27)
DEPRECATED RDW RBC AUTO: 47.3 FL (ref 37–54)
EGFRCR SERPLBLD CKD-EPI 2021: 78.9 ML/MIN/1.73
ERYTHROCYTE [DISTWIDTH] IN BLOOD BY AUTOMATED COUNT: 13.9 % (ref 12.3–15.4)
GLUCOSE SERPL-MCNC: 92 MG/DL (ref 65–99)
HCT VFR BLD AUTO: 44 % (ref 37.5–51)
HGB BLD-MCNC: 13.5 G/DL (ref 13–17.7)
MAGNESIUM SERPL-MCNC: 2 MG/DL (ref 1.6–2.4)
MCH RBC QN AUTO: 28.5 PG (ref 26.6–33)
MCHC RBC AUTO-ENTMCNC: 30.7 G/DL (ref 31.5–35.7)
MCV RBC AUTO: 92.8 FL (ref 79–97)
PLATELET # BLD AUTO: 370 10*3/MM3 (ref 140–450)
PMV BLD AUTO: 8.6 FL (ref 6–12)
POTASSIUM SERPL-SCNC: 4.5 MMOL/L (ref 3.5–5.2)
RBC # BLD AUTO: 4.74 10*6/MM3 (ref 4.14–5.8)
SODIUM SERPL-SCNC: 136 MMOL/L (ref 136–145)
WBC NRBC COR # BLD AUTO: 10.6 10*3/MM3 (ref 3.4–10.8)

## 2024-04-18 PROCEDURE — 83735 ASSAY OF MAGNESIUM: CPT | Performed by: INTERNAL MEDICINE

## 2024-04-18 PROCEDURE — 85027 COMPLETE CBC AUTOMATED: CPT | Performed by: INTERNAL MEDICINE

## 2024-04-18 PROCEDURE — 99232 SBSQ HOSP IP/OBS MODERATE 35: CPT | Performed by: INTERNAL MEDICINE

## 2024-04-18 PROCEDURE — 97116 GAIT TRAINING THERAPY: CPT

## 2024-04-18 PROCEDURE — 82550 ASSAY OF CK (CPK): CPT | Performed by: INTERNAL MEDICINE

## 2024-04-18 PROCEDURE — 97110 THERAPEUTIC EXERCISES: CPT

## 2024-04-18 PROCEDURE — 80048 BASIC METABOLIC PNL TOTAL CA: CPT | Performed by: INTERNAL MEDICINE

## 2024-04-18 RX ADMIN — ATORVASTATIN CALCIUM 10 MG: 10 TABLET, FILM COATED ORAL at 10:12

## 2024-04-18 RX ADMIN — Medication 400 MG: at 10:12

## 2024-04-18 RX ADMIN — METOPROLOL SUCCINATE 12.5 MG: 25 TABLET, EXTENDED RELEASE ORAL at 10:12

## 2024-04-18 RX ADMIN — BACITRACIN 0.9 G: 500 OINTMENT TOPICAL at 21:07

## 2024-04-18 RX ADMIN — APIXABAN 2.5 MG: 2.5 TABLET, FILM COATED ORAL at 10:12

## 2024-04-18 RX ADMIN — ROPINIROLE HYDROCHLORIDE 0.5 MG: 0.25 TABLET, FILM COATED ORAL at 21:07

## 2024-04-18 RX ADMIN — Medication 10 ML: at 10:12

## 2024-04-18 RX ADMIN — BACITRACIN 0.9 G: 500 OINTMENT TOPICAL at 10:13

## 2024-04-18 RX ADMIN — MIRTAZAPINE 15 MG: 15 TABLET, FILM COATED ORAL at 21:07

## 2024-04-18 RX ADMIN — Medication 10 ML: at 21:10

## 2024-04-18 RX ADMIN — NYSTATIN: 100000 POWDER TOPICAL at 10:13

## 2024-04-18 RX ADMIN — BACITRACIN 0.9 G: 500 OINTMENT TOPICAL at 16:59

## 2024-04-18 RX ADMIN — APIXABAN 2.5 MG: 2.5 TABLET, FILM COATED ORAL at 21:07

## 2024-04-18 RX ADMIN — NYSTATIN: 100000 POWDER TOPICAL at 21:09

## 2024-04-18 RX ADMIN — Medication 1 TABLET: at 10:12

## 2024-04-18 NOTE — THERAPY TREATMENT NOTE
Acute Care - Physical Therapy Treatment Note  KARTHIK Alegria     Patient Name: Enrique Wylie  : 1935  MRN: [id number]  Today's Date: 2024      Visit Dx:     ICD-10-CM ICD-9-CM   1. Traumatic rhabdomyolysis, initial encounter  T79.6XXA 958.6   2. Fall, initial encounter  W19.XXXA E888.9   3. Decreased activities of daily living (ADL)  Z78.9 V49.89   4. Difficulty walking  R26.2 719.7     Patient Active Problem List   Diagnosis    Balance problem    Restless leg syndrome    Encounter for long-term (current) use of other medications    Lumbar spinal stenosis    Grief reaction    Hyperlipidemia    Unsteady gait when walking    Death of wife    Lumbar spinal stenosis    Advance directive in chart    Skin cancer    Nephrolithiasis    Left ureteral stone    Foreign body in genitourinary tract    Advance directive in chart    Bilateral sensorineural hearing loss    Cardiomegaly    Essential hypertension    Fall at home, initial encounter    Paroxysmal atrial fibrillation    Weight loss, unintentional    Adjustment disorder with depressed mood    Back pain    Rhabdomyolysis     Past Medical History:   Diagnosis Date    Advance directive in chart 2020    AF (paroxysmal atrial fibrillation)     Allergic rhinitis     occasionally    Arthritis     Back pain 2014    Balance problem     walking is hard, using cane    Cancer     squamous cell  face and ears    Cervical spinal stenosis 2016    Previous surgery for myelopathy    Cervical spondylosis with myelopathy 2012    C4-5 and C5-6 with edema in the cord at C4-5    Death of wife 2020    Fall at home, initial encounter 2020    Gait instability 2019    Grief reaction 2020    High cholesterol     Kidney stone     Lumbar spinal stenosis 2016    Worsening leg symptoms,  3/12/15    Medication management 2019    Restless leg syndrome 2019    Weight loss, unintentional 2020     Past Surgical History:  "  Procedure Laterality Date    CATARACT EXTRACTION Bilateral     CERVICAL FUSION      C4,5 and 6    KNEE ARTHROSCOPY      right knee    LUMBAR LAMINECTOMY  02/10/2016    L3-4    ROTATOR CUFF REPAIR Right     SKIN BIOPSY      face and ears, squamous cell     SPINE SURGERY      minimally invasive procedure \"closing in on spinal Cord\"  \"gave spinal cord more room\"    URETEROSCOPY LASER LITHOTRIPSY WITH STENT INSERTION Bilateral 11/13/2023    Procedure: Bilateral Retrograde Pyelogram, Left Ureteroscopy Lasertripsy Basket Stone Extraction and Stent Insertion, Right Stent Insertion, Bladder Stone Extraction;  Surgeon: Katerina Carpenter MD;  Location: [facility];  Service: Urology;  Laterality: Bilateral;    URETEROSCOPY LASER LITHOTRIPSY WITH STENT INSERTION Bilateral 12/18/2023    Procedure: CYSTOSCOPY URETEROSCOPY RIGHT RETROGRADE PYELOGRAM HOLMIUM LASER STENT exchange; LEFT STENT REMOVAL;  Surgeon: Katerina Carpenter MD;  Location: [facility];  Service: Urology;  Laterality: Bilateral;     PT Assessment (Last 12 Hours)       PT Evaluation and Treatment       Row Name 04/18/24 1516          Physical Therapy Time and Intention    Subjective Information no complaints (P)   -NM     Document Type therapy note (daily note) (P)   -NM     Mode of Treatment individual therapy;physical therapy (P)   -NM     Patient Effort good (P)   -NM     Symptoms Noted During/After Treatment fatigue;shortness of breath (P)   -NM       Row Name 04/18/24 1516          General Information    Patient Profile Reviewed yes (P)   -NM     Patient Observations cooperative;agree to therapy (P)   -NM     Barriers to Rehab none identified (P)   -NM       Row Name 04/18/24 1516          Pain    Pretreatment Pain Rating 0/10 - no pain (P)   -NM     Posttreatment Pain Rating 0/10 - no pain (P)   -NM       Row Name 04/18/24 1516          Transfers    Transfers sit-stand transfer;stand-sit transfer (P)   -NM       Row Name 04/18/24 1516          " Sit-Stand Transfer    Sit-Stand [place] (Transfers) minimum assist (75% patient effort) (P)   -NM     Assistive Device (Sit-Stand Transfers) walker, front-wheeled (P)   -NM       Row Name 04/18/24 1516          Stand-Sit Transfer    Stand-Sit [place] (Transfers) minimum assist (75% patient effort) (P)   -NM     Assistive Device (Stand-Sit Transfers) walker, front-wheeled (P)   -NM       Row Name 04/18/24 1516          Gait/Stairs (Locomotion)    Gait/Stairs Locomotion gait/ambulation independence;gait/ambulation assistive device;distance ambulated;gait pattern (P)   -NM     [place] Level (Gait) minimum assist (75% patient effort) (P)   -NM     Assistive Device (Gait) walker, front-wheeled (P)   -NM     Patient was able to Ambulate yes (P)   -NM     Distance in Feet (Gait) 70 (P)   -NM     Pattern (Gait) step-to (P)   -NM     Deviations/Abnormal Patterns (Gait) sukumar decreased;festinating/shuffling;gait speed decreased;stride length decreased (P)   -NM     Bilateral Gait Deviations forward flexed posture (P)   -NM       Row Name 04/18/24 1516          Safety Issues, Functional Mobility    Impairments Affecting Function (Mobility) balance;endurance/activity tolerance;strength (P)   -NM       Row Name 04/18/24 1516          Balance    Dynamic Standing Balance minimal assist (P)   -NM     Position/Device Used, Standing Balance walker, front-wheeled (P)   -NM       Row Name             Wound 04/11/24 0022 Right anterior foot Skin Tear    Wound - Properties Group Placement Date: 04/11/24  -AS Placement Time: 0022  -AS Present on Original Admission: Y  -AS Side: Right  -AS Orientation: anterior  -AS Location: foot  -AS Primary Wound Type: Skin tear  -AS    Retired Wound - Properties Group Placement Date: 04/11/24  -AS Placement Time: 0022  -AS Present on Original Admission: Y  -AS Side: Right  -AS Orientation: anterior  -AS Location: foot  -AS Primary Wound Type: Skin tear  -AS    Retired Wound -  Properties Group Date first assessed: 04/11/24  -AS Time first assessed: 0022  -AS Present on Original Admission: Y  -AS Side: Right  -AS Location: foot  -AS Primary Wound Type: Skin tear  -AS      Row Name             Wound 04/11/24 0022 Bilateral lower leg    Wound - Properties Group Placement Date: 04/11/24  -AS Placement Time: 0022  -AS Present on Original Admission: Y  -AS Side: Bilateral  -AS Orientation: lower  -AS Location: leg  -AS    Retired Wound - Properties Group Placement Date: 04/11/24  -AS Placement Time: 0022  -AS Present on Original Admission: Y  -AS Side: Bilateral  -AS Orientation: lower  -AS Location: leg  -AS    Retired Wound - Properties Group Date first assessed: 04/11/24  -AS Time first assessed: 0022  -AS Present on Original Admission: Y  -AS Side: Bilateral  -AS Location: leg  -AS      Row Name             Wound 04/11/24 0023 Right anterior knee Skin Tear    Wound - Properties Group Placement Date: 04/11/24  -AS Placement Time: 0023  -AS Present on Original Admission: Y  -AS Side: Right  -AS Orientation: anterior  -AS Location: knee  -AS Primary Wound Type: Skin tear  -AS    Retired Wound - Properties Group Placement Date: 04/11/24  -AS Placement Time: 0023  -AS Present on Original Admission: Y  -AS Side: Right  -AS Orientation: anterior  -AS Location: knee  -AS Primary Wound Type: Skin tear  -AS    Retired Wound - Properties Group Date first assessed: 04/11/24  -AS Time first assessed: 0023  -AS Present on Original Admission: Y  -AS Side: Right  -AS Location: knee  -AS Primary Wound Type: Skin tear  -AS      Row Name             Wound 04/11/24 0023 Left anterior knee Skin Tear    Wound - Properties Group Placement Date: 04/11/24  -AS Placement Time: 0023  -AS Present on Original Admission: Y  -AS Side: Left  -AS Orientation: anterior  -AS Location: knee  -AS Primary Wound Type: Skin tear  -AS    Retired Wound - Properties Group Placement Date: 04/11/24  -AS Placement Time: 0023  -AS  Present on Original Admission: Y  -AS Side: Left  -AS Orientation: anterior  -AS Location: knee  -AS Primary Wound Type: Skin tear  -AS    Retired Wound - Properties Group Date first assessed: 04/11/24  -AS Time first assessed: 0023  -AS Present on Original Admission: Y  -AS Side: Left  -AS Location: knee  -AS Primary Wound Type: Skin tear  -AS      Row Name             Wound 04/11/24 0023 Right anterior thigh Skin Tear    Wound - Properties Group Placement Date: 04/11/24  -AS Placement Time: 0023  -AS Present on Original Admission: Y  -AS Side: Right  -AS Orientation: anterior  -AS Location: thigh  -AS Primary Wound Type: Skin tear  -AS    Retired Wound - Properties Group Placement Date: 04/11/24  -AS Placement Time: 0023  -AS Present on Original Admission: Y  -AS Side: Right  -AS Orientation: anterior  -AS Location: thigh  -AS Primary Wound Type: Skin tear  -AS    Retired Wound - Properties Group Date first assessed: 04/11/24  -AS Time first assessed: 0023  -AS Present on Original Admission: Y  -AS Side: Right  -AS Location: thigh  -AS Primary Wound Type: Skin tear  -AS      Row Name             Wound 04/11/24 0024 Right anterior greater trochanter Skin Tear    Wound - Properties Group Placement Date: 04/11/24  -AS Placement Time: 0024  -AS Present on Original Admission: Y  -AS Side: Right  -AS Orientation: anterior  -AS Location: greater trochanter  -AS Primary Wound Type: Skin tear  -AS    Retired Wound - Properties Group Placement Date: 04/11/24  -AS Placement Time: 0024  -AS Present on Original Admission: Y  -AS Side: Right  -AS Orientation: anterior  -AS Location: greater trochanter  -AS Primary Wound Type: Skin tear  -AS    Retired Wound - Properties Group Date first assessed: 04/11/24  -AS Time first assessed: 0024  -AS Present on Original Admission: Y  -AS Side: Right  -AS Location: greater trochanter  -AS Primary Wound Type: Skin tear  -AS      Row Name             Wound 04/11/24 0024 Right scapula Skin  Tear    Wound - Properties Group Placement Date: 04/11/24  -AS Placement Time: 0024  -AS Present on Original Admission: Y  -AS Side: Right  -AS Location: scapula  -AS Primary Wound Type: Skin tear  -AS    Retired Wound - Properties Group Placement Date: 04/11/24  -AS Placement Time: 0024  -AS Present on Original Admission: Y  -AS Side: Right  -AS Location: scapula  -AS Primary Wound Type: Skin tear  -AS    Retired Wound - Properties Group Date first assessed: 04/11/24  -AS Time first assessed: 0024  -AS Present on Original Admission: Y  -AS Side: Right  -AS Location: scapula  -AS Primary Wound Type: Skin tear  -AS      Row Name             Wound 04/11/24 0024 Bilateral coccyx Pressure Injury    Wound - Properties Group Placement Date: 04/11/24  -AS Placement Time: 0024  -AS Present on Original Admission: Y  -AS Side: Bilateral  -AS Location: coccyx  -AS Primary Wound Type: Pressure inj  -AS    Retired Wound - Properties Group Placement Date: 04/11/24  -AS Placement Time: 0024  -AS Present on Original Admission: Y  -AS Side: Bilateral  -AS Location: coccyx  -AS Primary Wound Type: Pressure inj  -AS    Retired Wound - Properties Group Date first assessed: 04/11/24  -AS Time first assessed: 0024  -AS Present on Original Admission: Y  -AS Side: Bilateral  -AS Location: coccyx  -AS Primary Wound Type: Pressure inj  -AS      Row Name             Wound 04/11/24 0025 Bilateral anterior groin MASD (Moisture associated skin damage)    Wound - Properties Group Placement Date: 04/11/24  -AS Placement Time: 0025  -AS Present on Original Admission: Y  -AS Side: Bilateral  -AS Orientation: anterior  -AS Location: groin  -AS Primary Wound Type: MASD  -AS    Retired Wound - Properties Group Placement Date: 04/11/24  -AS Placement Time: 0025  -AS Present on Original Admission: Y  -AS Side: Bilateral  -AS Orientation: anterior  -AS Location: groin  -AS Primary Wound Type: MASD  -AS    Retired Wound - Properties Group Date first  assessed: 04/11/24  -AS Time first assessed: 0025  -AS Present on Original Admission: Y  -AS Side: Bilateral  -AS Location: groin  -AS Primary Wound Type: MASD  -AS      Row Name             Wound 04/11/24 0026 Left lower back Abrasion    Wound - Properties Group Placement Date: 04/11/24  -AS Placement Time: 0026  -AS Present on Original Admission: Y  -AS Side: Left  -AS Orientation: lower  -AS Location: back  -AS Primary Wound Type: Abrasion  -AS    Retired Wound - Properties Group Placement Date: 04/11/24  -AS Placement Time: 0026  -AS Present on Original Admission: Y  -AS Side: Left  -AS Orientation: lower  -AS Location: back  -AS Primary Wound Type: Abrasion  -AS    Retired Wound - Properties Group Date first assessed: 04/11/24  -AS Time first assessed: 0026  -AS Present on Original Admission: Y  -AS Side: Left  -AS Location: back  -AS Primary Wound Type: Abrasion  -AS      Row Name             Wound 04/11/24 0046 medial parietal region Traumatic    Wound - Properties Group Placement Date: 04/11/24  -AS Placement Time: 0046  -AS Present on Original Admission: Y  -AS Orientation: medial  -AS Location: parietal region  -AS Primary Wound Type: Traumatic  -AS    Retired Wound - Properties Group Placement Date: 04/11/24  -AS Placement Time: 0046  -AS Present on Original Admission: Y  -AS Orientation: medial  -AS Location: parietal region  -AS Primary Wound Type: Traumatic  -AS    Retired Wound - Properties Group Date first assessed: 04/11/24  -AS Time first assessed: 0046  -AS Present on Original Admission: Y  -AS Location: parietal region  -AS Primary Wound Type: Traumatic  -AS      Row Name             Wound 04/11/24 0049 Right lower arm Skin Tear    Wound - Properties Group Placement Date: 04/11/24  -AS Placement Time: 0049  -AS Side: Right  -AS Orientation: lower  -AS Location: arm  -AS Primary Wound Type: Skin tear  -AS    Retired Wound - Properties Group Placement Date: 04/11/24  -AS Placement Time: 0049  -AS  Side: Right  -AS Orientation: lower  -AS Location: arm  -AS Primary Wound Type: Skin tear  -AS    Retired Wound - Properties Group Date first assessed: 04/11/24  -AS Time first assessed: 0049  -AS Side: Right  -AS Location: arm  -AS Primary Wound Type: Skin tear  -AS      Row Name 04/18/24 1516          Positioning and Restraints    Pre-Treatment Position sitting in chair/recliner (P)   -NM     Post Treatment Position chair (P)   -NM     In Chair reclined;call light within reach;encouraged to call for assist;exit alarm on;with nsg (P)   -NM       Row Name 04/18/24 1516          Progress Summary (PT)    Progress Toward Functional Goals (PT) progress toward functional goals as expected (P)   -NM     Daily Progress Summary (PT) pt presents to therapy with weakness, balance and endurance deficits, as well as difficulty with ambulation and transfers. Continued skilled PT necessary to address above deficits. (P)   -NM               User Key  (r) = Recorded By, (t) = Taken By, (c) = Cosigned By      Initials Name Provider Type    AS Princess De León, RN Registered Nurse    NM Valentin Che, PT Student PT Student                    Physical Therapy Education       Title: PT OT SLP Therapies (Done)       Topic: Physical Therapy (Done)       Point: Mobility training (Done)       Learning Progress Summary             Patient Acceptance, E, VU,NR by  at 4/17/2024 1122    Acceptance, E,TB, VU by  at 4/11/2024 1205                         Point: Precautions (Done)       Learning Progress Summary             Patient Acceptance, E, VU,NR by  at 4/17/2024 1122    Acceptance, E,TB, VU by  at 4/11/2024 1205                                         User Key       Initials Effective Dates Name Provider Type Discipline     01/19/22 -  Quang Panda, RN Registered Nurse Nurse     10/12/23 -  Justin Barclay, PT Student PT Student PT                  PT Recommendation and Plan     Progress Summary (PT)  Progress Toward  Functional Goals (PT): (P) progress toward functional goals as expected  Daily Progress Summary (PT): (P) pt presents to therapy with weakness, balance and endurance deficits, as well as difficulty with ambulation and transfers. Continued skilled PT necessary to address above deficits.   Outcome Measures       Row Name 04/18/24 1500 04/17/24 0841          How much help from another person do you currently need...    Turning from your back to your side while in flat bed without using bedrails? 3  -JUAN MANUEL (r) NM (t) JUAN MANUEL (c) 3  -DK     Moving from lying on back to sitting on the side of a flat bed without bedrails? 3  -JUAN MANUEL (r) NM (t) JUAN MANUEL (c) 3  -DK     Moving to and from a bed to a chair (including a wheelchair)? 3  -JUAN MANUEL (r) NM (t) JUAN MANUEL (c) 3  -DK     Standing up from a chair using your arms (e.g., wheelchair, bedside chair)? 3  -JUAN MANUEL (r) NM (t) JUAN MANUEL (c) 3  -DK     Climbing 3-5 steps with a railing? 2  -JUAN MANUEL (r) NM (t) JUAN MANUEL (c) 2  -DK     To walk in hospital room? 3  -JUAN MANUEL (r) NM (t) JUAN MANUEL (c) 3  -DK     AM-PAC 6 Clicks Score (PT) 17  -JUAN MANUEL (r) NM (t) 17  -DK     Highest Level of Mobility Goal 5 --> Static standing  -JUAN MANUEL (r) NM (t) 5 --> Static standing  -DK        Functional Assessment    Outcome Measure Options -- AM-PAC 6 Clicks Basic Mobility (PT)  -DK               User Key  (r) = Recorded By, (t) = Taken By, (c) = Cosigned By      Initials Name Provider Type    Johanne Washington, PTA Physical Therapist Assistant    Clovis Hull, PT Physical Therapist    Valentin Manrique, PT Student PT Student                     Time Calculation:    PT Charges       Row Name 04/18/24 1515             Time Calculation    PT Received On 04/18/24  -JUAN MANUEL (r) NM (t) JUAN MANUEL (c)         Timed Charges    56620 - Gait Training Minutes  12  -JUAN MANUEL (r) NM (t) JUAN MANUEL (c)      66119 - PT Therapeutic Activity Minutes 5  -JUAN MANUEL (r) NM (t) JUAN MANUEL (c)         Total Minutes    Timed Charges Total Minutes 17  -JUAN MANUEL (r) NM (t)       Total Minutes 17  -JUAN MANUEL (r) NM (t)                User  Key  (r) = Recorded By, (t) = Taken By, (c) = Cosigned By      Initials Name Provider Type    Clovis Hull, PT Physical Therapist    Valentin Manrique, PT Student PT Student                  Therapy Charges for Today       Code Description Service Date Service Provider Modifiers Qty    [id number] HC GAIT TRAINING EA 15 MIN 4/18/2024 Valentin Che, PT Student GP 1            PT G-Codes  Outcome Measure Options: AM-PAC 6 Clicks Basic Mobility (PT)  AM-PAC 6 Clicks Score (PT): 17  AM-PAC 6 Clicks Score (OT): 14    Valentin Che PT Student  4/18/2024

## 2024-04-18 NOTE — PROGRESS NOTES
[facility]   Hospitalist Progress Note    Date of admission: 4/10/2024  Patient Name: Enrique Wylie  1935  Date: 4/18/2024      Subjective     Chief Complaint   Patient presents with    Fall       Interval Followup: No acute events overnight. Patient is wanting to go home.   Vitals are stable   Ck is back to normal       Objective     Vitals:   Temp:  [97.7 °F (36.5 °C)-98 °F (36.7 °C)] 97.7 °F (36.5 °C)  Heart Rate:  [78-95] 83  Resp:  [18] 18  BP: (108-137)/(48-81) 112/48    Physical Exam  Awake conversant in chair, answers basic questions appropriately poor insight poor memory. Friends are at the bedside   Ctab no wrr somewhat poor effort but otherwise breathing comfortably on room air  Ir/ir nl rate le stasis changes  Abd soft   Psych: normal mood     Result Review:  Vital signs, labs and recent relevant imaging reviewed.        acetaminophen    aluminum-magnesium hydroxide-simethicone    senna-docusate sodium **AND** polyethylene glycol **AND** bisacodyl **AND** bisacodyl    Diclofenac Sodium    hydrOXYzine    Lidocaine    melatonin    nicotine    ondansetron    prochlorperazine    sodium chloride    sodium chloride    sodium chloride    apixaban, 2.5 mg, Oral, Q12H  atorvastatin, 10 mg, Oral, Daily  bacitracin, 1 Application, Topical, 3 times per day  magnesium oxide, 400 mg, Oral, Daily  metoprolol succinate XL, 12.5 mg, Oral, Q24H  mirtazapine, 15 mg, Oral, Nightly  multivitamin with minerals, 1 tablet, Oral, Daily  nystatin, , Topical, Q12H  rOPINIRole, 0.5 mg, Oral, Nightly  sodium chloride, 10 mL, Intravenous, Q12H        CT Head Without Contrast    Result Date: 4/11/2024  1. No intracranial hemorrhage. 2. Stable marked ventricular dilatation/chronic hydrocephalus.  Electronically Signed By-Michele Ruelas MD On:4/11/2024 4:21 PM      XR Shoulder 2+ View Right    Result Date: 4/10/2024  1.  No evidence for displaced fracture or dislocation. 2.  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 3.  Postoperative changes are noted involving the right shoulder.  Electronically Signed By-Christiano Camargo MD On:4/10/2024 10:12 PM      XR Shoulder 2+ View Left    Result Date: 4/10/2024  1.  No evidence for displaced fracture or dislocation. 2.  Mild  osteoarthritic degenerative changes of the glenohumeral joint. 3.  Moderate hypertrophic age-related changes of the acromioclavicular joint.  Electronically Signed By-Christiano Camargo MD On:4/10/2024 10:11 PM      XR Hips Bilateral With or Without Pelvis 2 View    Result Date: 4/10/2024  1.  No evidence for displaced fracture or dislocation. 2.  Mild to moderate degenerative changes are noted. 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   Electronically Signed ByJune Camargo MD On:4/10/2024 7:50 PM      XR Spine Lumbar 2 or 3 View    Result Date: 4/10/2024  1.  No evidence for acute fracture or subluxation. No evidence for compression fracture deformity. 2.  Advanced discogenic degenerative changes and degenerative posterior facet changes are seen throughout the lumbar spine.   Electronically Signed ByJune Camargo MD On:4/10/2024 7:49 PM      CT Head Without Contrast    Result Date: 4/10/2024  Impression:  1. Chronic severe hydrocephalus, no acute intracranial pathology.    Electronically Signed ByDuane Watt MD On:4/10/2024 4:28 PM       Assessment / Plan     Summary: 88 y.o. male past medical history of A-fib with unknown Eliquis adherence, frequent falls, chronic lumbar spinal stenosis, restless leg syndrome, hypertension, chronic venous stasis, hyperlipidemia who presents to the ER due to a fall and being found on the ground.  Brought in found have rhabdomyolysis, JOSE from dehydration and generalized weakness working on rehab placement.  Of note patient had recently completed 20 days of Bactrim for lower extremity cellulitis    Working on rehab placement versus home with caregiver  support    Assessment/Plan (clinically significant if listed here)  Rhabdomyolysis in setting of fall with prolonged period on the ground  Fall in setting of anticoagulation use with head trauma  JOSE secondary to above  Paroxysmal A-fib on Eliquis  Hypertension  Mild cognitive impairment  Chronic severe hydrocephalus  Hypomagnesemia  Medication nonadherence  Chronic lumbar spinal stenosis  RLS  Hyperlipidemia  Chronic venous stasis   Recent LE cellulitis ? S/p 20 d bactrim    PLAN   Given risk/benefit of anticoagulation continue on 2.5 mg twice daily of Eliquis he would otherwise would qualify for the higher dose but given his age and falls reduction is reasonable if further falls even with rehab would likely discontinue altogether  Patient is also not on any rate controlling agents for his A-fib, initiated on metoprolol and rate seems to be reasonable at this time.   LFTs resolving with resolution of rhabdomyolysis  Continue local wound care/spontaneous stasis changes.  Completed extended course of antibiotics do not suspect acute infection currently  Nystatin for gluteal fold/fungal irritation  CK level is back to normal   Continue Requip at night for RLS in place of Sinemet.  Ferritin was adequate.  Does not have Parkinson's from what I can tell and this seems to be doing fine on Requip.  Chronic severe hydrocephalus - had prior workup 2016 with large volume LP without improvement in symptoms.  No drain/shunt recommended.  Suspect underlying dementia with advancing age/debility contributing to falls.  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    Medical Intervention Limits: NO intubation (DNI)  Level Of Support Discussed With:  Patient  Code Status (Patient has no pulse and is not breathing): No CPR (Do Not Attempt to Resuscitate)  Medical Interventions (Patient has pulse or is breathing): Limited Support      CBC          4/12/2024    06:06 4/15/2024    04:51 4/18/2024    05:18   CBC   WBC 7.86  7.91  10.60    RBC 4.49  4.69  4.74    Hemoglobin 12.7  13.0  13.5    Hematocrit 40.9  43.1  44.0    MCV 91.1  91.9  92.8    MCH 28.3  27.7  28.5    MCHC 31.1  30.2  30.7    RDW 14.5  14.2  13.9    Platelets 253  343  370        CMP          4/12/2024    06:06 4/15/2024    04:51 4/18/2024    05:18   CMP   Glucose 97  104  92    BUN 19  21  21    Creatinine 1.10  0.97  0.94    EGFR 65.4  76.0  78.9    Sodium 139  139  136    Potassium 4.2  4.0  4.5    Chloride 106  103  101    Calcium 7.8  8.4  8.4    Total Protein  6.2     Albumin  3.4     Globulin  2.8     Total Bilirubin  0.5     Alkaline Phosphatase  92     AST (SGOT)  44     ALT (SGPT)  43     Albumin/Globulin Ratio  1.2     BUN/Creatinine Ratio 17.3  21.6  22.3    Anion Gap 9.7  9.7  9.6

## 2024-04-18 NOTE — PLAN OF CARE
Goal Outcome Evaluation:  Plan of Care Reviewed With: patient        Progress: no change  Outcome Evaluation: pt is alert. no c/o of pain. skin care done per orders. no other concerns noted.

## 2024-04-18 NOTE — THERAPY TREATMENT NOTE
Patient Name: Enrique Wylie  : 1935    MRN: [id number]                              Today's Date: 2024       Admit Date: 4/10/2024    Visit Dx:     ICD-10-CM ICD-9-CM   1. Traumatic rhabdomyolysis, initial encounter  T79.6XXA 958.6   2. Fall, initial encounter  W19.XXXA E888.9   3. Decreased activities of daily living (ADL)  Z78.9 V49.89   4. Difficulty walking  R26.2 719.7     Patient Active Problem List   Diagnosis    Balance problem    Restless leg syndrome    Encounter for long-term (current) use of other medications    Lumbar spinal stenosis    Grief reaction    Hyperlipidemia    Unsteady gait when walking    Death of wife    Lumbar spinal stenosis    Advance directive in chart    Skin cancer    Nephrolithiasis    Left ureteral stone    Foreign body in genitourinary tract    Advance directive in chart    Bilateral sensorineural hearing loss    Cardiomegaly    Essential hypertension    Fall at home, initial encounter    Paroxysmal atrial fibrillation    Weight loss, unintentional    Adjustment disorder with depressed mood    Back pain    Rhabdomyolysis     Past Medical History:   Diagnosis Date    Advance directive in chart 2020    AF (paroxysmal atrial fibrillation)     Allergic rhinitis     occasionally    Arthritis     Back pain 2014    Balance problem     walking is hard, using cane    Cancer     squamous cell  face and ears    Cervical spinal stenosis 2016    Previous surgery for myelopathy    Cervical spondylosis with myelopathy 2012    C4-5 and C5-6 with edema in the cord at C4-5    Death of wife 2020    Fall at home, initial encounter 2020    Gait instability 2019    Grief reaction 2020    High cholesterol     Kidney stone     Lumbar spinal stenosis 2016    Worsening leg symptoms,  3/12/15    Medication management 2019    Restless leg syndrome 2019    Weight loss, unintentional 2020     Past Surgical History:  "  Procedure Laterality Date    CATARACT EXTRACTION Bilateral     CERVICAL FUSION      C4,5 and 6    KNEE ARTHROSCOPY      right knee    LUMBAR LAMINECTOMY  02/10/2016    L3-4    ROTATOR CUFF REPAIR Right     SKIN BIOPSY      face and ears, squamous cell     SPINE SURGERY      minimally invasive procedure \"closing in on spinal Cord\"  \"gave spinal cord more room\"    URETEROSCOPY LASER LITHOTRIPSY WITH STENT INSERTION Bilateral 11/13/2023    Procedure: Bilateral Retrograde Pyelogram, Left Ureteroscopy Lasertripsy Basket Stone Extraction and Stent Insertion, Right Stent Insertion, Bladder Stone Extraction;  Surgeon: Katerina Carpenter MD;  Location: [facility];  Service: Urology;  Laterality: Bilateral;    URETEROSCOPY LASER LITHOTRIPSY WITH STENT INSERTION Bilateral 12/18/2023    Procedure: CYSTOSCOPY URETEROSCOPY RIGHT RETROGRADE PYELOGRAM HOLMIUM LASER STENT exchange; LEFT STENT REMOVAL;  Surgeon: Katerina Carpenter MD;  Location: [facility] OR;  Service: Urology;  Laterality: Bilateral;      General Information       Row Name 04/18/24 1014          OT Time and Intention    Document Type therapy note (daily note)  -AV     Mode of Treatment individual therapy;occupational therapy  -AV       Row Name 04/18/24 1014          General Information    Existing Precautions/Restrictions fall  -AV       Row Name 04/18/24 1014          Cognition    Orientation Status (Cognition) --  Alert, pleasant and cooperative.  Required moderate cues/demonstration for exercises.  -AV       Row Name 04/18/24 1014          Safety Issues, Functional Mobility    Impairments Affecting Function (Mobility) balance;endurance/activity tolerance;strength  -AV               User Key  (r) = Recorded By, (t) = Taken By, (c) = Cosigned By      Initials Name Provider Type    AV Morgan Colon OT Occupational Therapist                     Mobility/ADL's    No documentation.                  Obj/Interventions       Row Name 04/18/24 1015          " Shoulder (Therapeutic Exercise)    Shoulder AAROM (Therapeutic Exercise) left;flexion;aBduction;aDduction;10 repetitions  2 sets of 10.  Also perform SROM shoulder flexion, horizontal ABD sets of 10.  -AV       Row Name 04/18/24 1015          Motor Skills    Therapeutic Exercise shoulder;elbow/forearm  Performed in Semi-Solomon's/room air.  Active shoulder flexion <45.  Active assisted shoulder flexion now >90.  -AV               User Key  (r) = Recorded By, (t) = Taken By, (c) = Cosigned By      Initials Name Provider Type    Morgan Grijalva OT Occupational Therapist                   Goals/Plan    No documentation.                  Clinical Impression       [facility] Name 04/18/24 1019          Pain Scale: FACES Pre/Post-Treatment    Pain: FACES Scale, Pretreatment 0-->no hurt  -AV     Posttreatment Pain Rating 0-->no hurt  -AV       [facility] Name 04/18/24 1019          Plan of Care Review    Progress improving  -AV     Outcome Evaluation Patient performed left shoulder SROM and AAROM exercises.  Left active assistive shoulder flexion now >90.  Continued OT indicated to remediate/compensate for deficits to maximize independence.  -AV       Row Name 04/18/24 1019          Vital Signs    O2 Delivery Pre Treatment room air  -AV     O2 Delivery Intra Treatment room air  -AV     O2 Delivery Post Treatment room air  -AV       [facility] Name 04/18/24 1019          Positioning and Restraints    Pre-Treatment Position in bed  -AV     Post Treatment Position bed  -AV     In Bed call light within reach;encouraged to call for assist;exit alarm on  -AV               User Key  (r) = Recorded By, (t) = Taken By, (c) = Cosigned By      Initials Name Provider Type    Morgan Grijalva OT Occupational Therapist                   Outcome Measures       Row Name 04/18/24 1020          How much help from another is currently needed...    Putting on and taking off regular lower body clothing? 2  -AV     Bathing (including washing, rinsing, and  drying) 2  -AV     Toileting (which includes using toilet bed pan or urinal) 2  -AV     Putting on and taking off regular upper body clothing 2  -AV     Taking care of personal grooming (such as brushing teeth) 2  -AV     Eating meals 4  -AV     AM-PAC 6 Clicks Score (OT) 14  -AV       Row Name 04/18/24 0744 04/18/24 0357       How much help from another person do you currently need...    Turning from your back to your side while in flat bed without using bedrails? 3  -MR 3  -AS    Moving from lying on back to sitting on the side of a flat bed without bedrails? 3  -MR 3  -AS    Moving to and from a bed to a chair (including a wheelchair)? 3  -MR 3  -AS    Standing up from a chair using your arms (e.g., wheelchair, bedside chair)? 3  -MR 3  -AS    Climbing 3-5 steps with a railing? 2  -MR 2  -AS    To walk in hospital room? 2  -MR 3  -AS    AM-PAC 6 Clicks Score (PT) 16  -MR 17  -AS    Highest Level of Mobility Goal 5 --> Static standing  -MR 5 --> Static standing  -AS      Row Name 04/18/24 1020          Optimal Instrument    Bending/Stooping 4  -AV     Standing 2  -AV     Reaching 3  -AV               User Key  (r) = Recorded By, (t) = Taken By, (c) = Cosigned By      Initials Name Provider Type    Morgan Grijalva OT Occupational Therapist    MR Brie Mann, RN Registered Nurse    AS Princess De León RN Registered Nurse                    Occupational Therapy Education       Title: PT OT SLP Therapies (Done)       Topic: Occupational Therapy (Done)       Point: ADL training (Done)       Description:   Instruct learner(s) on proper safety adaptation and remediation techniques during self care or transfers.   Instruct in proper use of assistive devices.                  Learning Progress Summary             Patient Acceptance, E, VU,NR by RF at 4/17/2024 1122    Acceptance, E, VU by AV at 4/11/2024 1149                         Point: Home exercise program (Done)       Description:   Instruct learner(s) on  appropriate technique for monitoring, assisting and/or progressing therapeutic exercises/activities.                  Learning Progress Summary             Patient Acceptance, E, VU,NR by RF at 4/17/2024 1122    Acceptance, E, VU by AV at 4/11/2024 1149                         Point: Precautions (Done)       Description:   Instruct learner(s) on prescribed precautions during self-care and functional transfers.                  Learning Progress Summary             Patient Acceptance, E, VU,NR by RF at 4/17/2024 1122    Acceptance, E, VU by AV at 4/11/2024 1149                         Point: Body mechanics (Done)       Description:   Instruct learner(s) on proper positioning and spine alignment during self-care, functional mobility activities and/or exercises.                  Learning Progress Summary             Patient Acceptance, E, VU,NR by RF at 4/17/2024 1122    Acceptance, E, VU by AV at 4/11/2024 1149                                         User Key       Initials Effective Dates Name Provider Type Discipline     06/16/21 -  Morgan Colon OT Occupational Therapist OT     01/19/22 -  Quang Panda RN Registered Nurse Nurse                  OT Recommendation and Plan  Planned Therapy Interventions (OT): activity tolerance training, BADL retraining, functional balance retraining, occupation/activity based interventions, patient/caregiver education/training, ROM/therapeutic exercise, strengthening exercise, transfer/mobility retraining  Therapy Frequency (OT): 5 times/wk  Plan of Care Review  Plan of Care Reviewed With: patient  Progress: improving  Outcome Evaluation: Patient performed left shoulder SROM and AAROM exercises.  Left active assistive shoulder flexion now >90.  Continued OT indicated to remediate/compensate for deficits to maximize independence.     Time Calculation:   Evaluation Complexity (OT)  Review Occupational Profile/Medical/Therapy History Complexity: expanded/moderate  complexity  Assessment, Occupational Performance/Identification of Deficit Complexity: 3-5 performance deficits  Clinical Decision Making Complexity (OT): detailed assessment/moderate complexity  Overall Complexity of Evaluation (OT): moderate complexity     Time Calculation- OT       Row Name 04/18/24 1021             Time Calculation- OT    OT Received On 04/18/24  -AV      OT Goal Re-Cert Due Date 04/20/24  -AV         Timed Charges    96686 - OT Therapeutic Exercise Minutes 10  -AV         Total Minutes    Timed Charges Total Minutes 10  -AV       Total Minutes 10  -AV                User Key  (r) = Recorded By, (t) = Taken By, (c) = Cosigned By      Initials Name Provider Type    AV Morgan Colon OT Occupational Therapist                  Therapy Charges for Today       Code Description Service Date Service Provider Modifiers Qty    [id number] HC OT THER PROC EA 15 MIN 4/18/2024 Morgan Colon OT GO 1                 Morgan Colon OT  4/18/2024

## 2024-04-19 ENCOUNTER — READMISSION MANAGEMENT (OUTPATIENT)
Dept: CALL CENTER | Facility: HOSPITAL | Age: 89
End: 2024-04-19
Payer: MEDICARE

## 2024-04-19 VITALS
OXYGEN SATURATION: 97 % | HEIGHT: 72 IN | BODY MASS INDEX: 25.68 KG/M2 | RESPIRATION RATE: 20 BRPM | HEART RATE: 85 BPM | WEIGHT: 189.6 LBS | SYSTOLIC BLOOD PRESSURE: 113 MMHG | TEMPERATURE: 98.2 F | DIASTOLIC BLOOD PRESSURE: 51 MMHG

## 2024-04-19 PROBLEM — N17.9 AKI (ACUTE KIDNEY INJURY): Status: ACTIVE | Noted: 2024-04-19

## 2024-04-19 PROCEDURE — 97116 GAIT TRAINING THERAPY: CPT

## 2024-04-19 PROCEDURE — 99239 HOSP IP/OBS DSCHRG MGMT >30: CPT | Performed by: INTERNAL MEDICINE

## 2024-04-19 RX ORDER — METOPROLOL SUCCINATE 25 MG/1
12.5 TABLET, EXTENDED RELEASE ORAL
Qty: 15 TABLET | Refills: 0 | Status: ON HOLD | OUTPATIENT
Start: 2024-04-20 | End: 2024-05-20

## 2024-04-19 RX ADMIN — ATORVASTATIN CALCIUM 10 MG: 10 TABLET, FILM COATED ORAL at 09:12

## 2024-04-19 RX ADMIN — METOPROLOL SUCCINATE 12.5 MG: 25 TABLET, EXTENDED RELEASE ORAL at 09:12

## 2024-04-19 RX ADMIN — BACITRACIN 0.9 G: 500 OINTMENT TOPICAL at 09:13

## 2024-04-19 RX ADMIN — APIXABAN 2.5 MG: 2.5 TABLET, FILM COATED ORAL at 09:12

## 2024-04-19 RX ADMIN — Medication 400 MG: at 09:12

## 2024-04-19 RX ADMIN — Medication 1 TABLET: at 09:12

## 2024-04-19 RX ADMIN — NYSTATIN: 100000 POWDER TOPICAL at 09:13

## 2024-04-19 RX ADMIN — Medication 10 ML: at 09:12

## 2024-04-19 NOTE — THERAPY TREATMENT NOTE
Acute Care - Physical Therapy Treatment Note   [facility]     Patient Name: Enrique Wylie  : 1935  MRN: [id number]  Today's Date: 2024    Admit date: 4/10/2024     Referring Physician: Oscar Landaverde DO     Surgery Date:* No surgery found *            Visit Dx:     ICD-10-CM ICD-9-CM   1. Traumatic rhabdomyolysis, initial encounter  T79.6XXA 958.6   2. Fall, initial encounter  W19.XXXA E888.9   3. Decreased activities of daily living (ADL)  Z78.9 V49.89   4. Difficulty walking  R26.2 719.7     Patient Active Problem List   Diagnosis    Balance problem    Restless leg syndrome    Encounter for long-term (current) use of other medications    Lumbar spinal stenosis    Grief reaction    Hyperlipidemia    Unsteady gait when walking    Death of wife    Lumbar spinal stenosis    Advance directive in chart    Skin cancer    Nephrolithiasis    Left ureteral stone    Foreign body in genitourinary tract    Advance directive in chart    Bilateral sensorineural hearing loss    Cardiomegaly    Essential hypertension    Fall at home, initial encounter    Paroxysmal atrial fibrillation    Weight loss, unintentional    Adjustment disorder with depressed mood    Back pain    Rhabdomyolysis    JOSE (acute kidney injury)     Past Medical History:   Diagnosis Date    Advance directive in chart 2020    AF (paroxysmal atrial fibrillation)     Allergic rhinitis     occasionally    Arthritis     Back pain 2014    Balance problem     walking is hard, using cane    Cancer     squamous cell  face and ears    Cervical spinal stenosis 2016    Previous surgery for myelopathy    Cervical spondylosis with myelopathy 2012    C4-5 and C5-6 with edema in the cord at C4-5    Death of wife 2020    Fall at home, initial encounter 2020    Gait instability 2019    Grief reaction 2020    High cholesterol     Kidney stone     Lumbar spinal stenosis 2016    Worsening leg symptoms,  3/12/15  "   Medication management 02/06/2019    Restless leg syndrome 02/06/2019    Weight loss, unintentional 07/30/2020     Past Surgical History:   Procedure Laterality Date    CATARACT EXTRACTION Bilateral     CERVICAL FUSION      C4,5 and 6    KNEE ARTHROSCOPY      right knee    LUMBAR LAMINECTOMY  02/10/2016    L3-4    ROTATOR CUFF REPAIR Right     SKIN BIOPSY      face and ears, squamous cell     SPINE SURGERY      minimally invasive procedure \"closing in on spinal Cord\"  \"gave spinal cord more room\"    URETEROSCOPY LASER LITHOTRIPSY WITH STENT INSERTION Bilateral 11/13/2023    Procedure: Bilateral Retrograde Pyelogram, Left Ureteroscopy Lasertripsy Basket Stone Extraction and Stent Insertion, Right Stent Insertion, Bladder Stone Extraction;  Surgeon: Katerina Carpenter MD;  Location: [facility];  Service: Urology;  Laterality: Bilateral;    URETEROSCOPY LASER LITHOTRIPSY WITH STENT INSERTION Bilateral 12/18/2023    Procedure: CYSTOSCOPY URETEROSCOPY RIGHT RETROGRADE PYELOGRAM HOLMIUM LASER STENT exchange; LEFT STENT REMOVAL;  Surgeon: Katerina Carpenter MD;  Location: [facility] OR;  Service: Urology;  Laterality: Bilateral;     PT Assessment (Last 12 Hours)       PT Evaluation and Treatment       Row Name 04/19/24 1145          Physical Therapy Time and Intention    Subjective Information no complaints (P)   -     Document Type therapy note (daily note) (P)   -     Mode of Treatment individual therapy;physical therapy (P)   -     Patient Effort good (P)   -     Symptoms Noted During/After Treatment none (P)   -       Row Name 04/19/24 1145          General Information    Patient Profile Reviewed yes (P)   -     Patient Observations alert;cooperative;agree to therapy (P)   -       Row Name 04/19/24 1145          Pain    Pretreatment Pain Rating 0/10 - no pain (P)   -     Posttreatment Pain Rating 0/10 - no pain (P)   -       Row Name 04/19/24 1145          Cognition    Orientation Status " (Cognition) oriented x 3 (P)   -MF       Row Name 04/19/24 1145          Transfers    Transfers sit-stand transfer;stand-sit transfer (P)   -MF       Row Name 04/19/24 1145          Sit-Stand Transfer    Sit-Stand Hurley (Transfers) 1 person assist;contact guard (P)   -MF     Assistive Device (Sit-Stand Transfers) walker, front-wheeled (P)   -MF       Row Name 04/19/24 1145          Stand-Sit Transfer    Stand-Sit Hurley (Transfers) 1 person assist;contact guard (P)   -MF     Assistive Device (Stand-Sit Transfers) walker, front-wheeled (P)   -MF       Row Name 04/19/24 1145          Gait/Stairs (Locomotion)    Gait/Stairs Locomotion gait/ambulation assistive device (P)   -MF     Hurley Level (Gait) 1 person assist;contact guard (P)   -MF     Assistive Device (Gait) walker, front-wheeled (P)   -MF     Patient was able to Ambulate yes (P)   -MF     Distance in Feet (Gait) 70 (P)   -MF     Pattern (Gait) step-to (P)   -MF     Deviations/Abnormal Patterns (Gait) sukumar decreased;festinating/shuffling;gait speed decreased;stride length decreased (P)   -MF     Bilateral Gait Deviations forward flexed posture (P)   -       Row Name 04/19/24 1145          Safety Issues, Functional Mobility    Impairments Affecting Function (Mobility) balance;endurance/activity tolerance;strength (P)   -       Row Name 04/19/24 1145          Balance    Balance Assessment standing dynamic balance (P)   -MF     Dynamic Standing Balance 1-person assist;contact guard (P)   -MF     Position/Device Used, Standing Balance supported;walker, front-wheeled (P)   -       Row Name             Wound 04/11/24 0022 Right anterior foot Skin Tear    Wound - Properties Group Placement Date: 04/11/24  -AS Placement Time: 0022  -AS Present on Original Admission: Y  -AS Side: Right  -AS Orientation: anterior  -AS Location: foot  -AS Primary Wound Type: Skin tear  -AS    Retired Wound - Properties Group Placement Date: 04/11/24  -AS  Placement Time: 0022  -AS Present on Original Admission: Y  -AS Side: Right  -AS Orientation: anterior  -AS Location: foot  -AS Primary Wound Type: Skin tear  -AS    Retired Wound - Properties Group Date first assessed: 04/11/24  -AS Time first assessed: 0022  -AS Present on Original Admission: Y  -AS Side: Right  -AS Location: foot  -AS Primary Wound Type: Skin tear  -AS      Row Name             Wound 04/11/24 0022 Bilateral lower leg    Wound - Properties Group Placement Date: 04/11/24  -AS Placement Time: 0022  -AS Present on Original Admission: Y  -AS Side: Bilateral  -AS Orientation: lower  -AS Location: leg  -AS    Retired Wound - Properties Group Placement Date: 04/11/24  -AS Placement Time: 0022  -AS Present on Original Admission: Y  -AS Side: Bilateral  -AS Orientation: lower  -AS Location: leg  -AS    Retired Wound - Properties Group Date first assessed: 04/11/24  -AS Time first assessed: 0022  -AS Present on Original Admission: Y  -AS Side: Bilateral  -AS Location: leg  -AS      Row Name             Wound 04/11/24 0023 Right anterior knee Skin Tear    Wound - Properties Group Placement Date: 04/11/24  -AS Placement Time: 0023  -AS Present on Original Admission: Y  -AS Side: Right  -AS Orientation: anterior  -AS Location: knee  -AS Primary Wound Type: Skin tear  -AS    Retired Wound - Properties Group Placement Date: 04/11/24  -AS Placement Time: 0023  -AS Present on Original Admission: Y  -AS Side: Right  -AS Orientation: anterior  -AS Location: knee  -AS Primary Wound Type: Skin tear  -AS    Retired Wound - Properties Group Date first assessed: 04/11/24  -AS Time first assessed: 0023  -AS Present on Original Admission: Y  -AS Side: Right  -AS Location: knee  -AS Primary Wound Type: Skin tear  -AS      Row Name             Wound 04/11/24 0023 Left anterior knee Skin Tear    Wound - Properties Group Placement Date: 04/11/24  -AS Placement Time: 0023  -AS Present on Original Admission: Y  -AS Side: Left   -AS Orientation: anterior  -AS Location: knee  -AS Primary Wound Type: Skin tear  -AS    Retired Wound - Properties Group Placement Date: 04/11/24  -AS Placement Time: 0023  -AS Present on Original Admission: Y  -AS Side: Left  -AS Orientation: anterior  -AS Location: knee  -AS Primary Wound Type: Skin tear  -AS    Retired Wound - Properties Group Date first assessed: 04/11/24  -AS Time first assessed: 0023  -AS Present on Original Admission: Y  -AS Side: Left  -AS Location: knee  -AS Primary Wound Type: Skin tear  -AS      Row Name             Wound 04/11/24 0023 Right anterior thigh Skin Tear    Wound - Properties Group Placement Date: 04/11/24  -AS Placement Time: 0023  -AS Present on Original Admission: Y  -AS Side: Right  -AS Orientation: anterior  -AS Location: thigh  -AS Primary Wound Type: Skin tear  -AS    Retired Wound - Properties Group Placement Date: 04/11/24  -AS Placement Time: 0023  -AS Present on Original Admission: Y  -AS Side: Right  -AS Orientation: anterior  -AS Location: thigh  -AS Primary Wound Type: Skin tear  -AS    Retired Wound - Properties Group Date first assessed: 04/11/24  -AS Time first assessed: 0023  -AS Present on Original Admission: Y  -AS Side: Right  -AS Location: thigh  -AS Primary Wound Type: Skin tear  -AS      Row Name             Wound 04/11/24 0024 Right anterior greater trochanter Skin Tear    Wound - Properties Group Placement Date: 04/11/24  -AS Placement Time: 0024  -AS Present on Original Admission: Y  -AS Side: Right  -AS Orientation: anterior  -AS Location: greater trochanter  -AS Primary Wound Type: Skin tear  -AS    Retired Wound - Properties Group Placement Date: 04/11/24  -AS Placement Time: 0024  -AS Present on Original Admission: Y  -AS Side: Right  -AS Orientation: anterior  -AS Location: greater trochanter  -AS Primary Wound Type: Skin tear  -AS    Retired Wound - Properties Group Date first assessed: 04/11/24  -AS Time first assessed: 0024  -AS Present on  Original Admission: Y  -AS Side: Right  -AS Location: greater trochanter  -AS Primary Wound Type: Skin tear  -AS      Row Name             Wound 04/11/24 0024 Right scapula Skin Tear    Wound - Properties Group Placement Date: 04/11/24  -AS Placement Time: 0024  -AS Present on Original Admission: Y  -AS Side: Right  -AS Location: scapula  -AS Primary Wound Type: Skin tear  -AS    Retired Wound - Properties Group Placement Date: 04/11/24  -AS Placement Time: 0024  -AS Present on Original Admission: Y  -AS Side: Right  -AS Location: scapula  -AS Primary Wound Type: Skin tear  -AS    Retired Wound - Properties Group Date first assessed: 04/11/24  -AS Time first assessed: 0024  -AS Present on Original Admission: Y  -AS Side: Right  -AS Location: scapula  -AS Primary Wound Type: Skin tear  -AS      Row Name             Wound 04/11/24 0024 Bilateral coccyx Pressure Injury    Wound - Properties Group Placement Date: 04/11/24  -AS Placement Time: 0024  -AS Present on Original Admission: Y  -AS Side: Bilateral  -AS Location: coccyx  -AS Primary Wound Type: Pressure inj  -AS    Retired Wound - Properties Group Placement Date: 04/11/24  -AS Placement Time: 0024  -AS Present on Original Admission: Y  -AS Side: Bilateral  -AS Location: coccyx  -AS Primary Wound Type: Pressure inj  -AS    Retired Wound - Properties Group Date first assessed: 04/11/24  -AS Time first assessed: 0024  -AS Present on Original Admission: Y  -AS Side: Bilateral  -AS Location: coccyx  -AS Primary Wound Type: Pressure inj  -AS      Row Name             Wound 04/11/24 0025 Bilateral anterior groin MASD (Moisture associated skin damage)    Wound - Properties Group Placement Date: 04/11/24  -AS Placement Time: 0025  -AS Present on Original Admission: Y  -AS Side: Bilateral  -AS Orientation: anterior  -AS Location: groin  -AS Primary Wound Type: MASD  -AS    Retired Wound - Properties Group Placement Date: 04/11/24  -AS Placement Time: 0025  -AS Present on  Original Admission: Y  -AS Side: Bilateral  -AS Orientation: anterior  -AS Location: groin  -AS Primary Wound Type: MASD  -AS    Retired Wound - Properties Group Date first assessed: 04/11/24  -AS Time first assessed: 0025  -AS Present on Original Admission: Y  -AS Side: Bilateral  -AS Location: groin  -AS Primary Wound Type: MASD  -AS      Row Name             Wound 04/11/24 0026 Left lower back Abrasion    Wound - Properties Group Placement Date: 04/11/24  -AS Placement Time: 0026  -AS Present on Original Admission: Y  -AS Side: Left  -AS Orientation: lower  -AS Location: back  -AS Primary Wound Type: Abrasion  -AS    Retired Wound - Properties Group Placement Date: 04/11/24  -AS Placement Time: 0026  -AS Present on Original Admission: Y  -AS Side: Left  -AS Orientation: lower  -AS Location: back  -AS Primary Wound Type: Abrasion  -AS    Retired Wound - Properties Group Date first assessed: 04/11/24  -AS Time first assessed: 0026  -AS Present on Original Admission: Y  -AS Side: Left  -AS Location: back  -AS Primary Wound Type: Abrasion  -AS      Row Name             Wound 04/11/24 0046 medial parietal region Traumatic    Wound - Properties Group Placement Date: 04/11/24  -AS Placement Time: 0046  -AS Present on Original Admission: Y  -AS Orientation: medial  -AS Location: parietal region  -AS Primary Wound Type: Traumatic  -AS    Retired Wound - Properties Group Placement Date: 04/11/24  -AS Placement Time: 0046  -AS Present on Original Admission: Y  -AS Orientation: medial  -AS Location: parietal region  -AS Primary Wound Type: Traumatic  -AS    Retired Wound - Properties Group Date first assessed: 04/11/24  -AS Time first assessed: 0046  -AS Present on Original Admission: Y  -AS Location: parietal region  -AS Primary Wound Type: Traumatic  -AS      Row Name             Wound 04/11/24 0049 Right lower arm Skin Tear    Wound - Properties Group Placement Date: 04/11/24  -AS Placement Time: 0049  -AS Side: Right   -AS Orientation: lower  -AS Location: arm  -AS Primary Wound Type: Skin tear  -AS    Retired Wound - Properties Group Placement Date: 04/11/24  -AS Placement Time: 0049  -AS Side: Right  -AS Orientation: lower  -AS Location: arm  -AS Primary Wound Type: Skin tear  -AS    Retired Wound - Properties Group Date first assessed: 04/11/24  -AS Time first assessed: 0049  -AS Side: Right  -AS Location: arm  -AS Primary Wound Type: Skin tear  -AS      Row Name 04/19/24 1145          Plan of Care Review    Plan of Care Reviewed With -- (P)   -MF     Progress -- (P)   -MF     Outcome Evaluation -- (P)   -MF       Row Name 04/19/24 1145          Positioning and Restraints    Pre-Treatment Position sitting in chair/recliner (P)   -MF     Post Treatment Position chair (P)   -MF     In Chair reclined;call light within reach;encouraged to call for assist;exit alarm on (P)   -MF       Row Name 04/19/24 1145          Progress Summary (PT)    Progress Toward Functional Goals (PT) progress toward functional goals is good (P)   -MF     Daily Progress Summary (PT) Patient tolerated treatment well today. Patient is beginning to ambulate and transfer with less assistance but their gait speed is still significantly slow. Skilled PT is still needed at this time to improve ambulation and overall independence. (P)   -MF     Barriers to Overall Progress (PT) No barriers identified. (P)   -MF               User Key  (r) = Recorded By, (t) = Taken By, (c) = Cosigned By      Initials Name Provider Type    AS Princess De León, RN Registered Nurse    Rajat Richardson, PT Student PT Student                    Physical Therapy Education       Title: PT OT SLP Therapies (Done)       Topic: Physical Therapy (Done)       Point: Mobility training (Done)       Learning Progress Summary             Patient Acceptance, E, VU,NR by RF at 4/17/2024 1122    Acceptance, E,TB, VU by  at 4/11/2024 1205                         Point: Precautions (Done)        Learning Progress Summary             Patient Acceptance, E, VU,NR by  at 4/17/2024 1122    Acceptance, E,TB, VU by  at 4/11/2024 1205                                         User Key       Initials Effective Dates Name Provider Type Discipline     01/19/22 -  Quang Panda, RN Registered Nurse Nurse     10/12/23 -  Justin Barclay, PT Student PT Student PT                  PT Recommendation and Plan     Progress Summary (PT)  Progress Toward Functional Goals (PT): (P) progress toward functional goals is good  Daily Progress Summary (PT): (P) Patient tolerated treatment well today. Patient is beginning to ambulate and transfer with less assistance but their gait speed is still significantly slow. Skilled PT is still needed at this time to improve ambulation and overall independence.  Barriers to Overall Progress (PT): (P) No barriers identified.   Outcome Measures       Row Name 04/19/24 1158 04/18/24 1500 04/17/24 0841       How much help from another person do you currently need...    Turning from your back to your side while in flat bed without using bedrails? 3 (P)   -MF 3  -JUAN MANUEL (r) NM (t) JUAN MANUEL (c) 3  -DK    Moving from lying on back to sitting on the side of a flat bed without bedrails? 3 (P)   -MF 3  -JUAN MANUEL (r) NM (t) JUAN MANUEL (c) 3  -DK    Moving to and from a bed to a chair (including a wheelchair)? 3 (P)   -MF 3  -JUAN MANUEL (r) NM (t) JUAN MANUEL (c) 3  -DK    Standing up from a chair using your arms (e.g., wheelchair, bedside chair)? 3 (P)   -MF 3  -JUAN MANUEL (r) NM (t) JUAN MANUEL (c) 3  -DK    Climbing 3-5 steps with a railing? 2 (P)   -MF 2  -JUAN MANUEL (r) NM (t) JUAN MANUEL (c) 2  -DK    To walk in hospital room? 3 (P)   -MF 3  -JUAN MANUEL (r) NM (t) JUAN MANUEL (c) 3  -DK    AM-PAC 6 Clicks Score (PT) 17 (P)   -MF 17  -JUAN MANUEL (r) NM (t) 17  -DK    Highest Level of Mobility Goal 5 --> Static standing (P)   -MF 5 --> Static standing  -JUAN MANUEL (r) NM (t) 5 --> Static standing  -DK       Functional Assessment    Outcome Measure Options AM-PAC 6 Clicks Basic Mobility (PT) (P)   -MF  -- AM-PAC 6 Clicks Basic Mobility (PT)  -DK              User Key  (r) = Recorded By, (t) = Taken By, (c) = Cosigned By      Initials Name Provider Type    Johanne Washington, PTA Physical Therapist Assistant    Clovis Hull, PT Physical Therapist    NM Valentin Che, PT Student PT Student    Rajat Richardson, PT Student PT Student                     Time Calculation:    PT Charges       Row Name 04/19/24 1144             Time Calculation    PT Received On 04/19/24 (P)   -MF         Timed Charges    71494 - Gait Training Minutes  13 (P)   -MF         Total Minutes    Timed Charges Total Minutes 13 (P)   -MF       Total Minutes 13 (P)   -MF                User Key  (r) = Recorded By, (t) = Taken By, (c) = Cosigned By      Initials Name Provider Type    Rajat Richardson, PT Student PT Student                  Therapy Charges for Today       Code Description Service Date Service Provider Modifiers Qty    [id number] HC GAIT TRAINING EA 15 MIN 4/19/2024 Rajat Steen, PT Student GP 1            PT G-Codes  Outcome Measure Options: (P) AM-PAC 6 Clicks Basic Mobility (PT)  AM-PAC 6 Clicks Score (PT): (P) 17  AM-PAC 6 Clicks Score (OT): 14    Rajat Steen PT Student  4/19/2024

## 2024-04-19 NOTE — PLAN OF CARE
Goal Outcome Evaluation:   Pt remained A&Ox4 this shift with forgetfullness. Also, pt remained on room air maintaining stable oxygen saturation. Wound and skin care completed as per order. Pt is to dc this shift. All other vital signs remained stable.

## 2024-04-19 NOTE — DISCHARGE SUMMARY
[facility]         HOSPITALIST  DISCHARGE SUMMARY    Patient Name: Enrique Wylie  : 1935  MRN: [id number]    Date of Admission: 4/10/2024  Date of Discharge:  2024    Primary Care Physician: Josh Maya MD    Consults       Date and Time Order Name Status Description    4/10/2024  5:49 PM Hospitalist (on-call MD unless specified)              Active and Resolved Hospital Problems:  Active Hospital Problems    Diagnosis POA    **Rhabdomyolysis [M62.82] Yes    JOSE (acute kidney injury) [N17.9] Unknown    Essential hypertension [I10] Yes      Resolved Hospital Problems   No resolved problems to display.       Hospital Course     Hospital Course:  Enrique Wylie is a 88 y.o. male with past medical history of A-fib on Eliquis but has not been taking it,  frequent falls, chronic lumbar spinal stenosis, restless leg syndrome, hypertension, chronic venous stasis, hyperlipidemia who presents to the ER due to a fall and being found on the ground.  Patient is relatively poor historian but at the very least recollects falling earlier this morning.  He states he was potentially getting out of bed and fell on the ground and was in significant pain and unable to call for help.  He has no family that lives with him and only has neighbors that check on him throughout the day.  His home health nurse was scheduled to come in today and when she came in to evaluate him he was found on the ground in his own urine.  The nurse then notified EMS and patient was brought to the ER for evaluation  Upon arrival here he was found to be hemodynamically stable and in atrial fibrillation with a controlled rate.  Lab workup was notable for a mild leukocytosis of 11,000, mild elevated troponin of 49, acute kidney injury with a creatinine of 1.35 up from a baseline of 1.  CK was found to be significantly elevated at 7965.  Imaging of the chest and head were unremarkable.  Patient was given a liter  fluid bolus hospitalist was then contacted for admission of rhabdomyolysis.  Patient's CK has returned back to normal.  At this time we will continue to discontinue Eliquis as patient has not been taking it as an outpatient and he has a significant fall risk.  Patient was started on metoprolol for his atrial fibrillation.  Patient will discharge back to his home today.  Patient's power of  who is a friend is arranging for patient to have a full-time caregiver.        DISCHARGE Follow Up Recommendations for labs and diagnostics:   Follow up with pcp in 1 week       Day of Discharge     Vital Signs:  Temp:  [97.7 °F (36.5 °C)-98.6 °F (37 °C)] 97.7 °F (36.5 °C)  Heart Rate:  [73-93] 85  Resp:  [18-20] 20  BP: (103-134)/(59-70) 113/68  Physical Exam:   Awake conversant in chair, answers basic questions appropriately poor insight poor memory.   Ctab no wrr somewhat poor effort but otherwise breathing comfortably on room air  Ir/ir nl rate le stasis changes  Abd soft   Psych: normal mood       Discharge Details        Discharge Medications        New Medications        Instructions Start Date   metoprolol succinate XL 25 MG 24 hr tablet  Commonly known as: TOPROL-XL   12.5 mg, Oral, Every 24 Hours Scheduled   Start Date: April 20, 2024            Continue These Medications        Instructions Start Date   acetaminophen 650 MG 8 hr tablet  Commonly known as: TYLENOL   650 mg, Oral, Every 8 Hours PRN      atorvastatin 10 MG tablet  Commonly known as: LIPITOR   10 mg, Oral, Daily      carbidopa-levodopa  MG per tablet  Commonly known as: SINEMET   1 tablet, Oral, 2 Times Daily      DuoDERM CGF Extra Thin misc   1 each, Apply externally, Every Other Day      DuoDERM CGF Extra Thin misc   1 each, Apply externally, Every Other Day      mirtazapine 15 MG tablet  Commonly known as: REMERON   15 mg, Oral, Daily, HS      urea 10 % cream  Commonly known as: CARMOL   1 Application, Topical, As Needed             Stop  These Medications      apixaban 5 MG tablet tablet  Commonly known as: ELIQUIS     furosemide 20 MG tablet  Commonly known as: Lasix     potassium chloride 10 MEQ CR tablet     sulfamethoxazole-trimethoprim 800-160 MG per tablet  Commonly known as: BACTRIM DS,SEPTRA DS              No Known Allergies    Discharge Disposition:  Home-Health Care Svc    Diet:  Hospital:  Diet Order   Procedures    Diet: Cardiac; Healthy Heart (2-3 Na+); Fluid Consistency: Thin (IDDSI 0)       Discharge Activity: as tolerated       CODE STATUS:  Code Status and Medical Interventions:   Ordered at: 04/10/24 1839     Medical Intervention Limits:    NO intubation (DNI)     Level Of Support Discussed With:    Patient     Code Status (Patient has no pulse and is not breathing):    No CPR (Do Not Attempt to Resuscitate)     Medical Interventions (Patient has pulse or is breathing):    Limited Support         Future Appointments   Date Time Provider Department Center   4/19/2024  1:00 PM Akua Mota MD [facility]   4/25/2024 10:45 AM Josh Maya MD [facility]   6/3/2024 12:00 PM Josh Maya MD [facility]   8/20/2024 11:15 AM [facility]       Additional Instructions for the Follow-ups that You Need to Schedule       Discharge Follow-up with PCP   As directed       Currently Documented PCP:    Josh Maya MD    PCP Phone Number:    [phone]     Follow Up Details: in 1 week                Pertinent  and/or Most Recent Results     PROCEDURES:   None     LAB RESULTS:      Lab 04/18/24 0518 04/15/24  0451   WBC 10.60 7.91   HEMOGLOBIN 13.5 13.0   HEMATOCRIT 44.0 43.1   PLATELETS 370 343   NEUTROS ABS  --  4.54   IMMATURE GRANS (ABS)  --  0.04   LYMPHS ABS  --  1.84   MONOS ABS  --  1.04*   EOS ABS  --  0.37   MCV 92.8 91.9         Lab 04/18/24  0518 04/15/24  0451   SODIUM 136 139   POTASSIUM 4.5 4.0   CHLORIDE 101 103   CO2 25.4 26.3   ANION GAP 9.6 9.7   BUN 21  21   CREATININE 0.94 0.97   EGFR 78.9 76.0   GLUCOSE 92 104*   CALCIUM 8.4* 8.4*   MAGNESIUM 2.0  --          Lab 04/15/24  0451   TOTAL PROTEIN 6.2   ALBUMIN 3.4*   GLOBULIN 2.8   ALT (SGPT) 43*   AST (SGOT) 44*   BILIRUBIN 0.5   ALK PHOS 92                     Brief Urine Lab Results  (Last result in the past 365 days)        Color   Clarity   Blood   Leuk Est   Nitrite   Protein   CREAT   Urine HCG        04/10/24 1837 Yellow   Clear   Moderate (2+)   Negative   Negative   30 mg/dL (1+)                 Microbiology Results (last 10 days)       Procedure Component Value - Date/Time    COVID-19, FLU A/B, RSV PCR 1 HR TAT - Swab, Nasopharynx [[id number]]  (Normal) Collected: 04/11/24 1044    Lab Status: Final result Specimen: Swab from Nasopharynx Updated: 04/11/24 1143     COVID19 Not Detected     Influenza A PCR Not Detected     Influenza B PCR Not Detected     RSV, PCR Not Detected    Narrative:      Fact sheet for providers: https://www.fda.gov/media/029408/download    Fact sheet for patients: https://www.fda.gov/media/835274/download    Test performed by PCR.    Blood Culture - Blood, Arm, Left [[id number]]  (Normal) Collected: 04/11/24 1026    Lab Status: Final result Specimen: Blood from Arm, Left Updated: 04/16/24 1045     Blood Culture No growth at 5 days    Blood Culture - Blood, Hand, Left [[id number]]  (Normal) Collected: 04/11/24 1026    Lab Status: Final result Specimen: Blood from Hand, Left Updated: 04/16/24 1045     Blood Culture No growth at 5 days            CT Head Without Contrast    Result Date: 4/11/2024  1. No intracranial hemorrhage. 2. Stable marked ventricular dilatation/chronic hydrocephalus.  Electronically Signed By-Michele Ruelas MD On:4/11/2024 4:21 PM                    Labs Pending at Discharge:        Time spent on Discharge including face to face service:  more than 35 minutes    Electronically signed by Oscar Landaverde DO, 04/19/24, 11:58 AM EDT.

## 2024-04-19 NOTE — SIGNIFICANT NOTE
04/19/24 0912   OTHER   Discipline occupational therapist   Rehab Time/Intention   Session Not Performed patient unavailable for treatment  (with nurse)

## 2024-04-19 NOTE — SIGNIFICANT NOTE
04/18/24 1315   Coping/Psychosocial   Observed Emotional State calm;cooperative   Verbalized Emotional State relief;hopefulness   Trust Relationship/Rapport empathic listening provided   Involvement in Care interacting with patient   Additional Documentation Spiritual Care (Group)   Spiritual Care   Use of Spiritual Resources non-Sikh use of spiritual care   Spiritual Care Source  initiative   Spiritual Care Follow-Up follow-up, none required as presently assessed   Response to Spiritual Care receptive of support   Spiritual Care Interventions supportive conversation provided   Spiritual Care Visit Type initial   Receptivity to Spiritual Care visit welcomed

## 2024-04-19 NOTE — PLAN OF CARE
Goal Outcome Evaluation:   Pt remained A&Ox4 with forgetfullness. Also, pt remained on room air maintaining stable oxygen saturation. Pt denied pain this shift. Wound and skin care completed as per order. Pt tolerated well. All other vital signs remained stable.

## 2024-04-19 NOTE — OUTREACH NOTE
Prep Survey      Flowsheet Row Responses   [facility] facility patient discharged from? [facility]   Is LACE score < 7 ? No   Eligibility [facility]   Date of Admission 04/10/24   Date of Discharge 04/19/24   Discharge Disposition Home-Health Care [facility]   Discharge diagnosis *Rhabdomyolysis   Does the patient have one of the following disease processes/diagnoses(primary or secondary)? Other   Does the patient have Home health ordered? Yes   What is the Home health agency?  [facility]   Is there a DME ordered? No   Prep survey completed? Yes            CHARISSE CLARK - Registered Nurse

## 2024-04-21 ENCOUNTER — TRANSITIONAL CARE MANAGEMENT TELEPHONE ENCOUNTER (OUTPATIENT)
Dept: CALL CENTER | Facility: HOSPITAL | Age: 89
End: 2024-04-21
Payer: MEDICARE

## 2024-04-21 NOTE — OUTREACH NOTE
Call Center TCM Note      Flowsheet Row Responses   [facility] [place] patient discharged from? [facility]   Does the patient have one of the following disease processes/diagnoses(primary or secondary)? Other   TCM attempt successful? No   Unsuccessful attempts Attempt 1            Analy Lang RN    4/21/2024, 09:55 EDT

## 2024-04-22 ENCOUNTER — TELEPHONE (OUTPATIENT)
Dept: FAMILY MEDICINE CLINIC | Facility: CLINIC | Age: 89
End: 2024-04-22
Payer: MEDICARE

## 2024-04-22 ENCOUNTER — TRANSITIONAL CARE MANAGEMENT TELEPHONE ENCOUNTER (OUTPATIENT)
Dept: CALL CENTER | Facility: HOSPITAL | Age: 89
End: 2024-04-22
Payer: MEDICARE

## 2024-04-22 NOTE — OUTREACH NOTE
Call Center TCM Note      Flowsheet Row Responses   [facility] patient discharged from? [facility]   Does the patient have one of the following disease processes/diagnoses(primary or secondary)? Other   TCM attempt successful? Yes  [VR for Pamela Evelin]   Call start time 1502   Call end time 1513   General alerts for this patient Per POA pt has dementia,best to call her for updates   Discharge diagnosis *Rhabdomyolysis   Person spoke with today (if not patient) and relationship Pt and POA Pamela   Meds reviewed with patient/caregiver? Yes   Is the patient having any side effects they believe may be caused by any medication additions or changes? No   Does the patient have all medications ordered at discharge? Yes   Prescription comments Pt has metoprolol and verified other meds d/c'd including eliquis   Is the patient taking all medications as directed (includes completed medication regime)? Yes   Comments Hospital f/u on 4/29/24@1045am   Does the patient have an appointment with their PCP within 7-14 days of discharge? Yes   What is the Home health agency?  [facility]   Has home health visited the patient within 72 hours of discharge? Yes   Home health comments Pt has 24/7 private duty sitters   DME comments Pt using walker, POA has a chair alarm in place (pt sleeps in chair)   Psychosocial issues? No   Comments Pt had a fall to floor on 4/19/24,  night of d/c. EMS was called to assess for injuries.  POA went to MD office to report today.  Since incident chair alarm is now in place.   Did the patient receive a copy of their discharge instructions? Yes   Nursing interventions Reviewed instructions with patient   What is the patient's perception of their health status since discharge? Same  [POA has arranged caregiving and has medicines set up for pt r/t med changes.  Pt with frequent falls and safety measures in place, as well as therapies with HH.]   Is the patient/caregiver able to teach  back signs and symptoms related to disease process for when to call PCP? Yes   Is the patient/caregiver able to teach back signs and symptoms related to disease process for when to call 911? Yes   TCM call completed? Yes   Call end time 1513            Gladys Ghotra RN    4/22/2024, 15:17 EDT

## 2024-04-22 NOTE — TELEPHONE ENCOUNTER
Called patient and spoke with him about having care 24/7 at home.  Patient states he didn't want people there all the time.  Explained to patient that Dr Maya wants someone there with him and that it is for his safety.    Patient states he will do it for now.  Advised him to have them help him with things. They can cook and clean for him.    Patient has appointment on Monday 04/29/2024 and Dr Maya will also discuss with patient.   Patient states he is not have any other problems other than the people in his house.

## 2024-04-25 ENCOUNTER — APPOINTMENT (OUTPATIENT)
Dept: CT IMAGING | Facility: HOSPITAL | Age: 89
End: 2024-04-25
Payer: MEDICARE

## 2024-04-25 ENCOUNTER — HOSPITAL ENCOUNTER (OUTPATIENT)
Facility: HOSPITAL | Age: 89
Discharge: NURSING, REHAB OR LONG-TERM CARE | End: 2024-05-05
Attending: EMERGENCY MEDICINE | Admitting: INTERNAL MEDICINE
Payer: MEDICARE

## 2024-04-25 ENCOUNTER — TELEPHONE (OUTPATIENT)
Dept: FAMILY MEDICINE CLINIC | Facility: CLINIC | Age: 89
End: 2024-04-25

## 2024-04-25 ENCOUNTER — READMISSION MANAGEMENT (OUTPATIENT)
Dept: CALL CENTER | Facility: HOSPITAL | Age: 89
End: 2024-04-25
Payer: MEDICARE

## 2024-04-25 DIAGNOSIS — Z74.09 IMPAIRED MOBILITY AND ADLS: ICD-10-CM

## 2024-04-25 DIAGNOSIS — K62.5 GASTROINTESTINAL HEMORRHAGE ASSOCIATED WITH ANORECTAL SOURCE: Primary | ICD-10-CM

## 2024-04-25 DIAGNOSIS — K62.89 PROCTITIS: ICD-10-CM

## 2024-04-25 DIAGNOSIS — K62.5 BRBPR (BRIGHT RED BLOOD PER RECTUM): ICD-10-CM

## 2024-04-25 DIAGNOSIS — Z78.9 IMPAIRED MOBILITY AND ADLS: ICD-10-CM

## 2024-04-25 DIAGNOSIS — R53.1 WEAKNESS: ICD-10-CM

## 2024-04-25 LAB
ABO GROUP BLD: NORMAL
ABO GROUP BLD: NORMAL
ALBUMIN SERPL-MCNC: 3.4 G/DL (ref 3.5–5.2)
ALBUMIN/GLOB SERPL: 1.1 G/DL
ALP SERPL-CCNC: 124 U/L (ref 39–117)
ALT SERPL W P-5'-P-CCNC: 11 U/L (ref 1–41)
ANION GAP SERPL CALCULATED.3IONS-SCNC: 9.8 MMOL/L (ref 5–15)
AST SERPL-CCNC: 17 U/L (ref 1–40)
BASOPHILS # BLD AUTO: 0.06 10*3/MM3 (ref 0–0.2)
BASOPHILS NFR BLD AUTO: 0.4 % (ref 0–1.5)
BILIRUB SERPL-MCNC: 0.6 MG/DL (ref 0–1.2)
BLD GP AB SCN SERPL QL: NEGATIVE
BUN SERPL-MCNC: 13 MG/DL (ref 8–23)
BUN/CREAT SERPL: 13 (ref 7–25)
CALCIUM SPEC-SCNC: 8.6 MG/DL (ref 8.6–10.5)
CHLORIDE SERPL-SCNC: 105 MMOL/L (ref 98–107)
CO2 SERPL-SCNC: 26.2 MMOL/L (ref 22–29)
CREAT SERPL-MCNC: 1 MG/DL (ref 0.76–1.27)
CRP SERPL-MCNC: 5.62 MG/DL (ref 0–0.5)
DEPRECATED RDW RBC AUTO: 48.7 FL (ref 37–54)
EGFRCR SERPLBLD CKD-EPI 2021: 73.3 ML/MIN/1.73
EOSINOPHIL # BLD AUTO: 0.1 10*3/MM3 (ref 0–0.4)
EOSINOPHIL NFR BLD AUTO: 0.7 % (ref 0.3–6.2)
ERYTHROCYTE [DISTWIDTH] IN BLOOD BY AUTOMATED COUNT: 14.5 % (ref 12.3–15.4)
ERYTHROCYTE [SEDIMENTATION RATE] IN BLOOD: 18 MM/HR (ref 0–20)
GLOBULIN UR ELPH-MCNC: 3.2 GM/DL
GLUCOSE SERPL-MCNC: 99 MG/DL (ref 65–99)
HCT VFR BLD AUTO: 40.4 % (ref 37.5–51)
HGB BLD-MCNC: 12.7 G/DL (ref 13–17.7)
HOLD SPECIMEN: NORMAL
HOLD SPECIMEN: NORMAL
IMM GRANULOCYTES # BLD AUTO: 0.06 10*3/MM3 (ref 0–0.05)
IMM GRANULOCYTES NFR BLD AUTO: 0.4 % (ref 0–0.5)
INR PPP: 1.24 (ref 0.86–1.15)
LYMPHOCYTES # BLD AUTO: 1.55 10*3/MM3 (ref 0.7–3.1)
LYMPHOCYTES NFR BLD AUTO: 10.7 % (ref 19.6–45.3)
MCH RBC QN AUTO: 28.7 PG (ref 26.6–33)
MCHC RBC AUTO-ENTMCNC: 31.4 G/DL (ref 31.5–35.7)
MCV RBC AUTO: 91.4 FL (ref 79–97)
MONOCYTES # BLD AUTO: 1.5 10*3/MM3 (ref 0.1–0.9)
MONOCYTES NFR BLD AUTO: 10.4 % (ref 5–12)
NEUTROPHILS NFR BLD AUTO: 11.16 10*3/MM3 (ref 1.7–7)
NEUTROPHILS NFR BLD AUTO: 77.4 % (ref 42.7–76)
NRBC BLD AUTO-RTO: 0 /100 WBC (ref 0–0.2)
PLATELET # BLD AUTO: 337 10*3/MM3 (ref 140–450)
PMV BLD AUTO: 8.8 FL (ref 6–12)
POTASSIUM SERPL-SCNC: 4.6 MMOL/L (ref 3.5–5.2)
PROT SERPL-MCNC: 6.6 G/DL (ref 6–8.5)
PROTHROMBIN TIME: 15.9 SECONDS (ref 11.8–14.9)
RBC # BLD AUTO: 4.42 10*6/MM3 (ref 4.14–5.8)
RH BLD: POSITIVE
RH BLD: POSITIVE
SODIUM SERPL-SCNC: 141 MMOL/L (ref 136–145)
T&S EXPIRATION DATE: NORMAL
WBC NRBC COR # BLD AUTO: 14.43 10*3/MM3 (ref 3.4–10.8)
WHOLE BLOOD HOLD COAG: NORMAL
WHOLE BLOOD HOLD SPECIMEN: NORMAL

## 2024-04-25 PROCEDURE — 86140 C-REACTIVE PROTEIN: CPT | Performed by: INTERNAL MEDICINE

## 2024-04-25 PROCEDURE — G0378 HOSPITAL OBSERVATION PER HR: HCPCS

## 2024-04-25 PROCEDURE — 86900 BLOOD TYPING SEROLOGIC ABO: CPT | Performed by: INTERNAL MEDICINE

## 2024-04-25 PROCEDURE — 25510000001 IOPAMIDOL PER 1 ML: Performed by: EMERGENCY MEDICINE

## 2024-04-25 PROCEDURE — 86850 RBC ANTIBODY SCREEN: CPT | Performed by: INTERNAL MEDICINE

## 2024-04-25 PROCEDURE — 25010000002 PIPERACILLIN SOD-TAZOBACTAM PER 1 G: Performed by: INTERNAL MEDICINE

## 2024-04-25 PROCEDURE — 87040 BLOOD CULTURE FOR BACTERIA: CPT | Performed by: INTERNAL MEDICINE

## 2024-04-25 PROCEDURE — 25810000003 SODIUM CHLORIDE 0.9 % SOLUTION: Performed by: EMERGENCY MEDICINE

## 2024-04-25 PROCEDURE — 80053 COMPREHEN METABOLIC PANEL: CPT | Performed by: EMERGENCY MEDICINE

## 2024-04-25 PROCEDURE — 99214 OFFICE O/P EST MOD 30 MIN: CPT | Performed by: INTERNAL MEDICINE

## 2024-04-25 PROCEDURE — 36415 COLL VENOUS BLD VENIPUNCTURE: CPT | Performed by: INTERNAL MEDICINE

## 2024-04-25 PROCEDURE — 99223 1ST HOSP IP/OBS HIGH 75: CPT | Performed by: INTERNAL MEDICINE

## 2024-04-25 PROCEDURE — 85610 PROTHROMBIN TIME: CPT | Performed by: EMERGENCY MEDICINE

## 2024-04-25 PROCEDURE — 86901 BLOOD TYPING SEROLOGIC RH(D): CPT | Performed by: INTERNAL MEDICINE

## 2024-04-25 PROCEDURE — 85027 COMPLETE CBC AUTOMATED: CPT | Performed by: INTERNAL MEDICINE

## 2024-04-25 PROCEDURE — 85652 RBC SED RATE AUTOMATED: CPT | Performed by: INTERNAL MEDICINE

## 2024-04-25 PROCEDURE — 96365 THER/PROPH/DIAG IV INF INIT: CPT

## 2024-04-25 PROCEDURE — 86901 BLOOD TYPING SEROLOGIC RH(D): CPT

## 2024-04-25 PROCEDURE — 85025 COMPLETE CBC W/AUTO DIFF WBC: CPT | Performed by: EMERGENCY MEDICINE

## 2024-04-25 PROCEDURE — 86900 BLOOD TYPING SEROLOGIC ABO: CPT

## 2024-04-25 PROCEDURE — 74177 CT ABD & PELVIS W/CONTRAST: CPT

## 2024-04-25 PROCEDURE — 99285 EMERGENCY DEPT VISIT HI MDM: CPT

## 2024-04-25 RX ORDER — SODIUM CHLORIDE 9 MG/ML
40 INJECTION, SOLUTION INTRAVENOUS AS NEEDED
Status: DISCONTINUED | OUTPATIENT
Start: 2024-04-25 | End: 2024-05-05 | Stop reason: HOSPADM

## 2024-04-25 RX ORDER — SODIUM CHLORIDE 0.9 % (FLUSH) 0.9 %
10 SYRINGE (ML) INJECTION EVERY 12 HOURS SCHEDULED
Status: DISCONTINUED | OUTPATIENT
Start: 2024-04-25 | End: 2024-05-05 | Stop reason: HOSPADM

## 2024-04-25 RX ORDER — POLYETHYLENE GLYCOL 3350 17 G/17G
17 POWDER, FOR SOLUTION ORAL DAILY
COMMUNITY

## 2024-04-25 RX ORDER — SODIUM CHLORIDE 0.9 % (FLUSH) 0.9 %
10 SYRINGE (ML) INJECTION AS NEEDED
Status: DISCONTINUED | OUTPATIENT
Start: 2024-04-25 | End: 2024-05-05 | Stop reason: HOSPADM

## 2024-04-25 RX ORDER — ACETAMINOPHEN 325 MG/1
650 TABLET ORAL EVERY 4 HOURS PRN
Status: DISCONTINUED | OUTPATIENT
Start: 2024-04-25 | End: 2024-05-05 | Stop reason: HOSPADM

## 2024-04-25 RX ADMIN — IOPAMIDOL 100 ML: 755 INJECTION, SOLUTION INTRAVENOUS at 13:54

## 2024-04-25 RX ADMIN — Medication 10 ML: at 20:12

## 2024-04-25 RX ADMIN — PIPERACILLIN SODIUM AND TAZOBACTAM SODIUM 3.38 G: 3; .375 INJECTION, POWDER, LYOPHILIZED, FOR SOLUTION INTRAVENOUS at 15:57

## 2024-04-25 RX ADMIN — SODIUM CHLORIDE 1000 ML: 9 INJECTION, SOLUTION INTRAVENOUS at 13:14

## 2024-04-25 RX ADMIN — POLYETHYLENE GLYCOL 3350, SODIUM SULFATE ANHYDROUS, SODIUM BICARBONATE, SODIUM CHLORIDE, POTASSIUM CHLORIDE 2000 ML: 236; 22.74; 6.74; 5.86; 2.97 POWDER, FOR SOLUTION ORAL at 20:12

## 2024-04-25 RX ADMIN — PIPERACILLIN SODIUM AND TAZOBACTAM SODIUM 3.38 G: 3; .375 INJECTION, POWDER, LYOPHILIZED, FOR SOLUTION INTRAVENOUS at 21:50

## 2024-04-25 NOTE — TELEPHONE ENCOUNTER
[facility] called and states that the patient told them yesterday that he had some blood in his stool.  Then his morning he had another BM and had large amount of blood in stool.    They are sending him to Hospital for evaluation. Patient was agreeable to this.

## 2024-04-26 ENCOUNTER — ANESTHESIA EVENT (OUTPATIENT)
Dept: GASTROENTEROLOGY | Facility: HOSPITAL | Age: 89
End: 2024-04-26
Payer: MEDICARE

## 2024-04-26 ENCOUNTER — ANESTHESIA (OUTPATIENT)
Dept: GASTROENTEROLOGY | Facility: HOSPITAL | Age: 89
End: 2024-04-26
Payer: MEDICARE

## 2024-04-26 LAB
ALBUMIN SERPL-MCNC: 3.4 G/DL (ref 3.5–5.2)
ALBUMIN/GLOB SERPL: 1.2 G/DL
ALP SERPL-CCNC: 124 U/L (ref 39–117)
ALT SERPL W P-5'-P-CCNC: 14 U/L (ref 1–41)
ANION GAP SERPL CALCULATED.3IONS-SCNC: 11.7 MMOL/L (ref 5–15)
AST SERPL-CCNC: 18 U/L (ref 1–40)
BASOPHILS # BLD AUTO: 0.07 10*3/MM3 (ref 0–0.2)
BASOPHILS NFR BLD AUTO: 0.5 % (ref 0–1.5)
BILIRUB SERPL-MCNC: 1 MG/DL (ref 0–1.2)
BUN SERPL-MCNC: 10 MG/DL (ref 8–23)
BUN/CREAT SERPL: 9.8 (ref 7–25)
C COLI+JEJ+UPSA DNA STL QL NAA+NON-PROBE: NOT DETECTED
CALCIUM SPEC-SCNC: 8.3 MG/DL (ref 8.6–10.5)
CHLORIDE SERPL-SCNC: 102 MMOL/L (ref 98–107)
CO2 SERPL-SCNC: 24.3 MMOL/L (ref 22–29)
CREAT SERPL-MCNC: 1.02 MG/DL (ref 0.76–1.27)
CRYPTOSP DNA STL QL NAA+NON-PROBE: NOT DETECTED
DEPRECATED RDW RBC AUTO: 48.2 FL (ref 37–54)
DEPRECATED RDW RBC AUTO: 48.3 FL (ref 37–54)
DEPRECATED RDW RBC AUTO: 48.7 FL (ref 37–54)
DEPRECATED RDW RBC AUTO: 49.4 FL (ref 37–54)
DEPRECATED RDW RBC AUTO: 49.4 FL (ref 37–54)
E HISTOLYT DNA STL QL NAA+NON-PROBE: NOT DETECTED
EC STX1+STX2 GENES STL QL NAA+NON-PROBE: NOT DETECTED
EGFRCR SERPLBLD CKD-EPI 2021: 71.6 ML/MIN/1.73
EOSINOPHIL # BLD AUTO: 0.24 10*3/MM3 (ref 0–0.4)
EOSINOPHIL NFR BLD AUTO: 1.6 % (ref 0.3–6.2)
ERYTHROCYTE [DISTWIDTH] IN BLOOD BY AUTOMATED COUNT: 14.3 % (ref 12.3–15.4)
ERYTHROCYTE [DISTWIDTH] IN BLOOD BY AUTOMATED COUNT: 14.3 % (ref 12.3–15.4)
ERYTHROCYTE [DISTWIDTH] IN BLOOD BY AUTOMATED COUNT: 14.4 % (ref 12.3–15.4)
G LAMBLIA DNA STL QL NAA+NON-PROBE: NOT DETECTED
GLOBULIN UR ELPH-MCNC: 2.9 GM/DL
GLUCOSE SERPL-MCNC: 85 MG/DL (ref 65–99)
HCT VFR BLD AUTO: 40.5 % (ref 37.5–51)
HCT VFR BLD AUTO: 41.4 % (ref 37.5–51)
HCT VFR BLD AUTO: 42.7 % (ref 37.5–51)
HCT VFR BLD AUTO: 42.8 % (ref 37.5–51)
HCT VFR BLD AUTO: 42.8 % (ref 37.5–51)
HGB BLD-MCNC: 12.5 G/DL (ref 13–17.7)
HGB BLD-MCNC: 12.8 G/DL (ref 13–17.7)
HGB BLD-MCNC: 12.8 G/DL (ref 13–17.7)
HGB BLD-MCNC: 13.1 G/DL (ref 13–17.7)
HGB BLD-MCNC: 13.1 G/DL (ref 13–17.7)
IMM GRANULOCYTES # BLD AUTO: 0.09 10*3/MM3 (ref 0–0.05)
IMM GRANULOCYTES NFR BLD AUTO: 0.6 % (ref 0–0.5)
LYMPHOCYTES # BLD AUTO: 1.5 10*3/MM3 (ref 0.7–3.1)
LYMPHOCYTES NFR BLD AUTO: 9.9 % (ref 19.6–45.3)
MAGNESIUM SERPL-MCNC: 1.9 MG/DL (ref 1.6–2.4)
MCH RBC QN AUTO: 28.1 PG (ref 26.6–33)
MCH RBC QN AUTO: 28.5 PG (ref 26.6–33)
MCH RBC QN AUTO: 29.2 PG (ref 26.6–33)
MCHC RBC AUTO-ENTMCNC: 29.9 G/DL (ref 31.5–35.7)
MCHC RBC AUTO-ENTMCNC: 29.9 G/DL (ref 31.5–35.7)
MCHC RBC AUTO-ENTMCNC: 30.7 G/DL (ref 31.5–35.7)
MCHC RBC AUTO-ENTMCNC: 30.9 G/DL (ref 31.5–35.7)
MCHC RBC AUTO-ENTMCNC: 31.6 G/DL (ref 31.5–35.7)
MCV RBC AUTO: 91.6 FL (ref 79–97)
MCV RBC AUTO: 92.3 FL (ref 79–97)
MCV RBC AUTO: 92.4 FL (ref 79–97)
MCV RBC AUTO: 94.1 FL (ref 79–97)
MCV RBC AUTO: 94.1 FL (ref 79–97)
MONOCYTES # BLD AUTO: 1.37 10*3/MM3 (ref 0.1–0.9)
MONOCYTES NFR BLD AUTO: 9 % (ref 5–12)
NEUTROPHILS NFR BLD AUTO: 11.92 10*3/MM3 (ref 1.7–7)
NEUTROPHILS NFR BLD AUTO: 78.4 % (ref 42.7–76)
NRBC BLD AUTO-RTO: 0 /100 WBC (ref 0–0.2)
PLATELET # BLD AUTO: 295 10*3/MM3 (ref 140–450)
PLATELET # BLD AUTO: 321 10*3/MM3 (ref 140–450)
PLATELET # BLD AUTO: 324 10*3/MM3 (ref 140–450)
PLATELET # BLD AUTO: 350 10*3/MM3 (ref 140–450)
PLATELET # BLD AUTO: 350 10*3/MM3 (ref 140–450)
PMV BLD AUTO: 8.8 FL (ref 6–12)
PMV BLD AUTO: 8.9 FL (ref 6–12)
PMV BLD AUTO: 9 FL (ref 6–12)
PMV BLD AUTO: 9 FL (ref 6–12)
PMV BLD AUTO: 9.2 FL (ref 6–12)
POTASSIUM SERPL-SCNC: 4.5 MMOL/L (ref 3.5–5.2)
PROT SERPL-MCNC: 6.3 G/DL (ref 6–8.5)
RBC # BLD AUTO: 4.39 10*6/MM3 (ref 4.14–5.8)
RBC # BLD AUTO: 4.48 10*6/MM3 (ref 4.14–5.8)
RBC # BLD AUTO: 4.55 10*6/MM3 (ref 4.14–5.8)
RBC # BLD AUTO: 4.55 10*6/MM3 (ref 4.14–5.8)
RBC # BLD AUTO: 4.66 10*6/MM3 (ref 4.14–5.8)
S ENT+BONG DNA STL QL NAA+NON-PROBE: NOT DETECTED
SHIGELLA SP+EIEC IPAH ST NAA+NON-PROBE: NOT DETECTED
SODIUM SERPL-SCNC: 138 MMOL/L (ref 136–145)
WBC NRBC COR # BLD AUTO: 10.3 10*3/MM3 (ref 3.4–10.8)
WBC NRBC COR # BLD AUTO: 10.96 10*3/MM3 (ref 3.4–10.8)
WBC NRBC COR # BLD AUTO: 14.03 10*3/MM3 (ref 3.4–10.8)
WBC NRBC COR # BLD AUTO: 15.19 10*3/MM3 (ref 3.4–10.8)
WBC NRBC COR # BLD AUTO: 15.19 10*3/MM3 (ref 3.4–10.8)

## 2024-04-26 PROCEDURE — 25010000002 PIPERACILLIN SOD-TAZOBACTAM PER 1 G: Performed by: INTERNAL MEDICINE

## 2024-04-26 PROCEDURE — A9270 NON-COVERED ITEM OR SERVICE: HCPCS | Performed by: INTERNAL MEDICINE

## 2024-04-26 PROCEDURE — 87427 SHIGA-LIKE TOXIN AG IA: CPT | Performed by: INTERNAL MEDICINE

## 2024-04-26 PROCEDURE — 63710000001 ATORVASTATIN 10 MG TABLET: Performed by: INTERNAL MEDICINE

## 2024-04-26 PROCEDURE — 63710000001 SENNOSIDES-DOCUSATE 8.6-50 MG TABLET: Performed by: INTERNAL MEDICINE

## 2024-04-26 PROCEDURE — G0378 HOSPITAL OBSERVATION PER HR: HCPCS

## 2024-04-26 PROCEDURE — 45380 COLONOSCOPY AND BIOPSY: CPT | Performed by: INTERNAL MEDICINE

## 2024-04-26 PROCEDURE — 85027 COMPLETE CBC AUTOMATED: CPT | Performed by: INTERNAL MEDICINE

## 2024-04-26 PROCEDURE — 83735 ASSAY OF MAGNESIUM: CPT | Performed by: INTERNAL MEDICINE

## 2024-04-26 PROCEDURE — 63710000001 MESALAMINE 1000 MG SUPPOSITORY: Performed by: INTERNAL MEDICINE

## 2024-04-26 PROCEDURE — 99232 SBSQ HOSP IP/OBS MODERATE 35: CPT | Performed by: INTERNAL MEDICINE

## 2024-04-26 PROCEDURE — 63710000001 MIRTAZAPINE 15 MG TABLET: Performed by: INTERNAL MEDICINE

## 2024-04-26 PROCEDURE — 87045 FECES CULTURE AEROBIC BACT: CPT | Performed by: INTERNAL MEDICINE

## 2024-04-26 PROCEDURE — 63710000001 CARBIDOPA-LEVODOPA 25-100 MG TABLET: Performed by: INTERNAL MEDICINE

## 2024-04-26 PROCEDURE — 87506 IADNA-DNA/RNA PROBE TQ 6-11: CPT | Performed by: INTERNAL MEDICINE

## 2024-04-26 PROCEDURE — 25010000002 PROPOFOL 10 MG/ML EMULSION: Performed by: NURSE ANESTHETIST, CERTIFIED REGISTERED

## 2024-04-26 PROCEDURE — 63710000001 PSYLLIUM 51.7 % PACK: Performed by: INTERNAL MEDICINE

## 2024-04-26 PROCEDURE — 87046 STOOL CULTR AEROBIC BACT EA: CPT | Performed by: INTERNAL MEDICINE

## 2024-04-26 PROCEDURE — 25810000003 LACTATED RINGERS PER 1000 ML: Performed by: NURSE ANESTHETIST, CERTIFIED REGISTERED

## 2024-04-26 PROCEDURE — 85025 COMPLETE CBC W/AUTO DIFF WBC: CPT | Performed by: INTERNAL MEDICINE

## 2024-04-26 PROCEDURE — 88305 TISSUE EXAM BY PATHOLOGIST: CPT | Performed by: INTERNAL MEDICINE

## 2024-04-26 PROCEDURE — 63710000001 METOPROLOL SUCCINATE XL 25 MG TABLET SUSTAINED-RELEASE 24 HOUR: Performed by: INTERNAL MEDICINE

## 2024-04-26 PROCEDURE — 80053 COMPREHEN METABOLIC PANEL: CPT | Performed by: INTERNAL MEDICINE

## 2024-04-26 RX ORDER — MIRTAZAPINE 15 MG/1
15 TABLET, FILM COATED ORAL DAILY
Status: DISCONTINUED | OUTPATIENT
Start: 2024-04-26 | End: 2024-05-05 | Stop reason: HOSPADM

## 2024-04-26 RX ORDER — PHENYLEPHRINE HCL IN 0.9% NACL 1 MG/10 ML
SYRINGE (ML) INTRAVENOUS AS NEEDED
Status: DISCONTINUED | OUTPATIENT
Start: 2024-04-26 | End: 2024-04-26 | Stop reason: SURG

## 2024-04-26 RX ORDER — SODIUM CHLORIDE, SODIUM LACTATE, POTASSIUM CHLORIDE, CALCIUM CHLORIDE 600; 310; 30; 20 MG/100ML; MG/100ML; MG/100ML; MG/100ML
30 INJECTION, SOLUTION INTRAVENOUS CONTINUOUS
Status: DISCONTINUED | OUTPATIENT
Start: 2024-04-26 | End: 2024-04-27

## 2024-04-26 RX ORDER — ATORVASTATIN CALCIUM 10 MG/1
10 TABLET, FILM COATED ORAL NIGHTLY
Status: DISCONTINUED | OUTPATIENT
Start: 2024-04-26 | End: 2024-05-05 | Stop reason: HOSPADM

## 2024-04-26 RX ORDER — PROPOFOL 10 MG/ML
VIAL (ML) INTRAVENOUS AS NEEDED
Status: DISCONTINUED | OUTPATIENT
Start: 2024-04-26 | End: 2024-04-26 | Stop reason: SURG

## 2024-04-26 RX ORDER — METOPROLOL SUCCINATE 25 MG/1
12.5 TABLET, EXTENDED RELEASE ORAL
Status: DISCONTINUED | OUTPATIENT
Start: 2024-04-26 | End: 2024-05-05 | Stop reason: HOSPADM

## 2024-04-26 RX ORDER — MESALAMINE 1000 MG/1
1000 SUPPOSITORY RECTAL NIGHTLY
Status: DISCONTINUED | OUTPATIENT
Start: 2024-04-26 | End: 2024-05-05 | Stop reason: HOSPADM

## 2024-04-26 RX ORDER — AMOXICILLIN 250 MG
1 CAPSULE ORAL 2 TIMES DAILY
Status: DISCONTINUED | OUTPATIENT
Start: 2024-04-26 | End: 2024-05-05 | Stop reason: HOSPADM

## 2024-04-26 RX ORDER — SODIUM CHLORIDE, SODIUM LACTATE, POTASSIUM CHLORIDE, CALCIUM CHLORIDE 600; 310; 30; 20 MG/100ML; MG/100ML; MG/100ML; MG/100ML
INJECTION, SOLUTION INTRAVENOUS CONTINUOUS PRN
Status: DISCONTINUED | OUTPATIENT
Start: 2024-04-26 | End: 2024-04-26 | Stop reason: SURG

## 2024-04-26 RX ORDER — LIDOCAINE HYDROCHLORIDE 20 MG/ML
INJECTION, SOLUTION EPIDURAL; INFILTRATION; INTRACAUDAL; PERINEURAL AS NEEDED
Status: DISCONTINUED | OUTPATIENT
Start: 2024-04-26 | End: 2024-04-26 | Stop reason: SURG

## 2024-04-26 RX ADMIN — AVOBENZONE, HOMOSALATE, OCTISALATE, OCTOCRYLENE, AND OXYBENZONE 1 PACKET: 29.4; 147; 49; 25.4; 58.8 LOTION TOPICAL at 16:23

## 2024-04-26 RX ADMIN — CARBIDOPA AND LEVODOPA 1 TABLET: 25; 100 TABLET ORAL at 21:03

## 2024-04-26 RX ADMIN — Medication 10 ML: at 12:08

## 2024-04-26 RX ADMIN — PIPERACILLIN SODIUM AND TAZOBACTAM SODIUM 3.38 G: 3; .375 INJECTION, POWDER, LYOPHILIZED, FOR SOLUTION INTRAVENOUS at 05:47

## 2024-04-26 RX ADMIN — METOPROLOL SUCCINATE 12.5 MG: 25 TABLET, EXTENDED RELEASE ORAL at 12:07

## 2024-04-26 RX ADMIN — MESALAMINE 1000 MG: 1000 SUPPOSITORY RECTAL at 21:03

## 2024-04-26 RX ADMIN — Medication 150 MCG: at 11:07

## 2024-04-26 RX ADMIN — ATORVASTATIN CALCIUM 10 MG: 10 TABLET, FILM COATED ORAL at 21:03

## 2024-04-26 RX ADMIN — MIRTAZAPINE 15 MG: 15 TABLET, FILM COATED ORAL at 12:07

## 2024-04-26 RX ADMIN — CARBIDOPA AND LEVODOPA 1 TABLET: 25; 100 TABLET ORAL at 12:07

## 2024-04-26 RX ADMIN — PROPOFOL 50 MG: 10 INJECTION, EMULSION INTRAVENOUS at 10:53

## 2024-04-26 RX ADMIN — SENNOSIDES AND DOCUSATE SODIUM 1 TABLET: 50; 8.6 TABLET ORAL at 21:03

## 2024-04-26 RX ADMIN — POLYETHYLENE GLYCOL 3350, SODIUM SULFATE ANHYDROUS, SODIUM BICARBONATE, SODIUM CHLORIDE, POTASSIUM CHLORIDE 2000 ML: 236; 22.74; 6.74; 5.86; 2.97 POWDER, FOR SOLUTION ORAL at 04:48

## 2024-04-26 RX ADMIN — PROPOFOL 150 MCG/KG/MIN: 10 INJECTION, EMULSION INTRAVENOUS at 10:56

## 2024-04-26 RX ADMIN — PIPERACILLIN SODIUM AND TAZOBACTAM SODIUM 3.38 G: 3; .375 INJECTION, POWDER, LYOPHILIZED, FOR SOLUTION INTRAVENOUS at 14:44

## 2024-04-26 RX ADMIN — LIDOCAINE HYDROCHLORIDE 100 MG: 20 INJECTION, SOLUTION INTRAVENOUS at 10:53

## 2024-04-26 RX ADMIN — PIPERACILLIN SODIUM AND TAZOBACTAM SODIUM 3.38 G: 3; .375 INJECTION, POWDER, LYOPHILIZED, FOR SOLUTION INTRAVENOUS at 21:53

## 2024-04-26 RX ADMIN — SODIUM CHLORIDE, POTASSIUM CHLORIDE, SODIUM LACTATE AND CALCIUM CHLORIDE: 600; 310; 30; 20 INJECTION, SOLUTION INTRAVENOUS at 10:49

## 2024-04-26 NOTE — ANESTHESIA POSTPROCEDURE EVALUATION
Patient: Enrique Wylie    Procedure Summary       Date: 04/26/24 Room / Location: [facility] 4 / [facility]    Anesthesia Start: 1048 Anesthesia Stop: 1121    Procedure: COLONOSCOPY WITH BIOPSIES Diagnosis:       Proctitis      BRBPR (bright red blood per rectum)      (Proctitis [K62.89])      (BRBPR (bright red blood per rectum) [K62.5])    Surgeons: Oral Duong MD Provider: Kamilah Preston CRNA    Anesthesia Type: general ASA Status: 3            Anesthesia Type: general    Vitals  Vitals Value Taken Time   /62 04/26/24 1136   Temp 36.7 °C (98 °F) 04/26/24 1135   Pulse 95 04/26/24 1137   Resp 15 04/26/24 1135   SpO2 100 % 04/26/24 1127   Vitals shown include unfiled device data.        Post Anesthesia Care and Evaluation    Post-procedure mental status: acceptable.  Pain management: satisfactory to patient    Airway patency: patent  Anesthetic complications: No anesthetic complications    Cardiovascular status: acceptable  Respiratory status: acceptable    Comments: Per chart review

## 2024-04-26 NOTE — OUTREACH NOTE
Medical Week 2 Survey      Flowsheet Row Responses   [facility] patient discharged from? [facility]   Does the patient have one of the following disease processes/diagnoses(primary or secondary)? Other   Week 2 attempt successful? No   Unsuccessful attempts Attempt 1   Revoke Readmitted            YARELY CONTRERAS - Registered Nurse

## 2024-04-26 NOTE — ANESTHESIA PREPROCEDURE EVALUATION
Anesthesia Evaluation     Patient summary reviewed and Nursing notes reviewed   NPO Solid Status: > 8 hours  NPO Liquid Status: > 2 hours           Airway   Mallampati: II  TM distance: >3 FB  Neck ROM: full  No difficulty expected  Dental - normal exam     Pulmonary     breath sounds clear to auscultation  Cardiovascular     ECG reviewed  Rhythm: irregular  Rate: normal    (+) hypertension, dysrhythmias Atrial Fib, Paroxysmal Atrial Fib, hyperlipidemia    PE comment: In A Fib    Neuro/Psych  (+) psychiatric history Depression  GI/Hepatic/Renal/Endo    (+) GI bleeding lower active bleeding, renal disease- stones    Musculoskeletal     (+) back pain  Abdominal    Substance History      OB/GYN          Other   arthritis,   history of cancer remission    ROS/Med Hx Other: EKG, 4/15/2024:   HEART RATE= 98  bpm  RR Interval= 614  ms  WI Interval=   ms  P Horizontal Axis=   deg  P Front Axis=   deg  QRSD Interval= 107  ms  QT Interval= 370  ms  QTcB= 472  ms  QRS Axis= -57  deg  T Wave Axis= 26  deg  - ABNORMAL ECG -  Atrial fibrillation  Left anterior fascicular block  Consider right ventricular hypertrophy  When compared with ECG of 27-Mar-2019 9:54:00,  Significant change in rhythm: previously sinus  Electronically Signed By: Bradley Justin ([facility]) 15-Apr-2024 11:52:41  Date and Time of Study: 2024-04-10 14:56:58    STRESS TEST, 12/05/2023:  Rest ECG Baseline ECG rhythm of atrial fibrillation noted. Controlled ventricular response. PVCs, right bundle branch block and left anterior fascicular block noted. Some ventricular bigeminy pattern unifocal PVC's.  Stress Description A pharmacological stress test was performed using regadenoson without low-level exercise.   The patient reached the end of the protocol.   The patient reported shortness of breath during the stress test. Symptoms were not clinically significant.   Blood pressure demonstrated a normal response to stress. Heart rate demonstrated a normal response to  stress.  Stress ECG Stress ECG rhythm of atrial fibrillation noted. Controlled ventricular response. PVCs, right bundle branch block and left anterior fascicular block noted during stress.   There was no ST segment deviation noted during stress.   Arrhythmias during stress: rare PVCs. Unifocal, single PVC's.   Arrhythmias were not significant during stress.      Stress ECG was interpretable.  Recovery ECG During recovery, the patient complained of no significant symptoms following stress.     Atrial fibrillation was noted during recovery. Recovery rhythm comment: Controlled ventricular response.   PVC's, RBBB and LAFB noted on ECG during recovery stage.   There was no ST segment deviation noted during recovery.   Arrhythmias during recovery: frequent PVC's. Recovery arrhythmias comment: Unifocal, single PVC's.   Arrhythmias were not significant during recovery.                        Anesthesia Plan    ASA 3     general   total IV anesthesia  (Total IV Anesthesia    Patient understands anesthesia not responsible for dental damage.  )  intravenous induction     Anesthetic plan, risks, benefits, and alternatives have been provided, discussed and informed consent has been obtained with: patient.  Pre-procedure education provided  Plan discussed with CRNA.        CODE STATUS:    Level Of Support Discussed With: Patient  Code Status (Patient has no pulse and is not breathing): CPR (Attempt to Resuscitate)  Medical Interventions (Patient has pulse or is breathing): Full Support

## 2024-04-27 LAB
DEPRECATED RDW RBC AUTO: 48.3 FL (ref 37–54)
DEPRECATED RDW RBC AUTO: 48.5 FL (ref 37–54)
DEPRECATED RDW RBC AUTO: 48.7 FL (ref 37–54)
ERYTHROCYTE [DISTWIDTH] IN BLOOD BY AUTOMATED COUNT: 14.5 % (ref 12.3–15.4)
ERYTHROCYTE [DISTWIDTH] IN BLOOD BY AUTOMATED COUNT: 14.5 % (ref 12.3–15.4)
ERYTHROCYTE [DISTWIDTH] IN BLOOD BY AUTOMATED COUNT: 14.6 % (ref 12.3–15.4)
HCT VFR BLD AUTO: 41.5 % (ref 37.5–51)
HCT VFR BLD AUTO: 41.8 % (ref 37.5–51)
HCT VFR BLD AUTO: 42 % (ref 37.5–51)
HGB BLD-MCNC: 13.1 G/DL (ref 13–17.7)
HGB BLD-MCNC: 13.1 G/DL (ref 13–17.7)
HGB BLD-MCNC: 13.2 G/DL (ref 13–17.7)
MCH RBC QN AUTO: 28.7 PG (ref 26.6–33)
MCH RBC QN AUTO: 28.8 PG (ref 26.6–33)
MCH RBC QN AUTO: 28.9 PG (ref 26.6–33)
MCHC RBC AUTO-ENTMCNC: 31.2 G/DL (ref 31.5–35.7)
MCHC RBC AUTO-ENTMCNC: 31.6 G/DL (ref 31.5–35.7)
MCHC RBC AUTO-ENTMCNC: 31.6 G/DL (ref 31.5–35.7)
MCV RBC AUTO: 90.8 FL (ref 79–97)
MCV RBC AUTO: 91.1 FL (ref 79–97)
MCV RBC AUTO: 92.5 FL (ref 79–97)
PLATELET # BLD AUTO: 293 10*3/MM3 (ref 140–450)
PLATELET # BLD AUTO: 342 10*3/MM3 (ref 140–450)
PLATELET # BLD AUTO: 367 10*3/MM3 (ref 140–450)
PMV BLD AUTO: 8.9 FL (ref 6–12)
PMV BLD AUTO: 9 FL (ref 6–12)
PMV BLD AUTO: 9.5 FL (ref 6–12)
RBC # BLD AUTO: 4.54 10*6/MM3 (ref 4.14–5.8)
RBC # BLD AUTO: 4.57 10*6/MM3 (ref 4.14–5.8)
RBC # BLD AUTO: 4.59 10*6/MM3 (ref 4.14–5.8)
WBC NRBC COR # BLD AUTO: 11.5 10*3/MM3 (ref 3.4–10.8)
WBC NRBC COR # BLD AUTO: 8.82 10*3/MM3 (ref 3.4–10.8)
WBC NRBC COR # BLD AUTO: 8.96 10*3/MM3 (ref 3.4–10.8)

## 2024-04-27 PROCEDURE — A9270 NON-COVERED ITEM OR SERVICE: HCPCS | Performed by: STUDENT IN AN ORGANIZED HEALTH CARE EDUCATION/TRAINING PROGRAM

## 2024-04-27 PROCEDURE — 99232 SBSQ HOSP IP/OBS MODERATE 35: CPT | Performed by: STUDENT IN AN ORGANIZED HEALTH CARE EDUCATION/TRAINING PROGRAM

## 2024-04-27 PROCEDURE — 63710000001 MESALAMINE 1000 MG SUPPOSITORY: Performed by: INTERNAL MEDICINE

## 2024-04-27 PROCEDURE — 25010000002 PIPERACILLIN SOD-TAZOBACTAM PER 1 G: Performed by: INTERNAL MEDICINE

## 2024-04-27 PROCEDURE — 63710000001 METRONIDAZOLE 500 MG TABLET: Performed by: STUDENT IN AN ORGANIZED HEALTH CARE EDUCATION/TRAINING PROGRAM

## 2024-04-27 PROCEDURE — A9270 NON-COVERED ITEM OR SERVICE: HCPCS | Performed by: INTERNAL MEDICINE

## 2024-04-27 PROCEDURE — 97165 OT EVAL LOW COMPLEX 30 MIN: CPT

## 2024-04-27 PROCEDURE — 63710000001 METOPROLOL SUCCINATE XL 25 MG TABLET SUSTAINED-RELEASE 24 HOUR: Performed by: INTERNAL MEDICINE

## 2024-04-27 PROCEDURE — 63710000001 ATORVASTATIN 10 MG TABLET: Performed by: INTERNAL MEDICINE

## 2024-04-27 PROCEDURE — 85027 COMPLETE CBC AUTOMATED: CPT | Performed by: INTERNAL MEDICINE

## 2024-04-27 PROCEDURE — 63710000001 CARBIDOPA-LEVODOPA 25-100 MG TABLET: Performed by: INTERNAL MEDICINE

## 2024-04-27 PROCEDURE — 97161 PT EVAL LOW COMPLEX 20 MIN: CPT

## 2024-04-27 PROCEDURE — 63710000001 LEVOFLOXACIN 750 MG TABLET: Performed by: STUDENT IN AN ORGANIZED HEALTH CARE EDUCATION/TRAINING PROGRAM

## 2024-04-27 PROCEDURE — G0378 HOSPITAL OBSERVATION PER HR: HCPCS

## 2024-04-27 PROCEDURE — 63710000001 MIRTAZAPINE 15 MG TABLET: Performed by: INTERNAL MEDICINE

## 2024-04-27 PROCEDURE — 63710000001 PSYLLIUM 51.7 % PACK: Performed by: INTERNAL MEDICINE

## 2024-04-27 PROCEDURE — 63710000001 SENNOSIDES-DOCUSATE 8.6-50 MG TABLET: Performed by: INTERNAL MEDICINE

## 2024-04-27 RX ORDER — METRONIDAZOLE 500 MG/1
500 TABLET ORAL EVERY 8 HOURS SCHEDULED
Qty: 15 TABLET | Refills: 0 | Status: SHIPPED | OUTPATIENT
Start: 2024-04-27 | End: 2024-05-05 | Stop reason: HOSPADM

## 2024-04-27 RX ORDER — LEVOFLOXACIN 750 MG/1
750 TABLET, FILM COATED ORAL
Qty: 5 TABLET | Refills: 0 | Status: SHIPPED | OUTPATIENT
Start: 2024-04-27 | End: 2024-05-05 | Stop reason: HOSPADM

## 2024-04-27 RX ORDER — LEVOFLOXACIN 750 MG/1
750 TABLET, FILM COATED ORAL
Status: COMPLETED | OUTPATIENT
Start: 2024-04-27 | End: 2024-05-01

## 2024-04-27 RX ORDER — MESALAMINE 1000 MG/1
1000 SUPPOSITORY RECTAL NIGHTLY
Qty: 6 SUPPOSITORY | Refills: 0 | Status: SHIPPED | OUTPATIENT
Start: 2024-04-27

## 2024-04-27 RX ORDER — METRONIDAZOLE 500 MG/1
500 TABLET ORAL EVERY 8 HOURS SCHEDULED
Status: COMPLETED | OUTPATIENT
Start: 2024-04-27 | End: 2024-05-02

## 2024-04-27 RX ORDER — AMOXICILLIN 250 MG
1 CAPSULE ORAL 2 TIMES DAILY
Start: 2024-04-27

## 2024-04-27 RX ADMIN — LEVOFLOXACIN 750 MG: 750 TABLET, FILM COATED ORAL at 15:37

## 2024-04-27 RX ADMIN — SENNOSIDES AND DOCUSATE SODIUM 1 TABLET: 50; 8.6 TABLET ORAL at 20:20

## 2024-04-27 RX ADMIN — Medication 10 ML: at 20:20

## 2024-04-27 RX ADMIN — CARBIDOPA AND LEVODOPA 1 TABLET: 25; 100 TABLET ORAL at 11:09

## 2024-04-27 RX ADMIN — MIRTAZAPINE 15 MG: 15 TABLET, FILM COATED ORAL at 11:09

## 2024-04-27 RX ADMIN — METRONIDAZOLE 500 MG: 500 TABLET ORAL at 20:20

## 2024-04-27 RX ADMIN — SENNOSIDES AND DOCUSATE SODIUM 1 TABLET: 50; 8.6 TABLET ORAL at 11:09

## 2024-04-27 RX ADMIN — METRONIDAZOLE 500 MG: 500 TABLET ORAL at 15:37

## 2024-04-27 RX ADMIN — Medication 10 ML: at 11:10

## 2024-04-27 RX ADMIN — ATORVASTATIN CALCIUM 10 MG: 10 TABLET, FILM COATED ORAL at 20:20

## 2024-04-27 RX ADMIN — MESALAMINE 1000 MG: 1000 SUPPOSITORY RECTAL at 20:20

## 2024-04-27 RX ADMIN — PIPERACILLIN SODIUM AND TAZOBACTAM SODIUM 3.38 G: 3; .375 INJECTION, POWDER, LYOPHILIZED, FOR SOLUTION INTRAVENOUS at 05:32

## 2024-04-27 RX ADMIN — CARBIDOPA AND LEVODOPA 1 TABLET: 25; 100 TABLET ORAL at 20:20

## 2024-04-27 RX ADMIN — AVOBENZONE, HOMOSALATE, OCTISALATE, OCTOCRYLENE, AND OXYBENZONE 1 PACKET: 29.4; 147; 49; 25.4; 58.8 LOTION TOPICAL at 11:09

## 2024-04-27 RX ADMIN — METOPROLOL SUCCINATE 12.5 MG: 25 TABLET, EXTENDED RELEASE ORAL at 11:09

## 2024-04-28 LAB
DEPRECATED RDW RBC AUTO: 47.6 FL (ref 37–54)
DEPRECATED RDW RBC AUTO: 48.8 FL (ref 37–54)
ERYTHROCYTE [DISTWIDTH] IN BLOOD BY AUTOMATED COUNT: 14.3 % (ref 12.3–15.4)
ERYTHROCYTE [DISTWIDTH] IN BLOOD BY AUTOMATED COUNT: 14.5 % (ref 12.3–15.4)
HCT VFR BLD AUTO: 39.9 % (ref 37.5–51)
HCT VFR BLD AUTO: 43.5 % (ref 37.5–51)
HGB BLD-MCNC: 12.6 G/DL (ref 13–17.7)
HGB BLD-MCNC: 13.4 G/DL (ref 13–17.7)
MCH RBC QN AUTO: 28.5 PG (ref 26.6–33)
MCH RBC QN AUTO: 28.6 PG (ref 26.6–33)
MCHC RBC AUTO-ENTMCNC: 30.8 G/DL (ref 31.5–35.7)
MCHC RBC AUTO-ENTMCNC: 31.6 G/DL (ref 31.5–35.7)
MCV RBC AUTO: 90.5 FL (ref 79–97)
MCV RBC AUTO: 92.4 FL (ref 79–97)
PLATELET # BLD AUTO: 312 10*3/MM3 (ref 140–450)
PLATELET # BLD AUTO: 335 10*3/MM3 (ref 140–450)
PMV BLD AUTO: 8.9 FL (ref 6–12)
PMV BLD AUTO: 9.1 FL (ref 6–12)
RBC # BLD AUTO: 4.41 10*6/MM3 (ref 4.14–5.8)
RBC # BLD AUTO: 4.71 10*6/MM3 (ref 4.14–5.8)
WBC NRBC COR # BLD AUTO: 8.03 10*3/MM3 (ref 3.4–10.8)
WBC NRBC COR # BLD AUTO: 8.97 10*3/MM3 (ref 3.4–10.8)

## 2024-04-28 PROCEDURE — 97110 THERAPEUTIC EXERCISES: CPT

## 2024-04-28 PROCEDURE — 63710000001 CARBIDOPA-LEVODOPA 25-100 MG TABLET: Performed by: INTERNAL MEDICINE

## 2024-04-28 PROCEDURE — 63710000001 PSYLLIUM 51.7 % PACK: Performed by: INTERNAL MEDICINE

## 2024-04-28 PROCEDURE — A9270 NON-COVERED ITEM OR SERVICE: HCPCS | Performed by: INTERNAL MEDICINE

## 2024-04-28 PROCEDURE — G0378 HOSPITAL OBSERVATION PER HR: HCPCS

## 2024-04-28 PROCEDURE — 97116 GAIT TRAINING THERAPY: CPT

## 2024-04-28 PROCEDURE — 99232 SBSQ HOSP IP/OBS MODERATE 35: CPT | Performed by: STUDENT IN AN ORGANIZED HEALTH CARE EDUCATION/TRAINING PROGRAM

## 2024-04-28 PROCEDURE — 63710000001 SENNOSIDES-DOCUSATE 8.6-50 MG TABLET: Performed by: INTERNAL MEDICINE

## 2024-04-28 PROCEDURE — 63710000001 LEVOFLOXACIN 750 MG TABLET: Performed by: STUDENT IN AN ORGANIZED HEALTH CARE EDUCATION/TRAINING PROGRAM

## 2024-04-28 PROCEDURE — 85027 COMPLETE CBC AUTOMATED: CPT | Performed by: INTERNAL MEDICINE

## 2024-04-28 PROCEDURE — 97530 THERAPEUTIC ACTIVITIES: CPT

## 2024-04-28 PROCEDURE — 63710000001 ATORVASTATIN 10 MG TABLET: Performed by: INTERNAL MEDICINE

## 2024-04-28 PROCEDURE — A9270 NON-COVERED ITEM OR SERVICE: HCPCS | Performed by: STUDENT IN AN ORGANIZED HEALTH CARE EDUCATION/TRAINING PROGRAM

## 2024-04-28 PROCEDURE — 63710000001 METOPROLOL SUCCINATE XL 25 MG TABLET SUSTAINED-RELEASE 24 HOUR: Performed by: INTERNAL MEDICINE

## 2024-04-28 PROCEDURE — 63710000001 MESALAMINE 1000 MG SUPPOSITORY: Performed by: INTERNAL MEDICINE

## 2024-04-28 PROCEDURE — 63710000001 METRONIDAZOLE 500 MG TABLET: Performed by: STUDENT IN AN ORGANIZED HEALTH CARE EDUCATION/TRAINING PROGRAM

## 2024-04-28 PROCEDURE — 63710000001 MIRTAZAPINE 15 MG TABLET: Performed by: INTERNAL MEDICINE

## 2024-04-28 RX ADMIN — METRONIDAZOLE 500 MG: 500 TABLET ORAL at 05:01

## 2024-04-28 RX ADMIN — CARBIDOPA AND LEVODOPA 1 TABLET: 25; 100 TABLET ORAL at 11:09

## 2024-04-28 RX ADMIN — CARBIDOPA AND LEVODOPA 1 TABLET: 25; 100 TABLET ORAL at 20:30

## 2024-04-28 RX ADMIN — Medication 10 ML: at 11:11

## 2024-04-28 RX ADMIN — ATORVASTATIN CALCIUM 10 MG: 10 TABLET, FILM COATED ORAL at 20:30

## 2024-04-28 RX ADMIN — MESALAMINE 1000 MG: 1000 SUPPOSITORY RECTAL at 20:30

## 2024-04-28 RX ADMIN — MIRTAZAPINE 15 MG: 15 TABLET, FILM COATED ORAL at 11:09

## 2024-04-28 RX ADMIN — AVOBENZONE, HOMOSALATE, OCTISALATE, OCTOCRYLENE, AND OXYBENZONE 1 PACKET: 29.4; 147; 49; 25.4; 58.8 LOTION TOPICAL at 11:09

## 2024-04-28 RX ADMIN — METRONIDAZOLE 500 MG: 500 TABLET ORAL at 20:30

## 2024-04-28 RX ADMIN — METRONIDAZOLE 500 MG: 500 TABLET ORAL at 14:52

## 2024-04-28 RX ADMIN — LEVOFLOXACIN 750 MG: 750 TABLET, FILM COATED ORAL at 11:09

## 2024-04-28 RX ADMIN — Medication 10 ML: at 20:30

## 2024-04-28 RX ADMIN — METOPROLOL SUCCINATE 12.5 MG: 25 TABLET, EXTENDED RELEASE ORAL at 11:09

## 2024-04-28 RX ADMIN — SENNOSIDES AND DOCUSATE SODIUM 1 TABLET: 50; 8.6 TABLET ORAL at 20:30

## 2024-04-28 RX ADMIN — SENNOSIDES AND DOCUSATE SODIUM 1 TABLET: 50; 8.6 TABLET ORAL at 11:09

## 2024-04-29 ENCOUNTER — TELEPHONE (OUTPATIENT)
Dept: FAMILY MEDICINE CLINIC | Facility: CLINIC | Age: 89
End: 2024-04-29

## 2024-04-29 PROCEDURE — 63710000001 PSYLLIUM 51.7 % PACK: Performed by: INTERNAL MEDICINE

## 2024-04-29 PROCEDURE — 63710000001 CARBIDOPA-LEVODOPA 25-100 MG TABLET: Performed by: INTERNAL MEDICINE

## 2024-04-29 PROCEDURE — A9270 NON-COVERED ITEM OR SERVICE: HCPCS | Performed by: STUDENT IN AN ORGANIZED HEALTH CARE EDUCATION/TRAINING PROGRAM

## 2024-04-29 PROCEDURE — A9270 NON-COVERED ITEM OR SERVICE: HCPCS | Performed by: INTERNAL MEDICINE

## 2024-04-29 PROCEDURE — G0378 HOSPITAL OBSERVATION PER HR: HCPCS

## 2024-04-29 PROCEDURE — 63710000001 METRONIDAZOLE 500 MG TABLET: Performed by: STUDENT IN AN ORGANIZED HEALTH CARE EDUCATION/TRAINING PROGRAM

## 2024-04-29 PROCEDURE — 97116 GAIT TRAINING THERAPY: CPT

## 2024-04-29 PROCEDURE — 99232 SBSQ HOSP IP/OBS MODERATE 35: CPT | Performed by: STUDENT IN AN ORGANIZED HEALTH CARE EDUCATION/TRAINING PROGRAM

## 2024-04-29 PROCEDURE — 63710000001 MESALAMINE 1000 MG SUPPOSITORY: Performed by: INTERNAL MEDICINE

## 2024-04-29 PROCEDURE — 63710000001 MIRTAZAPINE 15 MG TABLET: Performed by: INTERNAL MEDICINE

## 2024-04-29 PROCEDURE — 63710000001 SENNOSIDES-DOCUSATE 8.6-50 MG TABLET: Performed by: INTERNAL MEDICINE

## 2024-04-29 PROCEDURE — 97110 THERAPEUTIC EXERCISES: CPT

## 2024-04-29 PROCEDURE — 63710000001 ATORVASTATIN 10 MG TABLET: Performed by: INTERNAL MEDICINE

## 2024-04-29 PROCEDURE — 63710000001 METOPROLOL SUCCINATE XL 25 MG TABLET SUSTAINED-RELEASE 24 HOUR: Performed by: INTERNAL MEDICINE

## 2024-04-29 PROCEDURE — 63710000001 LEVOFLOXACIN 750 MG TABLET: Performed by: STUDENT IN AN ORGANIZED HEALTH CARE EDUCATION/TRAINING PROGRAM

## 2024-04-29 RX ADMIN — METRONIDAZOLE 500 MG: 500 TABLET ORAL at 06:15

## 2024-04-29 RX ADMIN — MIRTAZAPINE 15 MG: 15 TABLET, FILM COATED ORAL at 09:30

## 2024-04-29 RX ADMIN — ATORVASTATIN CALCIUM 10 MG: 10 TABLET, FILM COATED ORAL at 20:21

## 2024-04-29 RX ADMIN — SENNOSIDES AND DOCUSATE SODIUM 1 TABLET: 50; 8.6 TABLET ORAL at 20:21

## 2024-04-29 RX ADMIN — METRONIDAZOLE 500 MG: 500 TABLET ORAL at 13:49

## 2024-04-29 RX ADMIN — CARBIDOPA AND LEVODOPA 1 TABLET: 25; 100 TABLET ORAL at 09:30

## 2024-04-29 RX ADMIN — SENNOSIDES AND DOCUSATE SODIUM 1 TABLET: 50; 8.6 TABLET ORAL at 09:31

## 2024-04-29 RX ADMIN — CARBIDOPA AND LEVODOPA 1 TABLET: 25; 100 TABLET ORAL at 20:21

## 2024-04-29 RX ADMIN — LEVOFLOXACIN 750 MG: 750 TABLET, FILM COATED ORAL at 09:42

## 2024-04-29 RX ADMIN — METRONIDAZOLE 500 MG: 500 TABLET ORAL at 20:21

## 2024-04-29 RX ADMIN — METOPROLOL SUCCINATE 12.5 MG: 25 TABLET, EXTENDED RELEASE ORAL at 09:30

## 2024-04-29 RX ADMIN — Medication 10 ML: at 09:31

## 2024-04-29 RX ADMIN — Medication 10 ML: at 20:21

## 2024-04-29 RX ADMIN — MESALAMINE 1000 MG: 1000 SUPPOSITORY RECTAL at 20:21

## 2024-04-29 RX ADMIN — AVOBENZONE, HOMOSALATE, OCTISALATE, OCTOCRYLENE, AND OXYBENZONE 1 PACKET: 29.4; 147; 49; 25.4; 58.8 LOTION TOPICAL at 09:30

## 2024-04-29 NOTE — TELEPHONE ENCOUNTER
Pt called office and stated he wanted someone in the office to do something for him because he does not like/ know what is going on in the hospital currently. He states he gave POA to the wrong person and has called a  but the  has not reached out to him. I made him aware there is nothing  can do while he is in the hospital and he will be scheduled for a hospital follow up after discharge. He stated no one is telling him anything and I explained if he had questions about his care while admitted he needed to call in a nurse and speak with them or wait for the doctor to come see him. He stated it was very unfortunate I could not help him and he doesn't understand why we cannot do anything for him while he is in the hospital. I reiterated everything stated above and pt gave verbal ok and got off the phone.

## 2024-04-30 LAB
BACTERIA SPEC AEROBE CULT: NORMAL
BACTERIA SPEC AEROBE CULT: NORMAL
BACTERIA SPEC CULT: NORMAL
BACTERIA SPEC CULT: NORMAL
CAMPYLOBACTER STL CULT: NORMAL
E COLI SXT STL QL IA: NEGATIVE
SALM + SHIG STL CULT: NORMAL

## 2024-04-30 PROCEDURE — 63710000001 METOPROLOL SUCCINATE XL 25 MG TABLET SUSTAINED-RELEASE 24 HOUR: Performed by: INTERNAL MEDICINE

## 2024-04-30 PROCEDURE — G0378 HOSPITAL OBSERVATION PER HR: HCPCS

## 2024-04-30 PROCEDURE — 99232 SBSQ HOSP IP/OBS MODERATE 35: CPT | Performed by: INTERNAL MEDICINE

## 2024-04-30 PROCEDURE — 63710000001 MESALAMINE 1000 MG SUPPOSITORY: Performed by: INTERNAL MEDICINE

## 2024-04-30 PROCEDURE — A9270 NON-COVERED ITEM OR SERVICE: HCPCS | Performed by: STUDENT IN AN ORGANIZED HEALTH CARE EDUCATION/TRAINING PROGRAM

## 2024-04-30 PROCEDURE — 63710000001 LEVOFLOXACIN 750 MG TABLET: Performed by: STUDENT IN AN ORGANIZED HEALTH CARE EDUCATION/TRAINING PROGRAM

## 2024-04-30 PROCEDURE — 63710000001 ACETAMINOPHEN 325 MG TABLET: Performed by: INTERNAL MEDICINE

## 2024-04-30 PROCEDURE — A9270 NON-COVERED ITEM OR SERVICE: HCPCS | Performed by: INTERNAL MEDICINE

## 2024-04-30 PROCEDURE — 63710000001 CARBIDOPA-LEVODOPA 25-100 MG TABLET: Performed by: INTERNAL MEDICINE

## 2024-04-30 PROCEDURE — 63710000001 SENNOSIDES-DOCUSATE 8.6-50 MG TABLET: Performed by: INTERNAL MEDICINE

## 2024-04-30 PROCEDURE — 63710000001 PSYLLIUM 51.7 % PACK: Performed by: INTERNAL MEDICINE

## 2024-04-30 PROCEDURE — 63710000001 ATORVASTATIN 10 MG TABLET: Performed by: INTERNAL MEDICINE

## 2024-04-30 PROCEDURE — 63710000001 MIRTAZAPINE 15 MG TABLET: Performed by: INTERNAL MEDICINE

## 2024-04-30 PROCEDURE — 63710000001 METRONIDAZOLE 500 MG TABLET: Performed by: STUDENT IN AN ORGANIZED HEALTH CARE EDUCATION/TRAINING PROGRAM

## 2024-04-30 RX ADMIN — AVOBENZONE, HOMOSALATE, OCTISALATE, OCTOCRYLENE, AND OXYBENZONE 1 PACKET: 29.4; 147; 49; 25.4; 58.8 LOTION TOPICAL at 08:43

## 2024-04-30 RX ADMIN — SENNOSIDES AND DOCUSATE SODIUM 1 TABLET: 50; 8.6 TABLET ORAL at 08:43

## 2024-04-30 RX ADMIN — SENNOSIDES AND DOCUSATE SODIUM 1 TABLET: 50; 8.6 TABLET ORAL at 20:10

## 2024-04-30 RX ADMIN — METRONIDAZOLE 500 MG: 500 TABLET ORAL at 05:29

## 2024-04-30 RX ADMIN — METOPROLOL SUCCINATE 12.5 MG: 25 TABLET, EXTENDED RELEASE ORAL at 08:43

## 2024-04-30 RX ADMIN — Medication 10 ML: at 20:11

## 2024-04-30 RX ADMIN — MIRTAZAPINE 15 MG: 15 TABLET, FILM COATED ORAL at 08:43

## 2024-04-30 RX ADMIN — ACETAMINOPHEN 650 MG: 325 TABLET ORAL at 05:29

## 2024-04-30 RX ADMIN — LEVOFLOXACIN 750 MG: 750 TABLET, FILM COATED ORAL at 08:43

## 2024-04-30 RX ADMIN — METRONIDAZOLE 500 MG: 500 TABLET ORAL at 20:10

## 2024-04-30 RX ADMIN — ATORVASTATIN CALCIUM 10 MG: 10 TABLET, FILM COATED ORAL at 20:10

## 2024-04-30 RX ADMIN — CARBIDOPA AND LEVODOPA 1 TABLET: 25; 100 TABLET ORAL at 20:10

## 2024-04-30 RX ADMIN — Medication 10 ML: at 08:43

## 2024-04-30 RX ADMIN — MESALAMINE 1000 MG: 1000 SUPPOSITORY RECTAL at 20:10

## 2024-04-30 RX ADMIN — METRONIDAZOLE 500 MG: 500 TABLET ORAL at 14:27

## 2024-04-30 RX ADMIN — CARBIDOPA AND LEVODOPA 1 TABLET: 25; 100 TABLET ORAL at 08:43

## 2024-05-01 PROCEDURE — A9270 NON-COVERED ITEM OR SERVICE: HCPCS | Performed by: INTERNAL MEDICINE

## 2024-05-01 PROCEDURE — 63710000001 LEVOFLOXACIN 750 MG TABLET: Performed by: STUDENT IN AN ORGANIZED HEALTH CARE EDUCATION/TRAINING PROGRAM

## 2024-05-01 PROCEDURE — A9270 NON-COVERED ITEM OR SERVICE: HCPCS | Performed by: STUDENT IN AN ORGANIZED HEALTH CARE EDUCATION/TRAINING PROGRAM

## 2024-05-01 PROCEDURE — 63710000001 PSYLLIUM 51.7 % PACK: Performed by: INTERNAL MEDICINE

## 2024-05-01 PROCEDURE — 63710000001 METOPROLOL SUCCINATE XL 25 MG TABLET SUSTAINED-RELEASE 24 HOUR: Performed by: INTERNAL MEDICINE

## 2024-05-01 PROCEDURE — 63710000001 ATORVASTATIN 10 MG TABLET: Performed by: INTERNAL MEDICINE

## 2024-05-01 PROCEDURE — 99232 SBSQ HOSP IP/OBS MODERATE 35: CPT | Performed by: INTERNAL MEDICINE

## 2024-05-01 PROCEDURE — 63710000001 MESALAMINE 1000 MG SUPPOSITORY: Performed by: INTERNAL MEDICINE

## 2024-05-01 PROCEDURE — 63710000001 ACETAMINOPHEN 325 MG TABLET: Performed by: INTERNAL MEDICINE

## 2024-05-01 PROCEDURE — 63710000001 MIRTAZAPINE 15 MG TABLET: Performed by: INTERNAL MEDICINE

## 2024-05-01 PROCEDURE — G0378 HOSPITAL OBSERVATION PER HR: HCPCS

## 2024-05-01 PROCEDURE — 63710000001 CARBIDOPA-LEVODOPA 25-100 MG TABLET: Performed by: INTERNAL MEDICINE

## 2024-05-01 PROCEDURE — 63710000001 METRONIDAZOLE 500 MG TABLET: Performed by: STUDENT IN AN ORGANIZED HEALTH CARE EDUCATION/TRAINING PROGRAM

## 2024-05-01 PROCEDURE — 63710000001 SENNOSIDES-DOCUSATE 8.6-50 MG TABLET: Performed by: INTERNAL MEDICINE

## 2024-05-01 RX ADMIN — AVOBENZONE, HOMOSALATE, OCTISALATE, OCTOCRYLENE, AND OXYBENZONE 1 PACKET: 29.4; 147; 49; 25.4; 58.8 LOTION TOPICAL at 09:04

## 2024-05-01 RX ADMIN — METRONIDAZOLE 500 MG: 500 TABLET ORAL at 05:41

## 2024-05-01 RX ADMIN — ATORVASTATIN CALCIUM 10 MG: 10 TABLET, FILM COATED ORAL at 21:24

## 2024-05-01 RX ADMIN — CARBIDOPA AND LEVODOPA 1 TABLET: 25; 100 TABLET ORAL at 09:04

## 2024-05-01 RX ADMIN — MESALAMINE 1000 MG: 1000 SUPPOSITORY RECTAL at 21:24

## 2024-05-01 RX ADMIN — CARBIDOPA AND LEVODOPA 1 TABLET: 25; 100 TABLET ORAL at 21:24

## 2024-05-01 RX ADMIN — Medication 10 ML: at 21:24

## 2024-05-01 RX ADMIN — METRONIDAZOLE 500 MG: 500 TABLET ORAL at 21:24

## 2024-05-01 RX ADMIN — MIRTAZAPINE 15 MG: 15 TABLET, FILM COATED ORAL at 09:03

## 2024-05-01 RX ADMIN — LEVOFLOXACIN 750 MG: 750 TABLET, FILM COATED ORAL at 09:03

## 2024-05-01 RX ADMIN — SENNOSIDES AND DOCUSATE SODIUM 1 TABLET: 50; 8.6 TABLET ORAL at 09:04

## 2024-05-01 RX ADMIN — METOPROLOL SUCCINATE 12.5 MG: 25 TABLET, EXTENDED RELEASE ORAL at 09:03

## 2024-05-01 RX ADMIN — SENNOSIDES AND DOCUSATE SODIUM 1 TABLET: 50; 8.6 TABLET ORAL at 21:24

## 2024-05-01 RX ADMIN — Medication 10 ML: at 09:04

## 2024-05-01 RX ADMIN — METRONIDAZOLE 500 MG: 500 TABLET ORAL at 14:16

## 2024-05-01 RX ADMIN — ACETAMINOPHEN 650 MG: 325 TABLET ORAL at 02:29

## 2024-05-02 LAB
ANION GAP SERPL CALCULATED.3IONS-SCNC: 9.1 MMOL/L (ref 5–15)
BUN SERPL-MCNC: 16 MG/DL (ref 8–23)
BUN/CREAT SERPL: 16.2 (ref 7–25)
CALCIUM SPEC-SCNC: 8.5 MG/DL (ref 8.6–10.5)
CHLORIDE SERPL-SCNC: 103 MMOL/L (ref 98–107)
CO2 SERPL-SCNC: 24.9 MMOL/L (ref 22–29)
CREAT SERPL-MCNC: 0.99 MG/DL (ref 0.76–1.27)
DEPRECATED RDW RBC AUTO: 48.2 FL (ref 37–54)
EGFRCR SERPLBLD CKD-EPI 2021: 74.2 ML/MIN/1.73
ERYTHROCYTE [DISTWIDTH] IN BLOOD BY AUTOMATED COUNT: 14.5 % (ref 12.3–15.4)
GLUCOSE SERPL-MCNC: 93 MG/DL (ref 65–99)
HCT VFR BLD AUTO: 40.8 % (ref 37.5–51)
HGB BLD-MCNC: 13 G/DL (ref 13–17.7)
MAGNESIUM SERPL-MCNC: 1.9 MG/DL (ref 1.6–2.4)
MCH RBC QN AUTO: 28.8 PG (ref 26.6–33)
MCHC RBC AUTO-ENTMCNC: 31.9 G/DL (ref 31.5–35.7)
MCV RBC AUTO: 90.5 FL (ref 79–97)
PLATELET # BLD AUTO: 331 10*3/MM3 (ref 140–450)
PMV BLD AUTO: 9.1 FL (ref 6–12)
POTASSIUM SERPL-SCNC: 4.1 MMOL/L (ref 3.5–5.2)
RBC # BLD AUTO: 4.51 10*6/MM3 (ref 4.14–5.8)
SODIUM SERPL-SCNC: 137 MMOL/L (ref 136–145)
WBC NRBC COR # BLD AUTO: 9.65 10*3/MM3 (ref 3.4–10.8)

## 2024-05-02 PROCEDURE — A9270 NON-COVERED ITEM OR SERVICE: HCPCS | Performed by: INTERNAL MEDICINE

## 2024-05-02 PROCEDURE — 83735 ASSAY OF MAGNESIUM: CPT | Performed by: INTERNAL MEDICINE

## 2024-05-02 PROCEDURE — G0378 HOSPITAL OBSERVATION PER HR: HCPCS

## 2024-05-02 PROCEDURE — 63710000001 MESALAMINE 1000 MG SUPPOSITORY: Performed by: INTERNAL MEDICINE

## 2024-05-02 PROCEDURE — 63710000001 PSYLLIUM 51.7 % PACK: Performed by: INTERNAL MEDICINE

## 2024-05-02 PROCEDURE — 85027 COMPLETE CBC AUTOMATED: CPT | Performed by: INTERNAL MEDICINE

## 2024-05-02 PROCEDURE — 63710000001 ATORVASTATIN 10 MG TABLET: Performed by: INTERNAL MEDICINE

## 2024-05-02 PROCEDURE — 99232 SBSQ HOSP IP/OBS MODERATE 35: CPT | Performed by: INTERNAL MEDICINE

## 2024-05-02 PROCEDURE — A9270 NON-COVERED ITEM OR SERVICE: HCPCS | Performed by: STUDENT IN AN ORGANIZED HEALTH CARE EDUCATION/TRAINING PROGRAM

## 2024-05-02 PROCEDURE — 63710000001 METOPROLOL SUCCINATE XL 25 MG TABLET SUSTAINED-RELEASE 24 HOUR: Performed by: INTERNAL MEDICINE

## 2024-05-02 PROCEDURE — 97530 THERAPEUTIC ACTIVITIES: CPT

## 2024-05-02 PROCEDURE — 63710000001 CARBIDOPA-LEVODOPA 25-100 MG TABLET: Performed by: INTERNAL MEDICINE

## 2024-05-02 PROCEDURE — 97116 GAIT TRAINING THERAPY: CPT

## 2024-05-02 PROCEDURE — 80048 BASIC METABOLIC PNL TOTAL CA: CPT | Performed by: INTERNAL MEDICINE

## 2024-05-02 PROCEDURE — 63710000001 SENNOSIDES-DOCUSATE 8.6-50 MG TABLET: Performed by: INTERNAL MEDICINE

## 2024-05-02 PROCEDURE — 63710000001 METRONIDAZOLE 500 MG TABLET: Performed by: STUDENT IN AN ORGANIZED HEALTH CARE EDUCATION/TRAINING PROGRAM

## 2024-05-02 PROCEDURE — 63710000001 MIRTAZAPINE 15 MG TABLET: Performed by: INTERNAL MEDICINE

## 2024-05-02 PROCEDURE — 97110 THERAPEUTIC EXERCISES: CPT

## 2024-05-02 RX ADMIN — METRONIDAZOLE 500 MG: 500 TABLET ORAL at 05:05

## 2024-05-02 RX ADMIN — METOPROLOL SUCCINATE 12.5 MG: 25 TABLET, EXTENDED RELEASE ORAL at 08:56

## 2024-05-02 RX ADMIN — Medication 10 ML: at 08:57

## 2024-05-02 RX ADMIN — ATORVASTATIN CALCIUM 10 MG: 10 TABLET, FILM COATED ORAL at 20:59

## 2024-05-02 RX ADMIN — SENNOSIDES AND DOCUSATE SODIUM 1 TABLET: 50; 8.6 TABLET ORAL at 20:59

## 2024-05-02 RX ADMIN — Medication 10 ML: at 20:59

## 2024-05-02 RX ADMIN — MIRTAZAPINE 15 MG: 15 TABLET, FILM COATED ORAL at 08:56

## 2024-05-02 RX ADMIN — AVOBENZONE, HOMOSALATE, OCTISALATE, OCTOCRYLENE, AND OXYBENZONE 1 PACKET: 29.4; 147; 49; 25.4; 58.8 LOTION TOPICAL at 08:56

## 2024-05-02 RX ADMIN — CARBIDOPA AND LEVODOPA 1 TABLET: 25; 100 TABLET ORAL at 08:56

## 2024-05-02 RX ADMIN — CARBIDOPA AND LEVODOPA 1 TABLET: 25; 100 TABLET ORAL at 20:59

## 2024-05-02 RX ADMIN — MESALAMINE 1000 MG: 1000 SUPPOSITORY RECTAL at 20:58

## 2024-05-02 RX ADMIN — SENNOSIDES AND DOCUSATE SODIUM 1 TABLET: 50; 8.6 TABLET ORAL at 08:56

## 2024-05-03 ENCOUNTER — TELEPHONE (OUTPATIENT)
Dept: GASTROENTEROLOGY | Facility: CLINIC | Age: 89
End: 2024-05-03
Payer: MEDICARE

## 2024-05-03 LAB
ANION GAP SERPL CALCULATED.3IONS-SCNC: 8.8 MMOL/L (ref 5–15)
BUN SERPL-MCNC: 17 MG/DL (ref 8–23)
BUN/CREAT SERPL: 16.2 (ref 7–25)
CALCIUM SPEC-SCNC: 7.8 MG/DL (ref 8.6–10.5)
CHLORIDE SERPL-SCNC: 104 MMOL/L (ref 98–107)
CO2 SERPL-SCNC: 22.2 MMOL/L (ref 22–29)
CREAT SERPL-MCNC: 1.05 MG/DL (ref 0.76–1.27)
DEPRECATED RDW RBC AUTO: 49 FL (ref 37–54)
EGFRCR SERPLBLD CKD-EPI 2021: 69.1 ML/MIN/1.73
ERYTHROCYTE [DISTWIDTH] IN BLOOD BY AUTOMATED COUNT: 14.6 % (ref 12.3–15.4)
GLUCOSE SERPL-MCNC: 103 MG/DL (ref 65–99)
HCT VFR BLD AUTO: 40.8 % (ref 37.5–51)
HGB BLD-MCNC: 12.7 G/DL (ref 13–17.7)
MAGNESIUM SERPL-MCNC: 1.8 MG/DL (ref 1.6–2.4)
MCH RBC QN AUTO: 28.4 PG (ref 26.6–33)
MCHC RBC AUTO-ENTMCNC: 31.1 G/DL (ref 31.5–35.7)
MCV RBC AUTO: 91.3 FL (ref 79–97)
PLATELET # BLD AUTO: 319 10*3/MM3 (ref 140–450)
PMV BLD AUTO: 8.9 FL (ref 6–12)
POTASSIUM SERPL-SCNC: 3.9 MMOL/L (ref 3.5–5.2)
RBC # BLD AUTO: 4.47 10*6/MM3 (ref 4.14–5.8)
SODIUM SERPL-SCNC: 135 MMOL/L (ref 136–145)
WBC NRBC COR # BLD AUTO: 9.07 10*3/MM3 (ref 3.4–10.8)

## 2024-05-03 PROCEDURE — 63710000001 CARBIDOPA-LEVODOPA 25-100 MG TABLET: Performed by: INTERNAL MEDICINE

## 2024-05-03 PROCEDURE — A9270 NON-COVERED ITEM OR SERVICE: HCPCS | Performed by: INTERNAL MEDICINE

## 2024-05-03 PROCEDURE — 97110 THERAPEUTIC EXERCISES: CPT

## 2024-05-03 PROCEDURE — 63710000001 ATORVASTATIN 10 MG TABLET: Performed by: INTERNAL MEDICINE

## 2024-05-03 PROCEDURE — 63710000001 PSYLLIUM 51.7 % PACK: Performed by: INTERNAL MEDICINE

## 2024-05-03 PROCEDURE — 99232 SBSQ HOSP IP/OBS MODERATE 35: CPT | Performed by: INTERNAL MEDICINE

## 2024-05-03 PROCEDURE — 80048 BASIC METABOLIC PNL TOTAL CA: CPT | Performed by: INTERNAL MEDICINE

## 2024-05-03 PROCEDURE — 83735 ASSAY OF MAGNESIUM: CPT | Performed by: INTERNAL MEDICINE

## 2024-05-03 PROCEDURE — 63710000001 SENNOSIDES-DOCUSATE 8.6-50 MG TABLET: Performed by: INTERNAL MEDICINE

## 2024-05-03 PROCEDURE — 63710000001 MIRTAZAPINE 15 MG TABLET: Performed by: INTERNAL MEDICINE

## 2024-05-03 PROCEDURE — G0378 HOSPITAL OBSERVATION PER HR: HCPCS

## 2024-05-03 PROCEDURE — 63710000001 MESALAMINE 1000 MG SUPPOSITORY: Performed by: INTERNAL MEDICINE

## 2024-05-03 PROCEDURE — 85027 COMPLETE CBC AUTOMATED: CPT | Performed by: INTERNAL MEDICINE

## 2024-05-03 PROCEDURE — 97116 GAIT TRAINING THERAPY: CPT

## 2024-05-03 PROCEDURE — 63710000001 METOPROLOL SUCCINATE XL 25 MG TABLET SUSTAINED-RELEASE 24 HOUR: Performed by: INTERNAL MEDICINE

## 2024-05-03 RX ADMIN — Medication 10 ML: at 20:58

## 2024-05-03 RX ADMIN — MIRTAZAPINE 15 MG: 15 TABLET, FILM COATED ORAL at 08:03

## 2024-05-03 RX ADMIN — Medication 10 ML: at 08:05

## 2024-05-03 RX ADMIN — CARBIDOPA AND LEVODOPA 1 TABLET: 25; 100 TABLET ORAL at 08:03

## 2024-05-03 RX ADMIN — AVOBENZONE, HOMOSALATE, OCTISALATE, OCTOCRYLENE, AND OXYBENZONE 1 PACKET: 29.4; 147; 49; 25.4; 58.8 LOTION TOPICAL at 08:03

## 2024-05-03 RX ADMIN — MESALAMINE 1000 MG: 1000 SUPPOSITORY RECTAL at 20:58

## 2024-05-03 RX ADMIN — SENNOSIDES AND DOCUSATE SODIUM 1 TABLET: 50; 8.6 TABLET ORAL at 20:58

## 2024-05-03 RX ADMIN — ATORVASTATIN CALCIUM 10 MG: 10 TABLET, FILM COATED ORAL at 20:58

## 2024-05-03 RX ADMIN — METOPROLOL SUCCINATE 12.5 MG: 25 TABLET, EXTENDED RELEASE ORAL at 08:03

## 2024-05-03 RX ADMIN — CARBIDOPA AND LEVODOPA 1 TABLET: 25; 100 TABLET ORAL at 20:58

## 2024-05-03 RX ADMIN — SENNOSIDES AND DOCUSATE SODIUM 1 TABLET: 50; 8.6 TABLET ORAL at 08:03

## 2024-05-03 NOTE — TELEPHONE ENCOUNTER
----- Message from Roxana MUÑOZ sent at 4/29/2024 10:06 AM EDT -----  Spoke with Dr. Duong-patient needs follow-up apt with nurse practitioner or telephone call with nurse practitioner to discuss about repeating a scope for polyp removal versus risk of cancer by not removing the polyps.

## 2024-05-04 PROCEDURE — 63710000001 PSYLLIUM 51.7 % PACK: Performed by: INTERNAL MEDICINE

## 2024-05-04 PROCEDURE — 63710000001 METOPROLOL SUCCINATE XL 25 MG TABLET SUSTAINED-RELEASE 24 HOUR: Performed by: INTERNAL MEDICINE

## 2024-05-04 PROCEDURE — 63710000001 MESALAMINE 1000 MG SUPPOSITORY: Performed by: INTERNAL MEDICINE

## 2024-05-04 PROCEDURE — 63710000001 CARBIDOPA-LEVODOPA 25-100 MG TABLET: Performed by: INTERNAL MEDICINE

## 2024-05-04 PROCEDURE — 63710000001 ATORVASTATIN 10 MG TABLET: Performed by: INTERNAL MEDICINE

## 2024-05-04 PROCEDURE — G0378 HOSPITAL OBSERVATION PER HR: HCPCS

## 2024-05-04 PROCEDURE — A9270 NON-COVERED ITEM OR SERVICE: HCPCS | Performed by: INTERNAL MEDICINE

## 2024-05-04 PROCEDURE — 63710000001 SENNOSIDES-DOCUSATE 8.6-50 MG TABLET: Performed by: INTERNAL MEDICINE

## 2024-05-04 PROCEDURE — 99232 SBSQ HOSP IP/OBS MODERATE 35: CPT | Performed by: INTERNAL MEDICINE

## 2024-05-04 PROCEDURE — 63710000001 MIRTAZAPINE 15 MG TABLET: Performed by: INTERNAL MEDICINE

## 2024-05-04 RX ADMIN — CARBIDOPA AND LEVODOPA 1 TABLET: 25; 100 TABLET ORAL at 08:31

## 2024-05-04 RX ADMIN — METOPROLOL SUCCINATE 12.5 MG: 25 TABLET, EXTENDED RELEASE ORAL at 08:31

## 2024-05-04 RX ADMIN — Medication 10 ML: at 08:32

## 2024-05-04 RX ADMIN — SENNOSIDES AND DOCUSATE SODIUM 1 TABLET: 50; 8.6 TABLET ORAL at 08:31

## 2024-05-04 RX ADMIN — MIRTAZAPINE 15 MG: 15 TABLET, FILM COATED ORAL at 08:31

## 2024-05-04 RX ADMIN — Medication 10 ML: at 20:08

## 2024-05-04 RX ADMIN — SENNOSIDES AND DOCUSATE SODIUM 1 TABLET: 50; 8.6 TABLET ORAL at 20:08

## 2024-05-04 RX ADMIN — AVOBENZONE, HOMOSALATE, OCTISALATE, OCTOCRYLENE, AND OXYBENZONE 1 PACKET: 29.4; 147; 49; 25.4; 58.8 LOTION TOPICAL at 08:31

## 2024-05-04 RX ADMIN — MESALAMINE 1000 MG: 1000 SUPPOSITORY RECTAL at 20:08

## 2024-05-04 RX ADMIN — ATORVASTATIN CALCIUM 10 MG: 10 TABLET, FILM COATED ORAL at 20:08

## 2024-05-04 RX ADMIN — CARBIDOPA AND LEVODOPA 1 TABLET: 25; 100 TABLET ORAL at 20:08

## 2024-05-05 VITALS
HEART RATE: 88 BPM | SYSTOLIC BLOOD PRESSURE: 111 MMHG | TEMPERATURE: 98.7 F | BODY MASS INDEX: 25.14 KG/M2 | RESPIRATION RATE: 18 BRPM | DIASTOLIC BLOOD PRESSURE: 67 MMHG | HEIGHT: 72 IN | WEIGHT: 185.63 LBS | OXYGEN SATURATION: 100 %

## 2024-05-05 PROCEDURE — 63710000001 SENNOSIDES-DOCUSATE 8.6-50 MG TABLET: Performed by: INTERNAL MEDICINE

## 2024-05-05 PROCEDURE — G0378 HOSPITAL OBSERVATION PER HR: HCPCS

## 2024-05-05 PROCEDURE — 63710000001 MIRTAZAPINE 15 MG TABLET: Performed by: INTERNAL MEDICINE

## 2024-05-05 PROCEDURE — 63710000001 METOPROLOL SUCCINATE XL 25 MG TABLET SUSTAINED-RELEASE 24 HOUR: Performed by: INTERNAL MEDICINE

## 2024-05-05 PROCEDURE — 99239 HOSP IP/OBS DSCHRG MGMT >30: CPT | Performed by: INTERNAL MEDICINE

## 2024-05-05 PROCEDURE — A9270 NON-COVERED ITEM OR SERVICE: HCPCS | Performed by: INTERNAL MEDICINE

## 2024-05-05 PROCEDURE — 63710000001 PSYLLIUM 51.7 % PACK: Performed by: INTERNAL MEDICINE

## 2024-05-05 PROCEDURE — 63710000001 CARBIDOPA-LEVODOPA 25-100 MG TABLET: Performed by: INTERNAL MEDICINE

## 2024-05-05 RX ADMIN — Medication 10 ML: at 10:02

## 2024-05-05 RX ADMIN — CARBIDOPA AND LEVODOPA 1 TABLET: 25; 100 TABLET ORAL at 10:01

## 2024-05-05 RX ADMIN — MIRTAZAPINE 15 MG: 15 TABLET, FILM COATED ORAL at 10:01

## 2024-05-05 RX ADMIN — SENNOSIDES AND DOCUSATE SODIUM 1 TABLET: 50; 8.6 TABLET ORAL at 10:02

## 2024-05-05 RX ADMIN — METOPROLOL SUCCINATE 12.5 MG: 25 TABLET, EXTENDED RELEASE ORAL at 10:02

## 2024-05-05 RX ADMIN — AVOBENZONE, HOMOSALATE, OCTISALATE, OCTOCRYLENE, AND OXYBENZONE 1 PACKET: 29.4; 147; 49; 25.4; 58.8 LOTION TOPICAL at 10:01

## 2024-05-22 ENCOUNTER — OFFICE VISIT (OUTPATIENT)
Dept: FAMILY MEDICINE CLINIC | Facility: CLINIC | Age: 89
End: 2024-05-22
Payer: MEDICARE

## 2024-05-22 VITALS
HEART RATE: 75 BPM | WEIGHT: 185 LBS | OXYGEN SATURATION: 98 % | DIASTOLIC BLOOD PRESSURE: 67 MMHG | HEIGHT: 72 IN | TEMPERATURE: 97.5 F | BODY MASS INDEX: 25.06 KG/M2 | SYSTOLIC BLOOD PRESSURE: 108 MMHG
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
## 2024-05-22 DIAGNOSIS — R60.0 EDEMA, LOWER EXTREMITY: Primary | ICD-10-CM

## 2024-05-22 DIAGNOSIS — N20.0 NEPHROLITHIASIS: ICD-10-CM
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
## 2024-05-22 DIAGNOSIS — N17.9 AKI (ACUTE KIDNEY INJURY): ICD-10-CM

## 2024-05-22 DIAGNOSIS — K62.5 BLOOD PER RECTUM: ICD-10-CM

## 2024-05-22 DIAGNOSIS — R26.81 UNSTEADY GAIT WHEN WALKING: ICD-10-CM

## 2024-05-22 PROCEDURE — 99214 OFFICE O/P EST MOD 30 MIN: CPT | Performed by: STUDENT IN AN ORGANIZED HEALTH CARE EDUCATION/TRAINING PROGRAM

## 2024-05-22 PROCEDURE — G2211 COMPLEX E/M VISIT ADD ON: HCPCS | Performed by: STUDENT IN AN ORGANIZED HEALTH CARE EDUCATION/TRAINING PROGRAM

## 2024-05-22 PROCEDURE — 1160F RVW MEDS BY RX/DR IN RCRD: CPT | Performed by: STUDENT IN AN ORGANIZED HEALTH CARE EDUCATION/TRAINING PROGRAM

## 2024-05-22 PROCEDURE — 1159F MED LIST DOCD IN RCRD: CPT | Performed by: STUDENT IN AN ORGANIZED HEALTH CARE EDUCATION/TRAINING PROGRAM

## 2024-05-22 PROCEDURE — 1126F AMNT PAIN NOTED NONE PRSNT: CPT | Performed by: STUDENT IN AN ORGANIZED HEALTH CARE EDUCATION/TRAINING PROGRAM

## 2024-05-22 RX ORDER — FUROSEMIDE 20 MG/1
20 TABLET ORAL 2 TIMES DAILY
Qty: 180 TABLET | Refills: 2 | Status: SHIPPED | OUTPATIENT
Start: 2024-05-22

## 2024-05-22 RX ORDER — DOXYCYCLINE HYCLATE 100 MG/1
100 CAPSULE ORAL 2 TIMES DAILY
Qty: 20 CAPSULE | Refills: 0 | Status: SHIPPED | OUTPATIENT
Start: 2024-05-22 | End: 2024-06-01

## 2024-05-22 RX ORDER — FEXOFENADINE HCL 60 MG/1
1 TABLET, FILM COATED ORAL DAILY
COMMUNITY
Start: 2024-05-18

## 2024-05-22 NOTE — PROGRESS NOTES
"Chief Complaint  Hospital Follow Up Visit and Leg Swelling (Feet and legs )    Subjective      Enrique Wylie is a 88 y.o. male who presents to [facility]     Enrique Wylie is a 88 y.o. male with past medical history of atrial fibrillation, Parkinson's, cervical spine stenosis, lumbar spinal stenosis, restless leg, and squamous cell. Recently admitted for rhabdomyolysis due to fall and then blood in stool.   He was found to have a continuous area of ulcerated mucosa with no stigmata of recent bleeding in the distal rectum.  Consistent with sterile coral ulceration and likely the source of his bleeding.  Started on fiber as well as stool softeners.  Hemoglobin stable. Patient completed a course of . Levaquin and Flagyl.  Patient was discharged to rehab facility.    Today follows up with comorbidities.         Objective   Vital Signs:   Vitals:    05/22/24 1049   BP: 108/67   BP Location: Right arm   Patient Position: Sitting   Cuff Size: Adult   Pulse: 75   Temp: 97.5 °F (36.4 °C)   SpO2: 98%   Weight: 83.9 kg (185 lb)   Height: 182.9 cm (72\")     Body mass index is 25.09 kg/m².    Wt Readings from Last 3 Encounters:   05/22/24 83.9 kg (185 lb)   05/05/24 84.2 kg (185 lb 10 oz)   04/10/24 86 kg (189 lb 9.5 oz)     BP Readings from Last 3 Encounters:   05/22/24 108/67   05/05/24 111/67   04/19/24 113/51       Health Maintenance   Topic Date Due    RSV Vaccine - Adults (1 - 1-dose 60+ series) Never done    TDAP/TD VACCINES (2 - Tdap) 10/30/2007    COVID-19 Vaccine (4 - 2023-24 season) 09/01/2023    LIPID PANEL  06/06/2024    INFLUENZA VACCINE  08/01/2024    ANNUAL WELLNESS VISIT  02/21/2025    BMI FOLLOWUP  02/21/2025    Pneumococcal Vaccine 65+  Completed    ZOSTER VACCINE  Completed       Physical Exam  Vitals reviewed.   Constitutional:       Comments: LE edema and redness   HENT:      Head: Normocephalic.      Mouth/Throat:      Mouth: Mucous membranes are moist.   Eyes: "      Pupils: Pupils are equal, round, and reactive to light.   Cardiovascular:      Rate and Rhythm: Normal rate.   Abdominal:      General: Abdomen is flat.   Musculoskeletal:         General: Normal range of motion.      Cervical back: Normal range of motion.   Skin:     General: Skin is warm.      Capillary Refill: Capillary refill takes less than 2 seconds.   Neurological:      Mental Status: He is alert.              Assessment & Plan  Edema, lower extremity  Lasix as needed    Blood per rectum  Follow with GI.   Nephrolithiasis  Renal condition is stable.  Continue current treatment regimen.  Renal condition will be reassessed in 6 months.  JOSE (acute kidney injury)  Renal condition is stable.  Continue current treatment regimen.  Renal condition will be reassessed in 6 months.  Unsteady gait when walking  Risk to fall   Fall at home, initial encounter  Recommend 24 hours home health or Nursing facility     Orders Placed This Encounter   Procedures    Basic metabolic panel    CBC w AUTO Differential     New Medications Ordered This Visit   Medications    furosemide (Lasix) 20 MG tablet     Sig: Take 1 tablet by mouth 2 (Two) Times a Day.     Dispense:  180 tablet     Refill:  2    doxycycline (VIBRAMYCIN) 100 MG capsule     Sig: Take 1 capsule by mouth 2 (Two) Times a Day for 10 days.     Dispense:  20 capsule     Refill:  0                  I spent 30 minutes caring for Enrique on this date of service. This time includes time spent by me in the following activities:preparing for the visit, reviewing tests, obtaining and/or reviewing a separately obtained history, performing a medically appropriate examination and/or evaluation , counseling and educating the patient/family/caregiver, ordering medications, tests, or procedures, referring and communicating with other health care professionals , documenting information in the medical record, independently interpreting results and communicating that information  with the patient/family/caregiver, and care coordination  FOLLOW UP  Return in about 3 months (around 8/22/2024).  Patient was given instructions and counseling regarding his condition or for health maintenance advice. Please see specific information pulled into the AVS if appropriate.       Josh Lees MD  05/22/24  11:24 EDT    CURRENT & DISCONTINUED MEDICATIONS  Current Outpatient Medications   Medication Instructions    acetaminophen (TYLENOL) 650 mg, Oral, Every 8 Hours PRN    atorvastatin (LIPITOR) 10 mg, Oral, Daily    carbidopa-levodopa (SINEMET)  MG per tablet 1 tablet, Oral, 2 Times Daily    Control Gel Formula Dressing (DuoDERM CGF Extra Thin) misc 1 each, Apply externally, Every Other Day    Control Gel Formula Dressing (DuoDERM CGF Extra Thin) misc 1 each, Apply externally, Every Other Day    doxycycline (VIBRAMYCIN) 100 mg, Oral, 2 Times Daily    fexofenadine (ALLEGRA) 60 MG tablet 1 tablet, Oral, Daily    furosemide (LASIX) 20 mg, Oral, 2 Times Daily    mesalamine (CANASA) 1,000 mg, Rectal, Nightly    metoprolol succinate XL (TOPROL-XL) 12.5 mg, Oral, Every 24 Hours Scheduled    mirtazapine (REMERON) 15 mg, Oral, Daily, HS    polyethylene glycol (MIRALAX) 17 g, Oral, Daily    Psyllium (METAMUCIL MULTIHEALTH FIBER) 51.7 % packet 1 packet, Oral, Daily    sennosides-docusate (PERICOLACE) 8.6-50 MG per tablet 1 tablet, Oral, 2 Times Daily    urea (CARMOL) 10 % cream 1 Application, Topical, As Needed       There are no discontinued medications.

## 2024-06-06 ENCOUNTER — TELEPHONE (OUTPATIENT)
Dept: FAMILY MEDICINE CLINIC | Facility: CLINIC | Age: 89
End: 2024-06-06
Payer: MEDICARE

## 2024-06-06 NOTE — TELEPHONE ENCOUNTER
HUSAM called and asked for an order for transfer seat to get in to and from the shower. Please advise if order can be placed and faxed to [facility]     +1 edema to both legs but no open wounds.     She stated he is still having issues with his shoulder since fall. I did make her aware he has in office appt on 6/10/24 and more treatment for this could be discussed then.         [phone]- Mindi HUSAM

## 2024-06-07 DIAGNOSIS — F03.C0 SEVERE DEMENTIA WITHOUT BEHAVIORAL DISTURBANCE, PSYCHOTIC DISTURBANCE, MOOD DISTURBANCE, OR ANXIETY, UNSPECIFIED DEMENTIA TYPE: ICD-10-CM

## 2024-06-07 DIAGNOSIS — R60.0 EDEMA, LOWER EXTREMITY: Primary | ICD-10-CM

## 2024-06-07 DIAGNOSIS — R26.81 UNSTEADY GAIT WHEN WALKING: ICD-10-CM

## 2024-06-07 DIAGNOSIS — R60.0 LEG EDEMA: ICD-10-CM

## 2024-06-07 NOTE — TELEPHONE ENCOUNTER
Order has been placed and faxed to [facility]. [facility] when [facility] office [facility].

## 2024-06-17 ENCOUNTER — TELEPHONE (OUTPATIENT)
Dept: FAMILY MEDICINE CLINIC | Facility: CLINIC | Age: 89
End: 2024-06-17
Payer: MEDICARE

## 2024-06-17 DIAGNOSIS — E78.2 MIXED HYPERLIPIDEMIA: ICD-10-CM

## 2024-06-17 RX ORDER — FEXOFENADINE HCL 60 MG/1
60 TABLET, FILM COATED ORAL DAILY
Qty: 90 TABLET | Refills: 3 | Status: SHIPPED | OUTPATIENT
Start: 2024-06-17 | End: 2024-06-18 | Stop reason: SDUPTHER

## 2024-06-17 RX ORDER — METOPROLOL SUCCINATE 25 MG/1
12.5 TABLET, EXTENDED RELEASE ORAL
Qty: 15 TABLET | Refills: 0 | Status: SHIPPED | OUTPATIENT
Start: 2024-06-17 | End: 2024-06-18 | Stop reason: SDUPTHER

## 2024-06-17 RX ORDER — MESALAMINE 1000 MG/1
1000 SUPPOSITORY RECTAL NIGHTLY
Qty: 6 SUPPOSITORY | Refills: 0 | Status: SHIPPED | OUTPATIENT
Start: 2024-06-17 | End: 2024-06-18 | Stop reason: SDUPTHER

## 2024-06-17 RX ORDER — ATORVASTATIN CALCIUM 10 MG/1
10 TABLET, FILM COATED ORAL DAILY
Qty: 90 TABLET | Refills: 3 | Status: SHIPPED | OUTPATIENT
Start: 2024-06-17 | End: 2024-06-18 | Stop reason: SDUPTHER

## 2024-06-17 RX ORDER — UREA 8.5 G/85G
1 CREAM TOPICAL AS NEEDED
Qty: 1 EACH | Refills: 2 | Status: SHIPPED | OUTPATIENT
Start: 2024-06-17 | End: 2024-06-18 | Stop reason: SDUPTHER

## 2024-06-17 RX ORDER — SENNOSIDES 8.6 MG
650 CAPSULE ORAL EVERY 8 HOURS PRN
Qty: 90 TABLET | Refills: 3 | Status: SHIPPED | OUTPATIENT
Start: 2024-06-17

## 2024-06-17 RX ORDER — POLYETHYLENE GLYCOL 3350 17 G/17G
17 POWDER, FOR SOLUTION ORAL DAILY
Qty: 510 G | Refills: 3 | Status: SHIPPED | OUTPATIENT
Start: 2024-06-17

## 2024-06-17 RX ORDER — AMOXICILLIN 250 MG
1 CAPSULE ORAL 2 TIMES DAILY
Start: 2024-06-17 | End: 2024-06-18 | Stop reason: SDUPTHER

## 2024-06-17 NOTE — TELEPHONE ENCOUNTER
Caller: АНДРЕЙ ELI    Relationship: Emergency Contact    Best call back number: [phone]     Requested Prescriptions:   ALL ACTIVE MEDICATION     Pharmacy where request should be sent: [facility] - [place], [place] - [address] [facility] - [phone]  - [phone] [place]     Last office visit with prescribing clinician: 5/22/2024   Last telemedicine visit with prescribing clinician: Visit date not found   Next office visit with prescribing clinician: 8/5/2024     Does the patient have less than a 3 day supply:  [x] Yes  [] No    Would you like a call back once the refill request has been completed: [x] Yes [] No    If the office needs to give you a call back, can they leave a voicemail: [x] Yes [] No    Antionette Gann Rep   06/17/24 09:07 EDT

## 2024-06-18 RX ORDER — FEXOFENADINE HCL 60 MG/1
60 TABLET, FILM COATED ORAL DAILY
Qty: 90 TABLET | Refills: 3 | Status: SHIPPED | OUTPATIENT
Start: 2024-06-18

## 2024-06-18 RX ORDER — MESALAMINE 1000 MG/1
1000 SUPPOSITORY RECTAL NIGHTLY
Qty: 6 SUPPOSITORY | Refills: 0 | Status: SHIPPED | OUTPATIENT
Start: 2024-06-18

## 2024-06-18 RX ORDER — UREA 8.5 G/85G
1 CREAM TOPICAL AS NEEDED
Qty: 1 EACH | Refills: 2 | Status: SHIPPED | OUTPATIENT
Start: 2024-06-18

## 2024-06-18 RX ORDER — METOPROLOL SUCCINATE 25 MG/1
12.5 TABLET, EXTENDED RELEASE ORAL
Qty: 15 TABLET | Refills: 0 | Status: SHIPPED | OUTPATIENT
Start: 2024-06-18 | End: 2024-07-18

## 2024-06-18 RX ORDER — AMOXICILLIN 250 MG
1 CAPSULE ORAL 2 TIMES DAILY
Start: 2024-06-18

## 2024-06-18 RX ORDER — MIRTAZAPINE 15 MG/1
15 TABLET, FILM COATED ORAL DAILY
Qty: 90 TABLET | Refills: 1 | Status: SHIPPED | OUTPATIENT
Start: 2024-06-18

## 2024-06-18 RX ORDER — FUROSEMIDE 20 MG/1
20 TABLET ORAL 2 TIMES DAILY
Qty: 180 TABLET | Refills: 2 | Status: SHIPPED | OUTPATIENT
Start: 2024-06-18

## 2024-06-18 RX ORDER — ATORVASTATIN CALCIUM 10 MG/1
10 TABLET, FILM COATED ORAL DAILY
Qty: 90 TABLET | Refills: 3 | Status: SHIPPED | OUTPATIENT
Start: 2024-06-18

## 2024-06-18 NOTE — TELEPHONE ENCOUNTER
Caller: [facility] [place] [address] - [phone] [facility] [phone]     Relationship to patient: Pharmacy    Best call back number: [phone]    Patient is needing: PHARMACIST FROM [facility] CALLED TO CHECK ON THE STATUS OF A REFILL REQUEST FOR THE PATIENT. CALLER STATES PATIENTS REFILLS FOR ALL HIS ACTIVE MEDICATIONS ARE SUPPOSED TO BE GOING TO THE [facility] IN [facility] AND NOT [facility] IN [place]. CALLER ASKING IF ALL PRESCRIPTIONS CAN BE RESUBMITTED TO THE CORRECT PHARMACY. CALLER STATES THEY DID GIVE PATIENT AN EMERGENCY REFILL YESTERDAY ON SOME OF HIS MEDICATIONS THAT HE WAS COMPLETELY OUT OF.

## 2024-07-22 ENCOUNTER — TELEPHONE (OUTPATIENT)
Dept: FAMILY MEDICINE CLINIC | Facility: CLINIC | Age: 89
End: 2024-07-22
Payer: MEDICARE

## 2024-07-22 NOTE — TELEPHONE ENCOUNTER
[place] calling from [facility] stating she is releasing pt from [facility]

## 2024-08-05 ENCOUNTER — OFFICE VISIT (OUTPATIENT)
Dept: FAMILY MEDICINE CLINIC | Facility: CLINIC | Age: 89
End: 2024-08-05
Payer: MEDICARE

## 2024-08-05 VITALS
WEIGHT: 195 LBS | SYSTOLIC BLOOD PRESSURE: 111 MMHG | HEART RATE: 76 BPM | DIASTOLIC BLOOD PRESSURE: 74 MMHG | BODY MASS INDEX: 26.41 KG/M2 | HEIGHT: 72 IN | OXYGEN SATURATION: 96 % | TEMPERATURE: 98.1 F

## 2024-08-05 DIAGNOSIS — R53.81 PHYSICAL DECONDITIONING: Primary | ICD-10-CM

## 2024-08-05 DIAGNOSIS — E78.2 MIXED HYPERLIPIDEMIA: ICD-10-CM

## 2024-08-05 DIAGNOSIS — H57.89 REDNESS OF BOTH EYES: ICD-10-CM

## 2024-08-05 PROCEDURE — 1160F RVW MEDS BY RX/DR IN RCRD: CPT | Performed by: STUDENT IN AN ORGANIZED HEALTH CARE EDUCATION/TRAINING PROGRAM

## 2024-08-05 PROCEDURE — 99214 OFFICE O/P EST MOD 30 MIN: CPT | Performed by: STUDENT IN AN ORGANIZED HEALTH CARE EDUCATION/TRAINING PROGRAM

## 2024-08-05 PROCEDURE — 1159F MED LIST DOCD IN RCRD: CPT | Performed by: STUDENT IN AN ORGANIZED HEALTH CARE EDUCATION/TRAINING PROGRAM

## 2024-08-05 PROCEDURE — 1126F AMNT PAIN NOTED NONE PRSNT: CPT | Performed by: STUDENT IN AN ORGANIZED HEALTH CARE EDUCATION/TRAINING PROGRAM

## 2024-08-05 RX ORDER — MESALAMINE 1000 MG/1
1000 SUPPOSITORY RECTAL NIGHTLY
Qty: 6 SUPPOSITORY | Refills: 0 | Status: SHIPPED | OUTPATIENT
Start: 2024-08-05

## 2024-08-05 RX ORDER — MIRTAZAPINE 15 MG/1
15 TABLET, FILM COATED ORAL DAILY
Qty: 90 TABLET | Refills: 1 | Status: SHIPPED | OUTPATIENT
Start: 2024-08-05

## 2024-08-05 RX ORDER — METOPROLOL SUCCINATE 25 MG/1
12.5 TABLET, EXTENDED RELEASE ORAL
Qty: 90 TABLET | Refills: 2 | Status: SHIPPED | OUTPATIENT
Start: 2024-08-05

## 2024-08-05 RX ORDER — FUROSEMIDE 20 MG/1
20 TABLET ORAL 2 TIMES DAILY
Qty: 180 TABLET | Refills: 2 | Status: SHIPPED | OUTPATIENT
Start: 2024-08-05

## 2024-08-05 RX ORDER — ATORVASTATIN CALCIUM 10 MG/1
10 TABLET, FILM COATED ORAL DAILY
Qty: 90 TABLET | Refills: 3 | Status: SHIPPED | OUTPATIENT
Start: 2024-08-05

## 2024-08-05 RX ORDER — OFLOXACIN 3 MG/ML
1 SOLUTION/ DROPS OPHTHALMIC 4 TIMES DAILY
Qty: 2 ML | Refills: 0 | Status: SHIPPED | OUTPATIENT
Start: 2024-08-05 | End: 2024-08-12

## 2024-08-05 NOTE — PROGRESS NOTES
"Chief Complaint  Edema (3 month follow up ) and Arm Pain (Left arm pain had a fall )    Subjective      Enrique Wylie is a 89 y.o. male who presents to [facility]     Patient comes after a fall. He hurt his shoulder 2 months ago. He ended up in the hospital due to LE infection treated in the inpatient settings. Also, presented proctitis - resolved. Pt now looking better. Might benefit from home health.     Redness eyes. Will start ofloxacin and eval by ophthalmo. No red flag signs.     Objective   Vital Signs:   Vitals:    08/05/24 1329   BP: 111/74   Pulse: 76   Temp: 98.1 °F (36.7 °C)   TempSrc: Temporal   SpO2: 96%   Weight: 88.5 kg (195 lb)   Height: 182.9 cm (72.01\")     Body mass index is 26.44 kg/m².    Wt Readings from Last 3 Encounters:   08/05/24 88.5 kg (195 lb)   05/22/24 83.9 kg (185 lb)   05/05/24 84.2 kg (185 lb 10 oz)     BP Readings from Last 3 Encounters:   08/05/24 111/74   05/22/24 108/67   05/05/24 111/67       Health Maintenance   Topic Date Due    LIPID PANEL  06/06/2024    INFLUENZA VACCINE  08/01/2024    COVID-19 Vaccine (4 - 2023-24 season) 10/25/2024 (Originally 9/1/2023)    RSV Vaccine - Adults (1 - 1-dose 60+ series) 08/05/2025 (Originally 6/8/1997)    TDAP/TD VACCINES (2 - Tdap) 08/05/2025 (Originally 10/30/2007)    ANNUAL WELLNESS VISIT  02/21/2025    BMI FOLLOWUP  02/21/2025    Pneumococcal Vaccine 65+  Completed    ZOSTER VACCINE  Completed       Physical Exam  Vitals reviewed.   HENT:      Head: Normocephalic.      Mouth/Throat:      Mouth: Mucous membranes are moist.   Eyes:      Pupils: Pupils are equal, round, and reactive to light.   Cardiovascular:      Rate and Rhythm: Normal rate.   Abdominal:      General: Abdomen is flat.   Musculoskeletal:         General: Normal range of motion.      Cervical back: Normal range of motion.   Skin:     General: Skin is warm.      Capillary Refill: Capillary refill takes less than 2 seconds. "   Neurological:      Mental Status: He is alert.            Assessment & Plan  Physical deconditioning    Redness of both eyes    Mixed hyperlipidemia       Orders Placed This Encounter   Procedures    Ambulatory Referral to Home Health    Ambulatory Referral to Ophthalmology     New Medications Ordered This Visit   Medications    ofloxacin (OCUFLOX) 0.3 % ophthalmic solution     Sig: Administer 1 drop into the left eye 4 (Four) Times a Day for 7 days.     Dispense:  2 mL     Refill:  0    atorvastatin (LIPITOR) 10 MG tablet     Sig: Take 1 tablet by mouth Daily.     Dispense:  90 tablet     Refill:  3    carbidopa-levodopa (SINEMET)  MG per tablet     Sig: Take 1 tablet by mouth 2 (Two) Times a Day.     Dispense:  180 tablet     Refill:  1    metoprolol succinate XL (TOPROL-XL) 25 MG 24 hr tablet     Sig: Take 0.5 tablets by mouth Daily.     Dispense:  90 tablet     Refill:  2    mirtazapine (REMERON) 15 MG tablet     Sig: Take 1 tablet by mouth Daily. HS     Dispense:  90 tablet     Refill:  1    furosemide (Lasix) 20 MG tablet     Sig: Take 1 tablet by mouth 2 (Two) Times a Day.     Dispense:  180 tablet     Refill:  2    mesalamine (CANASA) 1000 MG suppository     Sig: Insert 1 suppository into the rectum Every Night.     Dispense:  6 suppository     Refill:  0                  I spent 30 minutes caring for Enrique on this date of service. This time includes time spent by me in the following activities:preparing for the visit, reviewing tests, obtaining and/or reviewing a separately obtained history, performing a medically appropriate examination and/or evaluation , counseling and educating the patient/family/caregiver, ordering medications, tests, or procedures, referring and communicating with other health care professionals , documenting information in the medical record, independently interpreting results and communicating that information with the patient/family/caregiver, and care coordination  FOLLOW  UP  Return in about 29 weeks (around 2/24/2025).  Patient was given instructions and counseling regarding his condition or for health maintenance advice. Please see specific information pulled into the AVS if appropriate.       Josh Lees MD  08/05/24  14:14 EDT    CURRENT & DISCONTINUED MEDICATIONS  Current Outpatient Medications   Medication Instructions    acetaminophen (TYLENOL) 650 mg, Oral, Every 8 Hours PRN    atorvastatin (LIPITOR) 10 mg, Oral, Daily    carbidopa-levodopa (SINEMET)  MG per tablet 1 tablet, Oral, 2 Times Daily    Control Gel Formula Dressing (DuoDERM CGF Extra Thin) misc 1 each, Apply externally, Every Other Day    Control Gel Formula Dressing (DuoDERM CGF Extra Thin) misc 1 each, Apply externally, Every Other Day    fexofenadine (ALLEGRA) 60 mg, Oral, Daily    furosemide (LASIX) 20 mg, Oral, 2 Times Daily    mesalamine (CANASA) 1,000 mg, Rectal, Nightly    metoprolol succinate XL (TOPROL-XL) 12.5 mg, Oral, Every 24 Hours Scheduled    mirtazapine (REMERON) 15 mg, Oral, Daily, HS    Misc. Devices (Transfer Bench) misc 1 each, Does not apply, Daily    ofloxacin (OCUFLOX) 0.3 % ophthalmic solution 1 drop, Left Eye, 4 Times Daily    polyethylene glycol (MIRALAX) 17 g, Oral, Daily    Psyllium (METAMUCIL MULTIHEALTH FIBER) 51.7 % packet 1 packet, Oral, Daily    sennosides-docusate (PERICOLACE) 8.6-50 MG per tablet 1 tablet, Oral, 2 Times Daily    urea (CARMOL) 10 % cream 1 Application, Topical, As Needed       Medications Discontinued During This Encounter   Medication Reason    mirtazapine (REMERON) 15 MG tablet Reorder    metoprolol succinate XL (TOPROL-XL) 25 MG 24 hr tablet Reorder    mesalamine (CANASA) 1000 MG suppository Reorder    furosemide (Lasix) 20 MG tablet Reorder    carbidopa-levodopa (SINEMET)  MG per tablet Reorder    atorvastatin (LIPITOR) 10 MG tablet Reorder

## 2024-08-14 ENCOUNTER — APPOINTMENT (OUTPATIENT)
Dept: GENERAL RADIOLOGY | Facility: HOSPITAL | Age: 89
End: 2024-08-14
Payer: MEDICARE

## 2024-08-14 ENCOUNTER — APPOINTMENT (OUTPATIENT)
Dept: CT IMAGING | Facility: HOSPITAL | Age: 89
End: 2024-08-14
Payer: MEDICARE

## 2024-08-14 ENCOUNTER — HOSPITAL ENCOUNTER (OUTPATIENT)
Facility: HOSPITAL | Age: 89
Setting detail: OBSERVATION
Discharge: HOME HEALTH | End: 2024-08-16
Attending: EMERGENCY MEDICINE | Admitting: INTERNAL MEDICINE
Payer: MEDICARE

## 2024-08-14 ENCOUNTER — APPOINTMENT (OUTPATIENT)
Dept: MRI IMAGING | Facility: HOSPITAL | Age: 89
End: 2024-08-14
Payer: MEDICARE

## 2024-08-14 ENCOUNTER — APPOINTMENT (OUTPATIENT)
Dept: CARDIOLOGY | Facility: HOSPITAL | Age: 89
End: 2024-08-14
Payer: MEDICARE

## 2024-08-14 DIAGNOSIS — R26.2 DIFFICULTY WALKING: ICD-10-CM

## 2024-08-14 DIAGNOSIS — R55 POSTURAL DIZZINESS WITH NEAR SYNCOPE: Primary | ICD-10-CM

## 2024-08-14 DIAGNOSIS — I48.0 PAROXYSMAL ATRIAL FIBRILLATION: ICD-10-CM

## 2024-08-14 DIAGNOSIS — R42 POSTURAL DIZZINESS WITH NEAR SYNCOPE: Primary | ICD-10-CM

## 2024-08-14 LAB
ALBUMIN SERPL-MCNC: 4 G/DL (ref 3.5–5.2)
ALBUMIN/GLOB SERPL: 1.3 G/DL
ALP SERPL-CCNC: 104 U/L (ref 39–117)
ALT SERPL W P-5'-P-CCNC: 7 U/L (ref 1–41)
ANION GAP SERPL CALCULATED.3IONS-SCNC: 9.1 MMOL/L (ref 5–15)
AST SERPL-CCNC: 17 U/L (ref 1–40)
BASOPHILS # BLD AUTO: 0.04 10*3/MM3 (ref 0–0.2)
BASOPHILS NFR BLD AUTO: 0.5 % (ref 0–1.5)
BH CV ECHO MEAS - AO MAX PG: 6.3 MMHG
BH CV ECHO MEAS - AO MEAN PG: 3 MMHG
BH CV ECHO MEAS - AO ROOT DIAM: 3.4 CM
BH CV ECHO MEAS - AO V2 MAX: 125 CM/SEC
BH CV ECHO MEAS - AO V2 VTI: 21.8 CM
BH CV ECHO MEAS - AVA(I,D): 2.03 CM2
BH CV ECHO MEAS - EDV(CUBED): 91.1 ML
BH CV ECHO MEAS - EDV(MOD-SP2): 98.5 ML
BH CV ECHO MEAS - EDV(MOD-SP4): 109 ML
BH CV ECHO MEAS - EF(MOD-BP): 71.1 %
BH CV ECHO MEAS - EF(MOD-SP2): 62.9 %
BH CV ECHO MEAS - EF(MOD-SP4): 77.8 %
BH CV ECHO MEAS - ESV(CUBED): 32.8 ML
BH CV ECHO MEAS - ESV(MOD-SP2): 36.5 ML
BH CV ECHO MEAS - ESV(MOD-SP4): 24.2 ML
BH CV ECHO MEAS - FS: 28.9 %
BH CV ECHO MEAS - IVS/LVPW: 0.82 CM
BH CV ECHO MEAS - IVSD: 0.9 CM
BH CV ECHO MEAS - LA DIMENSION: 3.8 CM
BH CV ECHO MEAS - LAT PEAK E' VEL: 5 CM/SEC
BH CV ECHO MEAS - LV MASS(C)D: 153.3 GRAMS
BH CV ECHO MEAS - LV MAX PG: 2.28 MMHG
BH CV ECHO MEAS - LV MEAN PG: 1 MMHG
BH CV ECHO MEAS - LV V1 MAX: 75.5 CM/SEC
BH CV ECHO MEAS - LV V1 VTI: 14.1 CM
BH CV ECHO MEAS - LVIDD: 4.5 CM
BH CV ECHO MEAS - LVIDS: 3.2 CM
BH CV ECHO MEAS - LVOT AREA: 3.1 CM2
BH CV ECHO MEAS - LVOT DIAM: 2 CM
BH CV ECHO MEAS - LVPWD: 1.1 CM
BH CV ECHO MEAS - MED PEAK E' VEL: 5.7 CM/SEC
BH CV ECHO MEAS - MV A MAX VEL: 37.7 CM/SEC
BH CV ECHO MEAS - MV DEC TIME: 0.16 SEC
BH CV ECHO MEAS - MV E MAX VEL: 94.9 CM/SEC
BH CV ECHO MEAS - MV E/A: 2.5
BH CV ECHO MEAS - SV(LVOT): 44.3 ML
BH CV ECHO MEAS - SV(MOD-SP2): 62 ML
BH CV ECHO MEAS - SV(MOD-SP4): 84.8 ML
BH CV ECHO MEAS - TAPSE (>1.6): 2.09 CM
BH CV ECHO MEASUREMENTS AVERAGE E/E' RATIO: 17.74
BILIRUB SERPL-MCNC: 0.6 MG/DL (ref 0–1.2)
BUN SERPL-MCNC: 17 MG/DL (ref 8–23)
BUN/CREAT SERPL: 14.8 (ref 7–25)
CALCIUM SPEC-SCNC: 9 MG/DL (ref 8.6–10.5)
CHLORIDE SERPL-SCNC: 106 MMOL/L (ref 98–107)
CO2 SERPL-SCNC: 28.9 MMOL/L (ref 22–29)
CREAT SERPL-MCNC: 1.15 MG/DL (ref 0.76–1.27)
DEPRECATED RDW RBC AUTO: 46.4 FL (ref 37–54)
EGFRCR SERPLBLD CKD-EPI 2021: 61.6 ML/MIN/1.73
EOSINOPHIL # BLD AUTO: 0.21 10*3/MM3 (ref 0–0.4)
EOSINOPHIL NFR BLD AUTO: 2.6 % (ref 0.3–6.2)
ERYTHROCYTE [DISTWIDTH] IN BLOOD BY AUTOMATED COUNT: 13.4 % (ref 12.3–15.4)
GLOBULIN UR ELPH-MCNC: 3 GM/DL
GLUCOSE SERPL-MCNC: 109 MG/DL (ref 65–99)
HCT VFR BLD AUTO: 44.2 % (ref 37.5–51)
HGB BLD-MCNC: 14.4 G/DL (ref 13–17.7)
HOLD SPECIMEN: NORMAL
HOLD SPECIMEN: NORMAL
IMM GRANULOCYTES # BLD AUTO: 0.02 10*3/MM3 (ref 0–0.05)
IMM GRANULOCYTES NFR BLD AUTO: 0.2 % (ref 0–0.5)
LEFT ATRIUM VOLUME INDEX: 30.5 ML/M2
LYMPHOCYTES # BLD AUTO: 1.63 10*3/MM3 (ref 0.7–3.1)
LYMPHOCYTES NFR BLD AUTO: 20.2 % (ref 19.6–45.3)
MAGNESIUM SERPL-MCNC: 2.1 MG/DL (ref 1.6–2.4)
MCH RBC QN AUTO: 30.9 PG (ref 26.6–33)
MCHC RBC AUTO-ENTMCNC: 32.6 G/DL (ref 31.5–35.7)
MCV RBC AUTO: 94.8 FL (ref 79–97)
MONOCYTES # BLD AUTO: 0.88 10*3/MM3 (ref 0.1–0.9)
MONOCYTES NFR BLD AUTO: 10.9 % (ref 5–12)
NEUTROPHILS NFR BLD AUTO: 5.27 10*3/MM3 (ref 1.7–7)
NEUTROPHILS NFR BLD AUTO: 65.6 % (ref 42.7–76)
NRBC BLD AUTO-RTO: 0 /100 WBC (ref 0–0.2)
PLATELET # BLD AUTO: 255 10*3/MM3 (ref 140–450)
PMV BLD AUTO: 9.2 FL (ref 6–12)
POTASSIUM SERPL-SCNC: 3.6 MMOL/L (ref 3.5–5.2)
PROT SERPL-MCNC: 7 G/DL (ref 6–8.5)
QT INTERVAL: 397 MS
QTC INTERVAL: 469 MS
RBC # BLD AUTO: 4.66 10*6/MM3 (ref 4.14–5.8)
SODIUM SERPL-SCNC: 144 MMOL/L (ref 136–145)
TROPONIN T SERPL HS-MCNC: 26 NG/L
WBC NRBC COR # BLD AUTO: 8.05 10*3/MM3 (ref 3.4–10.8)
WHOLE BLOOD HOLD COAG: NORMAL
WHOLE BLOOD HOLD SPECIMEN: NORMAL

## 2024-08-14 PROCEDURE — 93306 TTE W/DOPPLER COMPLETE: CPT | Performed by: INTERNAL MEDICINE

## 2024-08-14 PROCEDURE — 25010000002 ENOXAPARIN PER 10 MG: Performed by: INTERNAL MEDICINE

## 2024-08-14 PROCEDURE — 93005 ELECTROCARDIOGRAM TRACING: CPT | Performed by: EMERGENCY MEDICINE

## 2024-08-14 PROCEDURE — 96372 THER/PROPH/DIAG INJ SC/IM: CPT

## 2024-08-14 PROCEDURE — 80053 COMPREHEN METABOLIC PANEL: CPT

## 2024-08-14 PROCEDURE — G0378 HOSPITAL OBSERVATION PER HR: HCPCS

## 2024-08-14 PROCEDURE — 25810000003 SODIUM CHLORIDE 0.9 % SOLUTION: Performed by: EMERGENCY MEDICINE

## 2024-08-14 PROCEDURE — 83735 ASSAY OF MAGNESIUM: CPT

## 2024-08-14 PROCEDURE — 71045 X-RAY EXAM CHEST 1 VIEW: CPT

## 2024-08-14 PROCEDURE — 96360 HYDRATION IV INFUSION INIT: CPT

## 2024-08-14 PROCEDURE — 96361 HYDRATE IV INFUSION ADD-ON: CPT

## 2024-08-14 PROCEDURE — 70450 CT HEAD/BRAIN W/O DYE: CPT

## 2024-08-14 PROCEDURE — 93306 TTE W/DOPPLER COMPLETE: CPT

## 2024-08-14 PROCEDURE — 25010000002 SULFUR HEXAFLUORIDE MICROSPH 60.7-25 MG RECONSTITUTED SUSPENSION: Performed by: INTERNAL MEDICINE

## 2024-08-14 PROCEDURE — 85025 COMPLETE CBC W/AUTO DIFF WBC: CPT

## 2024-08-14 PROCEDURE — 84484 ASSAY OF TROPONIN QUANT: CPT

## 2024-08-14 PROCEDURE — 93005 ELECTROCARDIOGRAM TRACING: CPT

## 2024-08-14 PROCEDURE — 25810000003 LACTATED RINGERS PER 1000 ML: Performed by: INTERNAL MEDICINE

## 2024-08-14 PROCEDURE — 99285 EMERGENCY DEPT VISIT HI MDM: CPT

## 2024-08-14 PROCEDURE — 70551 MRI BRAIN STEM W/O DYE: CPT

## 2024-08-14 RX ORDER — SODIUM CHLORIDE 0.9 % (FLUSH) 0.9 %
10 SYRINGE (ML) INJECTION EVERY 12 HOURS SCHEDULED
Status: DISCONTINUED | OUTPATIENT
Start: 2024-08-14 | End: 2024-08-16 | Stop reason: HOSPADM

## 2024-08-14 RX ORDER — BISACODYL 10 MG
10 SUPPOSITORY, RECTAL RECTAL DAILY PRN
Status: DISCONTINUED | OUTPATIENT
Start: 2024-08-14 | End: 2024-08-16 | Stop reason: HOSPADM

## 2024-08-14 RX ORDER — AMOXICILLIN 250 MG
2 CAPSULE ORAL 2 TIMES DAILY PRN
Status: DISCONTINUED | OUTPATIENT
Start: 2024-08-14 | End: 2024-08-16 | Stop reason: HOSPADM

## 2024-08-14 RX ORDER — ATORVASTATIN CALCIUM 10 MG/1
10 TABLET, FILM COATED ORAL DAILY
Status: DISCONTINUED | OUTPATIENT
Start: 2024-08-14 | End: 2024-08-16 | Stop reason: HOSPADM

## 2024-08-14 RX ORDER — SODIUM CHLORIDE 0.9 % (FLUSH) 0.9 %
10 SYRINGE (ML) INJECTION AS NEEDED
Status: DISCONTINUED | OUTPATIENT
Start: 2024-08-14 | End: 2024-08-16 | Stop reason: HOSPADM

## 2024-08-14 RX ORDER — BISACODYL 5 MG/1
5 TABLET, DELAYED RELEASE ORAL DAILY PRN
Status: DISCONTINUED | OUTPATIENT
Start: 2024-08-14 | End: 2024-08-16 | Stop reason: HOSPADM

## 2024-08-14 RX ORDER — SODIUM CHLORIDE 9 MG/ML
40 INJECTION, SOLUTION INTRAVENOUS AS NEEDED
Status: DISCONTINUED | OUTPATIENT
Start: 2024-08-14 | End: 2024-08-16 | Stop reason: HOSPADM

## 2024-08-14 RX ORDER — SODIUM CHLORIDE, SODIUM LACTATE, POTASSIUM CHLORIDE, CALCIUM CHLORIDE 600; 310; 30; 20 MG/100ML; MG/100ML; MG/100ML; MG/100ML
100 INJECTION, SOLUTION INTRAVENOUS CONTINUOUS
Status: DISCONTINUED | OUTPATIENT
Start: 2024-08-14 | End: 2024-08-15

## 2024-08-14 RX ORDER — CALCIUM CARBONATE 500 MG/1
2 TABLET, CHEWABLE ORAL 2 TIMES DAILY PRN
Status: DISCONTINUED | OUTPATIENT
Start: 2024-08-14 | End: 2024-08-16 | Stop reason: HOSPADM

## 2024-08-14 RX ORDER — POLYETHYLENE GLYCOL 3350 17 G/17G
17 POWDER, FOR SOLUTION ORAL DAILY PRN
Status: DISCONTINUED | OUTPATIENT
Start: 2024-08-14 | End: 2024-08-16 | Stop reason: HOSPADM

## 2024-08-14 RX ORDER — ONDANSETRON 2 MG/ML
4 INJECTION INTRAMUSCULAR; INTRAVENOUS EVERY 4 HOURS PRN
Status: DISCONTINUED | OUTPATIENT
Start: 2024-08-14 | End: 2024-08-16 | Stop reason: HOSPADM

## 2024-08-14 RX ORDER — ENOXAPARIN SODIUM 100 MG/ML
40 INJECTION SUBCUTANEOUS DAILY
Status: DISCONTINUED | OUTPATIENT
Start: 2024-08-14 | End: 2024-08-16 | Stop reason: HOSPADM

## 2024-08-14 RX ADMIN — SODIUM CHLORIDE 500 ML: 9 INJECTION, SOLUTION INTRAVENOUS at 10:00

## 2024-08-14 RX ADMIN — SODIUM CHLORIDE, POTASSIUM CHLORIDE, SODIUM LACTATE AND CALCIUM CHLORIDE 100 ML/HR: 600; 310; 30; 20 INJECTION, SOLUTION INTRAVENOUS at 14:19

## 2024-08-14 RX ADMIN — SODIUM CHLORIDE, POTASSIUM CHLORIDE, SODIUM LACTATE AND CALCIUM CHLORIDE 100 ML/HR: 600; 310; 30; 20 INJECTION, SOLUTION INTRAVENOUS at 20:28

## 2024-08-14 RX ADMIN — SODIUM CHLORIDE 500 ML: 9 INJECTION, SOLUTION INTRAVENOUS at 07:52

## 2024-08-14 RX ADMIN — SULFUR HEXAFLUORIDE 2 ML: KIT at 14:28

## 2024-08-14 RX ADMIN — AVOBENZONE, HOMOSALATE, OCTISALATE, OCTOCRYLENE, AND OXYBENZONE 1 PACKET: 29.4; 147; 49; 25.4; 58.8 LOTION TOPICAL at 14:19

## 2024-08-14 RX ADMIN — CARBIDOPA AND LEVODOPA 1 TABLET: 25; 100 TABLET ORAL at 20:27

## 2024-08-14 RX ADMIN — Medication 10 ML: at 13:15

## 2024-08-14 RX ADMIN — ATORVASTATIN CALCIUM 10 MG: 10 TABLET, FILM COATED ORAL at 14:19

## 2024-08-14 RX ADMIN — ENOXAPARIN SODIUM 40 MG: 100 INJECTION SUBCUTANEOUS at 13:15

## 2024-08-14 RX ADMIN — Medication 10 ML: at 20:26

## 2024-08-14 NOTE — DISCHARGE INSTRUCTIONS
All of your blood work looked okay today.    Your EKG showed that you are in A-fib heart rhythm again and it sounds like you had an episode of dizziness or lightheadedness when you stood up this morning.    You are given some IV fluids here and look improved after this.    Please call your primary care doctor (Dr. Maya) for a follow-up appointment this week.

## 2024-08-14 NOTE — ED PROVIDER NOTES
Time: 8:46 AM EDT  Date of encounter:  8/14/2024  Independent Historian/Clinical History and Information was obtained by:   Patient    History is limited by: N/A    Chief Complaint: Dizziness and lightheadedness when standing up this morning      History of Present Illness:  Patient is a 89 y.o. year old male with history of atrial fibrillation who presents to the emergency department for evaluation of an episode of dizziness and feeling lightheaded like he was going to faint when he stood up this morning.    He denies any recent illnesses and no recent fevers or cough or congestion or chest pain or dyspnea, and denies any nausea or vomiting or dysuria.    He denies any new medications.  He does not think he takes any diuretics anymore.    He has been eating and drinking normally.  He is feeling better now lying in the stretcher.      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    C4-5 and C5-6 with edema in the cord at C4-5    Death of wife 07/30/2020    Dementia     Fall at home, initial encounter 05/21/2020    Gait instability 02/06/2019    Grief reaction 07/30/2020    High cholesterol     Kidney stone     Lumbar spinal stenosis 02/02/2016    Worsening leg symptoms,  3/12/15    Medication management 02/06/2019    Restless leg syndrome 02/06/2019    Weight loss, unintentional 07/30/2020     Past Surgical History:   Procedure Laterality Date    CATARACT EXTRACTION Bilateral     CERVICAL FUSION      C4,5 and 6    COLONOSCOPY N/A  "4/26/2024    Procedure: COLONOSCOPY WITH BIOPSIES;  Surgeon: Oral Duong MD;  Location: [facility] ENDOSCOPY;  Service: Gastroenterology;  Laterality: N/A;  PROCTITIS, COLON POLYPS    KNEE ARTHROSCOPY      right knee    LUMBAR LAMINECTOMY  02/10/2016    L3-4    ROTATOR CUFF REPAIR Right     SKIN BIOPSY      face and ears, squamous cell     SPINE SURGERY      minimally invasive procedure \"closing in on spinal Cord\"  \"gave spinal cord more room\"    URETEROSCOPY LASER LITHOTRIPSY WITH STENT INSERTION Bilateral 11/13/2023    Procedure: Bilateral Retrograde Pyelogram, Left Ureteroscopy Lasertripsy Basket Stone Extraction and Stent Insertion, Right Stent Insertion, Bladder Stone Extraction;  Surgeon: Katerina Carpenter MD;  Location: [facility] MAIN OR;  Service: Urology;  Laterality: Bilateral;    URETEROSCOPY LASER LITHOTRIPSY WITH STENT INSERTION Bilateral 12/18/2023    Procedure: CYSTOSCOPY URETEROSCOPY RIGHT RETROGRADE PYELOGRAM HOLMIUM LASER STENT exchange; LEFT STENT REMOVAL;  Surgeon: Katerina Carpenter MD;  Location: [facility] MAIN OR;  Service: Urology;  Laterality: Bilateral;     Family History   Problem Relation Age of Onset    Heart failure Mother     Heart failure Father        Home Medications:  Prior to Admission medications    Medication Sig Start Date End Date Taking? Authorizing Provider   acetaminophen (TYLENOL) 650 MG 8 hr tablet Take 1 tablet by mouth Every 8 (Eight) Hours As Needed for Mild Pain. 6/17/24   Josh Maya MD   atorvastatin (LIPITOR) 10 MG tablet Take 1 tablet by mouth Daily. 8/5/24   Josh Maya MD   carbidopa-levodopa (SINEMET)  MG per tablet Take 1 tablet by mouth 2 (Two) Times a Day. 8/5/24   Josh Maya MD   Control Gel Formula Dressing (DuoDERM CGF Extra Thin) misc Apply 1 each topically Every Other Day. 2/21/24   Josh Maya MD   Control Gel Formula Dressing (DuoDERM CGF Extra Thin) misc Apply 1 each topically Every Other Day. " "4/4/24   Josh Maya MD   fexofenadine (ALLEGRA) 60 MG tablet Take 1 tablet by mouth Daily. 6/18/24   Josh Maya MD   furosemide (Lasix) 20 MG tablet Take 1 tablet by mouth 2 (Two) Times a Day. 8/5/24   Josh Maya MD   mesalamine (CANASA) 1000 MG suppository Insert 1 suppository into the rectum Every Night. 8/5/24   Josh Maya MD   metoprolol succinate XL (TOPROL-XL) 25 MG 24 hr tablet Take 0.5 tablets by mouth Daily. 8/5/24   Josh Maya MD   mirtazapine (REMERON) 15 MG tablet Take 1 tablet by mouth Daily. HS 8/5/24   Josh Maya MD   Misc. Devices (Transfer Bench) misc Use 1 each Daily. 6/6/24   Josh Maya MD   polyethylene glycol (MiraLax) 17 GM/SCOOP powder Take 17 g by mouth Daily. 6/17/24   Josh Maya MD   Psyllium (METAMUCIL MULTIHEALTH FIBER) 51.7 % packet Take 1 packet by mouth Daily. 6/17/24   Josh Maya MD   sennosides-docusate (PERICOLACE) 8.6-50 MG per tablet Take 1 tablet by mouth 2 (Two) Times a Day. 6/18/24   Josh Maya MD   urea (CARMOL) 10 % cream Apply 1 Application topically to the appropriate area as directed As Needed for Dry Skin. 6/18/24   Josh Maya MD        Social History:   Social History     Tobacco Use    Smoking status: Never    Smokeless tobacco: Never   Vaping Use    Vaping status: Never Used   Substance Use Topics    Alcohol use: Never    Drug use: Never         Review of Systems:  Review of Systems   I performed a 10 point review of systems which was all negative, except for the positives found in the HPI above.      Physical Exam:  /72   Pulse 88   Temp 98.2 °F (36.8 °C) (Oral)   Resp 18   Ht 188 cm (74\")   Wt 93.1 kg (205 lb 4 oz)   SpO2 92%   BMI 26.35 kg/m²       Physical Exam   General: Awake alert and in no obvious distress    HEENT: Head normocephalic atraumatic, eyes PERRLA EOMI, nose normal, oropharynx normal.    Neck: " Supple full range of motion, no meningismus, no lymphadenopathy    Heart: Regular rate and rhythm, no murmurs or rubs, 2+ radial pulses bilaterally    Lungs: Clear to auscultation bilaterally without wheezes or crackles, no respiratory distress    Abdomen: Soft, nontender, nondistended, no rebound or guarding    Skin: Warm, dry, no rash    Musculoskeletal: Normal range of motion, no lower extremity edema    Neurologic: Oriented x3, no motor deficits no sensory deficits    Psychiatric: Mood appears stable, no psychosis          Procedures:  Procedures      Medical Decision Making:      Comorbidities that affect care:    Atrial Fibrillation    External Notes reviewed:    None      The following orders were placed and all results were independently analyzed by me:  Orders Placed This Encounter   Procedures    XR Chest 1 View    CT Head Without Contrast    Moffat Draw    Comprehensive Metabolic Panel    Single High Sensitivity Troponin T    Magnesium    Urinalysis With Microscopic If Indicated (No Culture) - Urine, Clean Catch    CBC Auto Differential    NPO Diet NPO Type: Strict NPO    Undress & Gown    Continuous Pulse Oximetry    Vital Signs    Orthostatic Blood Pressure    Orthostatic Vitals (Blood Pressure & Heart Rate)    Hospitalist (on-call MD unless specified)    Oxygen Therapy- Nasal Cannula; Titrate 1-6 LPM Per SpO2; 90 - 95%    POC Glucose Once    ECG 12 Lead ED Triage Standing Order; Weak / Dizzy / AMS    Insert Peripheral IV    Insert Peripheral IV    Fall Precautions    CBC & Differential    Green Top (Gel)    Lavender Top    Gold Top - SST    Light Blue Top       Medications Given in the Emergency Department:  Medications   sodium chloride 0.9 % flush 10 mL (has no administration in time range)   sodium chloride 0.9 % flush 10 mL (has no administration in time range)   sodium chloride 0.9 % bolus 500 mL (500 mL Intravenous New Bag 8/14/24 1000)   sodium chloride 0.9 % bolus 500 mL (0 mL Intravenous  Stopped 8/14/24 1024)        ED Course:    ED Course as of 08/14/24 1027   Wed Aug 14, 2024   0569 EKG: I interpreted his twelve-lead EKG as atrial fibrillation with heart rate of 83 bpm, absent P waves, QRS showing incomplete right bundle branch block, normal ST segments and T waves; no acute ischemia. [VS]      ED Course User Index  [VS] Rancho Briseno MD       Labs:    Lab Results (last 24 hours)       Procedure Component Value Units Date/Time    CBC & Differential [[id number]]  (Normal) Collected: 08/14/24 0624    Specimen: Blood Updated: 08/14/24 0631    Narrative:      The following orders were created for panel order CBC & Differential.  Procedure                               Abnormality         Status                     ---------                               -----------         ------                     CBC Auto Differential[[id number]]        Normal              Final result                 Please view results for these tests on the individual orders.    Comprehensive Metabolic Panel [[id number]]  (Abnormal) Collected: 08/14/24 0624    Specimen: Blood Updated: 08/14/24 0652     Glucose 109 mg/dL      BUN 17 mg/dL      Creatinine 1.15 mg/dL      Sodium 144 mmol/L      Potassium 3.6 mmol/L      Comment: Slight hemolysis detected by analyzer. Result may be falsely elevated.        Chloride 106 mmol/L      CO2 28.9 mmol/L      Calcium 9.0 mg/dL      Total Protein 7.0 g/dL      Albumin 4.0 g/dL      ALT (SGPT) 7 U/L      AST (SGOT) 17 U/L      Alkaline Phosphatase 104 U/L      Total Bilirubin 0.6 mg/dL      Globulin 3.0 gm/dL      A/G Ratio 1.3 g/dL      BUN/Creatinine Ratio 14.8     Anion Gap 9.1 mmol/L      eGFR 61.6 mL/min/1.73     Narrative:      GFR Normal >60  Chronic Kidney Disease <60  Kidney Failure <15    The GFR formula is only valid for adults with stable renal function between ages 18 and 70.    Single High Sensitivity Troponin T [[id number]]  (Abnormal) Collected: 08/14/24 0624    Specimen: Blood  Updated: 08/14/24 0652     HS Troponin T 26 ng/L     Narrative:      High Sensitive Troponin T Reference Range:  <14.0 ng/L- Negative Female for AMI  <22.0 ng/L- Negative Male for AMI  >=14 - Abnormal Female indicating possible myocardial injury.  >=22 - Abnormal Male indicating possible myocardial injury.   Clinicians would have to utilize clinical acumen, EKG, Troponin, and serial changes to determine if it is an Acute Myocardial Infarction or myocardial injury due to an underlying chronic condition.         Magnesium [[id number]]  (Normal) Collected: 08/14/24 0624    Specimen: Blood Updated: 08/14/24 0652     Magnesium 2.1 mg/dL     CBC Auto Differential [[id number]]  (Normal) Collected: 08/14/24 0624    Specimen: Blood Updated: 08/14/24 0631     WBC 8.05 10*3/mm3      RBC 4.66 10*6/mm3      Hemoglobin 14.4 g/dL      Hematocrit 44.2 %      MCV 94.8 fL      MCH 30.9 pg      MCHC 32.6 g/dL      RDW 13.4 %      RDW-SD 46.4 fl      MPV 9.2 fL      Platelets 255 10*3/mm3      Neutrophil % 65.6 %      Lymphocyte % 20.2 %      Monocyte % 10.9 %      Eosinophil % 2.6 %      Basophil % 0.5 %      Immature Grans % 0.2 %      Neutrophils, Absolute 5.27 10*3/mm3      Lymphocytes, Absolute 1.63 10*3/mm3      Monocytes, Absolute 0.88 10*3/mm3      Eosinophils, Absolute 0.21 10*3/mm3      Basophils, Absolute 0.04 10*3/mm3      Immature Grans, Absolute 0.02 10*3/mm3      nRBC 0.0 /100 WBC              Imaging:    XR Chest 1 View    Result Date: 8/14/2024  XR CHEST 1 VW Date of Exam: 8/14/2024 6:50 AM EDT Indication: Weak/Dizzy/AMS triage protocol Comparison: Chest x-ray 4/10/2024 Findings: Normal cardiomediastinal silhouette. The lungs are clear. No pleural effusion or pneumothorax. No acute osseous findings.     Impression: No acute cardiopulmonary findings. Electronically Signed: Severiano Head MD  8/14/2024 7:14 AM EDT  Workstation ID: [id number]       Differential Diagnosis and Discussion:    Dizziness: Based on the patient's  history, signs, and symptoms, the diffential diagnosis includes but is not limited to meningitis, stroke, sepsis, subarachnoid hemorrhage, intracranial bleeding, encephalitis, vertigo, electrolyte imbalance, and metabolic disorders.    All labs were reviewed and interpreted by me.  All X-rays impressions were independently interpreted by me.  EKG was interpreted by me.    MDM     Amount and/or Complexity of Data Reviewed  Clinical lab tests: reviewed  Tests in the radiology section of CPT®: reviewed  Tests in the medicine section of CPT®: reviewed         This patient is a pleasant 89-year-old male with history of atrial fibrillation and Parkinson's on Sinemet, now presents with dizziness and lightheadedness and feeling faint when he stood up this morning.    He arrives in atrial fibrillation but heart rate is well-controlled in the 90 bpm range for the most part.  Blood pressure stable here.      Other than the A-fib, he has a normal physical exam and no weakness or numbness or altered mental status or signs of a stroke today.    I am checking screening lab work to look for dehydration or blood loss or electrolyte abnormalities.    I will give him a small IV fluid bolus of 500 cc of saline and we will reassess and have him ambulate here to make sure he is no longer dizzy or lightheaded.    I do not see any signs of concerning arrhythmia and I will have him follow-up with his doctor this week.      He did have positive orthostatic vital signs here and felt weak or dizzy when he stood up so I am giving a second 500 cc bolus of normal saline and we will reassess.        Unfortunately, after a second bag of fluids here we stood him up and he was very dizzy and unable to even ambulate a step without assistance and looks like a significant fall risk to be sent home so I will plan to admit him for further workup.  He may even need MRI of the brain to rule out posterior stroke.              Patient Care  Considerations:          Consultants/Shared Management Plan:    Hospitalist: I have discussed the case with the admitting hospitalist who agrees to accept the patient for admission.    Social Determinants of Health:    Patient is independent, reliable, and has access to care.       Disposition and Care Coordination:    Admit:   Through independent evaluation of the patient's history, physical, and imperical data, the patient meets criteria for inpatient admission to the hospital.    I have explained the patient´s condition, diagnoses and treatment plan based on the information available to me at this time. I have answered questions and addressed any concerns. The patient has a good  understanding of the patient´s diagnosis, condition, and treatment plan as can be expected at this point. The vital signs have been stable. The patient´s condition is stable and appropriate for discharge from the emergency department.      The patient will pursue further outpatient evaluation with the primary care physician or other designated or consulting physician as outlined in the discharge instructions. They are agreeable to this plan of care and follow-up instructions have been explained in detail. The patient has received these instructions in written format and has expressed an understanding of the discharge instructions. The patient is aware that any significant change in condition or worsening of symptoms should prompt an immediate return to this or the closest emergency department or call to 911.  I have explained discharge medications and the need for follow up with the patient/caretakers. This was also printed in the discharge instructions. Patient was discharged with the following medications and follow up:      Medication List      No changes were made to your prescriptions during this visit.      Josh Maya MD  [address] [place] [address] [address] [place] [id number] [phone]    Call in 1 day  for a follow-up  appointment       Final diagnoses:   Postural dizziness with near syncope   Paroxysmal atrial fibrillation        ED Disposition       ED Disposition   Intended Admit    Condition   --    Comment   --               This medical record created using voice recognition software.             Rancho Briseno MD  08/14/24 1026       Rancho Briseno MD  08/14/24 1027

## 2024-08-14 NOTE — CASE MANAGEMENT/SOCIAL WORK
Discharge Planning Assessment   [facility]     Patient Name: Enrique Wylie  MRN: [id number]  Today's Date: 8/14/2024    Admit Date: 8/14/2024        Discharge Needs Assessment       Row Name 08/14/24 1126       Living Environment    People in Home alone    Current Living Arrangements home    Potentially Unsafe Housing Conditions none    In the past 12 months has the electric, gas, oil, or water company threatened to shut off services in your home? No    Primary Care Provided by self    Provides Primary Care For no one, unable/limited ability to care for self    Family Caregiver if Needed none    Quality of Family Relationships non-existent  states he has no family left    Able to Return to Prior Arrangements yes       Resource/Environmental Concerns    Resource/Environmental Concerns none    Transportation Concerns none       Transportation Needs    In the past 12 months, has lack of transportation kept you from medical appointments or from getting medications? no    In the past 12 months, has lack of transportation kept you from meetings, work, or from getting things needed for daily living? No       Food Insecurity    Within the past 12 months, you worried that your food would run out before you got the money to buy more. Never true    Within the past 12 months, the food you bought just didn't last and you didn't have money to get more. Never true       Transition Planning    Patient/Family Anticipates Transition to home;home with help/services    Patient/Family Anticipated Services at Transition home health care;skilled nursing    Transportation Anticipated family or friend will provide       Discharge Needs Assessment    Readmission Within the Last 30 Days no previous admission in last 30 days    Equipment Currently Used at Home walker, rolling    Concerns to be Addressed discharge planning    Anticipated Changes Related to Illness none    Equipment Needed After Discharge none                   Discharge  Plan    No documentation.                 Continued Care and Services - Admitted Since 8/14/2024    No active coordination exists for this encounter.          Demographic Summary    No documentation.                  Functional Status       Row Name 08/14/24 1124       Physical Activity    On average, how many days per week do you engage in moderate to strenuous exercise (like a brisk walk)? 0 days    On average, how many minutes do you engage in exercise at this level? 0 min    Number of minutes of exercise per week 0                   Psychosocial       Row Name 08/14/24 1125       Mental Health    Little Interest or Pleasure in Doing Things 0-->not at all    Feeling Down, Depressed or Hopeless 0-->not at all       Intellectual Performance WDL    Level of Consciousness Alert       Stress    Do you feel stress - tense, restless, nervous, or anxious, or unable to sleep at night because your mind is troubled all the time - these days? Not at all       Developmental Stage (Eriksson's)    Developmental Stage Stage 8 (65 years-death/Late Adulthood) Integrity vs. Despair       C-SSRS (Recent)    Q1 Wished to be Dead (Past Month) no    Q2 Suicidal Thoughts (Past Month) no    Q6 Suicide Behavior (Lifetime) no       Violence Risk    Feels Like Hurting Others no                   Abuse/Neglect       Row Name 08/14/24 1126       Personal Safety    Feels Unsafe at Home or Work/School no    Feels Threatened by Someone no    Does Anyone Try to Keep You From Having Contact with Others or Doing Things Outside Your Home? no    Physical Signs of Abuse Present no                   Legal       Row Name 08/14/24 1126       Financial Resource Strain    How hard is it for you to pay for the very basics like food, housing, medical care, and heating? Not hard                      SW met with pt this date. He states that he has no family left and lives alone. He states he is unable to walk right now because of his dizziness. He uses a  walker at home and says he can get around fine. He states that he has friends that drive him where he needs to go and bring him supper everyday. He states that he doesn't know what medications he takes and a neighbor friend comes to check his medications for him. He states that he could use home health when he is discharged     RON Patino

## 2024-08-14 NOTE — H&P
[facility]  Date: 2024   Patient Name: Enrique Wylie  : 1935  MRN: [id number]  Primary Care Physician:  Josh Maya MD  Date of admission: 2024    Subjective   Subjective     Chief Complaint: Dizziness    HPI:    Enrique Wylie is a 89 y.o. male PMH A-fib not on anticoagulation due to recent GI bleed, Parkinson's, HLD, RLS who presented to the ED on  due to complaints of dizziness that started this morning.  He states he woke up this morning and when he stood up he felt extremely lightheaded and felt like he was going to pass out.  He denies any change in vision or tunnel vision.  He does sit back down with resolution of his symptoms.  However, whenever he tried to stand or walk, the symptoms returned prompting him to call 911.  In the ED, he was found to have orthostatic hypotension.  Patient denied nausea, vomiting, or diarrhea.  He denies any recent overexertion or sweating.  He was given 1 L normal saline in ED but was still unable to stand without severe dizziness, he is unable to take a step or walk.  For that reason, ED requested admission.    Personal History     Past Medical History:  Past Medical History:   Diagnosis Date    Advance directive in chart 2020    AF (paroxysmal atrial fibrillation)     Allergic rhinitis     occasionally    Arthritis     Back pain 2014    Balance problem     walking is hard, using cane    Cancer     squamous cell  face and ears    Cervical spinal stenosis 2016    Previous surgery for myelopathy    Cervical spondylosis with myelopathy 2012    C4-5 and C5-6 with edema in the cord at C4-5    Death of wife 2020    Dementia     Fall at home, initial encounter 2020    Gait instability 2019    Grief reaction 2020    High cholesterol     Kidney stone     Lumbar spinal stenosis 2016    Worsening leg symptoms,  3/12/15    Medication management  "02/06/2019    Restless leg syndrome 02/06/2019    Weight loss, unintentional 07/30/2020       Past Surgical History:  Past Surgical History:   Procedure Laterality Date    CATARACT EXTRACTION Bilateral     CERVICAL FUSION      C4,5 and 6    COLONOSCOPY N/A 4/26/2024    Procedure: COLONOSCOPY WITH BIOPSIES;  Surgeon: Oral Duong MD;  Location: [facility] ENDOSCOPY;  Service: Gastroenterology;  Laterality: N/A;  PROCTITIS, COLON POLYPS    KNEE ARTHROSCOPY      right knee    LUMBAR LAMINECTOMY  02/10/2016    L3-4    ROTATOR CUFF REPAIR Right     SKIN BIOPSY      face and ears, squamous cell     SPINE SURGERY      minimally invasive procedure \"closing in on spinal Cord\"  \"gave spinal cord more room\"    URETEROSCOPY LASER LITHOTRIPSY WITH STENT INSERTION Bilateral 11/13/2023    Procedure: Bilateral Retrograde Pyelogram, Left Ureteroscopy Lasertripsy Basket Stone Extraction and Stent Insertion, Right Stent Insertion, Bladder Stone Extraction;  Surgeon: Katerina Carpenter MD;  Location: [facility] MAIN OR;  Service: Urology;  Laterality: Bilateral;    URETEROSCOPY LASER LITHOTRIPSY WITH STENT INSERTION Bilateral 12/18/2023    Procedure: CYSTOSCOPY URETEROSCOPY RIGHT RETROGRADE PYELOGRAM HOLMIUM LASER STENT exchange; LEFT STENT REMOVAL;  Surgeon: Katerina Carpenter MD;  Location: [facility] MAIN OR;  Service: Urology;  Laterality: Bilateral;       Family History:   Family History   Problem Relation Age of Onset    Heart failure Mother     Heart failure Father         Social History:   Social History     Socioeconomic History    Marital status:    Tobacco Use    Smoking status: Never    Smokeless tobacco: Never   Vaping Use    Vaping status: Never Used   Substance and Sexual Activity    Alcohol use: Never    Drug use: Never    Sexual activity: Defer        Home Medications:  DuoDERM CGF Extra Thin, Psyllium, Transfer Bench, acetaminophen, atorvastatin, carbidopa-levodopa, fexofenadine, furosemide, mesalamine, metoprolol " succinate XL, mirtazapine, polyethylene glycol, sennosides-docusate, and urea    Allergies:  No Known Allergies    Review of Systems   A 14 point review of systems was obtained and otherwise negative unless stated in the HPI    Objective   Objective     Vitals:   Temp:  [98.2 °F (36.8 °C)] 98.2 °F (36.8 °C)  Heart Rate:  [] 88  Resp:  [18] 18  BP: (118-148)/(70-89) 148/72    Physical Exam    Constitutional: Elderly male, awake, alert, no acute distress   HENT: NCAT, mucous membranes dry, hard of hearing   Respiratory: Clear to auscultation bilaterally, nonlabored respirations    Cardiovascular: IR/IR, no murmurs, rubs, or gallops, palpable pedal pulses bilaterally   Gastrointestinal: Positive bowel sounds, soft, nontender, nondistended   Musculoskeletal: No bilateral ankle edema, no clubbing or cyanosis to extremities   Psychiatric: Appropriate affect, cooperative   Neurologic: Oriented x 3, strength symmetric in all extremities, Cranial Nerves grossly intact to confrontation, speech clear   Skin: No rashes     Result Review    I have personally reviewed the results from the time of this admission to 8/14/2024 11:22 EDT and agree with these findings:  [x]  Laboratory personally reviewed CMP, CBC, troponin, chest x-ray personally reviewed with no cardiopulmonary abnormality noted  CBC          5/2/2024    05:12 5/3/2024    05:21 8/14/2024    06:24   CBC   WBC 9.65  9.07  8.05    RBC 4.51  4.47  4.66    Hemoglobin 13.0  12.7  14.4    Hematocrit 40.8  40.8  44.2    MCV 90.5  91.3  94.8    MCH 28.8  28.4  30.9    MCHC 31.9  31.1  32.6    RDW 14.5  14.6  13.4    Platelets 331  319  255      CMP          5/2/2024    05:12 5/3/2024    05:21 8/14/2024    06:24   CMP   Glucose 93  103  109    BUN 16  17  17    Creatinine 0.99  1.05  1.15    EGFR 74.2  69.1  61.6    Sodium 137  135  144    Potassium 4.1  3.9  3.6    Chloride 103  104  106    Calcium 8.5  7.8  9.0    Total Protein   7.0    Albumin   4.0    Globulin   3.0     Total Bilirubin   0.6    Alkaline Phosphatase   104    AST (SGOT)   17    ALT (SGPT)   7    Albumin/Globulin Ratio   1.3    BUN/Creatinine Ratio 16.2  16.2  14.8    Anion Gap 9.1  8.8  9.1      []  Microbiology  []  Radiology  [x]  EKG/Telemetry Telemetry personally reviewed  []  Cardiology/Vascular   []  Pathology  []  Old records  []  Other:      Assessment & Plan   Assessment / Plan   Presyncope  Inability to ambulate  Orthostatic hypotension  Atrial fibrillation, not on anticoagulation due to recent GI bleeding  Parkinson's  Cervical spine stenosis  Lumbar spinal stenosis  RLS    Admit to the hospital on telemetry for workup and management of the above  CT head reviewed and negative, will obtain MRI to rule out posterior stroke as the patient has A-fib and is not on anticoagulation due to recent history of GI bleeding and falls  Obtain orthostatic vital signs every shift  Start LR at 100 cc/h  If he remains orthostatic by the morning, may consider Florinef or midodrine  Obtain 2D echo  Restart appropriate home medications  Consult PT/OT  Obtain urinalysis  Trend renal function and electrolytes with a.m. BMP, magnesium   Trend Hgb and WBC with a.m. CBC    Discussed case with: ED physician, bedside RN    VTE Prophylaxis:  Pharmacologic VTE prophylaxis orders are signed & held.          CODE STATUS:    Medical Intervention Limits: No intubation (DNI); No cardioversion  Level Of Support Discussed With: Patient  Code Status (Patient has no pulse and is not breathing): No CPR (Do Not Attempt to Resuscitate)  Medical Interventions (Patient has pulse or is breathing): Limited Support      Electronically signed by Yamil Rosario MD, 08/14/24, 11:22 AM EDT.

## 2024-08-15 LAB
ANION GAP SERPL CALCULATED.3IONS-SCNC: 8.3 MMOL/L (ref 5–15)
BASOPHILS # BLD AUTO: 0.06 10*3/MM3 (ref 0–0.2)
BASOPHILS NFR BLD AUTO: 0.7 % (ref 0–1.5)
BILIRUB UR QL STRIP: NEGATIVE
BUN SERPL-MCNC: 11 MG/DL (ref 8–23)
BUN/CREAT SERPL: 11.8 (ref 7–25)
CALCIUM SPEC-SCNC: 8.2 MG/DL (ref 8.6–10.5)
CHLORIDE SERPL-SCNC: 106 MMOL/L (ref 98–107)
CLARITY UR: CLEAR
CO2 SERPL-SCNC: 24.7 MMOL/L (ref 22–29)
COLOR UR: YELLOW
CREAT SERPL-MCNC: 0.93 MG/DL (ref 0.76–1.27)
DEPRECATED RDW RBC AUTO: 45.8 FL (ref 37–54)
EGFRCR SERPLBLD CKD-EPI 2021: 79.5 ML/MIN/1.73
EOSINOPHIL # BLD AUTO: 0.38 10*3/MM3 (ref 0–0.4)
EOSINOPHIL NFR BLD AUTO: 4.4 % (ref 0.3–6.2)
ERYTHROCYTE [DISTWIDTH] IN BLOOD BY AUTOMATED COUNT: 13.2 % (ref 12.3–15.4)
GLUCOSE SERPL-MCNC: 80 MG/DL (ref 65–99)
GLUCOSE UR STRIP-MCNC: NEGATIVE MG/DL
HCT VFR BLD AUTO: 42.2 % (ref 37.5–51)
HGB BLD-MCNC: 13.6 G/DL (ref 13–17.7)
HGB UR QL STRIP.AUTO: NEGATIVE
IMM GRANULOCYTES # BLD AUTO: 0.02 10*3/MM3 (ref 0–0.05)
IMM GRANULOCYTES NFR BLD AUTO: 0.2 % (ref 0–0.5)
KETONES UR QL STRIP: NEGATIVE
LEUKOCYTE ESTERASE UR QL STRIP.AUTO: NEGATIVE
LYMPHOCYTES # BLD AUTO: 1.78 10*3/MM3 (ref 0.7–3.1)
LYMPHOCYTES NFR BLD AUTO: 20.7 % (ref 19.6–45.3)
MAGNESIUM SERPL-MCNC: 1.9 MG/DL (ref 1.6–2.4)
MCH RBC QN AUTO: 30.5 PG (ref 26.6–33)
MCHC RBC AUTO-ENTMCNC: 32.2 G/DL (ref 31.5–35.7)
MCV RBC AUTO: 94.6 FL (ref 79–97)
MONOCYTES # BLD AUTO: 1.11 10*3/MM3 (ref 0.1–0.9)
MONOCYTES NFR BLD AUTO: 12.9 % (ref 5–12)
NEUTROPHILS NFR BLD AUTO: 5.24 10*3/MM3 (ref 1.7–7)
NEUTROPHILS NFR BLD AUTO: 61.1 % (ref 42.7–76)
NITRITE UR QL STRIP: NEGATIVE
NRBC BLD AUTO-RTO: 0 /100 WBC (ref 0–0.2)
PH UR STRIP.AUTO: 7.5 [PH] (ref 5–8)
PLATELET # BLD AUTO: 209 10*3/MM3 (ref 140–450)
PMV BLD AUTO: 9.2 FL (ref 6–12)
POTASSIUM SERPL-SCNC: 3.7 MMOL/L (ref 3.5–5.2)
PROT UR QL STRIP: NEGATIVE
RBC # BLD AUTO: 4.46 10*6/MM3 (ref 4.14–5.8)
SODIUM SERPL-SCNC: 139 MMOL/L (ref 136–145)
SP GR UR STRIP: 1.01 (ref 1–1.03)
UROBILINOGEN UR QL STRIP: NORMAL
WBC NRBC COR # BLD AUTO: 8.59 10*3/MM3 (ref 3.4–10.8)

## 2024-08-15 PROCEDURE — 97161 PT EVAL LOW COMPLEX 20 MIN: CPT

## 2024-08-15 PROCEDURE — 96372 THER/PROPH/DIAG INJ SC/IM: CPT

## 2024-08-15 PROCEDURE — 81003 URINALYSIS AUTO W/O SCOPE: CPT | Performed by: INTERNAL MEDICINE

## 2024-08-15 PROCEDURE — 83735 ASSAY OF MAGNESIUM: CPT | Performed by: INTERNAL MEDICINE

## 2024-08-15 PROCEDURE — 25810000003 LACTATED RINGERS PER 1000 ML: Performed by: INTERNAL MEDICINE

## 2024-08-15 PROCEDURE — 97165 OT EVAL LOW COMPLEX 30 MIN: CPT

## 2024-08-15 PROCEDURE — 25010000002 ENOXAPARIN PER 10 MG: Performed by: INTERNAL MEDICINE

## 2024-08-15 PROCEDURE — 80048 BASIC METABOLIC PNL TOTAL CA: CPT | Performed by: INTERNAL MEDICINE

## 2024-08-15 PROCEDURE — 96361 HYDRATE IV INFUSION ADD-ON: CPT

## 2024-08-15 PROCEDURE — 85025 COMPLETE CBC W/AUTO DIFF WBC: CPT | Performed by: INTERNAL MEDICINE

## 2024-08-15 PROCEDURE — G0378 HOSPITAL OBSERVATION PER HR: HCPCS

## 2024-08-15 RX ADMIN — AVOBENZONE, HOMOSALATE, OCTISALATE, OCTOCRYLENE, AND OXYBENZONE 1 PACKET: 29.4; 147; 49; 25.4; 58.8 LOTION TOPICAL at 09:20

## 2024-08-15 RX ADMIN — Medication 10 ML: at 09:20

## 2024-08-15 RX ADMIN — ATORVASTATIN CALCIUM 10 MG: 10 TABLET, FILM COATED ORAL at 09:20

## 2024-08-15 RX ADMIN — SODIUM CHLORIDE, POTASSIUM CHLORIDE, SODIUM LACTATE AND CALCIUM CHLORIDE 100 ML/HR: 600; 310; 30; 20 INJECTION, SOLUTION INTRAVENOUS at 11:27

## 2024-08-15 RX ADMIN — CARBIDOPA AND LEVODOPA 1 TABLET: 25; 100 TABLET ORAL at 09:20

## 2024-08-15 RX ADMIN — ENOXAPARIN SODIUM 40 MG: 100 INJECTION SUBCUTANEOUS at 09:20

## 2024-08-15 RX ADMIN — CARBIDOPA AND LEVODOPA 1 TABLET: 25; 100 TABLET ORAL at 20:46

## 2024-08-15 RX ADMIN — Medication 10 ML: at 20:22

## 2024-08-15 NOTE — PLAN OF CARE
Problem: Adult Inpatient Plan of Care  Goal: Plan of Care Review  Outcome: Ongoing, Progressing  Flowsheets (Taken 8/15/2024 0100)  Progress: no change  Plan of Care Reviewed With: patient  Outcome Evaluation: Patient A&Ox4, on room air, Afib, VSS, pending UA sample, no acute events, IVF continued,  no s/s distress, care ongoing   Goal Outcome Evaluation:  Plan of Care Reviewed With: patient        Progress: no change  Outcome Evaluation: Patient A&Ox4, on room air, Afib, VSS, pending UA sample, no acute events, IVF continued,  no s/s distress, care ongoing

## 2024-08-15 NOTE — PLAN OF CARE
Goal Outcome Evaluation:           Progress: no change  Outcome Evaluation: Patient is very weak. Heavy x2 assist to get up Patient had no other complaints.

## 2024-08-15 NOTE — THERAPY EVALUATION
Acute Care - Physical Therapy Initial Evaluation  KARTHIK Alegria     Patient Name: Enrique Wylie  : 1935  MRN: [id number]  Today's Date: 8/15/2024      Visit Dx:     ICD-10-CM ICD-9-CM   1. Postural dizziness with near syncope  R42 780.4    R55 780.2   2. Paroxysmal atrial fibrillation  I48.0 427.31   3. Difficulty walking  R26.2 719.7     Patient Active Problem List   Diagnosis    Balance problem    Restless leg syndrome    Encounter for long-term (current) use of other medications    Lumbar spinal stenosis    Grief reaction    Hyperlipidemia    Unsteady gait when walking    Death of wife    Lumbar spinal stenosis    Advance directive in chart    Skin cancer    Nephrolithiasis    Left ureteral stone    Foreign body in genitourinary tract    Advance directive in chart    Bilateral sensorineural hearing loss    Cardiomegaly    Essential hypertension    Fall at home, initial encounter    Paroxysmal atrial fibrillation    Weight loss, unintentional    Adjustment disorder with depressed mood    Back pain    Rhabdomyolysis    JOSE (acute kidney injury)    BRBPR (bright red blood per rectum)    Proctitis    Syncope     Past Medical History:   Diagnosis Date    Advance directive in chart 2020    AF (paroxysmal atrial fibrillation)     Allergic rhinitis     occasionally    Arthritis     Back pain 2014    Balance problem     walking is hard, using cane    Cancer     squamous cell  face and ears    Cervical spinal stenosis 2016    Previous surgery for myelopathy    Cervical spondylosis with myelopathy 2012    C4-5 and C5-6 with edema in the cord at C4-5    Death of wife 2020    Dementia     Fall at home, initial encounter 2020    Gait instability 2019    Grief reaction 2020    High cholesterol     Kidney stone     Lumbar spinal stenosis 2016    Worsening leg symptoms,  3/12/15    Medication management 2019    Restless leg syndrome 2019    Weight loss,  "unintentional 07/30/2020     Past Surgical History:   Procedure Laterality Date    CATARACT EXTRACTION Bilateral     CERVICAL FUSION      C4,5 and 6    COLONOSCOPY N/A 4/26/2024    Procedure: COLONOSCOPY WITH BIOPSIES;  Surgeon: Oral Duong MD;  Location: [facility] ENDOSCOPY;  Service: Gastroenterology;  Laterality: N/A;  PROCTITIS, COLON POLYPS    KNEE ARTHROSCOPY      right knee    LUMBAR LAMINECTOMY  02/10/2016    L3-4    ROTATOR CUFF REPAIR Right     SKIN BIOPSY      face and ears, squamous cell     SPINE SURGERY      minimally invasive procedure \"closing in on spinal Cord\"  \"gave spinal cord more room\"    URETEROSCOPY LASER LITHOTRIPSY WITH STENT INSERTION Bilateral 11/13/2023    Procedure: Bilateral Retrograde Pyelogram, Left Ureteroscopy Lasertripsy Basket Stone Extraction and Stent Insertion, Right Stent Insertion, Bladder Stone Extraction;  Surgeon: Katerina Carpenter MD;  Location: [facility] OR;  Service: Urology;  Laterality: Bilateral;    URETEROSCOPY LASER LITHOTRIPSY WITH STENT INSERTION Bilateral 12/18/2023    Procedure: CYSTOSCOPY URETEROSCOPY RIGHT RETROGRADE PYELOGRAM HOLMIUM LASER STENT exchange; LEFT STENT REMOVAL;  Surgeon: Katerina Carpenter MD;  Location: [facility] MAIN OR;  Service: Urology;  Laterality: Bilateral;     PT Assessment (Last 12 Hours)       PT Evaluation and Treatment       Row Name 08/15/24 1500          Physical Therapy Time and Intention    Document Type evaluation  -AV     Mode of Treatment individual therapy;physical therapy  -AV       Row Name 08/15/24 1500          General Information    Patient Profile Reviewed yes  -AV     Prior Level of Function --  Reports (I) with ADLs. Ambulated with RW. No home O2.  -AV     Equipment Currently Used at Home walker, rolling  -AV     Existing Precautions/Restrictions fall  -AV       Row Name 08/15/24 1500          Living Environment    Current Living Arrangements home  -AV     Home Accessibility stairs within home  -AV     People " in Home alone  -AV       Row Name 08/15/24 1500          Stairs Within Home, Primary    Stairs, Within Home, Primary Reports flight to basement  -AV       Row Name 08/15/24 1500          Cognition    Orientation Status (Cognition) oriented to;person;place  -       Row Name 08/15/24 1500          Range of Motion (ROM)    Range of Motion bilateral lower extremities;ROM is WFL  -[facility] Name 08/15/24 1500          Strength (Manual Muscle Testing)    Strength (Manual Muscle Testing) bilateral lower extremities  3+/5  -AV       Row Name 08/15/24 1500          Bed Mobility    Comment, (Bed Mobility) Patient seated upright in recliner upon therapist entry  -[facility] Name 08/15/24 1500          Transfers    Transfers sit-stand transfer;stand-sit transfer  -AV     Comment, (Transfers) Patient requiring moderate assist and bracing of BLEs on recliner to maintain static standing balance.  -AV       Row Name 08/15/24 1500          Sit-Stand Transfer    Sit-Stand [place] (Transfers) moderate assist (50% patient effort)  -AV     Assistive Device (Sit-Stand Transfers) walker, front-wheeled  -AV       Row Name 08/15/24 1500          Stand-Sit Transfer    Stand-Sit [place] (Transfers) moderate assist (50% patient effort)  -AV     Assistive Device (Stand-Sit Transfers) walker, front-wheeled  -AV       Row Name 08/15/24 1500          Gait/Stairs (Locomotion)    Comment, (Gait/Stairs) Deferred for safety  -       Row Name 08/15/24 1500          Safety Issues, Functional Mobility    Safety Issues Affecting Function (Mobility) awareness of need for assistance;insight into deficits/self-awareness;sequencing abilities  -AV     Impairments Affecting Function (Mobility) balance;cognition;endurance/activity tolerance;postural/trunk control;strength  -       Row Name 08/15/24 1500          Balance    Balance Assessment standing static balance  -AV     Static Standing Balance moderate assist  -AV     Position/Device  Used, Standing Balance supported;walker, front-wheeled  -AV     Comment, Balance Posterior trunk lean and bracing of BLEs on recliner to assist in maintaining balance  -AV       Row Name 08/15/24 1500          Plan of Care Review    Plan of Care Reviewed With patient  -AV     Progress no change  -AV     Outcome Evaluation Patient presents with deficits in balance, strength, transfers, and ambulation. Patient will benefit from skilled PT services to address these mobility deficits and decrease risk of falls.  -AV       Row Name 08/15/24 1500          Positioning and Restraints    Pre-Treatment Position sitting in chair/recliner  -AV     Post Treatment Position chair  -AV     In Chair reclined;call light within reach;encouraged to call for assist;exit alarm on  -AV       Row Name 08/15/24 1500          Therapy Assessment/Plan (PT)    Rehab Potential (PT) good, to achieve stated therapy goals  -AV     Criteria for Skilled Interventions Met (PT) yes;meets criteria  -AV     Therapy Frequency (PT) daily  -AV     Predicted Duration of Therapy Intervention (PT) 10 days  -AV     Problem List (PT) problems related to;balance;cognition;mobility;strength;postural control  -AV     Activity Limitations Related to Problem List (PT) unable to transfer safely  -AV       Row Name 08/15/24 1500          PT Evaluation Complexity    History, PT Evaluation Complexity 1-2 personal factors and/or comorbidities  -AV     Examination of Body Systems (PT Eval Complexity) total of 4 or more elements  -AV     Clinical Presentation (PT Evaluation Complexity) stable  -AV     Clinical Decision Making (PT Evaluation Complexity) low complexity  -AV     Overall Complexity (PT Evaluation Complexity) low complexity  -AV       Row Name 08/15/24 1500          Therapy Plan Review/Discharge Plan (PT)    Therapy Plan Review (PT) evaluation/treatment results reviewed;patient  -AV       Row Name 08/15/24 1500          Physical Therapy Goals    Bed Mobility  Goal Selection (PT) bed mobility, PT goal 1  -AV     Transfer Goal Selection (PT) transfer, PT goal 1  -AV     Gait Training Goal Selection (PT) gait training, PT goal 1  -AV       Row Name 08/15/24 1500          Bed Mobility Goal 1 (PT)    Activity/Assistive Device (Bed Mobility Goal 1, PT) sit to supine/supine to sit  -AV     [place] Level/Cues Needed (Bed Mobility Goal 1, PT) minimum assist (75% or more patient effort)  -AV     Time Frame (Bed Mobility Goal 1, PT) 10 days  -AV       Row Name 08/15/24 1500          Transfer Goal 1 (PT)    Activity/Assistive Device (Transfer Goal 1, PT) sit-to-stand/stand-to-sit;bed-to-chair/chair-to-bed;walker, rolling  -AV     [place] Level/Cues Needed (Transfer Goal 1, PT) minimum assist (75% or more patient effort)  -AV     Time Frame (Transfer Goal 1, PT) 10 days  -AV       Row Name 08/15/24 1500          Gait Training Goal 1 (PT)    Activity/Assistive Device (Gait Training Goal 1, PT) gait (walking locomotion);assistive device use;walker, rolling  -AV     [place] Level (Gait Training Goal 1, PT) minimum assist (75% or more patient effort)  -AV     Distance (Gait Training Goal 1, PT) 25  -AV     Time Frame (Gait Training Goal 1, PT) 10 days  -AV               User Key  (r) = Recorded By, (t) = Taken By, (c) = Cosigned By      Initials Name Provider Type    Chai Tyler, PT Physical Therapist                    Physical Therapy Education       Title: PT OT SLP Therapies (In Progress)       Topic: Physical Therapy (In Progress)       Point: Mobility training (Done)       Learning Progress Summary             Patient Acceptance, E,TB, VU by AV at 8/15/2024 1521                         Point: Home exercise program (Not Started)       Learner Progress:  Not documented in this visit.              Point: Body mechanics (Done)       Learning Progress Summary             Patient Acceptance, E,TB, VU by AV at 8/15/2024 1521                         Point:  Precautions (Done)       Learning Progress Summary             Patient Acceptance, E,TB, VU by AV at 8/15/2024 1521                                         User Key       Initials Effective Dates Name Provider Type Discipline     06/11/21 -  Chai Kamara, [place] Physical Therapist PT                  PT Recommendation and Plan  Anticipated Discharge Disposition (PT): sub acute care setting  Planned Therapy Interventions (PT): balance training, bed mobility training, gait training, home exercise program, neuromuscular re-education, strengthening, transfer training  Therapy Frequency (PT): daily  Plan of Care Reviewed With: patient  Progress: no change  Outcome Evaluation: Patient presents with deficits in balance, strength, transfers, and ambulation. Patient will benefit from skilled PT services to address these mobility deficits and decrease risk of falls.   Outcome Measures       Row Name 08/15/24 1500             How much help from another person do you currently need...    Turning from your back to your side while in flat bed without using bedrails? 2  -AV      Moving from lying on back to sitting on the side of a flat bed without bedrails? 2  -AV      Moving to and from a bed to a chair (including a wheelchair)? 2  -AV      Standing up from a chair using your arms (e.g., wheelchair, bedside chair)? 2  -AV      Climbing 3-5 steps with a railing? 1  -AV      To walk in hospital room? 2  -AV      AM-PAC 6 Clicks Score (PT) 11  -AV      Highest Level of Mobility Goal 4 --> Transfer to chair/commode  -AV         Functional Assessment    Outcome Measure Options AM-PAC 6 Clicks Basic Mobility (PT)  -AV                User Key  (r) = Recorded By, (t) = Taken By, (c) = Cosigned By      Initials Name Provider Type    AV Chai Kamara, PT Physical Therapist                     Time Calculation:    PT Charges       Row Name 08/15/24 1520             Time Calculation    PT Received On 08/15/24  -AV      PT Goal  Re-Cert Due Date 08/24/24  -AV         Untimed Charges    PT Eval/Re-eval Minutes 34  -AV         Total Minutes    Untimed Charges Total Minutes 34  -AV       Total Minutes 34  -AV                User Key  (r) = Recorded By, (t) = Taken By, (c) = Cosigned By      Initials Name Provider Type    AV Chai Kamara, PT Physical Therapist                  Therapy Charges for Today       Code Description Service Date Service Provider Modifiers Qty    16097561155 HC PT EVAL LOW COMPLEXITY 3 8/15/2024 Chai Kamara, PT GP 1            PT G-Codes  Outcome Measure Options: AM-PAC 6 Clicks Basic Mobility (PT)  AM-PAC 6 Clicks Score (PT): 11  AM-PAC 6 Clicks Score (OT): 13    Chai Kamara PT  8/15/2024

## 2024-08-15 NOTE — PROGRESS NOTES
[facility]   Hospitalist Progress Note  Date: 8/15/2024  Patient Name: Enrique Wylie  : 1935  MRN: [id number]  Date of admission: 2024      Subjective   Subjective     Chief Complaint: Dizziness    Summary:  89 y.o. male PMH A-fib not on anticoagulation due to recent GI bleed, Parkinson's, HLD, RLS who presented to the ED on  due to complaints of dizziness that started this morning.  He states he woke up this morning and when he stood up he felt extremely lightheaded and felt like he was going to pass out.  He denies any change in vision or tunnel vision.  He does sit back down with resolution of his symptoms.  However, whenever he tried to stand or walk, the symptoms returned prompting him to call 911.  In the ED, he was found to have orthostatic hypotension.  Patient denied nausea, vomiting, or diarrhea.  He denies any recent overexertion or sweating.  He was given 1 L normal saline in ED but was still unable to stand without severe dizziness, he is unable to take a step or walk.  For that reason, ED requested admission.     Interval Followup: No acute events overnight.  When he stood up this morning, he was no longer orthostatic and did not get dizzy.  However, he is still very unsteady on his feet and was fell backwards.  Nursing states it took 1-2 nurses to help him get to a bedside chair.  I had extensive discussion with him that rehab is recommended, however, he has 24-hour care at home and does not want to go to rehab despite risks being discussed with him.    Objective   Objective     Vitals:   Temp:  [98.1 °F (36.7 °C)-98.3 °F (36.8 °C)] 98.1 °F (36.7 °C)  Heart Rate:  [70-94] 78  Resp:  [18-22] 22  BP: (119-154)/(54-84) 119/73  Physical Exam    Constitutional: Elderly male, awake, alert, no acute distress   Respiratory: Clear to auscultation bilaterally, nonlabored respirations    Cardiovascular: RRR, no MRG   Gastrointestinal: Positive bowel sounds, soft, NTND   Neurologic:  Oriented x 3, strength symmetric in all extremities, Cranial Nerves grossly intact to confrontation, speech clear    Result Review    I have personally reviewed the results below:  [x]  Laboratory personally reviewed BMP, CBC, magnesium  []  Microbiology  []  Radiology  []  EKG/Telemetry   []  Cardiology/Vascular   []  Pathology  []  Old records  []  Other:  CBC          5/3/2024    05:21 8/14/2024    06:24 8/15/2024    06:21   CBC   WBC 9.07  8.05  8.59    RBC 4.47  4.66  4.46    Hemoglobin 12.7  14.4  13.6    Hematocrit 40.8  44.2  42.2    MCV 91.3  94.8  94.6    MCH 28.4  30.9  30.5    MCHC 31.1  32.6  32.2    RDW 14.6  13.4  13.2    Platelets 319  255  209      CMP          5/3/2024    05:21 8/14/2024    06:24 8/15/2024    06:21   CMP   Glucose 103  109  80    BUN 17  17  11    Creatinine 1.05  1.15  0.93    EGFR 69.1  61.6  79.5    Sodium 135  144  139    Potassium 3.9  3.6  3.7    Chloride 104  106  106    Calcium 7.8  9.0  8.2    Total Protein  7.0     Albumin  4.0     Globulin  3.0     Total Bilirubin  0.6     Alkaline Phosphatase  104     AST (SGOT)  17     ALT (SGPT)  7     Albumin/Globulin Ratio  1.3     BUN/Creatinine Ratio 16.2  14.8  11.8    Anion Gap 8.8  9.1  8.3        Assessment & Plan   Assessment / Plan   Presyncope  Inability to ambulate  Orthostatic hypotension  Atrial fibrillation, not on anticoagulation due to recent GI bleeding  Parkinson's  Cervical spine stenosis  Lumbar spinal stenosis  RLS    Continue to monitor in the hospital for workup and management of the above  MRI reviewed and negative for CVA  2D echo reviewed and negative for significant valvular abnormality, no evidence of severe AS  Orthostatic hypotension resolved this morning  DC IV fluids and monitor  Continue home carbidopa/levodopa  PT/OT consulted recommend subacute care.  I had an extensive discussion with the patient recommending inpatient rehab.  However, he does have 24-hour care and does not want rehab placement.  I  did explain risks of falls and injury if he does not have adequate supervision, he understands the risks but prefers to be discharged home with home health  Will likely discharge home tomorrow if no events overnight  Lab holiday tomorrow     Discussed plan with RN, clinical     Total of 53 minutes spent reviewing labs and imaging, counseling and educating the patient and family, ordering medications, discussing with specialty services, documenting clinical information in the electronic medical record, and care coordination.    VTE Prophylaxis:  Pharmacologic VTE prophylaxis orders are present.        CODE STATUS:   Medical Intervention Limits: No intubation (DNI); No cardioversion  Level Of Support Discussed With: Patient  Code Status (Patient has no pulse and is not breathing): No CPR (Do Not Attempt to Resuscitate)  Medical Interventions (Patient has pulse or is breathing): Limited Support        Electronically signed by Yamil Rosario MD, 08/15/24, 3:35 PM EDT.

## 2024-08-15 NOTE — THERAPY EVALUATION
Patient Name: Enrique Wylie  : 1935    MRN: [id number]                              Today's Date: 8/15/2024       Admit Date: 2024    Visit Dx:     ICD-10-CM ICD-9-CM   1. Postural dizziness with near syncope  R42 780.4    R55 780.2   2. Paroxysmal atrial fibrillation  I48.0 427.31     Patient Active Problem List   Diagnosis    Balance problem    Restless leg syndrome    Encounter for long-term (current) use of other medications    Lumbar spinal stenosis    Grief reaction    Hyperlipidemia    Unsteady gait when walking    Death of wife    Lumbar spinal stenosis    Advance directive in chart    Skin cancer    Nephrolithiasis    Left ureteral stone    Foreign body in genitourinary tract    Advance directive in chart    Bilateral sensorineural hearing loss    Cardiomegaly    Essential hypertension    Fall at home, initial encounter    Paroxysmal atrial fibrillation    Weight loss, unintentional    Adjustment disorder with depressed mood    Back pain    Rhabdomyolysis    JOSE (acute kidney injury)    BRBPR (bright red blood per rectum)    Proctitis    Syncope     Past Medical History:   Diagnosis Date    Advance directive in chart 2020    AF (paroxysmal atrial fibrillation)     Allergic rhinitis     occasionally    Arthritis     Back pain 2014    Balance problem     walking is hard, using cane    Cancer     squamous cell  face and ears    Cervical spinal stenosis 2016    Previous surgery for myelopathy    Cervical spondylosis with myelopathy 2012    C4-5 and C5-6 with edema in the cord at C4-5    Death of wife 2020    Dementia     Fall at home, initial encounter 2020    Gait instability 2019    Grief reaction 2020    High cholesterol     Kidney stone     Lumbar spinal stenosis 2016    Worsening leg symptoms,  3/12/15    Medication management 2019    Restless leg syndrome 2019    Weight loss, unintentional 2020     Past Surgical  "History:   Procedure Laterality Date    CATARACT EXTRACTION Bilateral     CERVICAL FUSION      C4,5 and 6    COLONOSCOPY N/A 4/26/2024    Procedure: COLONOSCOPY WITH BIOPSIES;  Surgeon: Oral Duong MD;  Location: [facility] ENDOSCOPY;  Service: Gastroenterology;  Laterality: N/A;  PROCTITIS, COLON POLYPS    KNEE ARTHROSCOPY      right knee    LUMBAR LAMINECTOMY  02/10/2016    L3-4    ROTATOR CUFF REPAIR Right     SKIN BIOPSY      face and ears, squamous cell     SPINE SURGERY      minimally invasive procedure \"closing in on spinal Cord\"  \"gave spinal cord more room\"    URETEROSCOPY LASER LITHOTRIPSY WITH STENT INSERTION Bilateral 11/13/2023    Procedure: Bilateral Retrograde Pyelogram, Left Ureteroscopy Lasertripsy Basket Stone Extraction and Stent Insertion, Right Stent Insertion, Bladder Stone Extraction;  Surgeon: Katerina Carpenter MD;  Location: [facility] OR;  Service: Urology;  Laterality: Bilateral;    URETEROSCOPY LASER LITHOTRIPSY WITH STENT INSERTION Bilateral 12/18/2023    Procedure: CYSTOSCOPY URETEROSCOPY RIGHT RETROGRADE PYELOGRAM HOLMIUM LASER STENT exchange; LEFT STENT REMOVAL;  Surgeon: Katerina Carpenter MD;  Location: [facility] MAIN OR;  Service: Urology;  Laterality: Bilateral;      General Information       Row Name 08/15/24 0914          OT Time and Intention    Document Type evaluation  -PG     Mode of Treatment individual therapy;occupational therapy  -PG       Row Name 08/15/24 0914          General Information    Patient Profile Reviewed yes  Patient reports he lives alone and independent with all self-care/transfers.  Continues to drive and uses rolling walker.  Reports a neighbor brings over food.  Patient mildly confused  -PG     Prior Level of Function independent:;transfer;ADL's  -PG     Existing Precautions/Restrictions fall  -PG     Barriers to Rehab none identified  -PG       Row Name 08/15/24 0914          Occupational Profile    Reason for Services/Referral (Occupational " Profile) Patient is a 89-year-old male admitted for syncopal episode with history of Parkinson's.  Patient being evaluated by Occupational Therapy due to his recent decline in ADL function.  No previous OT services identified  -[facility] Name 08/15/24 0914          Living Environment    People in Home alone  -[facility] Name 08/15/24 0914          Cognition    Orientation Status (Cognition) oriented to;person;place  -[facility] Name 08/15/24 0914          Safety Issues, Functional Mobility    Impairments Affecting Function (Mobility) balance;cognition;endurance/activity tolerance;strength;postural/trunk control  -     Cognitive Impairments, Mobility Safety/Performance judgment;awareness, need for assistance;insight into deficits/self-awareness  -               User Key  (r) = Recorded By, (t) = Taken By, (c) = Cosigned By      Initials Name Provider Type     Delmer Ahmadi, OT Occupational Therapist                     Mobility/ADL's       [facility] Name 08/15/24 0916          Bed Mobility    Bed Mobility supine-sit  -     Supine-Sit [place] (Bed Mobility) maximum assist (25% patient effort)  -[facility] Name 08/15/24 0916          Transfers    Transfers bed-chair transfer  -PG       Row Name 08/15/24 0916          Bed-Chair Transfer    Bed-Chair [place] (Transfers) minimum assist (75% patient effort);verbal cues;2 person assist  -     Assistive Device (Bed-Chair Transfers) walker, front-wheeled  -[facility] Name 08/15/24 0916          Activities of Daily Living    BADL Assessment/Intervention bathing;upper body dressing;lower body dressing;grooming;toileting  -[facility] Name 08/15/24 0916          Bathing Assessment/Intervention    [place] Level (Bathing) bathing skills;maximum assist (25% patient effort)  -PG       Row Name 08/15/24 0916          Upper Body Dressing Assessment/Training    [place] Level (Upper Body Dressing) upper body dressing skills;maximum assist (25% patient  effort)  -PG       Row Name 08/15/24 0916          Lower Body Dressing Assessment/Training    [place] Level (Lower Body Dressing) lower body dressing skills;dependent (less than 25% patient effort)  -PG       Row Name 08/15/24 0916          Grooming Assessment/Training    [place] Level (Grooming) grooming skills;minimum assist (75% patient effort)  -PG       Row Name 08/15/24 0916          Toileting Assessment/Training    [place] Level (Toileting) toileting skills;dependent (less than 25% patient effort)  -PG               User Key  (r) = Recorded By, (t) = Taken By, (c) = Cosigned By      Initials Name Provider Type    PG Delmer Ahmadi OT Occupational Therapist                   Obj/Interventions       Row Name 08/15/24 0917          Sensory Assessment (Somatosensory)    Sensory Assessment (Somatosensory) sensation intact  -PG       Row Name 08/15/24 0917          Vision Assessment/Intervention    Visual Impairment/Limitations WFL  -PG       Row Name 08/15/24 0917          Range of Motion Comprehensive    General Range of Motion no range of motion deficits identified  -PG       Row Name 08/15/24 0917          Strength Comprehensive (MMT)    Comment, General Manual Muscle Testing (MMT) Assessment 4+/5 BUE, left shoulder 3+/5  -PG       Row Name 08/15/24 0917          Motor Skills    Motor Skills coordination;functional endurance  -PG     Coordination WFL  -PG     Functional Endurance Fair minus  -PG       Row Name 08/15/24 0917          Balance    Comment, Balance Posterior lean sitting on the edge of bed  -PG               User Key  (r) = Recorded By, (t) = Taken By, (c) = Cosigned By      Initials Name Provider Type    PG Delmer Ahmadi OT Occupational Therapist                   Goals/Plan       Row Name 08/15/24 0919          Transfer Goal 1 (OT)    Activity/Assistive Device (Transfer Goal 1, OT) transfers, all  -PG     [place] Level/Cues Needed (Transfer Goal 1, OT) modified independence   -PG     Time Frame (Transfer Goal 1, OT) long term goal (LTG);10 days  -PG       Row Name 08/15/24 0919          Bathing Goal 1 (OT)    Activity/Device (Bathing Goal 1, OT) bathing skills, all  -PG     [place] Level/Cues Needed (Bathing Goal 1, OT) modified independence  -PG     Time Frame (Bathing Goal 1, OT) long term goal (LTG);10 days  -PG       Row Name 08/15/24 0919          Dressing Goal 1 (OT)    Activity/Device (Dressing Goal 1, OT) dressing skills, all  -PG     [place]/Cues Needed (Dressing Goal 1, OT) modified independence  -PG     Time Frame (Dressing Goal 1, OT) long term goal (LTG);10 days  -PG       Row Name 08/15/24 0919          Toileting Goal 1 (OT)    Activity/Device (Toileting Goal 1, OT) toileting skills, all  -PG     [place] Level/Cues Needed (Toileting Goal 1, OT) modified independence  -PG     Time Frame (Toileting Goal 1, OT) long term goal (LTG);10 days  -PG       Row Name 08/15/24 0919          Grooming Goal 1 (OT)    Activity/Device (Grooming Goal 1, OT) grooming skills, all  -PG     [place] (Grooming Goal 1, OT) modified independence  -PG     Time Frame (Grooming Goal 1, OT) long term goal (LTG);10 days  -PG       Row Name 08/15/24 0919          Problem Specific Goal 1 (OT)    Problem Specific Goal 1 (OT) Patient will improve activity tolerance to good minus to support independence with ADL activity and functional transfers  -PG     Time Frame (Problem Specific Goal 1, OT) long term goal (LTG);10 days  -PG       Row Name 08/15/24 0919          Therapy Assessment/Plan (OT)    Planned Therapy Interventions (OT) activity tolerance training;BADL retraining;strengthening exercise;transfer/mobility retraining;patient/caregiver education/training;occupation/activity based interventions  -PG               User Key  (r) = Recorded By, (t) = Taken By, (c) = Cosigned By      Initials Name Provider Type    PG Delmer Ahmadi, OT Occupational Therapist                   Clinical  Impression       Row Name 08/15/24 0919          Pain Assessment    Pretreatment Pain Rating 0/10 - no pain  -PG     Posttreatment Pain Rating 0/10 - no pain  -PG       Row Name 08/15/24 0919          Plan of Care Review    Plan of Care Reviewed With patient  -PG     Progress no change  -PG     Outcome Evaluation Patient presents with limitations affecting strength, activity tolerance, and balance impacting patient's ability to return home safely and independently.  The skills of a therapist will be required to safely and effectively implement the following treatment plan to restore maximal level of function  -PG       Row Name 08/15/24 0919          Therapy Assessment/Plan (OT)    Patient/Family Therapy Goal Statement (OT) Return home independently  -PG     Rehab Potential (OT) good, to achieve stated therapy goals  -PG     Criteria for Skilled Therapeutic Interventions Met (OT) yes;meets criteria;skilled treatment is necessary  -PG     Therapy Frequency (OT) 5 times/wk  -PG       Row Name 08/15/24 0919          Therapy Plan Review/Discharge Plan (OT)    Anticipated Discharge Disposition (OT) skilled nursing facility;inpatient rehabilitation facility  -PG               User Key  (r) = Recorded By, (t) = Taken By, (c) = Cosigned By      Initials Name Provider Type    PG Delmer Ahmadi, GILBERTO Occupational Therapist                   Outcome Measures       Row Name 08/15/24 0921          How much help from another is currently needed...    Putting on and taking off regular lower body clothing? 1  -PG     Bathing (including washing, rinsing, and drying) 2  -PG     Toileting (which includes using toilet bed pan or urinal) 1  -PG     Putting on and taking off regular upper body clothing 2  -PG     Taking care of personal grooming (such as brushing teeth) 3  -PG     Eating meals 4  -PG     AM-PAC 6 Clicks Score (OT) 13  -PG       Row Name 08/14/24 2200          How much help from another person do you currently need...     Turning from your back to your side while in flat bed without using bedrails? 3  -AP     Moving from lying on back to sitting on the side of a flat bed without bedrails? 2  -AP     Moving to and from a bed to a chair (including a wheelchair)? 2  -AP     Standing up from a chair using your arms (e.g., wheelchair, bedside chair)? 2  -AP     Climbing 3-5 steps with a railing? 2  -AP     To walk in hospital room? 2  -AP     AM-PAC 6 Clicks Score (PT) 13  -AP     Highest Level of Mobility Goal 4 --> Transfer to chair/commode  -AP       Row Name 08/15/24 0921          Functional Assessment    Outcome Measure Options AM-PAC 6 Clicks Daily Activity (OT);Optimal Instrument  -PG       Row Name 08/15/24 0921          Optimal Instrument    Optimal Instrument Optimal - 3  -PG     Bending/Stooping 4  -PG     Standing 3  -PG     Reaching 2  -PG     From the list, choose the 3 activities you would most like to be able to do without any difficulty Bending/stooping;Standing;Reaching  -PG     Total Score Optimal - 3 9  -PG               User Key  (r) = Recorded By, (t) = Taken By, (c) = Cosigned By      Initials Name Provider Type    PG Delmer Ahmadi OT Occupational Therapist    Antonietta Kendrick, RN Registered Nurse                    Occupational Therapy Education       Title: PT OT SLP Therapies (In Progress)       Topic: Occupational Therapy (In Progress)       Point: ADL training (In Progress)       Description:   Instruct learner(s) on proper safety adaptation and remediation techniques during self care or transfers.   Instruct in proper use of assistive devices.                  Learning Progress Summary             Patient Acceptance, E,D, NR by PG at 8/15/2024 0921                         Point: Home exercise program (In Progress)       Description:   Instruct learner(s) on appropriate technique for monitoring, assisting and/or progressing therapeutic exercises/activities.                  Learning Progress Summary              Patient Acceptance, E,D, NR by PG at 8/15/2024 0921                         Point: Precautions (In Progress)       Description:   Instruct learner(s) on prescribed precautions during self-care and functional transfers.                  Learning Progress Summary             Patient Acceptance, E,D, NR by PG at 8/15/2024 0921                         Point: Body mechanics (In Progress)       Description:   Instruct learner(s) on proper positioning and spine alignment during self-care, functional mobility activities and/or exercises.                  Learning Progress Summary             Patient Acceptance, E,D, NR by PG at 8/15/2024 0921                                         User Key       Initials Effective Dates Name Provider Type Discipline    PG 06/16/21 -  Delmer Ahmadi, OT Occupational Therapist OT                  OT Recommendation and Plan  Planned Therapy Interventions (OT): activity tolerance training, BADL retraining, strengthening exercise, transfer/mobility retraining, patient/caregiver education/training, occupation/activity based interventions  Therapy Frequency (OT): 5 times/wk  Plan of Care Review  Plan of Care Reviewed With: patient  Progress: no change  Outcome Evaluation: Patient presents with limitations affecting strength, activity tolerance, and balance impacting patient's ability to return home safely and independently.  The skills of a therapist will be required to safely and effectively implement the following treatment plan to restore maximal level of function     Time Calculation:   Evaluation Complexity (OT)  Review Occupational Profile/Medical/Therapy History Complexity: brief/low complexity  Assessment, Occupational Performance/Identification of Deficit Complexity: 3-5 performance deficits  Clinical Decision Making Complexity (OT): problem focused assessment/low complexity  Overall Complexity of Evaluation (OT): low complexity     Time Calculation- OT       Row Name 08/15/24 0987              Time Calculation- OT    OT Received On 08/15/24  -PG      OT Goal Re-Cert Due Date 08/24/24  -PG         Untimed Charges    OT Eval/Re-eval Minutes 35  -PG         Total Minutes    Untimed Charges Total Minutes 35  -PG       Total Minutes 35  -PG                User Key  (r) = Recorded By, (t) = Taken By, (c) = Cosigned By      Initials Name Provider Type    PG Delmer Ahmadi OT Occupational Therapist                  Therapy Charges for Today       Code Description Service Date Service Provider Modifiers Qty    [id number] HC OT EVAL LOW COMPLEXITY 3 8/15/2024 Delmer Ahmadi OT GO 1                 Delmer Ahmadi OT  8/15/2024

## 2024-08-15 NOTE — PLAN OF CARE
Goal Outcome Evaluation:  Plan of Care Reviewed With: patient        Progress: no change  Outcome Evaluation: Patient presents with deficits in balance, strength, transfers, and ambulation. Patient will benefit from skilled PT services to address these mobility deficits and decrease risk of falls.      Anticipated Discharge Disposition (PT): sub acute care setting

## 2024-08-16 ENCOUNTER — READMISSION MANAGEMENT (OUTPATIENT)
Dept: CALL CENTER | Facility: HOSPITAL | Age: 89
End: 2024-08-16
Payer: MEDICARE

## 2024-08-16 VITALS
DIASTOLIC BLOOD PRESSURE: 62 MMHG | RESPIRATION RATE: 18 BRPM | WEIGHT: 205.25 LBS | TEMPERATURE: 98.2 F | HEIGHT: 74 IN | HEART RATE: 100 BPM | BODY MASS INDEX: 26.34 KG/M2 | SYSTOLIC BLOOD PRESSURE: 122 MMHG | OXYGEN SATURATION: 96 %

## 2024-08-16 PROCEDURE — 25010000002 ENOXAPARIN PER 10 MG: Performed by: INTERNAL MEDICINE

## 2024-08-16 PROCEDURE — G0378 HOSPITAL OBSERVATION PER HR: HCPCS

## 2024-08-16 PROCEDURE — 96372 THER/PROPH/DIAG INJ SC/IM: CPT

## 2024-08-16 RX ADMIN — ENOXAPARIN SODIUM 40 MG: 100 INJECTION SUBCUTANEOUS at 08:22

## 2024-08-16 RX ADMIN — CARBIDOPA AND LEVODOPA 1 TABLET: 25; 100 TABLET ORAL at 08:21

## 2024-08-16 RX ADMIN — Medication 10 ML: at 08:23

## 2024-08-16 RX ADMIN — AVOBENZONE, HOMOSALATE, OCTISALATE, OCTOCRYLENE, AND OXYBENZONE 1 PACKET: 29.4; 147; 49; 25.4; 58.8 LOTION TOPICAL at 08:21

## 2024-08-16 NOTE — PLAN OF CARE
Problem: Adult Inpatient Plan of Care  Goal: Plan of Care Review  Outcome: Ongoing, Progressing  Flowsheets (Taken 8/16/2024 0512)  Progress: no change  Plan of Care Reviewed With: patient  Goal: Patient-Specific Goal (Individualized)  Outcome: Ongoing, Progressing  Goal: Absence of Hospital-Acquired Illness or Injury  Outcome: Ongoing, Progressing  Intervention: Identify and Manage Fall Risk  Recent Flowsheet Documentation  Taken 8/16/2024 0509 by Ana Walker LPN  Safety Promotion/Fall Prevention: safety round/check completed  Taken 8/16/2024 0307 by Ana Walker LPN  Safety Promotion/Fall Prevention: safety round/check completed  Taken 8/16/2024 0115 by Ana Walker LPN  Safety Promotion/Fall Prevention: safety round/check completed  Taken 8/15/2024 2344 by Ana Walker LPN  Safety Promotion/Fall Prevention: safety round/check completed  Taken 8/15/2024 2108 by Ana Walker LPN  Safety Promotion/Fall Prevention: safety round/check completed  Taken 8/15/2024 1919 by Ana Walker LPN  Safety Promotion/Fall Prevention: safety round/check completed  Intervention: Prevent Skin Injury  Recent Flowsheet Documentation  Taken 8/15/2024 2344 by Ana Walker LPN  Skin Protection:   adhesive use limited   incontinence pads utilized   transparent dressing maintained  Intervention: Prevent and Manage VTE (Venous Thromboembolism) Risk  Recent Flowsheet Documentation  Taken 8/15/2024 2344 by Ana Walker LPN  Range of Motion: active ROM (range of motion) encouraged  Intervention: Prevent Infection  Recent Flowsheet Documentation  Taken 8/15/2024 2344 by Ana Walker LPN  Infection Prevention:   equipment surfaces disinfected   hand hygiene promoted  Goal: Optimal Comfort and Wellbeing  Outcome: Ongoing, Progressing  Intervention: Provide Person-Centered Care  Recent Flowsheet Documentation  Taken 8/15/2024 2344 by Ana Walker LPN  Trust Relationship/Rapport:   care explained   choices provided    questions answered   reassurance provided   thoughts/feelings acknowledged  Goal: Readiness for Transition of Care  Outcome: Ongoing, Progressing   Goal Outcome Evaluation:  Plan of Care Reviewed With: patient        Progress: no change     Vitals stable. No complaints of pain. Rested well throughout the night. Will continue plan of care.

## 2024-08-16 NOTE — OUTREACH NOTE
Prep Survey      Flowsheet Row Responses   [facility] patient discharged from? [facility]   Is LACE score < 7 ? Yes   Eligibility [facility]   Date of Admission 08/14/24   Date of Discharge 08/16/24   Discharge Disposition Home-Health Care Svc   Discharge diagnosis Syncope   Does the patient have one of the following disease processes/diagnoses(primary or secondary)? Other   Does the patient have Home health ordered? Yes   What is the Home health agency?  [facility] [place]   Is there a DME ordered? No   Prep survey completed? Yes            CHARISSE CLARK - Registered Nurse

## 2024-08-16 NOTE — DISCHARGE SUMMARY
[facility]         HOSPITALIST  DISCHARGE SUMMARY    Patient Name: Enrique Wylie  : 1935  MRN: [id number]    Date of Admission: 2024  Date of Discharge: 2024  Primary Care Physician: Josh Maya MD    Consults       Date and Time Order Name Status Description    2024 10:23 AM Hospitalist (on-call MD unless specified)              Active and Resolved Hospital Problems:  Active Hospital Problems    Diagnosis POA    **Syncope [R55] Yes      Resolved Hospital Problems   No resolved problems to display.   Presyncope  Inability to ambulate  Orthostatic hypotension  Atrial fibrillation, not on anticoagulation due to recent GI bleeding  Parkinson's  Cervical spine stenosis  Lumbar spinal stenosis  RLS    Hospital Course     Hospital Course:  Enrique Wylie is a 89 y.o. male PMH A-fib not on anticoagulation due to recent GI bleed, Parkinson's, HLD, RLS who presented to the ED on  due to complaints of dizziness that started this morning.  He states he woke up this morning and when he stood up he felt extremely lightheaded and felt like he was going to pass out.  He denies any change in vision or tunnel vision.  He does sit back down with resolution of his symptoms.  However, whenever he tried to stand or walk, the symptoms returned prompting him to call 911.  In the ED, he was found to have orthostatic hypotension.  Patient denied nausea, vomiting, or diarrhea.  He denies any recent overexertion or sweating.  He was given 1 L normal saline in ED but was still unable to stand without severe dizziness, he is unable to take a step or walk.  He was admitted for further care, ZELDA hose applied and volume resuscitated with resolution of his orthostatic hypotension.  He had a negative MRI, 2D echo obtained and largely normal with mild calcification of aortic valve.  No severe valvular abnormalities or stenosis.  No episodes on telemetry.  He does have significant  weakness and unsteadiness which is likely related to Parkinson's, advanced age, and debility.  Lasix is held at discharge, he is continued on metoprolol.  PT/OT recommended rehab, hospitalist service strongly recommended rehab placement but patient declined.  He has 24-hour care at home as well as home health and preferred to go home.  He was counseled on the risks of recurrent falls but as he is decisional, elected to return home.  He remains off of anticoagulation for A-fib in setting of high falls risk and recent GI bleeding.  He was discharged home with home health stable condition on 8/16/2024.  Recommend follow-up with PCP within 1 week.     DISCHARGE Follow Up Recommendations for labs and diagnostics: Recheck orthostatic vital signs in clinic within 1-2 weeks      Day of Discharge     Vital Signs:  Temp:  [97.5 °F (36.4 °C)-98.3 °F (36.8 °C)] 98.3 °F (36.8 °C)  Heart Rate:  [] 100  Resp:  [18-20] 18  BP: (122-156)/(62-89) 122/62  Physical Exam:       Constitutional: Elderly male, awake, alert, NAD              Respiratory: Clear to auscultation bilaterally, nonlabored respirations               Cardiovascular: RRR, no MRG              Gastrointestinal: Positive bowel sounds, soft, NTND              Neurologic: Oriented x 3, strength symmetric in all extremities, Cranial Nerves grossly intact to confrontation, speech clear    Discharge Details        Discharge Medications        Changes to Medications        Instructions Start Date   mesalamine 1000 MG suppository  Commonly known as: CANASA  What changed:   when to take this  reasons to take this   1,000 mg, Rectal, Nightly      polyethylene glycol 17 GM/SCOOP powder  Commonly known as: MiraLax  What changed:   when to take this  reasons to take this   17 g, Oral, Daily      Psyllium 51.7 % packet  Commonly known as: METAMUCIL MULTIHEALTH FIBER  What changed:   when to take this  reasons to take this   1 packet, Oral, Daily      sennosides-docusate  8.6-50 MG per tablet  Commonly known as: PERICOLACE  What changed:   when to take this  reasons to take this   1 tablet, Oral, 2 Times Daily             Continue These Medications        Instructions Start Date   acetaminophen 650 MG 8 hr tablet  Commonly known as: TYLENOL   650 mg, Oral, Every 8 Hours PRN      atorvastatin 10 MG tablet  Commonly known as: LIPITOR   10 mg, Oral, Daily      carbidopa-levodopa  MG per tablet  Commonly known as: SINEMET   1 tablet, Oral, 2 Times Daily      fexofenadine 60 MG tablet  Commonly known as: ALLEGRA   60 mg, Oral, Daily      metoprolol succinate XL 25 MG 24 hr tablet  Commonly known as: TOPROL-XL   12.5 mg, Oral, Every 24 Hours Scheduled      mirtazapine 15 MG tablet  Commonly known as: REMERON   15 mg, Oral, Daily, HS      urea 10 % cream  Commonly known as: CARMOL   1 Application, Topical, As Needed             Stop These Medications      furosemide 20 MG tablet  Commonly known as: Lasix              No Known Allergies    Discharge Disposition:  Home-Health Care [facility]    Diet:  Hospital:  Diet Order   Procedures    Diet: Regular/House; Fluid Consistency: Thin (IDDSI 0)       Discharge Activity:   Activity Instructions       Activity as Tolerated              CODE STATUS:  Code Status and Medical Interventions: No CPR (Do Not Attempt to Resuscitate); Limited Support; No intubation (DNI), No cardioversion   Ordered at: 08/14/24 1122     Medical Intervention Limits:    No intubation (DNI)    No cardioversion     Level Of Support Discussed With:    Patient     Code Status (Patient has no pulse and is not breathing):    No CPR (Do Not Attempt to Resuscitate)     Medical Interventions (Patient has pulse or is breathing):    Limited Support         Future Appointments   Date Time Provider Department Center   8/20/2024 11:15 AM Katerina Carpenter MD [facility]   2/24/2025 12:00 PM Josh Maya MD [facility]       Additional Instructions for the Follow-ups  that You Need to Schedule       Discharge Follow-up with PCP   As directed       Currently Documented PCP:    Josh Maya MD    PCP Phone Number:    [phone]     Follow Up Details: 3-5 days                Pertinent  and/or Most Recent Results     PROCEDURES:   None    LAB RESULTS:      Lab 08/15/24  0621 08/14/24 0624   WBC 8.59 8.05   HEMOGLOBIN 13.6 14.4   HEMATOCRIT 42.2 44.2   PLATELETS 209 255   NEUTROS ABS 5.24 5.27   IMMATURE GRANS (ABS) 0.02 0.02   LYMPHS ABS 1.78 1.63   MONOS ABS 1.11* 0.88   EOS ABS 0.38 0.21   MCV 94.6 94.8         Lab 08/15/24  0621 08/14/24  0624   SODIUM 139 144   POTASSIUM 3.7 3.6   CHLORIDE 106 106   CO2 24.7 28.9   ANION GAP 8.3 9.1   BUN 11 17   CREATININE 0.93 1.15   EGFR 79.5 61.6   GLUCOSE 80 109*   CALCIUM 8.2* 9.0   MAGNESIUM 1.9 2.1         Lab 08/14/24 0624   TOTAL PROTEIN 7.0   ALBUMIN 4.0   GLOBULIN 3.0   ALT (SGPT) 7   AST (SGOT) 17   BILIRUBIN 0.6   ALK PHOS 104         Lab 08/14/24 0624   HSTROP T 26*                 Brief Urine Lab Results  (Last result in the past 365 days)        Color   Clarity   Blood   Leuk Est   Nitrite   Protein   CREAT   Urine HCG        08/15/24 2210 Yellow   Clear   Negative   Negative   Negative   Negative                 Microbiology Results (last 10 days)       ** No results found for the last 240 hours. **            MRI Brain Without Contrast    Result Date: 8/14/2024  1. No acute brain abnormality is seen. No acute infarct. No acute intracranial hemorrhage. 2. There is stable ventriculomegaly since 2006. 3. There may be minimal chronic small vessel ischemia/infarction. 4. Slight motion artifact obscures detail on some of the sequences. 5. The other findings are as detailed above. Portions of this note were completed with a voice recognition program. Electronically Signed: Mina Stahl MD  8/14/2024 8:53 PM EDT  Workstation ID: [id number]    CT Head Without Contrast    Result Date: 8/14/2024  Impression: 1.No acute  intracranial process identified. 2.Findings suggestive of mild chronic small vessel ischemic disease. 3.Stable ventriculomegaly. Electronically Signed: Christiano Guido MD  8/14/2024 11:45 AM EDT  Workstation ID: [id number]    XR Chest 1 View    Result Date: 8/14/2024  Impression: No acute cardiopulmonary findings. Electronically Signed: Severiano Head MD  8/14/2024 7:14 AM EDT  Workstation ID: [id number]              Results for orders placed during the hospital encounter of 08/14/24    Adult Transthoracic Echo Complete w/ Color, Spectral and Contrast if necessary per protocol    Interpretation Summary    The study is technically difficult for diagnosis.    Left ventricular systolic function is hyperdynamic (EF > 70%). Calculated left ventricular EF = 71.1%    Left ventricular diastolic dysfunction is noted.    The left atrial cavity is mildly dilated.    There is mild calcification of the aortic valve.      Labs Pending at Discharge:        Time spent on Discharge including face to face service: 34 minutes    Electronically signed by Yamil Rosario MD, 08/16/24, 1:13 PM EDT.

## 2024-08-19 ENCOUNTER — TRANSITIONAL CARE MANAGEMENT TELEPHONE ENCOUNTER (OUTPATIENT)
Dept: CALL CENTER | Facility: HOSPITAL | Age: 89
End: 2024-08-19
Payer: MEDICARE

## 2024-08-19 NOTE — OUTREACH NOTE
Call Center TCM Note      Flowsheet Row Responses   [facility] patient discharged from? [facility]   Does the patient have one of the following disease processes/diagnoses(primary or secondary)? Other   TCM attempt successful? Yes  [VR for YULIA Meneses]   Call start time 1504   Call end time 1506   Discharge diagnosis Syncope   Person spoke with today (if not patient) and relationship YULIA Santiago   Meds reviewed with patient/caregiver? Yes   Is the patient having any side effects they believe may be caused by any medication additions or changes? No   Does the patient have all medications ordered at discharge? N/A   Prescription comments No new meds ordered-pt is holding his metoprolol and will be following up with PCP regarding restart   Is the patient taking all medications as directed (includes completed medication regime)? Yes   Does the patient have an appointment with their PCP within 7-14 days of discharge? No   Nursing Interventions Patient declined scheduling/rescheduling appointment at this time  [POA prefers to discuss scheduling with office,  reports she talked with office earlier today]   [facility]   Has home health visited the patient within 72 hours of discharge? Unsure   Psychosocial issues? No   Did the patient receive a copy of their discharge instructions? Yes   Nursing interventions Reviewed instructions with patient   What is the patient's perception of their health status since discharge? Same  [Call brief--POA reports pt with no further c/o dizziness at this time. HH to monitor BP as noted changes to meds at discharge.  No questions today]   Is the patient/caregiver able to teach back signs and symptoms related to disease process for when to call PCP? Yes   TCM call completed? Yes   Call end time 1506            Gladys Ghotra RN    8/19/2024, 15:08 EDT

## 2024-08-23 ENCOUNTER — TELEPHONE (OUTPATIENT)
Dept: FAMILY MEDICINE CLINIC | Facility: CLINIC | Age: 89
End: 2024-08-23

## 2024-08-27 ENCOUNTER — TELEPHONE (OUTPATIENT)
Dept: FAMILY MEDICINE CLINIC | Facility: CLINIC | Age: 89
End: 2024-08-27
Payer: MEDICARE

## 2024-09-06 LAB
QT INTERVAL: 397 MS
QTC INTERVAL: 469 MS

## 2024-10-18 ENCOUNTER — TELEPHONE (OUTPATIENT)
Dept: UROLOGY | Facility: CLINIC | Age: 89
End: 2024-10-18
Payer: MEDICARE

## 2024-10-18 NOTE — TELEPHONE ENCOUNTER
SPOKE TO [facility]. ADVISED THAT THE LETTER WAS REGARDING AN OVERDUE RENAL ULTRASOUND AND OFFICE F/U. SHE SAID PT IS DOING WELL FROM A UROLOGY STANDPOINT, NO PAIN OR ISSUES. ADVISED HER THAT WE CAN R/S THOSE APPTS IF SHE WOULD LIKE. SHE STATES THAT HE HAS NURSES THAT CARE FOR HIM, HIS PCP IS MONITORING HIS LABS, AND HE HAS SIGNIFICANT COGNITIVE DECLINE. SHE ADVISES THAT SHE WILL LET US KNOW IF HE STARTS TO HAVE PAIN OR UROLOGY ISSUES, AND THEN WE WILL SCHEDULE AS NEEDED. АН[facility] EXPRESSED UNDERSTANDING AND IS AGREEABLE.

## 2024-10-18 NOTE — TELEPHONE ENCOUNTER
PATIENT'S POA, АНДРЕЙ ELI, CALLED.  SHE SAID THEY RECEIVED A LETTER THAT PATIENT MISSED AN APPOINTMENT OR TEST.  SHE WANTS TO KNOW WHAT IT IS REGARDING.    SHE SAID THAT WHEN PATIENT WAS SEEN LAST, HIS CATHETER WAS REMOVED, AND THEY WERE UNAWARE HE NEEDED TO F/U ON ANYTHING.    I TOLD HER I THOUGHT THE LETTER WAS REGARDING A RENAL US BILATERAL HE WAS SUPPOSED TO HAVE DONE, AND A F/U APPOINTMENT WITH DR. RUVALCABA.    SHE SAID PATIENT HAS DEMENTIA AND OVERALL DECLINE.  IF HE NEEDS TO HAVE TESTING OR AN APPOINTMENT, SHE IS WILLING TO GET THOSE APPOINTMENTS SCHEDULED.  IT WOULD JUST BE HARDER.    SHE SAID SINCE HE HAS BEEN SEEN, HE HAS BEEN IN THE HOSPITAL DUE TO FALLING A LOT.  HE WAS ALSO IN REHAB, AND FELL 3 TIMES WHILE HE WAS THERE.    CALL HER [phone].

## 2024-11-18 ENCOUNTER — TELEPHONE (OUTPATIENT)
Dept: FAMILY MEDICINE CLINIC | Facility: CLINIC | Age: 89
End: 2024-11-18

## 2024-11-18 RX ORDER — OFLOXACIN 3 MG/ML
1 SOLUTION/ DROPS OPHTHALMIC 4 TIMES DAILY
Qty: 5 ML | Refills: 0 | OUTPATIENT
Start: 2024-11-18 | End: 2024-11-25

## 2024-11-18 NOTE — TELEPHONE ENCOUNTER
Caller: АНДРЕЙ ELI (POA)    Relationship: Emergency Contact    Best call back number: [phone]     What medication are you requesting: OFLOXACIN    What are your current symptoms: LEFT EYE IS RED    How long have you been experiencing symptoms: COMES AND GOES    Have you had these symptoms before:    [x] Yes  [] No    Have you been treated for these symptoms before:   [x] Yes  [] No    If a prescription is needed, what is your preferred pharmacy and phone number: [facility] [place] [address] - [phone] [facility] [phone] FX     Additional notes:    CALLER IS REQUESTING MEDICATION TO BE RE-FILLED.    PATIENT IS OUT OF MEDICATION AND HIS EYE IS RED AGAIN.

## 2024-11-19 RX ORDER — OFLOXACIN 3 MG/ML
1 SOLUTION/ DROPS OPHTHALMIC 4 TIMES DAILY
Qty: 5 ML | Refills: 0 | OUTPATIENT
Start: 2024-11-19 | End: 2024-11-26

## 2024-11-21 NOTE — TELEPHONE ENCOUNTER
Attempted to reach pt, left vm. HUB TO RELAY:   Eye redness can be due to not infectious reasons as glaucoma or others. Please, get evaluated by an ophthalmologist as soon as possible. Thank you

## 2025-02-03 RX ORDER — CARBIDOPA AND LEVODOPA 25; 100 MG/1; MG/1
1 TABLET ORAL 2 TIMES DAILY
Qty: 180 TABLET | Refills: 1 | Status: SHIPPED | OUTPATIENT
Start: 2025-02-03

## 2025-03-10 ENCOUNTER — HOSPITAL ENCOUNTER (INPATIENT)
Facility: HOSPITAL | Age: 89
LOS: 3 days | Discharge: HOME HEALTH | DRG: 693 | End: 2025-03-14
Attending: EMERGENCY MEDICINE | Admitting: STUDENT IN AN ORGANIZED HEALTH CARE EDUCATION/TRAINING PROGRAM
Payer: MEDICARE

## 2025-03-10 ENCOUNTER — APPOINTMENT (OUTPATIENT)
Dept: GENERAL RADIOLOGY | Facility: HOSPITAL | Age: 89
DRG: 693 | End: 2025-03-10
Payer: MEDICARE

## 2025-03-10 DIAGNOSIS — Z78.9 DECREASED ACTIVITIES OF DAILY LIVING (ADL): ICD-10-CM

## 2025-03-10 DIAGNOSIS — R50.9 FEBRILE ILLNESS: Primary | ICD-10-CM

## 2025-03-10 DIAGNOSIS — R26.2 DIFFICULTY WALKING: ICD-10-CM

## 2025-03-10 LAB
ALBUMIN SERPL-MCNC: 4.1 G/DL (ref 3.5–5.2)
ALBUMIN/GLOB SERPL: 1.1 G/DL
ALP SERPL-CCNC: 106 U/L (ref 39–117)
ALT SERPL W P-5'-P-CCNC: 11 U/L (ref 1–41)
ANION GAP SERPL CALCULATED.3IONS-SCNC: 12.6 MMOL/L (ref 5–15)
AST SERPL-CCNC: 18 U/L (ref 1–40)
BASOPHILS # BLD AUTO: 0.05 10*3/MM3 (ref 0–0.2)
BASOPHILS NFR BLD AUTO: 0.4 % (ref 0–1.5)
BILIRUB SERPL-MCNC: 1.1 MG/DL (ref 0–1.2)
BILIRUB UR QL STRIP: NEGATIVE
BUN SERPL-MCNC: 17 MG/DL (ref 8–23)
BUN/CREAT SERPL: 13.4 (ref 7–25)
CALCIUM SPEC-SCNC: 9.1 MG/DL (ref 8.6–10.5)
CHLORIDE SERPL-SCNC: 102 MMOL/L (ref 98–107)
CLARITY UR: CLEAR
CO2 SERPL-SCNC: 26.4 MMOL/L (ref 22–29)
COLOR UR: YELLOW
CREAT SERPL-MCNC: 1.27 MG/DL (ref 0.76–1.27)
D-LACTATE SERPL-SCNC: 1.8 MMOL/L (ref 0.5–2)
DEPRECATED RDW RBC AUTO: 44.4 FL (ref 37–54)
EGFRCR SERPLBLD CKD-EPI 2021: 54.7 ML/MIN/1.73
EOSINOPHIL # BLD AUTO: 0.02 10*3/MM3 (ref 0–0.4)
EOSINOPHIL NFR BLD AUTO: 0.2 % (ref 0.3–6.2)
ERYTHROCYTE [DISTWIDTH] IN BLOOD BY AUTOMATED COUNT: 12.7 % (ref 12.3–15.4)
FLUAV SUBTYP SPEC NAA+PROBE: NOT DETECTED
FLUBV RNA ISLT QL NAA+PROBE: NOT DETECTED
GLOBULIN UR ELPH-MCNC: 3.6 GM/DL
GLUCOSE SERPL-MCNC: 103 MG/DL (ref 65–99)
GLUCOSE UR STRIP-MCNC: NEGATIVE MG/DL
HCT VFR BLD AUTO: 48.4 % (ref 37.5–51)
HGB BLD-MCNC: 15.9 G/DL (ref 13–17.7)
HGB UR QL STRIP.AUTO: NEGATIVE
HOLD SPECIMEN: NORMAL
HOLD SPECIMEN: NORMAL
IMM GRANULOCYTES # BLD AUTO: 0.03 10*3/MM3 (ref 0–0.05)
IMM GRANULOCYTES NFR BLD AUTO: 0.2 % (ref 0–0.5)
KETONES UR QL STRIP: NEGATIVE
LEUKOCYTE ESTERASE UR QL STRIP.AUTO: NEGATIVE
LYMPHOCYTES # BLD AUTO: 0.87 10*3/MM3 (ref 0.7–3.1)
LYMPHOCYTES NFR BLD AUTO: 7.2 % (ref 19.6–45.3)
MAGNESIUM SERPL-MCNC: 2 MG/DL (ref 1.6–2.4)
MCH RBC QN AUTO: 31.3 PG (ref 26.6–33)
MCHC RBC AUTO-ENTMCNC: 32.9 G/DL (ref 31.5–35.7)
MCV RBC AUTO: 95.3 FL (ref 79–97)
MONOCYTES # BLD AUTO: 1.27 10*3/MM3 (ref 0.1–0.9)
MONOCYTES NFR BLD AUTO: 10.5 % (ref 5–12)
NEUTROPHILS NFR BLD AUTO: 81.5 % (ref 42.7–76)
NEUTROPHILS NFR BLD AUTO: 9.88 10*3/MM3 (ref 1.7–7)
NITRITE UR QL STRIP: NEGATIVE
NRBC BLD AUTO-RTO: 0 /100 WBC (ref 0–0.2)
PH UR STRIP.AUTO: 6.5 [PH] (ref 5–8)
PLATELET # BLD AUTO: 239 10*3/MM3 (ref 140–450)
PMV BLD AUTO: 9 FL (ref 6–12)
POTASSIUM SERPL-SCNC: 4 MMOL/L (ref 3.5–5.2)
PROCALCITONIN SERPL-MCNC: 0.11 NG/ML (ref 0–0.25)
PROT SERPL-MCNC: 7.7 G/DL (ref 6–8.5)
PROT UR QL STRIP: NEGATIVE
RBC # BLD AUTO: 5.08 10*6/MM3 (ref 4.14–5.8)
RSV RNA NPH QL NAA+NON-PROBE: NOT DETECTED
SARS-COV-2 RNA RESP QL NAA+PROBE: NOT DETECTED
SODIUM SERPL-SCNC: 141 MMOL/L (ref 136–145)
SP GR UR STRIP: 1.01 (ref 1–1.03)
TROPONIN T SERPL HS-MCNC: 29 NG/L
UROBILINOGEN UR QL STRIP: NORMAL
WBC NRBC COR # BLD AUTO: 12.12 10*3/MM3 (ref 3.4–10.8)
WHOLE BLOOD HOLD COAG: NORMAL
WHOLE BLOOD HOLD SPECIMEN: NORMAL

## 2025-03-10 PROCEDURE — P9612 CATHETERIZE FOR URINE SPEC: HCPCS

## 2025-03-10 PROCEDURE — 83735 ASSAY OF MAGNESIUM: CPT | Performed by: EMERGENCY MEDICINE

## 2025-03-10 PROCEDURE — 71045 X-RAY EXAM CHEST 1 VIEW: CPT

## 2025-03-10 PROCEDURE — 93005 ELECTROCARDIOGRAM TRACING: CPT | Performed by: EMERGENCY MEDICINE

## 2025-03-10 PROCEDURE — 36415 COLL VENOUS BLD VENIPUNCTURE: CPT

## 2025-03-10 PROCEDURE — 87637 SARSCOV2&INF A&B&RSV AMP PRB: CPT | Performed by: EMERGENCY MEDICINE

## 2025-03-10 PROCEDURE — 93010 ELECTROCARDIOGRAM REPORT: CPT | Performed by: INTERNAL MEDICINE

## 2025-03-10 PROCEDURE — 87040 BLOOD CULTURE FOR BACTERIA: CPT | Performed by: EMERGENCY MEDICINE

## 2025-03-10 PROCEDURE — 84484 ASSAY OF TROPONIN QUANT: CPT | Performed by: EMERGENCY MEDICINE

## 2025-03-10 PROCEDURE — 25810000003 SODIUM CHLORIDE 0.9 % SOLUTION: Performed by: EMERGENCY MEDICINE

## 2025-03-10 PROCEDURE — 99291 CRITICAL CARE FIRST HOUR: CPT

## 2025-03-10 PROCEDURE — 80053 COMPREHEN METABOLIC PANEL: CPT | Performed by: EMERGENCY MEDICINE

## 2025-03-10 PROCEDURE — 83605 ASSAY OF LACTIC ACID: CPT | Performed by: EMERGENCY MEDICINE

## 2025-03-10 PROCEDURE — 85025 COMPLETE CBC W/AUTO DIFF WBC: CPT | Performed by: EMERGENCY MEDICINE

## 2025-03-10 PROCEDURE — 84145 PROCALCITONIN (PCT): CPT | Performed by: EMERGENCY MEDICINE

## 2025-03-10 PROCEDURE — 81003 URINALYSIS AUTO W/O SCOPE: CPT | Performed by: EMERGENCY MEDICINE

## 2025-03-10 PROCEDURE — 25010000002 CEFTRIAXONE PER 250 MG: Performed by: EMERGENCY MEDICINE

## 2025-03-10 RX ORDER — FUROSEMIDE 20 MG/1
1 TABLET ORAL EVERY 12 HOURS SCHEDULED
COMMUNITY
Start: 2025-01-29

## 2025-03-10 RX ORDER — SODIUM CHLORIDE 0.9 % (FLUSH) 0.9 %
10 SYRINGE (ML) INJECTION AS NEEDED
Status: DISCONTINUED | OUTPATIENT
Start: 2025-03-10 | End: 2025-03-14 | Stop reason: HOSPADM

## 2025-03-10 RX ORDER — ACETAMINOPHEN 500 MG
1000 TABLET ORAL ONCE
Status: COMPLETED | OUTPATIENT
Start: 2025-03-10 | End: 2025-03-10

## 2025-03-10 RX ADMIN — ACETAMINOPHEN 1000 MG: 500 TABLET ORAL at 23:44

## 2025-03-10 RX ADMIN — SODIUM CHLORIDE 1000 ML: 9 INJECTION, SOLUTION INTRAVENOUS at 23:11

## 2025-03-10 RX ADMIN — CEFTRIAXONE SODIUM 1000 MG: 1 INJECTION, POWDER, FOR SOLUTION INTRAMUSCULAR; INTRAVENOUS at 23:11

## 2025-03-10 NOTE — Clinical Note
Level of Care: Remote Telemetry [26]   Diagnosis: Febrile illness [361297]   Certification: I Certify That Inpatient Hospital Services Are Medically Necessary For Greater Than 2 Midnights

## 2025-03-11 ENCOUNTER — APPOINTMENT (OUTPATIENT)
Dept: CT IMAGING | Facility: HOSPITAL | Age: 89
DRG: 693 | End: 2025-03-11
Payer: MEDICARE

## 2025-03-11 ENCOUNTER — APPOINTMENT (OUTPATIENT)
Dept: MRI IMAGING | Facility: HOSPITAL | Age: 89
DRG: 693 | End: 2025-03-11
Payer: MEDICARE

## 2025-03-11 PROBLEM — R50.9 FEBRILE ILLNESS: Status: ACTIVE | Noted: 2025-03-11

## 2025-03-11 LAB
ALBUMIN SERPL-MCNC: 3.4 G/DL (ref 3.5–5.2)
ALBUMIN/GLOB SERPL: 1.3 G/DL
ALP SERPL-CCNC: 82 U/L (ref 39–117)
ALT SERPL W P-5'-P-CCNC: 9 U/L (ref 1–41)
ANION GAP SERPL CALCULATED.3IONS-SCNC: 9.3 MMOL/L (ref 5–15)
AST SERPL-CCNC: 15 U/L (ref 1–40)
B PARAPERT DNA SPEC QL NAA+PROBE: NOT DETECTED
B PERT DNA SPEC QL NAA+PROBE: NOT DETECTED
BASOPHILS # BLD AUTO: 0.03 10*3/MM3 (ref 0–0.2)
BASOPHILS NFR BLD AUTO: 0.3 % (ref 0–1.5)
BILIRUB SERPL-MCNC: 0.9 MG/DL (ref 0–1.2)
BUN SERPL-MCNC: 17 MG/DL (ref 8–23)
BUN/CREAT SERPL: 15.9 (ref 7–25)
C PNEUM DNA NPH QL NAA+NON-PROBE: NOT DETECTED
CALCIUM SPEC-SCNC: 8 MG/DL (ref 8.6–10.5)
CHLORIDE SERPL-SCNC: 107 MMOL/L (ref 98–107)
CO2 SERPL-SCNC: 25.7 MMOL/L (ref 22–29)
CREAT SERPL-MCNC: 1.07 MG/DL (ref 0.76–1.27)
DEPRECATED RDW RBC AUTO: 44.9 FL (ref 37–54)
EGFRCR SERPLBLD CKD-EPI 2021: 67.2 ML/MIN/1.73
EOSINOPHIL # BLD AUTO: 0.02 10*3/MM3 (ref 0–0.4)
EOSINOPHIL NFR BLD AUTO: 0.2 % (ref 0.3–6.2)
ERYTHROCYTE [DISTWIDTH] IN BLOOD BY AUTOMATED COUNT: 12.6 % (ref 12.3–15.4)
FLUAV SUBTYP SPEC NAA+PROBE: NOT DETECTED
FLUBV RNA ISLT QL NAA+PROBE: NOT DETECTED
GEN 5 1HR TROPONIN T REFLEX: 29 NG/L
GLOBULIN UR ELPH-MCNC: 2.7 GM/DL
GLUCOSE SERPL-MCNC: 107 MG/DL (ref 65–99)
HADV DNA SPEC NAA+PROBE: NOT DETECTED
HCOV 229E RNA SPEC QL NAA+PROBE: NOT DETECTED
HCOV HKU1 RNA SPEC QL NAA+PROBE: NOT DETECTED
HCOV NL63 RNA SPEC QL NAA+PROBE: NOT DETECTED
HCOV OC43 RNA SPEC QL NAA+PROBE: NOT DETECTED
HCT VFR BLD AUTO: 41.7 % (ref 37.5–51)
HGB BLD-MCNC: 13.6 G/DL (ref 13–17.7)
HMPV RNA NPH QL NAA+NON-PROBE: NOT DETECTED
HPIV1 RNA ISLT QL NAA+PROBE: NOT DETECTED
HPIV2 RNA SPEC QL NAA+PROBE: NOT DETECTED
HPIV3 RNA NPH QL NAA+PROBE: NOT DETECTED
HPIV4 P GENE NPH QL NAA+PROBE: NOT DETECTED
IMM GRANULOCYTES # BLD AUTO: 0.04 10*3/MM3 (ref 0–0.05)
IMM GRANULOCYTES NFR BLD AUTO: 0.4 % (ref 0–0.5)
LYMPHOCYTES # BLD AUTO: 1.36 10*3/MM3 (ref 0.7–3.1)
LYMPHOCYTES NFR BLD AUTO: 12.8 % (ref 19.6–45.3)
M PNEUMO IGG SER IA-ACNC: NOT DETECTED
MAGNESIUM SERPL-MCNC: 1.9 MG/DL (ref 1.6–2.4)
MCH RBC QN AUTO: 31.3 PG (ref 26.6–33)
MCHC RBC AUTO-ENTMCNC: 32.6 G/DL (ref 31.5–35.7)
MCV RBC AUTO: 95.9 FL (ref 79–97)
MONOCYTES # BLD AUTO: 1.54 10*3/MM3 (ref 0.1–0.9)
MONOCYTES NFR BLD AUTO: 14.5 % (ref 5–12)
NEUTROPHILS NFR BLD AUTO: 7.62 10*3/MM3 (ref 1.7–7)
NEUTROPHILS NFR BLD AUTO: 71.8 % (ref 42.7–76)
NRBC BLD AUTO-RTO: 0 /100 WBC (ref 0–0.2)
PHOSPHATE SERPL-MCNC: 3.2 MG/DL (ref 2.5–4.5)
PLATELET # BLD AUTO: 207 10*3/MM3 (ref 140–450)
PMV BLD AUTO: 9.3 FL (ref 6–12)
POTASSIUM SERPL-SCNC: 3.6 MMOL/L (ref 3.5–5.2)
PROT SERPL-MCNC: 6.1 G/DL (ref 6–8.5)
RBC # BLD AUTO: 4.35 10*6/MM3 (ref 4.14–5.8)
RHINOVIRUS RNA SPEC NAA+PROBE: NOT DETECTED
RSV RNA NPH QL NAA+NON-PROBE: NOT DETECTED
SARS-COV-2 RNA RESP QL NAA+PROBE: NOT DETECTED
SODIUM SERPL-SCNC: 142 MMOL/L (ref 136–145)
TROPONIN T % DELTA: 0
TROPONIN T NUMERIC DELTA: 0 NG/L
WBC NRBC COR # BLD AUTO: 10.61 10*3/MM3 (ref 3.4–10.8)

## 2025-03-11 PROCEDURE — 70450 CT HEAD/BRAIN W/O DYE: CPT

## 2025-03-11 PROCEDURE — 0202U NFCT DS 22 TRGT SARS-COV-2: CPT | Performed by: STUDENT IN AN ORGANIZED HEALTH CARE EDUCATION/TRAINING PROGRAM

## 2025-03-11 PROCEDURE — 36415 COLL VENOUS BLD VENIPUNCTURE: CPT | Performed by: STUDENT IN AN ORGANIZED HEALTH CARE EDUCATION/TRAINING PROGRAM

## 2025-03-11 PROCEDURE — 25010000002 CEFTRIAXONE PER 250 MG: Performed by: STUDENT IN AN ORGANIZED HEALTH CARE EDUCATION/TRAINING PROGRAM

## 2025-03-11 PROCEDURE — 99223 1ST HOSP IP/OBS HIGH 75: CPT | Performed by: STUDENT IN AN ORGANIZED HEALTH CARE EDUCATION/TRAINING PROGRAM

## 2025-03-11 PROCEDURE — 80053 COMPREHEN METABOLIC PANEL: CPT | Performed by: STUDENT IN AN ORGANIZED HEALTH CARE EDUCATION/TRAINING PROGRAM

## 2025-03-11 PROCEDURE — 70551 MRI BRAIN STEM W/O DYE: CPT

## 2025-03-11 PROCEDURE — 83735 ASSAY OF MAGNESIUM: CPT | Performed by: STUDENT IN AN ORGANIZED HEALTH CARE EDUCATION/TRAINING PROGRAM

## 2025-03-11 PROCEDURE — 85025 COMPLETE CBC W/AUTO DIFF WBC: CPT | Performed by: STUDENT IN AN ORGANIZED HEALTH CARE EDUCATION/TRAINING PROGRAM

## 2025-03-11 PROCEDURE — 84484 ASSAY OF TROPONIN QUANT: CPT | Performed by: EMERGENCY MEDICINE

## 2025-03-11 PROCEDURE — 84100 ASSAY OF PHOSPHORUS: CPT | Performed by: STUDENT IN AN ORGANIZED HEALTH CARE EDUCATION/TRAINING PROGRAM

## 2025-03-11 RX ORDER — SODIUM CHLORIDE 0.9 % (FLUSH) 0.9 %
10 SYRINGE (ML) INJECTION EVERY 12 HOURS SCHEDULED
Status: DISCONTINUED | OUTPATIENT
Start: 2025-03-11 | End: 2025-03-14 | Stop reason: HOSPADM

## 2025-03-11 RX ORDER — ALUMINA, MAGNESIA, AND SIMETHICONE 2400; 2400; 240 MG/30ML; MG/30ML; MG/30ML
15 SUSPENSION ORAL EVERY 6 HOURS PRN
Status: DISCONTINUED | OUTPATIENT
Start: 2025-03-11 | End: 2025-03-14 | Stop reason: HOSPADM

## 2025-03-11 RX ORDER — FAMOTIDINE 20 MG/1
40 TABLET, FILM COATED ORAL DAILY
Status: DISCONTINUED | OUTPATIENT
Start: 2025-03-11 | End: 2025-03-14 | Stop reason: HOSPADM

## 2025-03-11 RX ORDER — NALOXONE HCL 0.4 MG/ML
0.4 VIAL (ML) INJECTION
Status: DISCONTINUED | OUTPATIENT
Start: 2025-03-11 | End: 2025-03-14 | Stop reason: HOSPADM

## 2025-03-11 RX ORDER — SODIUM CHLORIDE 0.9 % (FLUSH) 0.9 %
10 SYRINGE (ML) INJECTION AS NEEDED
Status: DISCONTINUED | OUTPATIENT
Start: 2025-03-11 | End: 2025-03-14 | Stop reason: HOSPADM

## 2025-03-11 RX ORDER — BISACODYL 10 MG
10 SUPPOSITORY, RECTAL RECTAL DAILY PRN
Status: DISCONTINUED | OUTPATIENT
Start: 2025-03-11 | End: 2025-03-14 | Stop reason: HOSPADM

## 2025-03-11 RX ORDER — ACETAMINOPHEN 325 MG/1
650 TABLET ORAL EVERY 4 HOURS PRN
Status: DISCONTINUED | OUTPATIENT
Start: 2025-03-11 | End: 2025-03-14 | Stop reason: HOSPADM

## 2025-03-11 RX ORDER — POLYETHYLENE GLYCOL 3350 17 G/17G
17 POWDER, FOR SOLUTION ORAL DAILY PRN
Status: DISCONTINUED | OUTPATIENT
Start: 2025-03-11 | End: 2025-03-14 | Stop reason: HOSPADM

## 2025-03-11 RX ORDER — MORPHINE SULFATE 2 MG/ML
1 INJECTION, SOLUTION INTRAMUSCULAR; INTRAVENOUS EVERY 4 HOURS PRN
Status: DISCONTINUED | OUTPATIENT
Start: 2025-03-11 | End: 2025-03-14 | Stop reason: HOSPADM

## 2025-03-11 RX ORDER — MIRTAZAPINE 15 MG/1
15 TABLET, FILM COATED ORAL NIGHTLY
Status: DISCONTINUED | OUTPATIENT
Start: 2025-03-11 | End: 2025-03-14 | Stop reason: HOSPADM

## 2025-03-11 RX ORDER — METOPROLOL SUCCINATE 25 MG/1
12.5 TABLET, EXTENDED RELEASE ORAL
Status: DISCONTINUED | OUTPATIENT
Start: 2025-03-11 | End: 2025-03-14 | Stop reason: HOSPADM

## 2025-03-11 RX ORDER — NITROGLYCERIN 0.4 MG/1
0.4 TABLET SUBLINGUAL
Status: DISCONTINUED | OUTPATIENT
Start: 2025-03-11 | End: 2025-03-14 | Stop reason: HOSPADM

## 2025-03-11 RX ORDER — ACETAMINOPHEN 650 MG/1
650 SUPPOSITORY RECTAL EVERY 4 HOURS PRN
Status: DISCONTINUED | OUTPATIENT
Start: 2025-03-11 | End: 2025-03-14 | Stop reason: HOSPADM

## 2025-03-11 RX ORDER — SODIUM CHLORIDE 9 MG/ML
40 INJECTION, SOLUTION INTRAVENOUS AS NEEDED
Status: DISCONTINUED | OUTPATIENT
Start: 2025-03-11 | End: 2025-03-14 | Stop reason: HOSPADM

## 2025-03-11 RX ORDER — KETOROLAC TROMETHAMINE 30 MG/ML
15 INJECTION, SOLUTION INTRAMUSCULAR; INTRAVENOUS EVERY 6 HOURS PRN
Status: DISCONTINUED | OUTPATIENT
Start: 2025-03-11 | End: 2025-03-14 | Stop reason: HOSPADM

## 2025-03-11 RX ORDER — ACETAMINOPHEN 160 MG/5ML
650 SOLUTION ORAL EVERY 4 HOURS PRN
Status: DISCONTINUED | OUTPATIENT
Start: 2025-03-11 | End: 2025-03-14 | Stop reason: HOSPADM

## 2025-03-11 RX ORDER — ONDANSETRON 2 MG/ML
4 INJECTION INTRAMUSCULAR; INTRAVENOUS EVERY 6 HOURS PRN
Status: DISCONTINUED | OUTPATIENT
Start: 2025-03-11 | End: 2025-03-14 | Stop reason: HOSPADM

## 2025-03-11 RX ORDER — CARBIDOPA AND LEVODOPA 25; 100 MG/1; MG/1
1 TABLET ORAL 2 TIMES DAILY
Status: DISCONTINUED | OUTPATIENT
Start: 2025-03-11 | End: 2025-03-14 | Stop reason: HOSPADM

## 2025-03-11 RX ORDER — BISACODYL 5 MG/1
5 TABLET, DELAYED RELEASE ORAL DAILY PRN
Status: DISCONTINUED | OUTPATIENT
Start: 2025-03-11 | End: 2025-03-14 | Stop reason: HOSPADM

## 2025-03-11 RX ORDER — ATORVASTATIN CALCIUM 10 MG/1
10 TABLET, FILM COATED ORAL DAILY
Status: DISCONTINUED | OUTPATIENT
Start: 2025-03-11 | End: 2025-03-14 | Stop reason: HOSPADM

## 2025-03-11 RX ORDER — AMOXICILLIN 250 MG
2 CAPSULE ORAL 2 TIMES DAILY PRN
Status: DISCONTINUED | OUTPATIENT
Start: 2025-03-11 | End: 2025-03-14 | Stop reason: HOSPADM

## 2025-03-11 RX ADMIN — CARBIDOPA AND LEVODOPA 1 TABLET: 25; 100 TABLET ORAL at 21:01

## 2025-03-11 RX ADMIN — SODIUM CHLORIDE 1000 MG: 9 INJECTION INTRAMUSCULAR; INTRAVENOUS; SUBCUTANEOUS at 21:01

## 2025-03-11 RX ADMIN — Medication 10 ML: at 09:18

## 2025-03-11 RX ADMIN — FAMOTIDINE 40 MG: 20 TABLET, FILM COATED ORAL at 12:06

## 2025-03-11 RX ADMIN — MIRTAZAPINE 15 MG: 15 TABLET, FILM COATED ORAL at 21:01

## 2025-03-11 RX ADMIN — CARBIDOPA AND LEVODOPA 1 TABLET: 25; 100 TABLET ORAL at 12:06

## 2025-03-11 RX ADMIN — METOPROLOL SUCCINATE 12.5 MG: 25 TABLET, EXTENDED RELEASE ORAL at 12:06

## 2025-03-11 RX ADMIN — ATORVASTATIN CALCIUM 10 MG: 10 TABLET, FILM COATED ORAL at 12:06

## 2025-03-11 RX ADMIN — Medication 10 ML: at 21:01

## 2025-03-11 NOTE — ED PROVIDER NOTES
Time: 10:42 PM EDT  Date of encounter:  3/10/2025  Independent Historian/Clinical History and Information was obtained by:   Patient and friends    History is limited by: Dementia, Altered Mental Status    Chief Complaint: Altered mental status, weakness      History of Present Illness:  Patient is a 89 y.o. year old male who presents to the emergency department for evaluation of altered mental status and weakness.  [place] with no living family.  Most of the history is provided by 2 friends at bedside that care for him.  They are his neighbors.  They state that starting this afternoon he is becoming weaker than normal with some increasing confusion.  They state he walks with a walker for the past 1+ years has a weak left arm that is at baseline today.  Patient admits to some coughing recently.  No reported fall with her friends at bedside.  Patient denies any unilateral numbness tingling, weakness, headache, chest pain or abdominal pain.      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    C4-5 and C5-6 with edema in the cord at C4-5    Death of wife 07/30/2020    Dementia     Fall at home, initial encounter 05/21/2020    Gait instability 02/06/2019    Grief reaction 07/30/2020    High cholesterol     Kidney stone     Lumbar spinal stenosis 02/02/2016    Worsening leg symptoms,  3/12/15    Medication management 02/06/2019    Restless leg syndrome 02/06/2019    Weight loss, unintentional 07/30/2020     Past  "Surgical History:   Procedure Laterality Date    CATARACT EXTRACTION Bilateral     CERVICAL FUSION      C4,5 and 6    COLONOSCOPY N/A 4/26/2024    Procedure: COLONOSCOPY WITH BIOPSIES;  Surgeon: Oral Duong MD;  Location: [facility] ENDOSCOPY;  Service: Gastroenterology;  Laterality: N/A;  PROCTITIS, COLON POLYPS    KNEE ARTHROSCOPY      right knee    LUMBAR LAMINECTOMY  02/10/2016    L3-4    ROTATOR CUFF REPAIR Right     SKIN BIOPSY      face and ears, squamous cell     SPINE SURGERY      minimally invasive procedure \"closing in on spinal Cord\"  \"gave spinal cord more room\"    URETEROSCOPY LASER LITHOTRIPSY WITH STENT INSERTION Bilateral 11/13/2023    Procedure: Bilateral Retrograde Pyelogram, Left Ureteroscopy Lasertripsy Basket Stone Extraction and Stent Insertion, Right Stent Insertion, Bladder Stone Extraction;  Surgeon: Katerina Carpenter MD;  Location: [facility] MAIN OR;  Service: Urology;  Laterality: Bilateral;    URETEROSCOPY LASER LITHOTRIPSY WITH STENT INSERTION Bilateral 12/18/2023    Procedure: CYSTOSCOPY URETEROSCOPY RIGHT RETROGRADE PYELOGRAM HOLMIUM LASER STENT exchange; LEFT STENT REMOVAL;  Surgeon: Katerina Carpenter MD;  Location: [facility] MAIN OR;  Service: Urology;  Laterality: Bilateral;     Family History   Problem Relation Age of Onset    Heart failure Mother     Heart failure Father        Home Medications:  Prior to Admission medications    Medication Sig Start Date End Date Taking? Authorizing Provider   acetaminophen (TYLENOL) 650 MG 8 hr tablet Take 1 tablet by mouth Every 8 (Eight) Hours As Needed for Mild Pain. 6/17/24   Josh Maya MD   atorvastatin (LIPITOR) 10 MG tablet Take 1 tablet by mouth Daily. 8/5/24   Josh Maya MD   carbidopa-levodopa (SINEMET)  MG per tablet TAKE ONE TABLET BY MOUTH TWICE DAILY 2/3/25   Josh Maya MD   fexofenadine (ALLEGRA) 60 MG tablet Take 1 tablet by mouth Daily. 6/18/24   Josh Maya MD " "  mesalamine (CANASA) 1000 MG suppository Insert 1 suppository into the rectum Every Night. 8/5/24   Josh Maya MD   metoprolol succinate XL (TOPROL-XL) 25 MG 24 hr tablet Take 0.5 tablets by mouth Daily. 8/5/24   Josh Maya MD   mirtazapine (REMERON) 15 MG tablet Take 1 tablet by mouth Daily. HS 8/5/24   Josh Maya MD   polyethylene glycol (MiraLax) 17 GM/SCOOP powder Take 17 g by mouth Daily. 6/17/24   Josh Maya MD   Psyllium (METAMUCIL MULTIHEALTH FIBER) 51.7 % packet Take 1 packet by mouth Daily. 6/17/24   Josh Maya MD   sennosides-docusate (PERICOLACE) 8.6-50 MG per tablet Take 1 tablet by mouth 2 (Two) Times a Day. 6/18/24   Josh Maya MD   urea (CARMOL) 10 % cream Apply 1 Application topically to the appropriate area as directed As Needed for Dry Skin. 6/18/24   Josh Maya MD        Social History:   Social History     Tobacco Use    Smoking status: Never    Smokeless tobacco: Never   Vaping Use    Vaping status: Never Used   Substance Use Topics    Alcohol use: Never    Drug use: Never         Review of Systems:  Review of Systems   Unable to perform ROS: Dementia   Respiratory:  Positive for cough.    Neurological:  Positive for tremors and weakness.   Psychiatric/Behavioral:  Positive for confusion.         Physical Exam:  /60 (BP Location: Right arm, Patient Position: Lying)   Pulse 97   Temp 99 °F (37.2 °C) (Oral)   Resp 18   Ht 188 cm (74.02\")   Wt 99.9 kg (220 lb 3.8 oz)   SpO2 91%   BMI 28.26 kg/m²     Physical Exam  Vitals and nursing note reviewed.   Constitutional:       General: He is awake.      Appearance: Normal appearance. He is well-developed.   HENT:      Head: Normocephalic and atraumatic.      Nose: Nose normal.      Mouth/Throat:      Mouth: Mucous membranes are moist.   Eyes:      Extraocular Movements: Extraocular movements intact.      Pupils: Pupils are equal, round, and reactive " to light.   Cardiovascular:      Rate and Rhythm: Regular rhythm. Tachycardia present.      Heart sounds: Normal heart sounds.   Pulmonary:      Effort: Pulmonary effort is normal. No respiratory distress.      Breath sounds: Normal breath sounds. No wheezing, rhonchi or rales.   Abdominal:      General: Bowel sounds are normal.      Palpations: Abdomen is soft.      Tenderness: There is no abdominal tenderness. There is no guarding or rebound.      Comments: No rigidity   Musculoskeletal:         General: No tenderness. Normal range of motion.      Cervical back: Normal range of motion and neck supple.   Skin:     General: Skin is warm and dry.      Coloration: Skin is not jaundiced.   Neurological:      General: No focal deficit present.      Mental Status: He is alert. He is confused.      GCS: GCS eye subscore is 4. GCS verbal subscore is 4. GCS motor subscore is 6.      Cranial Nerves: No cranial nerve deficit, dysarthria or facial asymmetry.      Sensory: No sensory deficit.      Motor: Weakness (Left upper extremity mild drift) present.   Psychiatric:         Mood and Affect: Mood normal.                    Medical Decision Making:      Comorbidities that affect care:    Dementia, restless leg syndrome, gait instability, hyperlipidemia    External Notes reviewed:    Hospital Discharge Summary:    Patient Name: Enrique Wylie  : 1935  MRN: [id number]     Date of Admission: 2024  Date of Discharge: 2024  Primary Care Physician: Josh Maya MD     Consults         Date and Time Order Name Status Description     2024 10:23 AM Hospitalist (on-call MD unless specified)                    Active and Resolved Hospital Problems:       Active Hospital Problems     Diagnosis POA    **Syncope [R55] Yes       Resolved Hospital Problems   No resolved problems to display.   Presyncope  Inability to ambulate  Orthostatic hypotension  Atrial fibrillation, not on anticoagulation due to  recent GI bleeding  Parkinson's  Cervical spine stenosis  Lumbar spinal stenosis  RLS    The following orders were placed and all results were independently analyzed by me:  Orders Placed This Encounter   Procedures    Blood Culture - Blood,    Blood Culture - Blood,    COVID-19, FLU A/B, RSV PCR 1 HR TAT - Swab, Nasopharynx    XR Chest 1 View    [place] Draw    Comprehensive Metabolic Panel    High Sensitivity Troponin T    Magnesium    CBC Auto Differential    Lactic Acid, Plasma    Procalcitonin    Urinalysis With Culture If Indicated - Urine, Catheter    High Sensitivity Troponin T 1Hr    NPO Diet NPO Type: Strict NPO    Undress & Gown    Continuous Pulse Oximetry    Vital Signs    Orthostatic Blood Pressure    Insert Indwelling Urinary Catheter    Assess Need for Indwelling Urinary Catheter - Follow Removal Protocol    Urinary Catheter Care    Hospitalist (on-call MD unless specified)    Oxygen Therapy- Nasal Cannula; Titrate 1-6 LPM Per SpO2; 90 - 95%    POC Glucose Once    ECG 12 Lead ED Triage Standing Order; Weak / Dizzy / AMS    Insert Peripheral IV    Fall Precautions    CBC & Differential    Green Top (Gel)    Lavender Top    Gold Top - SST    Light Blue Top       Medications Given in the Emergency Department:  Medications   sodium chloride 0.9 % flush 10 mL (has no administration in time range)   sodium chloride 0.9 % bolus 1,000 mL (1,000 mL Intravenous New Bag 3/10/25 2311)   cefTRIAXone (ROCEPHIN) in NS 1 gram/10ml IV PUSH syringe (1,000 mg Intravenous Given 3/10/25 2311)   acetaminophen (TYLENOL) tablet 1,000 mg (1,000 mg Oral Given 3/10/25 2344)        ED Course:    ED Course as of 03/11/25 0155   Tue Mar 11, 2025   0145 WBC(!): 12.12 [RP]      ED Course User Index  [RP] Arnulfo Coello MD       Labs:    Lab Results (last 24 hours)       Procedure Component Value Units Date/Time    Urinalysis With Culture If Indicated - Indwelling Urethral Catheter [[id number]]  (Normal) Collected: 03/10/25 3304     Specimen: Urine from Indwelling Urethral Catheter Updated: 03/10/25 2318     Color, UA Yellow     Appearance, UA Clear     pH, UA 6.5     Specific Gravity, UA 1.011     Glucose, UA Negative     Ketones, UA Negative     Bilirubin, UA Negative     Blood, UA Negative     Protein, UA Negative     Leuk Esterase, UA Negative     Nitrite, UA Negative     Urobilinogen, UA 1.0 E.U./dL    Narrative:      In absence of clinical symptoms, the presence of pyuria, bacteria, and/or nitrites on the urinalysis result does not correlate with infection.  Urine microscopic not indicated.    COVID-19, FLU A/B, RSV PCR 1 HR TAT - Swab, Nasopharynx [[id number]]  (Normal) Collected: 03/10/25 2254    Specimen: Swab from Nasopharynx Updated: 03/10/25 2350     COVID19 Not Detected     Influenza A PCR Not Detected     Influenza B PCR Not Detected     RSV, PCR Not Detected    Narrative:      Fact sheet for providers: https://www.fda.gov/media/350756/download    Fact sheet for patients: https://www.fda.gov/media/592246/download    Test performed by PCR.    CBC & Differential [[id number]]  (Abnormal) Collected: 03/10/25 2306    Specimen: Blood Updated: 03/10/25 2314    Narrative:      The following orders were created for panel order CBC & Differential.  Procedure                               Abnormality         Status                     ---------                               -----------         ------                     CBC Auto Differential[[id number]]        Abnormal            Final result                 Please view results for these tests on the individual orders.    Comprehensive Metabolic Panel [[id number]]  (Abnormal) Collected: 03/10/25 2306    Specimen: Blood Updated: 03/10/25 2333     Glucose 103 mg/dL      BUN 17 mg/dL      Creatinine 1.27 mg/dL      Sodium 141 mmol/L      Potassium 4.0 mmol/L      Comment: Slight hemolysis detected by analyzer. Result may be falsely elevated.        Chloride 102 mmol/L      CO2 26.4 mmol/L       Calcium 9.1 mg/dL      Total Protein 7.7 g/dL      Albumin 4.1 g/dL      ALT (SGPT) 11 U/L      AST (SGOT) 18 U/L      Alkaline Phosphatase 106 U/L      Total Bilirubin 1.1 mg/dL      Globulin 3.6 gm/dL      A/G Ratio 1.1 g/dL      BUN/Creatinine Ratio 13.4     Anion Gap 12.6 mmol/L      eGFR 54.7 mL/min/1.73     Narrative:      GFR Categories in Chronic Kidney Disease (CKD)      GFR Category          GFR (mL/min/1.73)    Interpretation  G1                     90 or greater         Normal or high (1)  G2                      60-89                Mild decrease (1)  G3a                   45-59                Mild to moderate decrease  G3b                   30-44                Moderate to severe decrease  G4                    15-29                Severe decrease  G5                    14 or less           Kidney failure          (1)In the absence of evidence of kidney disease, neither GFR category G1 or G2 fulfill the criteria for CKD.    eGFR calculation 2021 CKD-EPI creatinine equation, which does not include race as a factor    High Sensitivity Troponin T [[id number]]  (Abnormal) Collected: 03/10/25 2306    Specimen: Blood Updated: 03/10/25 2333     HS Troponin T 29 ng/L     Narrative:      High Sensitive Troponin T Reference Range:  <14.0 ng/L- Negative Female for AMI  <22.0 ng/L- Negative Male for AMI  >=14 - Abnormal Female indicating possible myocardial injury.  >=22 - Abnormal Male indicating possible myocardial injury.   Clinicians would have to utilize clinical acumen, EKG, Troponin, and serial changes to determine if it is an Acute Myocardial Infarction or myocardial injury due to an underlying chronic condition.         Magnesium [[id number]]  (Normal) Collected: 03/10/25 2306    Specimen: Blood Updated: 03/10/25 2333     Magnesium 2.0 mg/dL     CBC Auto Differential [[id number]]  (Abnormal) Collected: 03/10/25 2306    Specimen: Blood Updated: 03/10/25 2314     WBC 12.12 10*3/mm3      RBC 5.08 10*6/mm3       "Hemoglobin 15.9 g/dL      Hematocrit 48.4 %      MCV 95.3 fL      MCH 31.3 pg      MCHC 32.9 g/dL      RDW 12.7 %      RDW-SD 44.4 fl      MPV 9.0 fL      Platelets 239 10*3/mm3      Neutrophil % 81.5 %      Lymphocyte % 7.2 %      Monocyte % 10.5 %      Eosinophil % 0.2 %      Basophil % 0.4 %      Immature Grans % 0.2 %      Neutrophils, Absolute 9.88 10*3/mm3      Lymphocytes, Absolute 0.87 10*3/mm3      Monocytes, Absolute 1.27 10*3/mm3      Eosinophils, Absolute 0.02 10*3/mm3      Basophils, Absolute 0.05 10*3/mm3      Immature Grans, Absolute 0.03 10*3/mm3      nRBC 0.0 /100 WBC     Lactic Acid, Plasma [[id number]]  (Normal) Collected: 03/10/25 2306    Specimen: Blood Updated: 03/10/25 2330     Lactate 1.8 mmol/L     Procalcitonin [[id number]]  (Normal) Collected: 03/10/25 2306    Specimen: Blood Updated: 03/10/25 2339     Procalcitonin 0.11 ng/mL     Narrative:      As a Marker for Sepsis (Non-Neonates):    1. <0.5 ng/mL represents a low risk of severe sepsis and/or septic shock.  2. >2 ng/mL represents a high risk of severe sepsis and/or septic shock.    As a Marker for Lower Respiratory Tract Infections that require antibiotic therapy:    PCT on Admission    Antibiotic Therapy       6-12 Hrs later    >0.5                Strongly Recommended  >0.25 - <0.5        Recommended  0.1 - 0.25          Discouraged              Remeasure/reassess PCT  <0.1                Strongly Discouraged     Remeasure/reassess PCT    As 28 day mortality risk marker: \"Change in Procalcitonin Result\" (>80% or <=80%) if Day 0 (or Day 1) and Day 4 values are available. Refer to http://www.[facility]-pct-calculator.com    Change in PCT <=80%  A decrease of PCT levels below or equal to 80% defines a positive change in PCT test result representing a higher risk for 28-day all-cause mortality of patients diagnosed with severe sepsis for septic shock.    Change in PCT >80%  A decrease of PCT levels of more than 80% defines a negative change in " PCT result representing a lower risk for 28-day all-cause mortality of patients diagnosed with severe sepsis or septic shock.    This test is Prognostic not Diagnostic, if elevated correlate with clinical findings before administering antibiotic treatment.        Blood Culture - Blood, Arm, Left [[id number]] Collected: 03/10/25 2308    Specimen: Blood from Arm, Left Updated: 03/10/25 2311    Blood Culture - Blood, Arm, Right [[id number]] Collected: 03/10/25 2308    Specimen: Blood from Arm, Right Updated: 03/10/25 2311    High Sensitivity Troponin T 1Hr [[id number]]  (Abnormal) Collected: 03/11/25 0036    Specimen: Blood from Arm, Left Updated: 03/11/25 0059     HS Troponin T 29 ng/L      Troponin T Numeric Delta 0 ng/L      Troponin T % Delta 0    Narrative:      High Sensitive Troponin T Reference Range:  <14.0 ng/L- Negative Female for AMI  <22.0 ng/L- Negative Male for AMI  >=14 - Abnormal Female indicating possible myocardial injury.  >=22 - Abnormal Male indicating possible myocardial injury.   Clinicians would have to utilize clinical acumen, EKG, Troponin, and serial changes to determine if it is an Acute Myocardial Infarction or myocardial injury due to an underlying chronic condition.                  Imaging:    XR Chest 1 View  Result Date: 3/10/2025  XR CHEST 1 VW Date of exam: 3/10/2025, 11:13 P.M., EDT. Indications: Weak/dizzy/AMS triage protocol. Comparison: 8/14/2024. FINDINGS: A single AP (or PA) upright portable chest radiograph was performed. No cardiac enlargement is seen. No acute infiltrate is appreciated. No pleural effusion or pneumothorax is identified. The thoracic aorta is atherosclerotic. There is pulmonary hypoinflation. There may be mild subsegmental atelectasis and/or linear fibrosis in the lung bases, probably greater on the right. There is slight asymmetric elevation of the right diaphragm. External artifacts obscure detail. Chronic calcified granulomatous disease of the chest is  suggested. There may be mild thoracolumbar scoliosis. Postoperative changes involve the cervical spine and the right shoulder. No significant interval change is seen since the prior study (or studies).      No acute infiltrate is appreciated. Please see above comments for further detail.   Portions of this note were completed with a voice recognition program. Electronically Signed: Mina Stahl MD  3/10/2025 11:23 PM EDT  Workstation ID: [id number]        Differential Diagnosis and Discussion:    Fever: Based on the complaint of fever, differential diagnosis includes but is not limited to meningitis, pneumonia, pyelonephritis, acute uti,  systemic immune response syndrome, sepsis, viral syndrome, fungal infection, tick born illness and other bacterial infections.    PROCEDURES:    Labs were collected in the emergency department and all labs were reviewed and interpreted by me.  X-ray were performed in the emergency department and all X-ray impressions were independently interpreted by me.  An EKG was performed and the EKG was interpreted by me.    ECG 12 Lead ED Triage Standing Order; Weak / Dizzy / AMS   Preliminary Result   HEART DQTT=738  bpm   RR Yjdlwuqk=173  ms   NV Interval=  ms   P Horizontal Axis=  deg   P Front Axis=  deg   QRSD Bmddzhyz=586  ms   QT Kubqjagg=831  ms   IYmF=006  ms   QRS Axis=-74  deg   T Wave Axis=38  deg   - ABNORMAL ECG -   Atrial fibrillation   Ventricular premature complex   Left anterior fascicular block   Consider anterior infarct   Date and Time of Study:2025-03-10 22:41:20          Procedures    MDM                 Sepsis criteria was met in the emergency department and the Sepsis protocol (including antibiotic administration) was initiated.      SIRS criteria considered:   1.  Temperature > 100.4 or <96.8    2.  Heart Rate > 90    3.  Respiratory Rate > 22    4.  WBC > 12K or <4K.             Severe Sepsis:     Respiratory: Mechanical Ventilation or Bipap  Hypotension: SBP > 90  or MAP < 65  Renal: Creatinine > 2  Metabolic: Lactic Acid > 2  Hematologic: Platelets < 100K or INR > 1.5  Hepatic: BILI  >  2  CNS: Sudden AMS     Septic Shock:     Severe Sepsis + Persistent hypotension or Lactic Acid > 4     Normal saline bolus, Antibiotics, and final disposition was based on these definitions.        Sepsis was recognized at 22:43 EDT      Antibiotics were ordered.     30 mL/kg bolus was not indicated.       Patient did not receive the recommended 30 mL/kg fluid bolus for sepsis because it would be harmful or detrimental to the patient.    The patient has Concern for Fluid Overload and Rounded Down Due to National IV Fluid Shortage.   The patient was ordered 1 L of fluids.  Patient was reassessed after fluid bolus.    The patient presents with 2 out of the 4 SIRS criteria and a suspected source for sepsis.  Patient was evaluated and placed on a cardiac monitor for fear of worsening tachycardia and life-threatening hypotension.  Patient was monitored for shock and signs of end-organ damage.  Mental status was repeatedly checked throughout the ED stay.  Medications were ordered by me which includes Rocephin, saline bolus.  The case was discussed at length with admitting physician.     Total Critical Care time of 35 minutes. Total critical care time documented does not include time spent on separately billed procedures for services of nurses or physician assistants. I personally saw and examined the patient. I have reviewed all diagnostic interpretations and treatment plans as written. I was present for the key portions of any procedures performed and the inclusive time noted in any critical care statement. Critical care time includes patient management by me, time spent at the patients bedside,  time to review lab and imaging results, discussing patient care, documentation in the medical record, and time spent with family or caregiver.    Patient Care Considerations:    MRI: I considered ordering  an MRI however patient is febrile, tachycardic and sepsis explains altered mentation more so than stroke.  I see no focal symptoms by history or physical exam.      Consultants/Shared Management Plan:    Hospitalist: I have discussed the case with Dr. Richards who agrees to accept the patient for admission.    Social Determinants of Health:    Patient is unable to carry out activities of daily life. Escalation of care is necessary.       Disposition and Care Coordination:    Admit:   Through independent evaluation of the patient's history, physical, and imperical data, the patient meets criteria for inpatient admission to the hospital.        Final diagnoses:   Febrile illness        ED Disposition       ED Disposition   Decision to Admit    Condition   --    Comment   --               This medical record created using voice recognition software.             Arnulfo Coello MD  03/11/25 0155

## 2025-03-11 NOTE — H&P
[facility]  Date: 3/11/2025   Patient Name: Enrique Wylie  : 1935  MRN: [id number]  Primary Care Physician:  Josh Maya MD  Date of admission: 3/10/2025    Subjective   Subjective     Chief Complaint: Generalized weakness, confusion    HPI:    Enrique Wylie is a 89 y.o. male  with a past medical history of dementia, hypertension, hyperlipidemia, A-fib not on anticoagulation due to recent GI bleed, Parkinson's disease, restless leg syndrome presented to the ED for evaluation of altered mental status, generalized weakness.  [place] and was brought into the ED by his neighbors.  As per the report neighbors the ED physician, patient noted to have generalized weakness and appears to be confused.  At baseline patient walks with the help of the walker and has weakness in the left upper extremity chronically which is unchanged today.  No reports of fall as per the neighbors.  During my evaluation patient was not able to recall why he came to the hospital.  History is limited due to dementia.  Denies any pain.  Denies any cough, chest pain, shortness of breath, nausea, vomiting, abdominal pain, numbness, tingling.    Upon arrival, vital signs temperature 101.9, pulse 113, respiratory rate 19, blood pressure 123/95 on room air saturating around 92%.  Troponin x 229, CMP with no significant findings, normal lactic acid, normal Pro-Andriy, WBC 12.12, rest of the CBC within normal limits.  Urinalysis is noninfectious.  Blood cultures in process.  COVID flu RSV negative.  Chest x-ray showed no acute infiltrate.  Received Tylenol and ceftriaxone in the ED.  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    C4-5 and C5-6 with edema in the cord at C4-5    Death of wife 07/30/2020    Dementia     Fall at home, initial encounter 05/21/2020    Gait instability 02/06/2019    Grief reaction 07/30/2020    High cholesterol     Kidney stone     Lumbar spinal stenosis 02/02/2016    Worsening leg symptoms,  3/12/15    Medication management 02/06/2019    Restless leg syndrome 02/06/2019    Weight loss, unintentional 07/30/2020         Past Surgical History:   Procedure Laterality Date    CATARACT EXTRACTION Bilateral     CERVICAL FUSION      C4,5 and 6    COLONOSCOPY N/A 4/26/2024    Procedure: COLONOSCOPY WITH BIOPSIES;  Surgeon: Oral Duong MD;  Location: [facility] ENDOSCOPY;  Service: Gastroenterology;  Laterality: N/A;  PROCTITIS, COLON POLYPS    KNEE ARTHROSCOPY      right knee    LUMBAR LAMINECTOMY  02/10/2016    L3-4    ROTATOR CUFF REPAIR Right     SKIN BIOPSY      face and ears, squamous cell     SPINE SURGERY      minimally invasive procedure \"closing in on spinal Cord\"  \"gave spinal cord more room\"    URETEROSCOPY LASER LITHOTRIPSY WITH STENT INSERTION Bilateral 11/13/2023    Procedure: Bilateral Retrograde Pyelogram, Left Ureteroscopy Lasertripsy Basket Stone Extraction and Stent Insertion, Right Stent Insertion, Bladder Stone Extraction;  Surgeon: Katerina Carpenter MD;  Location: [facility] MAIN OR;  Service: Urology;  Laterality: Bilateral;    URETEROSCOPY LASER LITHOTRIPSY WITH STENT INSERTION Bilateral 12/18/2023    Procedure: CYSTOSCOPY URETEROSCOPY RIGHT RETROGRADE PYELOGRAM HOLMIUM LASER STENT exchange; LEFT STENT REMOVAL;  Surgeon: Katerina Carpenter MD;  Location: [facility] MAIN OR;  Service: Urology;  Laterality: " Bilateral;         Family History   Problem Relation Age of Onset    Heart failure Mother     Heart failure Father          Social History     Socioeconomic History    Marital status:    Tobacco Use    Smoking status: Never    Smokeless tobacco: Never   Vaping Use    Vaping status: Never Used   Substance and Sexual Activity    Alcohol use: Never    Drug use: Never    Sexual activity: Defer         Home Medications:  Psyllium, acetaminophen, atorvastatin, carbidopa-levodopa, fexofenadine, furosemide, mesalamine, metoprolol succinate XL, mirtazapine, polyethylene glycol, sennosides-docusate, and urea    Allergies:  No Known Allergies      Objective   Objective     Vitals:   Temp:  [98.2 °F (36.8 °C)-101.9 °F (38.8 °C)] 98.2 °F (36.8 °C)  Heart Rate:  [] 87  Resp:  [17-19] 17  BP: (104-138)/(60-95) 115/66    Physical Exam    Constitutional: Awake, alert, no acute distress   Eyes: Pupils equal, sclerae anicteric, no conjunctival injection   HENT: NCAT, mucous membranes moist   Neck: Supple, no neck tenderness, no neck stiffness   Respiratory: Clear to auscultation bilaterally, nonlabored respirations    Cardiovascular: RRR, no murmurs   Gastrointestinal: Positive bowel sounds, soft, nontender, nondistended   Musculoskeletal: 1+ bilateral lower extremity edema, no clubbing or cyanosis to extremities   Neurologic: Disoriented, speech clear but slow to respond, left upper extremity 3/5, right upper and bilateral lower extremity 5 out of 5, unable to do complete neurological examination due to confusion     Result Review    Result Review:  I have personally reviewed the results from the time of this admission to 3/11/2025 05:05 EDT and agree with these findings:  [x]  Laboratory  []  Microbiology  [x]  Radiology  [x]  EKG/Telemetry   []  Cardiology/Vascular   []  Pathology  []  Old records  []  Other:        Imaging Results (Last 24 Hours)       Procedure Component Value Units Date/Time    CT Head Without  Contrast [[id number]] Collected: 03/11/25 0256     Updated: 03/11/25 0304    Narrative:      CT HEAD WO CONTRAST-     Date of exam: 3/11/2025, 2:46 A.M.     Indications: AMS (altered mental status); left upper extremity weakness;  r50.9-fever, unspecified.     Comparisons: 8/14/2024; 4/11/2024; 4/10/2024; 5/4/2016.     TECHNIQUE:  Axial CT images were obtained of the head without contrast  administration. Reconstructed 2D coronal and sagittal images were also  obtained. Automated exposure control and iterative construction methods  were used. Additionally, the radiation dose reduction device was turned  on for each scan per the ALARA (As Low as Reasonably Achievable)  protocol. Please see the electronic medical record, EMR (i.e., Epic),  for the documented radiation dose. The study is motion-limited.     FINDINGS:  A routine nonenhanced head CT was performed. No acute brain abnormality  is seen. No acute infarct. No acute intracranial hemorrhage. No midline  shift or acute intracranial mass effect is seen. The ventricular system  and, to a lesser extent, the extra-axial spaces are prominent.  Ventriculomegaly is present and is unchanged since 8/14/2024. There is  suspected mild chronic small vessel ischemic disease. Arterial  calcifications are seen. No acute skull fracture is identified. Mild  age-indeterminate mucosal thickening and/or retained secretions  involve(s) the imaged paranasal sinuses. No air-fluid interfaces are  seen within the imaged paranasal sinuses. No significant acute findings  are seen with regard to the imaged orbits or middle ear clefts.          Impression:      No acute brain abnormality is appreciated. No significant interval  change is appreciated since the 8/14/2024 study.           Portions of this note were completed with a voice recognition program.     3/11/2025 3:02 AM by Mina Stahl MD on Workstation: [id number]       XR Chest 1 View [[id number]] Collected: 03/10/25 6839     Updated:  03/10/25 2325    Narrative:      XR CHEST 1 VW    Date of exam: 3/10/2025, 11:13 P.M., EDT.    Indications: Weak/dizzy/AMS triage protocol.    Comparison: 8/14/2024.    FINDINGS:  A single AP (or PA) upright portable chest radiograph was performed. No cardiac enlargement is seen. No acute infiltrate is appreciated. No pleural effusion or pneumothorax is identified. The thoracic aorta is atherosclerotic. There is pulmonary   hypoinflation. There may be mild subsegmental atelectasis and/or linear fibrosis in the lung bases, probably greater on the right. There is slight asymmetric elevation of the right diaphragm. External artifacts obscure detail. Chronic calcified   granulomatous disease of the chest is suggested. There may be mild thoracolumbar scoliosis. Postoperative changes involve the cervical spine and the right shoulder. No significant interval change is seen since the prior study (or studies).         Impression:      No acute infiltrate is appreciated. Please see above comments for further detail.         Portions of this note were completed with a voice recognition program.    Electronically Signed: Mina Stahl MD    3/10/2025 11:23 PM EDT    Workstation ID: [id number]             cefTRIAXone, 1,000 mg, Intravenous, Q24H  sodium chloride, 10 mL, Intravenous, Q12H         Assessment & Plan   Assessment / Plan     Assessment/Plan:   Fever of unclear etiology  SIRS positive  Generalized weakness  Left upper extremity weakness, per report chronic weakness  Confusion likely due to worsening dementia  Hyperlipidemia  History of A-fib, not on anticoagulation due to GI bleed in the past  Parkinson's disease  Restless leg syndrome    Plan  Admit to observation, remote telemetry  Continue ceftriaxone  Follow-up on blood cultures  Follow-up on complete respiratory panel  As per the report by the neighbors, patient noted to have weakness of the left upper extremity.  No previous documentation of left upper extremity  weakness was noted.  During the evaluation patient has significant weakness of the left upper extremity 3/5. Patient had a MRI of the brain without contrast in August 2024 with no previous ischemic strokes.  Noted to have minimal chronic small vessel ischemia/infarction.  Ordered CT head without contrast.  Based on the CT findings consider getting MRI of the brain /C-spine for further evaluation   Fall precautions  PT OT consult when appropriate  Nursing dysphagia screen  Heart healthy diet  Resume other appropriate home medications once reconciled      VTE Prophylaxis:  Mechanical VTE prophylaxis orders are present.    CODE STATUS:    Code Status (Patient has no pulse and is not breathing): No CPR (Do Not Attempt to Resuscitate)  Medical Interventions (Patient has pulse or is breathing): Limited Support  Medical Intervention Limits: No intubation (DNI)      Admission Status:  I believe this patient meets inpatient status.    Part of this note may be an electronic transcription/translation of spoken language to printed text using the Dragon Dictation System    Tang Richards MD

## 2025-03-11 NOTE — NURSING NOTE
Patient Enrique Wylie admitted to 4MTU from the ED. Patient is alert and oriented to person, place, and time. Patient oriented to unit, call light system and bed alarm use, teaching effective. Patient agreed to bed alarm use. Candida Auris screening revealed patient will NOT need tested, contact isolation and bleach cleaning due to no risk factors. Patient currently is not a concern for an active CDIFF infection.    4 eyes skin assessment completed with Salbador Nava RN. Per policy, the following skin interventions were put in place as a result of the skin assessment below: Avoiding use of disposable briefs, Limit number of layers underneath patient, Q2 turns, and Pillow supprt for offloading pressure, floating heels or padding for bony prominences    Skin Assessments (most recent)      Flowsheet Row Most Recent Value   Sensory Perception 3-->slightly limited   Moisture 3-->occasionally moist   Activity 1-->bedfast   Mobility 2-->very limited   Nutrition 3-->adequate   Friction and Shear 2-->potential problem   Chris Score 14            Fall assessment completed. Per policy, the patient was determined be a High fall risk due to Serrato Tigre score 15 or greater (only used within the first 24 hours of admission). Patient assessed to need the following mobility assistance: 2 Assist with the following assistive devices: Walker, and will be using a bedpan and dennis for toileting. Per policy, the following fall interventions were put in place: Standard Fall Precautions - oriented to room and call light, bed low and locked, clutter free environment, adequate lighting, directed to call for assistance, non-skid footwear, hourly rounding and personal belongings within reach., Bed Alarm, Fall Risk Care Plan, Frequent reorientation, and Assistive devices at beside (hearing aids, glasses, walker, lifts).     Patient's belongings that were sent with family: None  Patient's belongings that were sent to security:  None  Patient's belongings locked in safe box in room: None  Patient's belongings that were sent to pharmacy: None  Patient's belongings kept at bedside per patient: None

## 2025-03-12 ENCOUNTER — APPOINTMENT (OUTPATIENT)
Dept: CT IMAGING | Facility: HOSPITAL | Age: 89
DRG: 693 | End: 2025-03-12
Payer: MEDICARE

## 2025-03-12 PROBLEM — N21.0 BLADDER STONE: Status: ACTIVE | Noted: 2025-03-12

## 2025-03-12 PROBLEM — N13.39 OTHER HYDRONEPHROSIS: Status: ACTIVE | Noted: 2025-03-12

## 2025-03-12 PROCEDURE — 97165 OT EVAL LOW COMPLEX 30 MIN: CPT

## 2025-03-12 PROCEDURE — 99233 SBSQ HOSP IP/OBS HIGH 50: CPT | Performed by: INTERNAL MEDICINE

## 2025-03-12 PROCEDURE — 74176 CT ABD & PELVIS W/O CONTRAST: CPT

## 2025-03-12 PROCEDURE — 25010000002 CEFTRIAXONE PER 250 MG: Performed by: STUDENT IN AN ORGANIZED HEALTH CARE EDUCATION/TRAINING PROGRAM

## 2025-03-12 PROCEDURE — 99222 1ST HOSP IP/OBS MODERATE 55: CPT | Performed by: UROLOGY

## 2025-03-12 PROCEDURE — 97161 PT EVAL LOW COMPLEX 20 MIN: CPT

## 2025-03-12 RX ORDER — TAMSULOSIN HYDROCHLORIDE 0.4 MG/1
0.4 CAPSULE ORAL DAILY
Status: DISCONTINUED | OUTPATIENT
Start: 2025-03-12 | End: 2025-03-14 | Stop reason: HOSPADM

## 2025-03-12 RX ADMIN — Medication 10 MG: at 00:57

## 2025-03-12 RX ADMIN — Medication 10 MG: at 20:41

## 2025-03-12 RX ADMIN — METOPROLOL SUCCINATE 12.5 MG: 25 TABLET, EXTENDED RELEASE ORAL at 09:40

## 2025-03-12 RX ADMIN — Medication 10 ML: at 09:41

## 2025-03-12 RX ADMIN — ATORVASTATIN CALCIUM 10 MG: 10 TABLET, FILM COATED ORAL at 09:40

## 2025-03-12 RX ADMIN — Medication 10 ML: at 20:41

## 2025-03-12 RX ADMIN — CARBIDOPA AND LEVODOPA 1 TABLET: 25; 100 TABLET ORAL at 20:41

## 2025-03-12 RX ADMIN — SODIUM CHLORIDE 1000 MG: 9 INJECTION INTRAMUSCULAR; INTRAVENOUS; SUBCUTANEOUS at 20:40

## 2025-03-12 RX ADMIN — TAMSULOSIN HYDROCHLORIDE 0.4 MG: 0.4 CAPSULE ORAL at 11:47

## 2025-03-12 RX ADMIN — CARBIDOPA AND LEVODOPA 1 TABLET: 25; 100 TABLET ORAL at 09:40

## 2025-03-12 RX ADMIN — FAMOTIDINE 40 MG: 20 TABLET, FILM COATED ORAL at 09:40

## 2025-03-12 RX ADMIN — MIRTAZAPINE 15 MG: 15 TABLET, FILM COATED ORAL at 20:42

## 2025-03-12 NOTE — THERAPY EVALUATION
Patient Name: Enrique Wylie  : 1935    MRN: [id number]                              Today's Date: 3/12/2025       Admit Date: 3/10/2025    Visit Dx:     ICD-10-CM ICD-9-CM   1. Febrile illness  R50.9 780.60   2. Difficulty walking  R26.2 719.7   3. Decreased activities of daily living (ADL)  Z78.9 V49.89     Patient Active Problem List   Diagnosis    Balance problem    Restless leg syndrome    Encounter for long-term (current) use of other medications    Lumbar spinal stenosis    Grief reaction    Hyperlipidemia    Unsteady gait when walking    Death of wife    Lumbar spinal stenosis    Advance directive in chart    Skin cancer    Nephrolithiasis    Left ureteral stone    Foreign body in genitourinary tract    Advance directive in chart    Bilateral sensorineural hearing loss    Cardiomegaly    Essential hypertension    Fall at home, initial encounter    Paroxysmal atrial fibrillation    Weight loss, unintentional    Adjustment disorder with depressed mood    Back pain    Rhabdomyolysis    JOSE (acute kidney injury)    BRBPR (bright red blood per rectum)    Proctitis    Syncope    Febrile illness     Past Medical History:   Diagnosis Date    Advance directive in chart 2020    AF (paroxysmal atrial fibrillation)     Allergic rhinitis     occasionally    Arthritis     Back pain 2014    Balance problem     walking is hard, using cane    Cancer     squamous cell  face and ears    Cervical spinal stenosis 2016    Previous surgery for myelopathy    Cervical spondylosis with myelopathy 2012    C4-5 and C5-6 with edema in the cord at C4-5    Death of wife 2020    Dementia     Fall at home, initial encounter 2020    Gait instability 2019    Grief reaction 2020    High cholesterol     Kidney stone     Lumbar spinal stenosis 2016    Worsening leg symptoms,  3/12/15    Medication management 2019    Restless leg syndrome 2019    Weight loss,  "unintentional 07/30/2020     Past Surgical History:   Procedure Laterality Date    CATARACT EXTRACTION Bilateral     CERVICAL FUSION      C4,5 and 6    COLONOSCOPY N/A 4/26/2024    Procedure: COLONOSCOPY WITH BIOPSIES;  Surgeon: Oral Duong MD;  Location: [facility] ENDOSCOPY;  Service: Gastroenterology;  Laterality: N/A;  PROCTITIS, COLON POLYPS    KNEE ARTHROSCOPY      right knee    LUMBAR LAMINECTOMY  02/10/2016    L3-4    ROTATOR CUFF REPAIR Right     SKIN BIOPSY      face and ears, squamous cell     SPINE SURGERY      minimally invasive procedure \"closing in on spinal Cord\"  \"gave spinal cord more room\"    URETEROSCOPY LASER LITHOTRIPSY WITH STENT INSERTION Bilateral 11/13/2023    Procedure: Bilateral Retrograde Pyelogram, Left Ureteroscopy Lasertripsy Basket Stone Extraction and Stent Insertion, Right Stent Insertion, Bladder Stone Extraction;  Surgeon: Katerina Carpenter MD;  Location: [facility] MAIN OR;  Service: Urology;  Laterality: Bilateral;    URETEROSCOPY LASER LITHOTRIPSY WITH STENT INSERTION Bilateral 12/18/2023    Procedure: CYSTOSCOPY URETEROSCOPY RIGHT RETROGRADE PYELOGRAM HOLMIUM LASER STENT exchange; LEFT STENT REMOVAL;  Surgeon: Katerina Carpenter MD;  Location: [facility] MAIN OR;  Service: Urology;  Laterality: Bilateral;      General Information       Row Name 03/12/25 8933          OT Time and Intention    Document Type evaluation  -LF     Mode of Treatment individual therapy;occupational therapy  -LF       Row Name 03/12/25 6133          General Information    Patient Profile Reviewed yes  -LF     Prior Level of Function --  (I) with ADLs, ambulated with a RW, has a walk-in shower with shower chair/grab bars/handheld shower head, elevated commode, stands to groom, does not drive, and no home O2.  -LF     Existing Precautions/Restrictions fall  -LF     Barriers to Rehab none identified  -LF       Row Name 03/12/25 2488          Occupational Profile    Reason for Services/Referral " "(Occupational Profile) Patient is an 89 year old male admitted to [facility] for generalized weakness and confusion on March 10th, 2025. Occupational therapy consulted due to recent decline in ADLs/functional transfers. No previous occupational therapy services for current condition.  -       Row Name 03/12/25 1559          Living Environment    Current Living Arrangements home  -     People in Home alone  neighbors check on him and assist as needed  -       Row Name 03/12/25 1553          Home Main Entrance    Number of Stairs, Main Entrance other (see comments)  \"a few\"  -       Row Name 03/12/25 1553          Stairs Within Home, Primary    Number of Stairs, Within Home, Primary none  -       Row Name 03/12/25 4883          Cognition    Orientation Status (Cognition) oriented x 3  -       Row Name 03/12/25 5778          Safety Issues/Impairments Affecting Functional Mobility    Impairments Affecting Function (Mobility) balance;endurance/activity tolerance;strength  -               User Key  (r) = Recorded By, (t) = Taken By, (c) = Cosigned By      Initials Name Provider Type     Zhanna Martinez OT Occupational Therapist                     Mobility/ADL's       Row Name 03/12/25 7098          Bed Mobility    Bed Mobility supine-sit;sit-supine  -     Supine-Sit [place] (Bed Mobility) moderate assist (50% patient effort)  -     Sit-Supine [place] (Bed Mobility) moderate assist (50% patient effort)  -     Bed Mobility, Safety Issues decreased use of arms for pushing/pulling;decreased use of legs for bridging/pushing  -     Assistive Device (Bed Mobility) bed rails;head of bed elevated  -       Row Name 03/12/25 4086          Transfers    Transfers sit-stand transfer;stand-sit transfer  -       Row Name 03/12/25 1551          Sit-Stand Transfer    Sit-Stand [place] (Transfers) minimum assist (75% patient effort)  -     Assistive Device (Sit-Stand Transfers) " walker, front-wheeled  -       Row Name 03/12/25 1555          Stand-Sit Transfer    Stand-Sit [place] (Transfers) minimum assist (75% patient effort)  -     Assistive Device (Stand-Sit Transfers) walker, front-wheeled  -LF       Row Name 03/12/25 1555          Functional Mobility    Functional Mobility- Ind. Level minimum assist (75% patient effort)  -     Functional Mobility- Device walker, front-wheeled  -LF     Functional Mobility- Comment Pt completed functional mobility to the door and back with min assist using RW.  -       Row Name 03/12/25 [facility]          Activities of Daily Living    BADL Assessment/Intervention bathing;upper body dressing;lower body dressing;grooming;feeding;toileting  -       Row Name 03/12/25 [facility]          Bathing Assessment/Intervention    [place] Level (Bathing) bathing skills;upper body;minimum assist (75% patient effort);lower body;maximum assist (25% patient effort)  -       Row Name 03/12/25 [facility]          Upper Body Dressing Assessment/Training    [place] Level (Upper Body Dressing) upper body dressing skills;minimum assist (75% patient effort)  -       Row Name 03/12/25 [facility]          Lower Body Dressing Assessment/Training    [place] Level (Lower Body Dressing) lower body dressing skills;maximum assist (25% patient effort)  -       Row Name 03/12/25 [facility]          Grooming Assessment/Training    [place] Level (Grooming) grooming skills;standby assist  -       Row Name 03/12/25 [facility]          Self-Feeding Assessment/Training    [place] Level (Feeding) feeding skills;set up  -       Row Name 03/12/25 [facility]          Toileting Assessment/Training    [place] Level (Toileting) toileting skills;maximum assist (25% patient effort);dependent (less than 25% patient effort)  -     Comment, (Toileting) [place] catheter currently in place.  -               User Key  (r) = Recorded By, (t) = Taken By, (c) = Cosigned By      Initials Name  Provider Type     Zhanna Martinez OT Occupational Therapist                   Obj/Interventions       Row Name 03/12/25 1557          Sensory Assessment (Somatosensory)    Sensory Assessment (Somatosensory) UE sensation intact  -       Row Name 03/12/25 1557          Vision Assessment/Intervention    Visual Impairment/Limitations WFL;corrective lenses full-time  -[facility] Name 03/12/25 1557          Range of Motion Comprehensive    Comment, General Range of Motion Full AROM of RUE, ~90° LUE (pt reports ongoing weakness), and full AROM distally.  -       Row Name 03/12/25 1557          Strength Comprehensive (MMT)    Comment, General Manual Muscle Testing (MMT) Assessment 3+/5 proximal LUE, 4/5 distal LUE, and 4/5 RUE throughout.  -       Row Name 03/12/25 1557          Motor Skills    Motor Skills coordination;functional endurance  -     Coordination bilateral;upper extremity;WFL  -LF     Functional Endurance Fair-  -       Row Name 03/12/25 1557          Balance    Balance Assessment sitting dynamic balance;standing dynamic balance  -     Dynamic Sitting Balance standby assist  -     Position, Sitting Balance unsupported;sitting edge of bed  -     Dynamic Standing Balance contact guard;minimal assist  -     Position/Device Used, Standing Balance supported;walker, front-wheeled  -               User Key  (r) = Recorded By, (t) = Taken By, (c) = Cosigned By      Initials Name Provider Type     Zhanna Martinez OT Occupational Therapist                   Goals/Plan       Row Name 03/12/25 1559          Bed Mobility Goal 1 (OT)    Activity/Assistive Device (Bed Mobility Goal 1, OT) bed mobility activities, all  -LF     [place] Level/Cues Needed (Bed Mobility Goal 1, OT) standby assist  -     Time Frame (Bed Mobility Goal 1, OT) long term goal (LTG);10 days  -       Row Name 03/12/25 1559          Transfer Goal 1 (OT)    Activity/Assistive Device (Transfer Goal 1, OT) transfers,  all  -LF     [place] Level/Cues Needed (Transfer Goal 1, OT) standby assist  -LF     Time Frame (Transfer Goal 1, OT) long term goal (LTG);10 days  -LF       Row Name 03/12/25 1559          Bathing Goal 1 (OT)    Activity/Device (Bathing Goal 1, OT) bathing skills, all  -LF     [place] Level/Cues Needed (Bathing Goal 1, OT) standby assist  -LF     Time Frame (Bathing Goal 1, OT) long term goal (LTG);10 days  -LF       Row Name 03/12/25 1559          Dressing Goal 1 (OT)    Activity/Device (Dressing Goal 1, OT) dressing skills, all  -LF     Sal[place]/Cues Needed (Dressing Goal 1, OT) standby assist  -LF     Time Frame (Dressing Goal 1, OT) long term goal (LTG);10 days  -LF       Row Name 03/12/25 1559          Toileting Goal 1 (OT)    Activity/Device (Toileting Goal 1, OT) toileting skills, all  -LF     [place] Level/Cues Needed (Toileting Goal 1, OT) standby assist  -LF     Time Frame (Toileting Goal 1, OT) long term goal (LTG);10 days  -LF       Row Name 03/12/25 1559          Grooming Goal 1 (OT)    Activity/Device (Grooming Goal 1, OT) grooming skills, all  -LF     [place] (Grooming Goal 1, OT) set-up required  -LF     Time Frame (Grooming Goal 1, OT) long term goal (LTG);10 days  -       Row Name 03/12/25 1559          Problem Specific Goal 1 (OT)    Problem Specific Goal 1 (OT) Patient will demonstrate araceli+ endurance to support ADLs/functional transfers.  -LF     Time Frame (Problem Specific Goal 1, OT) long term goal (LTG);10 days  -LF       Row Name 03/12/25 1559          Therapy Assessment/Plan (OT)    Planned Therapy Interventions (OT) activity tolerance training;BADL retraining;functional balance retraining;occupation/activity based interventions;patient/caregiver education/training;strengthening exercise;transfer/mobility retraining  -               User Key  (r) = Recorded By, (t) = Taken By, (c) = Cosigned By      Initials Name Provider Type    LF Zhanna Martinez, OT  Occupational Therapist                   Clinical Impression       Row Name 03/12/25 1552          Pain Assessment    Additional Documentation Pain Scale: FACES Pre/Post-Treatment (Group)  -LF       Row Name 03/12/25 7085          Pain Scale: FACES Pre/Post-Treatment    Pain: FACES Scale, Pretreatment 0-->no hurt  -LF     Posttreatment Pain Rating 0-->no hurt  -LF       Row Name 03/12/25 1553          Plan of Care Review    Plan of Care Reviewed With patient  -     Progress no change  -     Outcome Evaluation Patient presents with limitations in self-care, functional transfers, balance, and endurance. He would benefit from continued skilled occupational therapy services to maximize independence with ADLs/functional transfers.  -       Row Name 03/12/25 0759          Therapy Assessment/Plan (OT)    Patient/Family Therapy Goal Statement (OT) To maximize independence.  -     Rehab Potential (OT) good  -     Criteria for Skilled Therapeutic Interventions Met (OT) yes;meets criteria;skilled treatment is necessary  -     Therapy Frequency (OT) 5 times/wk  -       Row Name 03/12/25 4864          Therapy Plan Review/Discharge Plan (OT)    Anticipated Discharge Disposition (OT) sub acute care setting  -       Row Name 03/12/25 1551          Vital Signs    O2 Delivery Pre Treatment room air  -LF     O2 Delivery Intra Treatment room air  -LF     O2 Delivery Post Treatment room air  -       Row Name 03/12/25 5764          Positioning and Restraints    Pre-Treatment Position in bed  -LF     Post Treatment Position bed  -LF     In Bed fowlers;call light within reach;encouraged to call for assist;exit alarm on  -LF               User Key  (r) = Recorded By, (t) = Taken By, (c) = Cosigned By      Initials Name Provider Type     Zhanna Martinez, OT Occupational Therapist                   Outcome Measures       Row Name 03/12/25 1695          How much help from another is currently needed...    Putting on and  taking off regular lower body clothing? 2  -LF     Bathing (including washing, rinsing, and drying) 2  -LF     Toileting (which includes using toilet bed pan or urinal) 1  -LF     Putting on and taking off regular upper body clothing 3  -LF     Taking care of personal grooming (such as brushing teeth) 3  -LF     Eating meals 4  -LF     AM-PAC 6 Clicks Score (OT) 15  -LF       Row Name 03/12/25 1400 03/12/25 0745       How much help from another person do you currently need...    Turning from your back to your side while in flat bed without using bedrails? 3  -DP 3  -AW    Moving from lying on back to sitting on the side of a flat bed without bedrails? 3  -DP 3  -AW    Moving to and from a bed to a chair (including a wheelchair)? 3  -DP 2  -AW    Standing up from a chair using your arms (e.g., wheelchair, bedside chair)? 3  -DP 2  -AW    Climbing 3-5 steps with a railing? 2  -DP 2  -AW    To walk in hospital room? 2  -DP 2  -AW    AM-PAC 6 Clicks Score (PT) 16  -DP 14  -AW    Highest Level of Mobility Goal 5 --> Static standing  -DP 4 --> Transfer to chair/commode  -AW      Row Name 03/12/25 1559 03/12/25 1400       Functional Assessment    Outcome Measure Options AM-PAC 6 Clicks Daily Activity (OT);Optimal Instrument  -LF AM-PAC 6 Clicks Basic Mobility (PT)  -DP      Row Name 03/12/25 1559          Optimal Instrument    Optimal Instrument Optimal - 3  -LF     Bending/Stooping 4  -LF     Standing 2  -LF     Reaching 3  -LF     From the list, choose the 3 activities you would most like to be able to do without any difficulty Bending/stooping;Standing;Reaching  -LF     Total Score Optimal - 3 9  -LF               User Key  (r) = Recorded By, (t) = Taken By, (c) = Cosigned By      Initials Name Provider Type    LF Zhanna Martinez, OT Occupational Therapist    Elisha Hernandez, PT Physical Therapist    AW Nanette Diane, RN Registered Nurse                    Occupational Therapy Education       Title: PT OT SLP  Therapies (In Progress)       Topic: Occupational Therapy (In Progress)       Point: ADL training (In Progress)       Learning Progress Summary            Patient Acceptance, E,TB, NR by  at 3/12/2025 1600                      Point: Precautions (In Progress)       Learning Progress Summary            Patient Acceptance, E,TB, NR by LF at 3/12/2025 1600                      Point: Body mechanics (In Progress)       Learning Progress Summary            Patient Acceptance, E,TB, NR by LF at 3/12/2025 1600                                      User Key       Initials Effective Dates Name Provider Type Discipline     06/16/21 -  Zhanna Martinez OT Occupational Therapist OT                  OT Recommendation and Plan  Planned Therapy Interventions (OT): activity tolerance training, BADL retraining, functional balance retraining, occupation/activity based interventions, patient/caregiver education/training, strengthening exercise, transfer/mobility retraining  Therapy Frequency (OT): 5 times/wk  Plan of Care Review  Plan of Care Reviewed With: patient  Progress: no change  Outcome Evaluation: Patient presents with limitations in self-care, functional transfers, balance, and endurance. He would benefit from continued skilled occupational therapy services to maximize independence with ADLs/functional transfers.     Time Calculation:   Evaluation Complexity (OT)  Review Occupational Profile/Medical/Therapy History Complexity: brief/low complexity  Assessment, Occupational Performance/Identification of Deficit Complexity: 3-5 performance deficits  Clinical Decision Making Complexity (OT): problem focused assessment/low complexity  Overall Complexity of Evaluation (OT): low complexity     Time Calculation- OT       Row Name 03/12/25 1600             Time Calculation- OT    OT Received On 03/12/25  -LF      OT Goal Re-Cert Due Date 03/21/25  -LF         Untimed Charges    OT Eval/Re-eval Minutes 48  -LF         Total  Minutes    Untimed Charges Total Minutes 48  -LF       Total Minutes 48  -LF                User Key  (r) = Recorded By, (t) = Taken By, (c) = Cosigned By      Initials Name Provider Type     Zhanna Martinez OT Occupational Therapist                  Therapy Charges for Today       Code Description Service Date Service Provider Modifiers Qty    [id number]  OT EVAL LOW COMPLEXITY 4 3/12/2025 Zhanna Martinez OT GO 1                 Zhanna Martinez OT  3/12/2025

## 2025-03-12 NOTE — THERAPY EVALUATION
Acute Care - Physical Therapy Initial Evaluation  KARTHIK Alegria     Patient Name: Enrique Wylie  : 1935  MRN: [id number]  Today's Date: 3/12/2025      Visit Dx:     ICD-10-CM ICD-9-CM   1. Febrile illness  R50.9 780.60   2. Difficulty walking  R26.2 719.7     Patient Active Problem List   Diagnosis    Balance problem    Restless leg syndrome    Encounter for long-term (current) use of other medications    Lumbar spinal stenosis    Grief reaction    Hyperlipidemia    Unsteady gait when walking    Death of wife    Lumbar spinal stenosis    Advance directive in chart    Skin cancer    Nephrolithiasis    Left ureteral stone    Foreign body in genitourinary tract    Advance directive in chart    Bilateral sensorineural hearing loss    Cardiomegaly    Essential hypertension    Fall at home, initial encounter    Paroxysmal atrial fibrillation    Weight loss, unintentional    Adjustment disorder with depressed mood    Back pain    Rhabdomyolysis    JOSE (acute kidney injury)    BRBPR (bright red blood per rectum)    Proctitis    Syncope    Febrile illness     Past Medical History:   Diagnosis Date    Advance directive in chart 2020    AF (paroxysmal atrial fibrillation)     Allergic rhinitis     occasionally    Arthritis     Back pain 2014    Balance problem     walking is hard, using cane    Cancer     squamous cell  face and ears    Cervical spinal stenosis 2016    Previous surgery for myelopathy    Cervical spondylosis with myelopathy 2012    C4-5 and C5-6 with edema in the cord at C4-5    Death of wife 2020    Dementia     Fall at home, initial encounter 2020    Gait instability 2019    Grief reaction 2020    High cholesterol     Kidney stone     Lumbar spinal stenosis 2016    Worsening leg symptoms,  3/12/15    Medication management 2019    Restless leg syndrome 2019    Weight loss, unintentional 2020     Past Surgical History:   Procedure  "Laterality Date    CATARACT EXTRACTION Bilateral     CERVICAL FUSION      C4,5 and 6    COLONOSCOPY N/A 4/26/2024    Procedure: COLONOSCOPY WITH BIOPSIES;  Surgeon: Oral Duong MD;  Location: [facility] ENDOSCOPY;  Service: Gastroenterology;  Laterality: N/A;  PROCTITIS, COLON POLYPS    KNEE ARTHROSCOPY      right knee    LUMBAR LAMINECTOMY  02/10/2016    L3-4    ROTATOR CUFF REPAIR Right     SKIN BIOPSY      face and ears, squamous cell     SPINE SURGERY      minimally invasive procedure \"closing in on spinal Cord\"  \"gave spinal cord more room\"    URETEROSCOPY LASER LITHOTRIPSY WITH STENT INSERTION Bilateral 11/13/2023    Procedure: Bilateral Retrograde Pyelogram, Left Ureteroscopy Lasertripsy Basket Stone Extraction and Stent Insertion, Right Stent Insertion, Bladder Stone Extraction;  Surgeon: Katerina Carpenter MD;  Location: [facility] MAIN OR;  Service: Urology;  Laterality: Bilateral;    URETEROSCOPY LASER LITHOTRIPSY WITH STENT INSERTION Bilateral 12/18/2023    Procedure: CYSTOSCOPY URETEROSCOPY RIGHT RETROGRADE PYELOGRAM HOLMIUM LASER STENT exchange; LEFT STENT REMOVAL;  Surgeon: Katerina Carpenter MD;  Location: [facility] MAIN OR;  Service: Urology;  Laterality: Bilateral;     PT Assessment (Last 12 Hours)       PT Evaluation and Treatment       Row Name 03/12/25 1400          Physical Therapy Time and Intention    Subjective Information no complaints  -DP     Document Type evaluation  -DP     Mode of Treatment individual therapy;physical therapy  -DP     Patient Effort good  -DP       Row Name 03/12/25 1400          General Information    Patient Profile Reviewed yes  -DP     Patient Observations alert;cooperative  -DP     Prior Level of Function independent:;gait;transfer;bed mobility;ADL's  -DP     Equipment Currently Used at Home walker, rolling  -DP     Existing Precautions/Restrictions fall  -DP     Barriers to Rehab none identified  -DP       Row Name 03/12/25 1400          Living Environment    " Current Living Arrangements home  -DP     People in Home alone  -DP     Primary Care Provided by self  -DP       Row Name 03/12/25 1400          Range of Motion (ROM)    Range of Motion bilateral lower extremities;ROM is WFL  -DP       Row Name 03/12/25 1400          Strength Comprehensive (MMT)    General Manual Muscle Testing (MMT) Assessment lower extremity strength deficits identified  -DP     Comment, General Manual Muscle Testing (MMT) Assessment BLE: 4-/5  -DP       Row Name 03/12/25 1400          Bed Mobility    Bed Mobility supine-sit-supine  -DP     Supine-Sit-Supine [place] (Bed Mobility) minimum assist (75% patient effort)  -DP       Row Name 03/12/25 1400          Transfers    Transfers sit-stand transfer  -DP       Row Name 03/12/25 1400          Sit-Stand Transfer    Sit-Stand [place] (Transfers) minimum assist (75% patient effort)  -DP       Row Name 03/12/25 1400          Gait/Stairs (Locomotion)    Gait/Stairs Locomotion gait/ambulation assistive device  -DP     [place] Level (Gait) contact guard  -DP     Assistive Device (Gait) walker, front-wheeled  -DP     Patient was able to Ambulate yes  -DP     Distance in Feet (Gait) 16  -DP       Row Name 03/12/25 1400          Balance    Balance Assessment standing dynamic balance  -DP     Dynamic Standing Balance contact guard  -DP       Row Name 03/12/25 1400          Plan of Care Review    Plan of Care Reviewed With patient  -DP     Outcome Evaluation Pt presents with decreased strength, transfers and functional mobility. Will benefit from inpatient PT services and continued PT services upon discharge.  -DP       Row Name 03/12/25 1400          Therapy Assessment/Plan (PT)    Criteria for Skilled Interventions Met (PT) yes;meets criteria  -DP     Therapy Frequency (PT) daily  -DP     Predicted Duration of Therapy Intervention (PT) 10 days  -DP     Problem List (PT) problems related to;mobility  -DP     Activity Limitations Related to  Problem List (PT) unable to transfer safely;unable to ambulate safely  -DP       Row Name 03/12/25 1400          PT Evaluation Complexity    History, PT Evaluation Complexity no personal factors and/or comorbidities  -DP     Examination of Body Systems (PT Eval Complexity) total of 4 or more elements  -DP     Clinical Presentation (PT Evaluation Complexity) stable  -DP     Clinical Decision Making (PT Evaluation Complexity) low complexity  -DP     Overall Complexity (PT Evaluation Complexity) low complexity  -DP       Row Name 03/12/25 1400          Physical Therapy Goals    Transfer Goal Selection (PT) transfer, PT goal 1  -DP     Gait Training Goal Selection (PT) gait training, PT goal 1  -DP       Row Name 03/12/25 1400          Transfer Goal 1 (PT)    Activity/Assistive Device (Transfer Goal 1, PT) sit-to-stand/stand-to-sit  -DP     [place] Level/Cues Needed (Transfer Goal 1, PT) supervision required  -DP     Time Frame (Transfer Goal 1, PT) 10 days  -DP       Row Name 03/12/25 1400          Gait Training Goal 1 (PT)    Activity/Assistive Device (Gait Training Goal 1, PT) assistive device use;walker, rolling  -DP     [place] Level (Gait Training Goal 1, PT) supervision required  -DP     Distance (Gait Training Goal 1, PT) 200  -DP     Time Frame (Gait Training Goal 1, PT) 10 days  -DP               User Key  (r) = Recorded By, (t) = Taken By, (c) = Cosigned By      Initials Name Provider Type    Elisha Hernandez, PT Physical Therapist                      PT Recommendation and Plan  Anticipated Discharge Disposition (PT): skilled nursing facility  Planned Therapy Interventions (PT): balance training, bed mobility training, gait training, strengthening, transfer training  Therapy Frequency (PT): daily  Plan of Care Reviewed With: patient  Outcome Evaluation: Pt presents with decreased strength, transfers and functional mobility. Will benefit from inpatient PT services and continued PT services upon  discharge.   Outcome Measures       Row Name 03/12/25 1400             How much help from another person do you currently need...    Turning from your back to your side while in flat bed without using bedrails? 3  -DP      Moving from lying on back to sitting on the side of a flat bed without bedrails? 3  -DP      Moving to and from a bed to a chair (including a wheelchair)? 3  -DP      Standing up from a chair using your arms (e.g., wheelchair, bedside chair)? 3  -DP      Climbing 3-5 steps with a railing? 2  -DP      To walk in hospital room? 2  -DP      AM-PAC 6 Clicks Score (PT) 16  -DP         Functional Assessment    Outcome Measure Options AM-PAC 6 Clicks Basic Mobility (PT)  -DP                User Key  (r) = Recorded By, (t) = Taken By, (c) = Cosigned By      Initials Name Provider Type    Elisha Hernandez, PT Physical Therapist                     Time Calculation:    PT Charges       Row Name 03/12/25 1502             Time Calculation    PT Received On 03/12/25  -DP         Untimed Charges    PT Eval/Re-eval Minutes 30  -DP         Total Minutes    Untimed Charges Total Minutes 30  -DP       Total Minutes 30  -DP                User Key  (r) = Recorded By, (t) = Taken By, (c) = Cosigned By      Initials Name Provider Type    Elisha Hernandez, PT Physical Therapist                      PT G-Codes  Outcome Measure Options: AM-PAC 6 Clicks Basic Mobility (PT)  AM-PAC 6 Clicks Score (PT): 16    Elisha Lyon, PT  3/12/2025

## 2025-03-12 NOTE — PLAN OF CARE
Goal Outcome Evaluation:  Plan of Care Reviewed With: patient           Outcome Evaluation: Patient is alert, disoriented to time this morning otherwise oriented. On room air. No complaints of pain. Catheter remains in place. CT of abdomen obtained, urology consulted. PT/OT seen patient during shift. SCDs in place.

## 2025-03-12 NOTE — PROGRESS NOTES
[facility]   Hospitalist Progress Note  Date: 3/12/2025  Patient Name: Enrique Wylie  : 1935  MRN: [id number]  Date of admission: 3/10/2025      Subjective   Subjective     Chief Complaint: Fever, confusion and generalized weakness    Summary:   Enrique Wylie is a 89 y.o. male  with a past medical history of dementia, hypertension, hyperlipidemia, A-fib not on anticoagulation due to recent GI bleed, Parkinson's disease, restless leg syndrome presented to the ED for evaluation of altered mental status, generalized weakness.  [place] and was brought into the ED by his neighbors.  As per the report neighbors the ED physician, patient noted to have generalized weakness and appears to be confused.  At baseline patient walks with the help of the walker and has weakness in the left upper extremity chronically which is unchanged today.  No reports of fall as per the neighbors.  During my evaluation patient was not able to recall why he came to the hospital.  History is limited due to dementia.  Denies any pain.  Denies any cough, chest pain, shortness of breath, nausea, vomiting, abdominal pain, numbness, tingling.     Upon arrival, vital signs temperature 101.9, pulse 113, respiratory rate 19, blood pressure 123/95 on room air saturating around 92%.  Troponin x 229, CMP with no significant findings, normal lactic acid, normal Pro-Andriy, WBC 12.12, rest of the CBC within normal limits.  Urinalysis is noninfectious.  Blood cultures in process.  COVID flu RSV negative.  Chest x-ray showed no acute infiltrate.  Received Tylenol and ceftriaxone in the ED.  Patient has been admitted for further evaluation and management of SIRS, fever of unclear etiology, generalized weakness  CT scan abdomen pelvis showed 1.8 cm urinary bladder diverticulum with a 4 mm stone on the left.  Also showed mild left ureteral hydronephrosis.    Interval Followup:   No more fever remains on room  air with good saturation.  Awake alert oriented x 3.  Denies any cough or phlegm.  No chest pain or shortness of breath.  Denies any urinary complaints per  Remains weak.  Sosa catheter placed  Review of Systems   All systems were reviewed and negative except for: Summary interval follow-up    Objective   Objective     Vitals:   Temp:  [98.2 °F (36.8 °C)-99.5 °F (37.5 °C)] 98.8 °F (37.1 °C)  Heart Rate:  [78-92] 87  Resp:  [18] 18  BP: (109-121)/(60-69) 109/62  Physical Exam    Constitutional: Awake, alert, no acute distress   Eyes: Pupils equal, sclerae anicteric, no conjunctival injection   HENT: NCAT, mucous membranes moist   Neck: Supple, no thyromegaly, no lymphadenopathy, trachea midline   Respiratory: Clear to auscultation bilaterally, nonlabored respirations    Cardiovascular: RRR, no murmurs, rubs, or gallops, palpable pedal pulses bilaterally   Gastrointestinal: Positive bowel sounds, soft, nontender, nondistended   Musculoskeletal: +1 bilateral ankle edema, no clubbing or cyanosis to extremities   Psychiatric: Appropriate affect, cooperative   Neurologic: Oriented x 3, strength symmetric in all extremities except left upper extremity with chronic weakness, Cranial Nerves grossly intact to confrontation, speech clear but slow to respond   Skin: No rashes     Result Review    Result Review:  I have personally reviewed the results for the past 24 hours and agree with these findings:  [x]  Laboratory  [x]  Microbiology  [x]  Radiology  [x]  EKG/Telemetry A-fib 111, PVCs, QT 0.44  []  Cardiology/Vascular   []  Pathology  [x]  Old records  [x]  Other: Medications    Assessment & Plan   Assessment / Plan     Assessment:  Fever of unknown source.  Resolved  Left ureteral hydronephrosis with 4 mm stone in left urinary bladder diverticulum.  Generalized weakness.  Chronic left upper extremity weakness.  Acute metabolic encephalopathy.  Resolved.  Permanent A-fib not on anticoagulation due to GI bleed in the  past.  Parkinson's disease.  RLS.  Dementia.  Leukocytosis.  Resolved  Urinary retention status post Sosa catheter.  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  MRI of the brain obtained.  No acute findings.  Await culture data.  Discussed with urology to evaluate patient  Chest x-ray negative.  UA negative.  Procalcitonin and lactate negative.  Leukocytosis has resolved.  Respiratory panel PCR negative.  Flomax  Hold home Lasix.  PT OT.  Recommending SNF  Discussed plan with RN and .  Patient adamantly refusing rehab placement and wants to go home from here.  Discussed with patient's neighbor    VTE Prophylaxis:  Mechanical VTE prophylaxis orders are present.        CODE STATUS:   Code Status (Patient has no pulse and is not breathing): No CPR (Do Not Attempt to Resuscitate)  Medical Interventions (Patient has pulse or is breathing): Limited Support  Medical Intervention Limits: No intubation (DNI)      Part of this note may be an electronic transcription/translation of spoken language to printed text using the Dragon Dictation System.     Electronically signed by Ashok Macario MD, 03/12/25, 4:49 PM EDT.

## 2025-03-12 NOTE — PLAN OF CARE
Goal Outcome Evaluation:  Plan of Care Reviewed With: patient           Outcome Evaluation: Pt presents with decreased strength, transfers and functional mobility. Will benefit from inpatient PT services and continued PT services upon discharge.    Anticipated Discharge Disposition (PT): skilled nursing facility

## 2025-03-13 PROCEDURE — 25010000002 CEFTRIAXONE PER 250 MG: Performed by: STUDENT IN AN ORGANIZED HEALTH CARE EDUCATION/TRAINING PROGRAM

## 2025-03-13 PROCEDURE — 99231 SBSQ HOSP IP/OBS SF/LOW 25: CPT | Performed by: UROLOGY

## 2025-03-13 PROCEDURE — 99232 SBSQ HOSP IP/OBS MODERATE 35: CPT | Performed by: INTERNAL MEDICINE

## 2025-03-13 RX ADMIN — ATORVASTATIN CALCIUM 10 MG: 10 TABLET, FILM COATED ORAL at 08:31

## 2025-03-13 RX ADMIN — Medication 10 ML: at 20:33

## 2025-03-13 RX ADMIN — FAMOTIDINE 40 MG: 20 TABLET, FILM COATED ORAL at 08:32

## 2025-03-13 RX ADMIN — Medication 10 ML: at 08:32

## 2025-03-13 RX ADMIN — MIRTAZAPINE 15 MG: 15 TABLET, FILM COATED ORAL at 20:33

## 2025-03-13 RX ADMIN — TAMSULOSIN HYDROCHLORIDE 0.4 MG: 0.4 CAPSULE ORAL at 08:31

## 2025-03-13 RX ADMIN — CARBIDOPA AND LEVODOPA 1 TABLET: 25; 100 TABLET ORAL at 20:33

## 2025-03-13 RX ADMIN — CARBIDOPA AND LEVODOPA 1 TABLET: 25; 100 TABLET ORAL at 08:31

## 2025-03-13 RX ADMIN — Medication 10 MG: at 22:31

## 2025-03-13 RX ADMIN — SODIUM CHLORIDE 1000 MG: 9 INJECTION INTRAMUSCULAR; INTRAVENOUS; SUBCUTANEOUS at 20:32

## 2025-03-13 RX ADMIN — METOPROLOL SUCCINATE 12.5 MG: 25 TABLET, EXTENDED RELEASE ORAL at 08:32

## 2025-03-13 NOTE — PLAN OF CARE
Goal Outcome Evaluation:           Progress: no change  Outcome Evaluation: No acute changes throughout shift, dennis removed. Md plan to dc in AM.

## 2025-03-13 NOTE — PROGRESS NOTES
[facility]   Hospitalist Progress Note  Date: 3/13/2025  Patient Name: Enrique Wylie  : 1935  MRN: [id number]  Date of admission: 3/10/2025      Subjective   Subjective     Chief Complaint: Fever, confusion and generalized weakness    Summary:   Enrique Wylie is a 89 y.o. male  with a past medical history of dementia, hypertension, hyperlipidemia, A-fib not on anticoagulation due to recent GI bleed, Parkinson's disease, restless leg syndrome presented to the ED for evaluation of altered mental status, generalized weakness.  Patient [place] and was brought into the ED by his neighbors.  As per the report neighbors the ED physician, patient noted to have generalized weakness and appears to be confused.  At baseline patient walks with the help of the walker and has weakness in the left upper extremity chronically which is unchanged today.  No reports of fall as per the neighbors.  During my evaluation patient was not able to recall why he came to the hospital.  History is limited due to dementia.  Denies any pain.  Denies any cough, chest pain, shortness of breath, nausea, vomiting, abdominal pain, numbness, tingling.     Upon arrival, vital signs temperature 101.9, pulse 113, respiratory rate 19, blood pressure 123/95 on room air saturating around 92%.  Troponin x 229, CMP with no significant findings, normal lactic acid, normal Pro-Andriy, WBC 12.12, rest of the CBC within normal limits.  Urinalysis is noninfectious.  Blood cultures in process.  COVID flu RSV negative.  Chest x-ray showed no acute infiltrate.  Received Tylenol and ceftriaxone in the ED.  Patient has been admitted for further evaluation and management of SIRS, fever of unclear etiology, generalized weakness  CT scan abdomen pelvis showed 1.8 cm urinary bladder diverticulum with a 4 mm stone on the left.  Also showed mild left ureteral hydronephrosis.  Urology consulted.    Interval Followup:   No more  fever remains on room air with good saturation.  Awake alert oriented x 3.  Denies any cough or phlegm.  No chest pain or shortness of breath.  Denies any urinary complaints   Remains weak.  Sosa catheter draining good amount of urine    Review of Systems   All systems were reviewed and negative except for: Summary interval follow-up    Objective   Objective     Vitals:   Temp:  [98.2 °F (36.8 °C)-98.4 °F (36.9 °C)] 98.4 °F (36.9 °C)  Heart Rate:  [80-95] 87  Resp:  [18] 18  BP: ()/(63-68) 114/68  Physical Exam    Constitutional: Awake, alert, no acute distress   Eyes: Pupils equal, sclerae anicteric, no conjunctival injection   HENT: NCAT, mucous membranes moist   Neck: Supple, no thyromegaly, no lymphadenopathy, trachea midline   Respiratory: Clear to auscultation bilaterally, nonlabored respirations    Cardiovascular: RRR, no murmurs, rubs, or gallops, palpable pedal pulses bilaterally   Gastrointestinal: Positive bowel sounds, soft, nontender, nondistended   Musculoskeletal: +1 bilateral ankle edema, no clubbing or cyanosis to extremities   Psychiatric: Appropriate affect, cooperative   Neurologic: Oriented x 3, strength symmetric in all extremities except left upper extremity with chronic weakness, Cranial Nerves grossly intact to confrontation, speech clear but slow to respond   Skin: No rashes     Result Review    Result Review:  I have personally reviewed the results for the past 24 hours and agree with these findings:  [x]  Laboratory  [x]  Microbiology  [x]  Radiology  []  EKG/Telemetry   []  Cardiology/Vascular   []  Pathology  [x]  Old records  [x]  Other: Medications    Assessment & Plan   Assessment / Plan     Assessment:  Fever of unknown source.  Resolved  Left ureteral hydronephrosis with 4 mm stone in left urinary bladder 1.8 cm diverticulum.  Generalized weakness.  Chronic left upper extremity weakness.  Acute metabolic encephalopathy.  Resolved.  Permanent A-fib not on anticoagulation due  to GI bleed in the past.  Parkinson's disease.  RLS.  Dementia.  Leukocytosis.  Resolved  Urinary retention status post Sosa catheter.  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  MRI of the brain obtained.  No acute findings.  Await culture data.  Negative date  Discussed with urology to evaluate patient.  Recommend cystoscopy.  Patient rather have it as an outpatient  Chest x-ray negative.  UA negative.  Procalcitonin and lactate negative.  Leukocytosis has resolved.  Respiratory panel PCR negative.  Flomax  Hold home Lasix.  PT OT.  Recommending SNF  DC Sosa catheter with trial of voiding    Discussed plan with RN and .  Patient adamantly refusing rehab placement and wants to go home from here.  Per  he agreed to try [facility] for rehab.  VTE Prophylaxis:  Mechanical VTE prophylaxis orders are present.        CODE STATUS:   Code Status (Patient has no pulse and is not breathing): No CPR (Do Not Attempt to Resuscitate)  Medical Interventions (Patient has pulse or is breathing): Limited Support  Medical Intervention Limits: No intubation (DNI)      Part of this note may be an electronic transcription/translation of spoken language to printed text using the Dragon Dictation System.       Electronically signed by Ashok Macario MD, 03/13/25, 4:56 PM EDT.

## 2025-03-13 NOTE — PROGRESS NOTES
" [facility]   Urology Progress Note    Patient Name: Enrique Wylie  : 1935  MRN: [id number]  Primary Care Physician:  Josh Maya MD  Date of admission: 3/10/2025    Subjective   Subjective       States that \"brain is not working well today\"    Objective   Objective     Vitals:   Temp:  [98.2 °F (36.8 °C)-98.8 °F (37.1 °C)] 98.4 °F (36.9 °C)  Heart Rate:  [78-95] 80  Resp:  [18] 18  BP: ()/(55-64) 98/64  Physical Exam     Alert, confused  No acute distress  Unlabored respirations  Nontender/nondistended   Sosa in place, clear      Result Review    Result Review:  I have personally reviewed the results from the time of this admission to 3/13/2025 06:13 EDT and agree with these findings:  [x]  Laboratory  [x]  Microbiology  []  Radiology  []  EKG/Telemetry   []  Cardiology/Vascular   []  Pathology  []  Old records  []  Other:      Assessment & Plan   Assessment / Plan     Brief Patient Summary:  Enrique Wylie is a 89 y.o. male admitted for workup of altered mental status, weakness, fever of unknown origin, CT scan with mild hydroureteronephrosis, bladder diverticulum with 4 mm calcification.  Diverticulum located just superior to the left UVJ.     History of BPH with bladder stone, bilateral nephrolithiasis requiring intervention, previously severe hydroureteronephrosis on left, now significantly improved on subsequent imaging.     Negative UA; blood cultures negative     Active Hospital Problems:  Active Hospital Problems    Diagnosis     **Febrile illness     Bladder stone     Other hydronephrosis      Plan:   Labs reviewed; cultures negative  Sosa catheter for hospitalist service    Do not believe urologic findings on CT to be significantly contributing to patient's clinical status presentation    Discussed CT scan findings with patient today.     Confused this morning; not willing to discuss possibility of OR for direct visualization during admission, evaluation of left " upper tract via cystoscopy, left retrograde pyelogram, bladder stone extraction.  No urgency to perform this evaluation; could be performed on outpatient basis    Continue medical management    Will reassess in morning    Electronically signed by Katerina Carpenter MD, 03/13/25, 6:13 AM EDT.

## 2025-03-13 NOTE — CONSULTS
[facility]   UROLOGY Consult Note    Patient Name: Enrique Wylie  : 1935  MRN: [id number]  Primary Care Physician:  Josh Maya MD  Referring Physician: No ref. provider found  Date of admission: 3/10/2025    Subjective   Subjective     Reason for Consult/ Chief Complaint: Bladder stone, hydronephrosis    HPI:  Enrique Wylie is a 89 y.o. male past medical history dementia, hypertension, hyperlipidemia, A-fib, Parkinson's disease, restless leg syndrome, known to urology for history of nephrolithiasis requiring intervention, BPH history of bladder stone admitted for altered mental status, generalized weakness, and fever of unknown origin.  Febrile to 101.9 at time of admission; afebrile since.  Catheter placed at time of admission.  UA unremarkable.  WBC today 10.61 from 12.2 at time of admission; creatinine around baseline 1.07.  Blood cultures negative x 2 at 24 hours.    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  Hydroureteronephrosis significantly improved from prior examinations.      Personal History     Past Medical History:   Diagnosis Date    Advance directive in chart 2020    AF (paroxysmal atrial fibrillation)     Allergic rhinitis     occasionally    Arthritis     Back pain 2014    Balance problem     walking is hard, using cane    Cancer     squamous cell  face and ears    Cervical spinal stenosis 2016    Previous surgery for myelopathy    Cervical spondylosis with myelopathy 2012    C4-5 and C5-6 with edema in the cord at C4-5    Death of wife 2020    Dementia     Fall at home, initial encounter 2020    Gait instability 2019    Grief reaction 2020    High cholesterol     Kidney stone     Lumbar spinal stenosis 2016     "Worsening leg symptoms,  3/12/15    Medication management 02/06/2019    Restless leg syndrome 02/06/2019    Weight loss, unintentional 07/30/2020       Past Surgical History:   Procedure Laterality Date    CATARACT EXTRACTION Bilateral     CERVICAL FUSION      C4,5 and 6    COLONOSCOPY N/A 4/26/2024    Procedure: COLONOSCOPY WITH BIOPSIES;  Surgeon: Oral Duong MD;  Location: [facility] ENDOSCOPY;  Service: Gastroenterology;  Laterality: N/A;  PROCTITIS, COLON POLYPS    KNEE ARTHROSCOPY      right knee    LUMBAR LAMINECTOMY  02/10/2016    L3-4    ROTATOR CUFF REPAIR Right     SKIN BIOPSY      face and ears, squamous cell     SPINE SURGERY      minimally invasive procedure \"closing in on spinal Cord\"  \"gave spinal cord more room\"    URETEROSCOPY LASER LITHOTRIPSY WITH STENT INSERTION Bilateral 11/13/2023    Procedure: Bilateral Retrograde Pyelogram, Left Ureteroscopy Lasertripsy Basket Stone Extraction and Stent Insertion, Right Stent Insertion, Bladder Stone Extraction;  Surgeon: Katerina Carpenter MD;  Location: [facility] MAIN OR;  Service: Urology;  Laterality: Bilateral;    URETEROSCOPY LASER LITHOTRIPSY WITH STENT INSERTION Bilateral 12/18/2023    Procedure: CYSTOSCOPY URETEROSCOPY RIGHT RETROGRADE PYELOGRAM HOLMIUM LASER STENT exchange; LEFT STENT REMOVAL;  Surgeon: Katerina Carpenter MD;  Location: [facility] MAIN OR;  Service: Urology;  Laterality: Bilateral;       Family History: family history includes Heart failure in his father and mother. Otherwise pertinent FHx was reviewed and not pertinent to current issue.    Social History:  reports that he has never smoked. He has never used smokeless tobacco. He reports that he does not drink alcohol and does not use drugs.    Home Medications:  acetaminophen, atorvastatin, carbidopa-levodopa, furosemide, metoprolol succinate XL, mirtazapine, and urea    Allergies:  No Known Allergies    Objective    Objective     Vitals:   Temp:  [98.2 °F (36.8 °C)-99.1 °F (37.3 " °C)] 98.4 °F (36.9 °C)  Heart Rate:  [78-95] 95  Resp:  [18] 18  BP: (107-121)/(55-69) 112/63    Physical Exam:   No acute distress, well-nourished  Lungs: Clear, unlabored  CV: Regular rate, no chest retractions  Awake alert and oriented  Mood normal; affect normal  Sosa in place, clear    Result Review    Result Review:  I have personally reviewed the results from the time of this admission to 3/12/2025 21:11 EDT and agree with these findings:  [x]  Laboratory  [x]  Microbiology  [x]  Radiology  []  EKG/Telemetry   []  Cardiology/Vascular   []  Pathology  []  Old records  []  Other:      Assessment & Plan   Assessment / Plan     Brief Patient Summary:  Enrique Wylie is a 89 y.o. male admitted for workup of altered mental status, weakness, fever of unknown origin, CT scan with mild hydroureteronephrosis, bladder diverticulum with 4 mm calcification.  Diverticulum located just superior to the left UVJ.    History of BPH with bladder stone, bilateral nephrolithiasis requiring intervention, previously severe hydroureteronephrosis on left, now significantly improved on subsequent imaging.    Negative UA; blood cultures negative at 24 hours    Active Hospital Problems:  Active Hospital Problems    Diagnosis     **Febrile illness     Bladder stone     Other hydronephrosis        Plan:   Labs and imaging reviewed    Believe unlikely urinary system significantly contributing to presentation, febrile illness given bland UA, hydroureteronephrosis significantly improved from previous CTs and renal ultrasound.    Sosa catheter per hospitalist service    Mild hydroureteronephrosis improving but of unknown clinical significance.  Options include serial monitoring with imaging vs operative evaluation.   Patient was lost to follow up after renal u/s, 2/12/24 with improved but persistent hydro.  Depending on acute issues, could consider OR for direct visualization during admission, evaluation of left upper tract via  cystoscopy, left retrograde pyelogram, bladder stone extraction.    Discussed with hospitalist service     Will follow        Electronically signed by Katerina Carpenter MD, 03/12/25, 9:03 PM EDT.

## 2025-03-14 ENCOUNTER — TELEPHONE (OUTPATIENT)
Dept: SURGERY | Facility: CLINIC | Age: 89
End: 2025-03-14
Payer: MEDICARE

## 2025-03-14 ENCOUNTER — READMISSION MANAGEMENT (OUTPATIENT)
Dept: CALL CENTER | Facility: HOSPITAL | Age: 89
End: 2025-03-14
Payer: MEDICARE

## 2025-03-14 VITALS
SYSTOLIC BLOOD PRESSURE: 126 MMHG | WEIGHT: 220.9 LBS | RESPIRATION RATE: 18 BRPM | DIASTOLIC BLOOD PRESSURE: 73 MMHG | BODY MASS INDEX: 28.35 KG/M2 | OXYGEN SATURATION: 91 % | HEART RATE: 80 BPM | HEIGHT: 74 IN | TEMPERATURE: 97.5 F

## 2025-03-14 PROBLEM — R50.9 FEBRILE ILLNESS: Status: RESOLVED | Noted: 2025-03-11 | Resolved: 2025-03-14

## 2025-03-14 PROCEDURE — 99239 HOSP IP/OBS DSCHRG MGMT >30: CPT | Performed by: INTERNAL MEDICINE

## 2025-03-14 PROCEDURE — 25010000002 CEFTRIAXONE PER 250 MG: Performed by: INTERNAL MEDICINE

## 2025-03-14 RX ORDER — TAMSULOSIN HYDROCHLORIDE 0.4 MG/1
0.4 CAPSULE ORAL DAILY
Qty: 30 CAPSULE | Refills: 0 | Status: SHIPPED | OUTPATIENT
Start: 2025-03-15 | End: 2025-04-14

## 2025-03-14 RX ADMIN — Medication 10 ML: at 08:54

## 2025-03-14 RX ADMIN — TAMSULOSIN HYDROCHLORIDE 0.4 MG: 0.4 CAPSULE ORAL at 08:53

## 2025-03-14 RX ADMIN — METOPROLOL SUCCINATE 12.5 MG: 25 TABLET, EXTENDED RELEASE ORAL at 08:53

## 2025-03-14 RX ADMIN — FAMOTIDINE 40 MG: 20 TABLET, FILM COATED ORAL at 08:53

## 2025-03-14 RX ADMIN — CARBIDOPA AND LEVODOPA 1 TABLET: 25; 100 TABLET ORAL at 08:53

## 2025-03-14 RX ADMIN — CEFTRIAXONE SODIUM 1000 MG: 1 INJECTION, POWDER, FOR SOLUTION INTRAMUSCULAR; INTRAVENOUS at 14:54

## 2025-03-14 RX ADMIN — ATORVASTATIN CALCIUM 10 MG: 10 TABLET, FILM COATED ORAL at 08:53

## 2025-03-14 NOTE — DISCHARGE SUMMARY
[facility]        HOSPITALIST  DISCHARGE SUMMARY    Patient Name: Enrique Wylie  : 1935  MRN: [id number]    Date of Admission: 3/10/2025  Date of Discharge:  3/14/2025  Primary Care Physician: Josh Maya MD    Consults       Date and Time Order Name Status Description    3/12/2025  4:48 PM Inpatient Urology Consult Completed     3/11/2025  1:31 AM Hospitalist (on-call MD unless specified)              Active and Resolved Hospital Problems:  Active Hospital Problems    Diagnosis POA   • Bladder stone [N21.0] Yes   • Other hydronephrosis [N13.39] Yes      Resolved Hospital Problems    Diagnosis POA   • **Febrile illness [R50.9] Yes     Fever of unknown source.  Resolved  Left ureteral hydronephrosis with 4 mm stone in left urinary bladder 1.8 cm diverticulum.  Patient does not want intervention right now.  Generalized weakness.  Chronic left upper extremity weakness.  Acute metabolic encephalopathy.  Resolved.  Permanent A-fib not on anticoagulation due to GI bleed in the past.  Parkinson's disease.  RLS.  Dementia.  Leukocytosis.  Resolved  Urinary retention status post Sosa catheter.  Resolved  Chronic stable dilated ventricles  Hepatic cyst 1.9 to 1.6 cm needs further workup as an outpatient  Hospital Course     Hospital Course:  Enrique Wylie is a 89 y.o. male with a past medical history of dementia, hypertension, hyperlipidemia, A-fib not on anticoagulation due to recent GI bleed, Parkinson's disease, restless leg syndrome presented to the ED for evaluation of altered mental status, generalized weakness.  [place] and was brought into the ED by his neighbors.  As per the report neighbors the ED physician, patient noted to have generalized weakness and appears to be confused.  At baseline patient walks with the help of the walker and has weakness in the left upper extremity chronically which is unchanged today.  No reports of  fall as per the neighbors.  During my evaluation patient was not able to recall why he came to the hospital.  History is limited due to dementia.  Denies any pain.  Denies any cough, chest pain, shortness of breath, nausea, vomiting, abdominal pain, numbness, tingling.     Upon arrival, vital signs temperature 101.9, pulse 113, respiratory rate 19, blood pressure 123/95 on room air saturating around 92%.  Troponin x 229, CMP with no significant findings, normal lactic acid, normal Pro-Andriy, WBC 12.12, rest of the CBC within normal limits.  Urinalysis is noninfectious.  Blood cultures in process.  COVID flu RSV negative.  Chest x-ray showed no acute infiltrate.  Received Tylenol and ceftriaxone in the ED.  Patient has been admitted for further evaluation and management of SIRS, fever of unclear etiology, generalized weakness  CT scan abdomen pelvis showed 1.8 cm urinary bladder diverticulum with a 4 mm stone on the left.  Also showed mild left ureteral hydronephrosis.  Urology consulted.  Patient does not want any procedure done while in the hospital he wants to go home as soon as possible     Interval Followup:   Remains afebrile, on room air with good saturation.  Awake alert oriented x 3.  Denies any cough or phlegm.  No chest pain or shortness of breath.  Denies any urinary complaints   Remains weak.  Again does not want to go to rehab.  Absolutely adamant to go home.  Does not want any urology procedures right now  Sosa catheter DC'd yesterday.  Bladder scan minimal urine although incontinent now  Finished antibiotics.  DISCHARGE Follow Up Recommendations for labs and diagnostics: PCP and urology.  PCP to workup hepatic cyst as an outpatient.      Day of Discharge     Vital Signs:  Temp:  [97.3 °F (36.3 °C)-98.4 °F (36.9 °C)] 97.5 °F (36.4 °C)  Heart Rate:  [80-98] 80  Resp:  [18] 18  BP: (112-140)/(66-82) 126/73    Physical Exam:    Constitutional: Awake, alert, no acute distress              Eyes: Pupils  equal, sclerae anicteric, no conjunctival injection              HENT: NCAT, mucous membranes moist              Neck: Supple, no thyromegaly, no lymphadenopathy, trachea midline              Respiratory: Clear to auscultation bilaterally, nonlabored respirations               Cardiovascular: RRR, no murmurs, rubs, or gallops, palpable pedal pulses bilaterally              Gastrointestinal: Positive bowel sounds, soft, nontender, nondistended              Musculoskeletal: +1 bilateral ankle edema, no clubbing or cyanosis to extremities              Psychiatric: Appropriate affect, cooperative              Neurologic: Oriented x 3, strength symmetric in all extremities except left upper extremity with chronic weakness, Cranial Nerves grossly intact to confrontation, speech clear but slow to respond              Skin: No rashes        Discharge Details        Discharge Medications        New Medications        Instructions Start Date   tamsulosin 0.4 MG capsule 24 hr capsule  Commonly known as: FLOMAX   0.4 mg, Oral, Daily   Start Date: March 15, 2025            Continue These Medications        Instructions Start Date   acetaminophen 650 MG 8 hr tablet  Commonly known as: TYLENOL   650 mg, Oral, Every 8 Hours PRN      atorvastatin 10 MG tablet  Commonly known as: LIPITOR   10 mg, Oral, Daily      carbidopa-levodopa  MG per tablet  Commonly known as: SINEMET   1 tablet, Oral, 2 Times Daily      furosemide 20 MG tablet  Commonly known as: LASIX   1 tablet, Oral, Every 12 Hours Scheduled      metoprolol succinate XL 25 MG 24 hr tablet  Commonly known as: TOPROL-XL   12.5 mg, Oral, Every 24 Hours Scheduled      mirtazapine 15 MG tablet  Commonly known as: REMERON   15 mg, Oral, Daily, HS      urea 10 % cream  Commonly known as: CARMOL   1 Application, Topical, As Needed               No Known Allergies    Discharge Disposition[place] [facility] [place]iet:  Diet Instructions       Diet: Cardiac Diets; Healthy  Heart (2-3 Na+); Thin (IDDSI 0)      Discharge Diet: Cardiac Diets    Cardiac Diet: Healthy Heart (2-3 Na+)    Fluid Consistency: Thin (IDDSI 0)            Discharge Activity:   Activity Instructions       Activity as Tolerated              CODE STATUS:  Code Status and Medical Interventions: No CPR (Do Not Attempt to Resuscitate); Limited Support; No intubation (DNI)   Ordered at: 03/11/25 0330     Code Status (Patient has no pulse and is not breathing):    No CPR (Do Not Attempt to Resuscitate)     Medical Interventions (Patient has pulse or is breathing):    Limited Support     Medical Intervention Limits:    No intubation (DNI)         No future appointments.    Additional Instructions for the Follow-ups that You Need to Schedule       Discharge Follow-up with PCP   As directed       Currently Documented PCP:    Josh Maya MD    PCP Phone Number:    [phone]     Follow Up Details: 1 week        Discharge Follow-up with Specified Provider: Dr. Katerina Roberts; 2 Weeks   As directed      To: Dr. Katerina Roberts   Follow Up: 2 Weeks   Follow Up Details: Bladder stone and left hydroureteronephrosis                Pertinent  and/or Most Recent Results     PROCEDURES:   * Cannot find OR case *     LAB RESULTS:      Lab 03/11/25  0504 03/10/25  2306   WBC 10.61 12.12*   HEMOGLOBIN 13.6 15.9   HEMATOCRIT 41.7 48.4   PLATELETS 207 239   NEUTROS ABS 7.62* 9.88*   IMMATURE GRANS (ABS) 0.04 0.03   LYMPHS ABS 1.36 0.87   MONOS ABS 1.54* 1.27*   EOS ABS 0.02 0.02   MCV 95.9 95.3   PROCALCITONIN  --  0.11   LACTATE  --  1.8         Lab 03/11/25  0504 03/10/25  2306   SODIUM 142 141   POTASSIUM 3.6 4.0   CHLORIDE 107 102   CO2 25.7 26.4   ANION GAP 9.3 12.6   BUN 17 17   CREATININE 1.07 1.27   EGFR 67.2 54.7*   GLUCOSE 107* 103*   CALCIUM 8.0* 9.1   MAGNESIUM 1.9 2.0   PHOSPHORUS 3.2  --          Lab 03/11/25  0504 03/10/25  2306   TOTAL PROTEIN 6.1 7.7   ALBUMIN 3.4* 4.1   GLOBULIN 2.7 3.6   ALT (SGPT) 9 11    AST (SGOT) 15 18   BILIRUBIN 0.9 1.1   ALK PHOS 82 106         Lab 03/11/25  0036 03/10/25  2306   HSTROP T 29* 29*                 Brief Urine Lab Results  (Last result in the past 365 days)        Color   Clarity   Blood   Leuk Est   Nitrite   Protein   CREAT   Urine HCG        03/10/25 2254 Yellow   Clear   Negative   Negative   Negative   Negative                 Microbiology Results (last 10 days)       Procedure Component Value - Date/Time    Respiratory Panel PCR w/COVID-19(SARS-CoV-2) [facility]/REYNA/PAD/COR/[facility] In-House, NP Swab in UTM/VTM, 2 HR TAT - Swab, Nasopharynx [[id number]]  (Normal) Collected: 03/11/25 1844    Lab Status: Final result Specimen: Swab from Nasopharynx Updated: 03/11/25 1948     ADENOVIRUS, PCR Not Detected     Coronavirus 229E Not Detected     Coronavirus HKU1 Not Detected     Coronavirus NL63 Not Detected     Coronavirus OC43 Not Detected     COVID19 Not Detected     Human Metapneumovirus Not Detected     Human Rhinovirus/Enterovirus Not Detected     Influenza A PCR Not Detected     Influenza B PCR Not Detected     Parainfluenza Virus 1 Not Detected     Parainfluenza Virus 2 Not Detected     Parainfluenza Virus 3 Not Detected     Parainfluenza Virus 4 Not Detected     RSV, PCR Not Detected     Bordetella pertussis pcr Not Detected     Bordetella parapertussis PCR Not Detected     Chlamydophila pneumoniae PCR Not Detected     Mycoplasma pneumo by PCR Not Detected    Narrative:      In the setting of a positive respiratory panel with a viral infection PLUS a negative procalcitonin without other underlying concern for bacterial infection, consider observing off antibiotics or discontinuation of antibiotics and continue supportive care. If the respiratory panel is positive for atypical bacterial infection (Bordetella pertussis, Chlamydophila pneumoniae, or Mycoplasma pneumoniae), consider antibiotic de-escalation to target atypical bacterial infection.    Blood Culture - Blood, Arm, Left  [[id number]]  (Normal) Collected: 03/10/25 2308    Lab Status: Preliminary result Specimen: Blood from Arm, Left Updated: 03/13/25 2315     Blood Culture No growth at 3 days    Narrative:      Less than seven (7) mL's of blood was collected.  Insufficient quantity may yield false negative results.    Blood Culture - Blood, Arm, Right [[id number]]  (Normal) Collected: 03/10/25 2308    Lab Status: Preliminary result Specimen: Blood from Arm, Right Updated: 03/13/25 2315     Blood Culture No growth at 3 days    Narrative:      Less than seven (7) mL's of blood was collected.  Insufficient quantity may yield false negative results.    COVID-19, FLU A/B, RSV PCR 1 HR TAT - Swab, Nasopharynx [[id number]]  (Normal) Collected: 03/10/25 2254    Lab Status: Final result Specimen: Swab from Nasopharynx Updated: 03/10/25 2350     COVID19 Not Detected     Influenza A PCR Not Detected     Influenza B PCR Not Detected     RSV, PCR Not Detected    Narrative:      Fact sheet for providers: https://www.fda.gov/media/655123/download    Fact sheet for patients: https://www.fda.gov/media/062596/download    Test performed by PCR.            CT Abdomen Pelvis Without Contrast  Result Date: 3/12/2025  Impression: Impression: Mild left hydroureteronephrosis. Just lateral to the left UVJ, there is a urinary bladder diverticulum measuring 1.8 cm and contains a small air bubbles as well as a 4 mm calculus. Mild left hydroureteronephrosis may be secondary to extrinsic compression  on the UVJ. Left UVJ stricture is also a consideration. Evaluation is limited on this noncontrast CT. Further evaluation by means of CT urogram may be of value. Chronic findings as above. Electronically Signed: Zbigniew Song MD  3/12/2025 4:32 PM EDT  Workstation ID: [id number]    MRI Brain Without Contrast  Result Date: 3/11/2025  Impression: Impression: 1. No acute ischemic change 2. Dilation of the ventricular system remains out of proportion to the overall degree of  atrophy. Findings do not appear to be significantly changed from comparison CT or MRI from 2024 3. Mild white matter changes compatible small vessel ischemic disease in this age group appear grossly stable 4. Mild paranasal sinus disease Electronically Signed: Vasu Amos MD  3/11/2025 3:07 PM EDT  Workstation ID: [id number]    CT Head Without Contrast  Result Date: 3/11/2025  Impression: No acute brain abnormality is appreciated. No significant interval change is appreciated since the 8/14/2024 study.    Portions of this note were completed with a voice recognition program.  3/11/2025 3:02 AM by Mina Stahl MD on Workstation: [id number]      XR Chest 1 View  Result Date: 3/10/2025  Impression: No acute infiltrate is appreciated. Please see above comments for further detail.   Portions of this note were completed with a voice recognition program. Electronically Signed: Mina Stahl MD  3/10/2025 11:23 PM EDT  Workstation ID: [id number]              Results for orders placed during the hospital encounter of 08/14/24    Adult Transthoracic Echo Complete w/ Color, Spectral and Contrast if necessary per protocol    Interpretation Summary  •  The study is technically difficult for diagnosis.  •  Left ventricular systolic function is hyperdynamic (EF > 70%). Calculated left ventricular EF = 71.1%  •  Left ventricular diastolic dysfunction is noted.  •  The left atrial cavity is mildly dilated.  •  There is mild calcification of the aortic valve.      Imaging Results (All)       Procedure Component Value Units Date/Time    CT Abdomen Pelvis Without Contrast [[id number]] Collected: 03/12/25 1624     Updated: 03/12/25 1634    Narrative:      CT ABDOMEN PELVIS WO CONTRAST    Date of Exam: 3/12/2025 4:15 PM EDT    Indication: Fever.  Renal stones.    Comparison: Contrast-enhanced CT of the abdomen and pelvis performed on April 25, 2024    Technique: Axial CT images were obtained of the abdomen and pelvis without the  administration of contrast. Reconstructed coronal and sagittal images were also obtained. Automated exposure control and iterative construction methods were used.      Findings:    Limited study without IV contrast.    Lung Bases:  Mild linear atelectasis versus scarring in the medial right middle lobe. There is elevation of the right hemidiaphragm.    Peritoneum:  No free intraperitoneal air or fluid.     Abdominal wall:  Unremarkable.    Liver:  The liver is within normal limits in size and contour. Small abutting cysts in the posterior right hepatic lobe measure 1.9 cm and 1.6 cm. Evaluation is limited without IV contrast.    Biliary/Gallbladder:  The gallbladder is normal without evidence of radiopaque gallstones. The biliary tree is nondilated.    Pancreas:  Pancreas is within normal limits. There is no evidence of pancreatic mass or peripancreatic inflammatory changes.    Spleen:  Spleen is normal in size and contour. Small splenic granulomas are visualized.    Gastrointestinal/Mesentery:   No evidence of bowel obstruction or gross inflammatory changes. The appendix appears within normal limits.      Adrenals:  Unremarkable right adrenal gland. Nonspecific left adrenal calcifications are visualized, stable. No adrenal nodule visualized.    Kidneys:  The kidneys are in anatomic position. No evidence of nephrolithiasis. No evidence of right hydronephrosis. There is mild diffuse left hydroureteronephrosis.    Bladder:   Just superior to the left UVJ, there is a urinary bladder diverticulum measuring 1.8 cm containing a small air bubble and a 4 mm calculus. The urinary bladder is decompressed by Sosa catheter. Small volume air is visualized in the urinary bladder from   recent catheterization.    Reproductive organs:    The prostate is mildly enlarged.    Lymph Nodes:  No significant adenopathy is identified.     Vasculature:  Small scattered calcified plaques are visualized. The abdominal aorta is normal in  caliber.    Osseous Structures:    No acute fracture or aggressive lesions. Advanced degenerative changes of the lumbar spine are visualized.        Impression:      Impression:    Mild left hydroureteronephrosis. Just lateral to the left UVJ, there is a urinary bladder diverticulum measuring 1.8 cm and contains a small air bubbles as well as a 4 mm calculus. Mild left hydroureteronephrosis may be secondary to extrinsic compression   on the UVJ. Left UVJ stricture is also a consideration. Evaluation is limited on this noncontrast CT. Further evaluation by means of CT urogram may be of value.    Chronic findings as above.            Electronically Signed: Zbigniew Song MD    3/12/2025 4:32 PM EDT    Workstation ID: [id number]    MRI Brain Without Contrast [[id number]] Collected: 03/11/25 1458     Updated: 03/11/25 1509    Narrative:      MRI BRAIN WO CONTRAST    Date of Exam: 3/11/2025 2:17 PM EDT    Indication: Increasing confusion.  Last MRI August 2024.     Comparison: CT same day and prior studies including prior MRIs from 8/14/2024 and prior    Technique:  Routine multiplanar/multisequence sequence images of the brain were obtained without contrast administration.      Findings:  Diffusion weighted imaging appears grossly unremarkable without evidence of acute restriction abnormality.    There is dilation of the lateral ventricles and remainder of the ventricular system out of proportion to mild degree of diffuse atrophy. There is no significant change when accounting for differences in technique to the comparison MRI prominent cisterna   magna noted in the posterior fossa. The midline structures of the brain appear to be grossly stable. Pituitary and sella structures appear stable. Craniocervical junction demonstrates no acute abnormality. Mild degree of FLAIR and T2 signal   hyperintensity within the supratentorial brain appears grossly unchanged from comparison major intracranial flow voids appear grossly  patent on motion limited imaging. Mucosal thickening noted of the paranasal sinuses. Limited imaging of the globes and   orbits demonstrate no obvious acute abnormality. Mastoid air cells are grossly clear.          Impression:      Impression:    1. No acute ischemic change    2. Dilation of the ventricular system remains out of proportion to the overall degree of atrophy. Findings do not appear to be significantly changed from comparison CT or MRI from 2024    3. Mild white matter changes compatible small vessel ischemic disease in this age group appear grossly stable    4. Mild paranasal sinus disease        Electronically Signed: Vasu Amos MD    3/11/2025 3:07 PM EDT    Workstation ID: [id number]    CT Head Without Contrast [[id number]] Collected: 03/11/25 0256     Updated: 03/11/25 0304    Narrative:      CT HEAD WO CONTRAST-     Date of exam: 3/11/2025, 2:46 A.M.     Indications: AMS (altered mental status); left upper extremity weakness;  r50.9-fever, unspecified.     Comparisons: 8/14/2024; 4/11/2024; 4/10/2024; 5/4/2016.     TECHNIQUE:  Axial CT images were obtained of the head without contrast  administration. Reconstructed 2D coronal and sagittal images were also  obtained. Automated exposure control and iterative construction methods  were used. Additionally, the radiation dose reduction device was turned  on for each scan per the ALARA (As Low as Reasonably Achievable)  protocol. Please see the electronic medical record, EMR (i.e., Epic),  for the documented radiation dose. The study is motion-limited.     FINDINGS:  A routine nonenhanced head CT was performed. No acute brain abnormality  is seen. No acute infarct. No acute intracranial hemorrhage. No midline  shift or acute intracranial mass effect is seen. The ventricular system  and, to a lesser extent, the extra-axial spaces are prominent.  Ventriculomegaly is present and is unchanged since 8/14/2024. There is  suspected mild chronic small  vessel ischemic disease. Arterial  calcifications are seen. No acute skull fracture is identified. Mild  age-indeterminate mucosal thickening and/or retained secretions  involve(s) the imaged paranasal sinuses. No air-fluid interfaces are  seen within the imaged paranasal sinuses. No significant acute findings  are seen with regard to the imaged orbits or middle ear clefts.          Impression:      No acute brain abnormality is appreciated. No significant interval  change is appreciated since the 8/14/2024 study.           Portions of this note were completed with a voice recognition program.     3/11/2025 3:02 AM by Mina Stahl MD on Workstation: [id number]       XR Chest 1 View [[id number]] Collected: 03/10/25 2321     Updated: 03/10/25 2325    Narrative:      XR CHEST 1 VW    Date of exam: 3/10/2025, 11:13 P.M., EDT.    Indications: Weak/dizzy/AMS triage protocol.    Comparison: 8/14/2024.    FINDINGS:  A single AP (or PA) upright portable chest radiograph was performed. No cardiac enlargement is seen. No acute infiltrate is appreciated. No pleural effusion or pneumothorax is identified. The thoracic aorta is atherosclerotic. There is pulmonary   hypoinflation. There may be mild subsegmental atelectasis and/or linear fibrosis in the lung bases, probably greater on the right. There is slight asymmetric elevation of the right diaphragm. External artifacts obscure detail. Chronic calcified   granulomatous disease of the chest is suggested. There may be mild thoracolumbar scoliosis. Postoperative changes involve the cervical spine and the right shoulder. No significant interval change is seen since the prior study (or studies).         Impression:      No acute infiltrate is appreciated. Please see above comments for further detail.         Portions of this note were completed with a voice recognition program.    Electronically Signed: Mina Stahl MD    3/10/2025 11:23 PM EDT    Workstation ID: [id number]              Labs Pending at Discharge:  Pending Labs       Order Current Status    Blood Culture - Blood, Arm, Left Preliminary result    Blood Culture - Blood, Arm, Right Preliminary result                Time spent on Discharge including face to face service: 31 minutes  Part of this note may be an electronic transcription/translation of spoken language to printed text using the Dragon Dictation System.     TElectronically signed by Ashok Macario MD, 03/14/25, 3:13 PM EDT.

## 2025-03-14 NOTE — PLAN OF CARE
Goal Outcome Evaluation:         Patient alert and able to make needs known, incontinent of bladder several times through shift, will continue with plan of care

## 2025-03-14 NOTE — OUTREACH NOTE
Prep Survey      Flowsheet Row Responses   [facility] patient discharged from? [facility]   Is LACE score < 7 ? No   Eligibility [facility]   Date of Admission 03/10/25   Date of Discharge 03/14/25   Discharge Disposition Home or Self Care   Discharge diagnosis Febrile illness   Does the patient have one of the following disease processes/diagnoses(primary or secondary)? Other   Does the patient have Home health ordered? No   Is there a DME ordered? No   Prep survey completed? Yes            CHARISSE CLARK - Registered Nurse

## 2025-03-14 NOTE — DISCHARGE INSTR - APPOINTMENTS
[facility] will contact [facility] or Jillian to begin services tomorrow at home. Their number is [phone] if you have questions THE UROLOGY OFFICE WILL CALL THE PT.

## 2025-03-14 NOTE — TELEPHONE ENCOUNTER
Hospital calling to scheduled 2 week follow up with Dr. Carpenter after she consulted in the hospital. [facility] does not have anywhere to put them. Please let the appointment desk know where to schedule the patient so that they can call the patient with an appointment.

## 2025-03-14 NOTE — PLAN OF CARE
Goal Outcome Evaluation:           Progress: no change  Outcome Evaluation: dcing home.

## 2025-03-14 NOTE — PLAN OF CARE
Problem: Adult Inpatient Plan of Care  Goal: Plan of Care Review  3/14/2025 1549 by Paty Lopez RN  Outcome: Met  3/14/2025 1549 by Paty Lopez RN  Outcome: Progressing  Flowsheets  Taken 3/14/2025 1549 by Paty Lopez RN  Outcome Evaluation: dcing home.  Taken 3/13/2025 1802 by Paty Lopez RN  Progress: no change  Taken 3/12/2025 1803 by Nanette Diane RN  Plan of Care Reviewed With: patient  Goal: Patient-Specific Goal (Individualized)  3/14/2025 1549 by Paty Lopez RN  Outcome: Met  3/14/2025 1549 by Paty Lopez RN  Outcome: Progressing  Goal: Absence of Hospital-Acquired Illness or Injury  3/14/2025 1549 by Paty Lopez RN  Outcome: Met  3/14/2025 1549 by Paty Lopez RN  Outcome: Progressing  Goal: Optimal Comfort and Wellbeing  3/14/2025 1549 by Paty Lopez RN  Outcome: Met  3/14/2025 1549 by Paty Lopez RN  Outcome: Progressing  Goal: Readiness for Transition of Care  3/14/2025 1549 by Paty Lopez RN  Outcome: Met  3/14/2025 1549 by Paty Lopez RN  Outcome: Progressing     Problem: Skin Injury Risk Increased  Goal: Skin Health and Integrity  3/14/2025 1549 by Paty Lopez RN  Outcome: Met  3/14/2025 1549 by Paty Lopez RN  Outcome: Progressing     Problem: Comorbidity Management  Goal: Blood Pressure in Desired Range  3/14/2025 1549 by Paty Lopez RN  Outcome: Met  3/14/2025 1549 by Paty Lopez RN  Outcome: Progressing     Problem: Infection  Goal: Absence of Infection Signs and Symptoms  3/14/2025 1549 by Paty Lopez RN  Outcome: Met  3/14/2025 1549 by Paty Lopez RN  Outcome: Progressing     Problem: Fall Injury Risk  Goal: Absence of Fall and Fall-Related Injury  3/14/2025 1549 by Richardton, Paty, RN  Outcome: Met  3/14/2025 1549 by Paty Lopez, RN  Outcome: Progressing   Goal Outcome Evaluation:           Progress: no change  Outcome Evaluation: dcing home.

## 2025-03-15 LAB
BACTERIA SPEC AEROBE CULT: NORMAL
BACTERIA SPEC AEROBE CULT: NORMAL
QT INTERVAL: 323 MS
QTC INTERVAL: 440 MS

## 2025-03-17 ENCOUNTER — TRANSITIONAL CARE MANAGEMENT TELEPHONE ENCOUNTER (OUTPATIENT)
Dept: CALL CENTER | Facility: HOSPITAL | Age: 89
End: 2025-03-17
Payer: MEDICARE

## 2025-03-17 NOTE — OUTREACH NOTE
Call Center TCM Note      Flowsheet Row Responses   [facility] patient discharged from? [facility]   Does the patient have one of the following disease processes/diagnoses(primary or secondary)? Other   TCM attempt successful? Yes  [VR for Pamela WatersYULIA,  inquired about POA status and pt reports that Pamela is no longer involved with care]   Call start time 1217   Call end time 1222   Discharge diagnosis Febrile illness   Person spoke with today (if not patient) and relationship patient   Meds reviewed with patient/caregiver? Yes   Is the patient having any side effects they believe may be caused by any medication additions or changes? No   Does the patient have all medications ordered at discharge? Yes   Is the patient taking all medications as directed (includes completed medication regime)? Yes   Medication comments pt taking flomax as prescribed   Comments Hospital f/u 3/24/24@1045am   Does the patient have an appointment with their PCP within 7-14 days of discharge? Yes   What is the Home health agency?  [facility]--noted accepted   Has home health visited the patient within 72 hours of discharge? Unsure   Nursing interventions Other   Psychosocial comments Inquired about POA status and person listed on VR,   pt reports this individual is no longer involved in his care.  Pt will need  new VR's updated.  Pt reports that his neighbor, Hussein, is now assisting with care.  Reviewed POA on file and note he is listed as secondary to the first person he no longer wants involved in care.  SW team will be alerted to determine if any further actions needed at this time. Pt lives alone and this neighbor, Hussein  who assiss him with transportation and does what he  can to assist pt according to CM notes.   Did the patient receive a copy of their discharge instructions? Yes   Nursing interventions Reviewed instructions with patient   What is the patient's perception of their health status since discharge? Same   [Pt reports he is doing better today, denies any fever or ongoing issues with urination at this time.  Reports taking new flomax as ordered.  Pt will have HH to follow.  Denies any resp type s/s at this time.]   Is the patient/caregiver able to teach back signs and symptoms related to disease process for when to call PCP? Yes   Is the patient/caregiver able to teach back signs and symptoms related to disease process for when to call 911? Yes   TCM call completed? Yes   Call end time 1222            Gladys Ghotra RN    3/17/2025, 12:48 EDT

## 2025-03-17 NOTE — TELEPHONE ENCOUNTER
CALLED PT TO SCHEDULE APPT AND SPOKE W/ АНДРЕЙ LISTED ON  VERBAL    SCHEDULED FOR 4/9 @ 10:30 W/ ISMAEL

## 2025-03-20 ENCOUNTER — PATIENT OUTREACH (OUTPATIENT)
Age: 89
End: 2025-03-20
Payer: MEDICARE

## 2025-03-20 NOTE — OUTREACH NOTE
MSW left VM with patient on this day.    Tatyana FOY -   Ambulatory Case Management    3/20/2025, 14:00 EDT

## 2025-03-24 ENCOUNTER — PATIENT OUTREACH (OUTPATIENT)
Age: 89
End: 2025-03-24
Payer: MEDICARE

## 2025-03-24 NOTE — OUTREACH NOTE
Social Work Assessment  Questions/Answers      Flowsheet Row Most Recent Value   Referral Source physician   Reason for Consult community resources   Preferred Language English   Permission Granted to Share Info With power of  for healthcare   Advance Care Planning Reviewed present on chart, concerns discussed   Decision Making Considerations patient/family ability to make health care decisions   Assistance with Healthcare Needs power of  for healthcare   People in Home alone   Current Living Arrangements home   Potentially Unsafe Housing Conditions none   In the past 12 months has the electric, gas, oil, or water company threatened to shut off services in your home? No   Primary Care Provided by friend   Provides Primary Care For no one, unable/limited ability to care for self   Family Caregiver if Needed friend(s)   Quality of Family Relationships helpful   Employment Status retired   Current or Previous Occupation not applicable   Current or Previous  Service active duty, past   Source of Income social security   Financial/Environmental Concerns none   Application for Public Assistance obtained public assistance pending number   Spiritual, Cultural Beliefs, Scientology Practices, Values that Affect Care no   Medications assistive person   Meal Preparation assistive person   Housekeeping assistive person   Laundry assistive person   Shopping assistive person   If for any reason you need help with day-to-day activities such as bathing, preparing meals, shopping, managing finances, etc., do you get the help you need? I get all the help I need   Feels Unsafe at Home or Work/School no   Feels Threatened by Someone no   Does Anyone Try to Keep You From Having Contact with Others or Doing Things Outside Your Home? no   Physical Signs of Abuse Present no   Current Self-injurious Behavior denies   Feels Unsafe at Home or Work/School no   Feels Threatened by Someone no   Does Anyone Try to Keep You  From Having Contact with Others or Doing Things Outside Your Home? no   Physical Signs of Abuse Present no   Past Self-injurious Behavior denies   Previous Mental Health Treatment none   Substance Use Status never used   Major Change/Loss/Stressor none   Sources of Support friend(s)          SDOH updated and reviewed with the patient during this program:  --     Disabilities: At Risk (3/11/2025)    Disabilities     Concentrating, Remembering, or Making Decisions Difficulty: yes     Doing Errands Independently Difficulty: yes      --     Employment: Not At Risk (3/11/2025)    Employment     Do you want help finding or keeping work or a job?: I do not need or want help      Financial Resource Strain: Low Risk  (8/14/2024)    Overall Financial Resource Strain (CARDIA)     Difficulty of Paying Living Expenses: Not hard at all      --     Food Insecurity: No Food Insecurity (3/11/2025)    Hunger Vital Sign     Worried About Running Out of Food in the Last Year: Never true     Ran Out of Food in the Last Year: Never true      --     Housing Stability: Not At Risk (3/24/2025)    Housing Stability     Current Living Arrangements: home     Potentially Unsafe Housing Conditions: none      --     Transportation Needs: No Transportation Needs (3/11/2025)    PRAPARE - Transportation     Lack of Transportation (Medical): No     Lack of Transportation (Non-Medical): No      --     Utilities: Not At Risk (3/24/2025)    [facility] Utilities     Threatened with loss of utilities: No       Patient Outreach    MSW spoke with patient's POA (Mr. Puente) and also Mrs. Puente. They state that he has gotten new POA paperwork completed by local  and they will bring this into his next PCP appointment. There were no further needs. MSW available if needed in the future.    Tatyana FOY -   Ambulatory Case Management    3/24/2025, 15:15 EDT

## 2025-03-25 ENCOUNTER — OUTSIDE FACILITY SERVICE (OUTPATIENT)
Dept: FAMILY MEDICINE CLINIC | Facility: CLINIC | Age: 89
End: 2025-03-25
Payer: MEDICARE

## 2025-03-26 ENCOUNTER — OFFICE VISIT (OUTPATIENT)
Dept: FAMILY MEDICINE CLINIC | Facility: CLINIC | Age: 89
End: 2025-03-26
Payer: MEDICARE

## 2025-03-26 VITALS
BODY MASS INDEX: 28.23 KG/M2 | WEIGHT: 220 LBS | SYSTOLIC BLOOD PRESSURE: 120 MMHG | OXYGEN SATURATION: 96 % | DIASTOLIC BLOOD PRESSURE: 70 MMHG | HEIGHT: 74 IN | HEART RATE: 88 BPM | TEMPERATURE: 97.8 F

## 2025-03-26 DIAGNOSIS — N20.0 NEPHROLITHIASIS: ICD-10-CM

## 2025-03-26 DIAGNOSIS — G20.A2 PARKINSON'S DISEASE WITHOUT DYSKINESIA, WITH FLUCTUATING MANIFESTATIONS: ICD-10-CM

## 2025-03-26 DIAGNOSIS — H90.3 BILATERAL SENSORINEURAL HEARING LOSS: ICD-10-CM

## 2025-03-26 DIAGNOSIS — Z76.89 ENCOUNTER TO ESTABLISH CARE WITH NEW DOCTOR: Primary | ICD-10-CM

## 2025-03-26 DIAGNOSIS — Z91.81 AT RISK FOR FALLS: ICD-10-CM

## 2025-03-26 DIAGNOSIS — E78.2 MIXED HYPERLIPIDEMIA: ICD-10-CM

## 2025-03-26 DIAGNOSIS — C44.90 SKIN CANCER: ICD-10-CM

## 2025-03-26 DIAGNOSIS — G25.81 RESTLESS LEG SYNDROME: ICD-10-CM

## 2025-03-26 DIAGNOSIS — R53.81 PHYSICAL DECONDITIONING: ICD-10-CM

## 2025-03-26 DIAGNOSIS — R26.81 UNSTEADY GAIT WHEN WALKING: ICD-10-CM

## 2025-03-26 RX ORDER — MIRTAZAPINE 15 MG/1
15 TABLET, FILM COATED ORAL DAILY
Qty: 90 TABLET | Refills: 1 | Status: SHIPPED | OUTPATIENT
Start: 2025-03-26

## 2025-03-26 NOTE — PROGRESS NOTES
Chief Complaint  Establish Care (Was in the hospital recently for a uti, has a appointment with urology in April.)    Chase Wylie is a 89 y.o. male who presents to [facility]     History of Present Illness  The patient is an 89-year-old male who presents today as a new patient to establish care.    He has a history of atrial fibrillation, allergies, arthritis, back pain, and balance issues, which necessitate the use of a wheelchair. Despite these issues, his leg strength remains robust. He experienced a fall at home in 2020 but has not had any recent falls. His mobility is slow, and he exhibits tremors after a few steps. He has a history of high cholesterol and restless leg syndrome. He previously experienced weight loss, but this is no longer an issue, and his appetite remains good. He has a history of squamous cell cancer but has not undergone any treatment for it. He has no other history of cancer. He has a history of multiple surgeries, including neck fusion, lumbar laminectomy in 2016, rotator cuff repair, and knee scope. He also had cataracts removed and a colonoscopy with biopsies in 2024. He recently underwent ureteroscopy with stent placement for urinary issues, but the stent has since been removed. He has a follow-up appointment with urology on 04/09/2025.    He was diagnosed with Parkinson's disease by a nurse post-hospitalization and is currently on carbidopa-levodopa therapy. He is unsure if his dementia is related to his Parkinson's disease. He reports he has not seen a neurologist.    He has a history of high cholesterol and is on Lipitor.    He has a history of restless leg syndrome.    He has a history of weight loss but is not having that issue any longer. His appetite is good.    He has a history of dry eye and was advised to use over-the-counter eye drops by his ophthalmologist a month ago. He was informed that his condition is primarily  "cosmetic and can be corrected surgically, but he declined this option.    He has a history of rotator cuff repair and continues to experience difficulty with his left arm. He is unable to raise his left arm, which had previously undergone surgery. He receives physical therapy once or twice a week.    He has a history of urinary tract infection but is not experiencing any symptoms currently. He reports no pain during urination, irregular urination, or blood in his urine.    He has a history of prostate issues and is on Flomax.    He has a history of sleep issues and is on Remeron.    MEDICATIONS  - Tylenol  - Atorvastatin  - Carbidopa-levodopa  - Lasix  - Metoprolol  - Mirtazapine  - Flomax  - Urea cream        Patient Care Team:  Suzie Li MD as PCP - General (Family Medicine)    Objective   Vital Signs:   Vitals:    03/26/25 1518   BP: 120/70   Pulse: 88   Temp: 97.8 °F (36.6 °C)   TempSrc: Oral   SpO2: 96%   Weight: 99.8 kg (220 lb)   Height: 188 cm (74.02\")     Body mass index is 28.23 kg/m².    Wt Readings from Last 3 Encounters:   03/26/25 99.8 kg (220 lb)   03/11/25 100 kg (220 lb 14.4 oz)   08/14/24 93.1 kg (205 lb 4 oz)     BP Readings from Last 3 Encounters:   03/26/25 120/70   03/14/25 126/73   08/16/24 122/62       Health Maintenance   Topic Date Due    LIPID PANEL  06/06/2024    ANNUAL WELLNESS VISIT  02/21/2025    COVID-19 Vaccine (4 - 2024-25 season) 04/09/2025 (Originally 9/1/2024)    RSV Vaccine - Adults (1 - 1-dose 75+ series) 08/05/2025 (Originally 6/8/2012)    TDAP/TD VACCINES (2 - Tdap) 08/05/2025 (Originally 10/30/2007)    INFLUENZA VACCINE  07/01/2025    Pneumococcal Vaccine 50+  Completed    ZOSTER VACCINE  Completed       Lab Results (last 24 hours)       ** No results found for the last 24 hours. **               Physical Exam     Physical Exam  General Appearance: Normal.  Vital signs: Weight is 220.  HEENT: Within normal limits.  Respiratory: Lungs were " auscultated.  Cardiovascular: Heart was examined.  Back, Musculoskeletal: There is some weakness in the left hand.  Skin: Warm and dry, no rash.  Neurological: There is some weakness in the left hand.  Other observations: None.      Result Review   The following data was reviewed by: Suzie Li MD on 03/26/2025:  [x]  Tests & Results  []  Hospitalization/Emergency Department/Urgent Care  []  Internal/External Consultant Notes    Results        Procedures          ASSESSMENT/PLAN  Diagnoses and all orders for this visit:    1. Encounter to establish care with new doctor (Primary)    2. Parkinson's disease without dyskinesia, with fluctuating manifestations  -     Miscellaneous DME    3. Physical deconditioning    4. Mixed hyperlipidemia    5. Unsteady gait when walking    6. Nephrolithiasis    7. At risk for falls    8. Restless leg syndrome  -     mirtazapine (REMERON) 15 MG tablet; Take 1 tablet by mouth Daily. HS  Dispense: 90 tablet; Refill: 1    9. Skin cancer    10. Bilateral sensorineural hearing loss        Assessment & Plan  1. Atrial Fibrillation.  He is currently on metoprolol for rate control. The risks and benefits of anticoagulation therapy were discussed, considering his unsteady gait due to Parkinson's disease. He will continue with metoprolol.    2. Parkinson's Disease.  He is on carbidopa-levodopa, taking 1 tablet twice a day. Physical therapy is recommended to improve mobility and prevent frozen shoulder. A wheelchair will be ordered through Medicare to assist with mobility.    3. Hyperlipidemia.  He is on Lipitor (atorvastatin). A cholesterol panel will be ordered as he is overdue for this test. The last cholesterol panel was done in June 2023.    4. Restless Leg Syndrome.  He will continue with his current management plan.    5. Weight Gain.  He has gained 15 pounds since August of last year. His appetite is good.    6. Dry Eye.  He was advised to use over-the-counter gel ointment for  dry eyes, especially at night, as it stays in the eye longer compared to watery drops.    7. Left Arm Weakness.  He has difficulty raising his left arm, possibly due to a frozen shoulder from previous rotator cuff surgery. Physical therapy will continue to work on improving his range of motion. If physical therapy does not help, orthopedic intervention may be considered.    8. Health Maintenance.  He is due for influenza and COVID-19 vaccinations. The influenza vaccine is recommended for mid-October 2025. The COVID-19 vaccine was discussed, and it was decided that the risks outweigh the benefits given his low-risk status.    9. Medication Management.  He is on multiple medications including Tylenol as needed, Lasix, mirtazapine (Remeron), and Flomax. Refills for mirtazapine and Flomax will be provided as they are due soon. He will contact the office when he is close to running out of any medications.    10. Prostate issues.  He is on Flomax for prostate issues.    11. Sleep issues.  He is on Remeron for sleep issues.    Follow-up  The patient will follow up in 6 months.    PROCEDURE  The patient underwent a lumbar laminectomy in 2016.  A cervical fusion was performed prior to 2016.  Rotator cuff repair was performed.  Knee scope was performed.  Cataracts were removed.  Colonoscopy with biopsies was performed in 2024.  Skin biopsy for squamous cell carcinoma was performed.  Ureteroscopy with stent placement was performed for urinary issues.          Enrique Wylie  reports that he has never smoked. He has never used smokeless tobacco.                FOLLOW UP  No follow-ups on file.  Patient was given instructions and counseling regarding his condition or for health maintenance advice. Please see specific information pulled into the AVS if appropriate.       Suzie Li MD  04/08/25  23:07 EDT    Part of this note may be an electronic transcription/translation of spoken language to printed text using the  Dragon Dictation System.    Patient or patient representative verbalized consent for the use of Ambient Listening during the visit with  Suzie Li MD for chart documentation. 3/26/25  23:08 EDT

## 2025-04-09 ENCOUNTER — OFFICE VISIT (OUTPATIENT)
Dept: UROLOGY | Age: 89
End: 2025-04-09
Payer: MEDICARE

## 2025-04-09 ENCOUNTER — READMISSION MANAGEMENT (OUTPATIENT)
Dept: CALL CENTER | Facility: HOSPITAL | Age: 89
End: 2025-04-09
Payer: MEDICARE

## 2025-04-09 DIAGNOSIS — Z09 ENCOUNTER FOR EXAMINATION FOLLOWING TREATMENT AT HOSPITAL: Primary | ICD-10-CM

## 2025-04-09 DIAGNOSIS — N21.0 BLADDER STONE: ICD-10-CM

## 2025-04-09 NOTE — OUTREACH NOTE
Medical Week 3 Survey      Flowsheet Row Responses   [facility] patient discharged from? [facility]   Does the patient have one of the following disease processes/diagnoses(primary or secondary)? Other   Week 3 attempt successful? Yes   Call start time 1356   Call end time 1356   Discharge diagnosis Febrile illness   Person spoke with today (if not patient) and relationship patient   Did the patient receive a copy of their discharge instructions? Yes   Nursing interventions Reviewed instructions with patient   What is the patient's perception of their health status since discharge? Improving   Is the patient/caregiver able to teach back signs and symptoms related to disease process for when to call PCP? Yes   Is the patient/caregiver able to teach back signs and symptoms related to disease process for when to call 911? Yes   Is the patient/caregiver able to teach back the hierarchy of who to call/visit for symptoms/problems? PCP, Specialist, Home health nurse, Urgent Care, ED, 911 Yes   Week 3 Call Completed? Yes   Graduated Yes   Wrap up additional comments Patient reports doing well. No concerns or questions noted.   Call end time 1356            Cassy BARBER - Registered Nurse

## 2025-04-09 NOTE — PROGRESS NOTES
UROLOGY OFFICE FOLLOW UP NOTE    Subjective   HPI  Enrique Wylie is a 89 y.o. male.  History of BPH, nephrolithiasis, last seen for inpatient consultation for CT scan findings including bladder diverticulum containing small air bubble and 4 mm stone located just superior to the left UVJ with mild diffuse left hydroureteronephrosis.  Hydroureteronephrosis significantly improved from prior examinations.  Patient had been admitted for altered mental status and generalized weakness, fever of unknown origin.    History of Present Illness  The patient is an 89-year-old male who presents for evaluation of a bladder stone. He is accompanied by his friends, POA to assist him in making medical decisions.    Patient now home.  States he is doing well.  He reports satisfactory urinary function with no associated back pain.    He was hospitalized on 03/12/2025 due to fever, which was initially suspected to be secondary to a urinary tract infection (UTI). However, his urine was clear, and there was no evidence of a UTI  He is currently not on any anticoagulant therapy. His appetite remains robust.    No complaints today.      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  Hydroureteronephrosis significantly improved from prior examinations.          Review of systems  A review of systems was performed, and positive findings are noted in the HPI.    Objective     Vital Signs:   There were no vitals taken for this visit.      Physical exam  No acute distress, well-nourished  Awake alert and oriented  Mood normal; affect normal  Physical Exam      Problem List:  Patient Active Problem List   Diagnosis    Balance problem    Restless leg syndrome    Encounter for long-term (current) use of other medications    Lumbar spinal stenosis     Grief reaction    Hyperlipidemia    Unsteady gait when walking    Death of wife    Lumbar spinal stenosis    Advance directive in chart    Skin cancer    Nephrolithiasis    Left ureteral stone    Foreign body in genitourinary tract    Advance directive in chart    Bilateral sensorineural hearing loss    Cardiomegaly    Essential hypertension    Fall at home, initial encounter    Paroxysmal atrial fibrillation    Weight loss, unintentional    Adjustment disorder with depressed mood    Back pain    Rhabdomyolysis    JOSE (acute kidney injury)    BRBPR (bright red blood per rectum)    Proctitis    Syncope    Bladder stone    Other hydronephrosis       Assessment & Plan   Diagnoses and all orders for this visit:    1. Encounter for examination following treatment at hospital (Primary)    2. Bladder stone  -     CT Abdomen Pelvis Stone Protocol; Future      Clinically improved since discharge  Patient with left-sided bladder diverticula; CT scan imaging personally reviewed by me, demonstrated discussed with patient and friends at length.  Patient asymptomatic, no flank pain or bothersome lower urinary tract symptoms.    UA negative for concern of infection while hospitalized; do not believe urologic etiology for patient's presentation or fever of unknown origin.    Discussed continued observation, repeat imaging now that acute issues have resolved via CT scan stone protocol versus operative evaluation, direct visualization.    Risk, benefits, and alternatives of each option were discussed.    Given patient clinically doing well, wished to proceed conservatively.  Plan to repeat imaging via CT scan in about a month  Assessment & Plan  Should patient develop acute flank pain, difficulty urinating, or hematuria, encouraged to call urology or go to ER    Follow-up  The patient will follow up in 1 month, CT scan prior       All questions addressed      Patient or patient representative verbalized consent for the use of Ambient  Listening during the visit with  Katerina Carpenter MD for chart documentation. 4/9/2025  15:51 EDT

## 2025-04-15 ENCOUNTER — TELEPHONE (OUTPATIENT)
Dept: FAMILY MEDICINE CLINIC | Facility: CLINIC | Age: 89
End: 2025-04-15

## 2025-04-15 NOTE — TELEPHONE ENCOUNTER
Caller: JHOANA ATWOOD    Relationship: Emergency Contact    Best call back number:       What is the best time to reach you: ANYTIME     Who are you requesting to speak with (clinical staff, provider,  specific staff member): CLINICAL          What was the call regarding:       MS ATWOOD SAID PCP JOSH SAID SHE WOULD  PUT IN AN ORDER FOR A A WHEEL CHAIR

## 2025-04-21 ENCOUNTER — TELEPHONE (OUTPATIENT)
Dept: UROLOGY | Age: 89
End: 2025-04-21
Payer: MEDICARE

## 2025-04-21 ENCOUNTER — TELEPHONE (OUTPATIENT)
Dept: FAMILY MEDICINE CLINIC | Facility: CLINIC | Age: 89
End: 2025-04-21

## 2025-04-21 NOTE — TELEPHONE ENCOUNTER
LEFT DETAILED MESSAGE THAT THE FLOMAX APPEARS TO HAVE BEEN ORDERED ON 3/15 BY DR KOCH WITH NO REFILLS. ADVISED THAT I DO NOT SEE WHERE DR GUEVARA HAD ORDERED THIS. ASKED FOR A CALL BACK WITH ANY QUESTIONS.

## 2025-04-21 NOTE — TELEPHONE ENCOUNTER
PATIENT'S CARETAKER, JHOANAJASON ATWOOD, CALLED.  SHE IS NOT ON VERBAL.   SHE ASKED FOR REFILLS ON FLOMAX TO GO TO [facility].    #[phone]

## 2025-04-22 NOTE — ED PROVIDER NOTES
"Time: 12:10 PM EDT  Date of encounter:  4/10/2024  Independent Historian/Clinical History and Information was obtained by:   Patient  Chief Complaint: Fall    History is limited by: N/A    History of Present Illness:  Patient is a 88 y.o. year old male who presents to the emergency department for evaluation of possible injuries after a fall.  Patient resides at home alone.  Patient reportedly fell last night.  Patient states he was unable to get up.  It is unknown how many hours patient was on the ground for.  Patient was found today by caregiver who called EMS.  Patient does complain of some low back pain but no other injuries.      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    C4-5 and C5-6 with edema in the cord at C4-5    Death of wife 07/30/2020    Fall at home, initial encounter 05/21/2020    Gait instability 02/06/2019    Grief reaction 07/30/2020    High cholesterol     Kidney stone     Lumbar spinal stenosis 02/02/2016    Worsening leg symptoms,  3/12/15    Medication management 02/06/2019    Restless leg syndrome 02/06/2019    Weight loss, unintentional 07/30/2020     Past Surgical History:   Procedure Laterality Date    CATARACT EXTRACTION Bilateral     CERVICAL FUSION      C4,5 and 6    KNEE ARTHROSCOPY      right knee    LUMBAR LAMINECTOMY  02/10/2016    L3-4    ROTATOR CUFF REPAIR Right     SKIN BIOPSY      face and ears, squamous cell     SPINE SURGERY      minimally invasive procedure \"closing in on spinal Cord\"  " "\"gave spinal cord more room\"    URETEROSCOPY LASER LITHOTRIPSY WITH STENT INSERTION Bilateral 11/13/2023    Procedure: Bilateral Retrograde Pyelogram, Left Ureteroscopy Lasertripsy Basket Stone Extraction and Stent Insertion, Right Stent Insertion, Bladder Stone Extraction;  Surgeon: Katerina Carpenter MD;  Location: [facility];  Service: Urology;  Laterality: Bilateral;    URETEROSCOPY LASER LITHOTRIPSY WITH STENT INSERTION Bilateral 12/18/2023    Procedure: CYSTOSCOPY URETEROSCOPY RIGHT RETROGRADE PYELOGRAM HOLMIUM LASER STENT exchange; LEFT STENT REMOVAL;  Surgeon: Katerina Carpenter MD;  Location: [facility] OR;  Service: Urology;  Laterality: Bilateral;     Family History   Problem Relation Age of Onset    Heart failure Mother     Heart failure Father        Home Medications:  Prior to Admission medications    Medication Sig Start Date End Date Taking? Authorizing Provider   acetaminophen (TYLENOL) 650 MG 8 hr tablet Take 1 tablet by mouth Every 8 (Eight) Hours As Needed for Mild Pain.   Yes Provider, MD Jaiden   apixaban (ELIQUIS) 5 MG tablet tablet Take 1 tablet by mouth 2 (Two) Times a Day. 2/21/24  Yes Josh Maya MD   atorvastatin (LIPITOR) 10 MG tablet Take 1 tablet by mouth Daily. 6/5/23  Yes Delmer Bunch III, MD   carbidopa-levodopa (SINEMET)  MG per tablet Take 1 tablet by mouth 2 (Two) Times a Day. 11/17/22  Yes Delmer Bunch III, MD   furosemide (Lasix) 20 MG tablet Take 1 tablet by mouth 2 (Two) Times a Day. 4/4/24  Yes Josh Maya MD   mirtazapine (REMERON) 15 MG tablet Take 1 tablet by mouth Daily. HS  Patient taking differently: Take 1 tablet by mouth Every Night. 6/5/23  Yes Delmer Bunch III, MD   potassium chloride 10 MEQ CR tablet Take 1 tablet by mouth 2 (Two) Times a Day. 2/21/24  Yes Josh Maya MD   sulfamethoxazole-trimethoprim (BACTRIM DS,SEPTRA DS) 800-160 MG per tablet Take 1 tablet by mouth 2 (Two) Times a Day for 10 days. " "4/4/24 4/14/24 Yes Josh Maya MD   urea (CARMOL) 10 % cream Apply 1 Application topically to the appropriate area as directed As Needed for Dry Skin. 3/15/24  Yes Josh Maya MD   Control Gel Formula Dressing (DuoDERM CGF Extra Thin) misc Apply 1 each topically Every Other Day. 2/21/24   Josh Maya MD   Control Gel Formula Dressing (DuoDERM CGF Extra Thin) misc Apply 1 each topically Every Other Day. 4/4/24   Josh Maya MD        Social History:   Social History     Tobacco Use    Smoking status: Never    Smokeless tobacco: Never   Vaping Use    Vaping status: Never Used   Substance Use Topics    Alcohol use: Never    Drug use: Never         Review of Systems:  Review of Systems   Constitutional:  Negative for chills and fever.   HENT:  Negative for congestion, ear pain and sore throat.    Eyes:  Negative for pain.   Respiratory:  Negative for cough, chest tightness and shortness of breath.    Cardiovascular:  Negative for chest pain.   Gastrointestinal:  Negative for abdominal pain, diarrhea, nausea and vomiting.   Genitourinary:  Negative for flank pain and hematuria.   Musculoskeletal:  Positive for back pain. Negative for joint swelling.   Skin:  Negative for pallor.   Neurological:  Negative for seizures and headaches.   All other systems reviewed and are negative.       Physical Exam:  /63 (BP Location: Left arm, Patient Position: Lying)   Pulse 94   Temp 98.6 °F (37 °C) (Oral)   Resp 20   Ht 182.9 cm (72.01\")   Wt 86 kg (189 lb 9.5 oz)   SpO2 100%   BMI 25.71 kg/m²     Physical Exam    Vital signs were reviewed under triage note.  General appearance - Patient appears well-developed and well-nourished.  Patient is in no acute distress.  Head - Normocephalic, atraumatic.  Pupils - Equal, round, reactive to light.  Extraocular muscles are intact.  Conjunctiva is clear.  Nasal - Normal inspection.  No evidence of trauma or epistaxis.  Tympanic " membranes - Gray, intact without erythema or retractions.  Oral mucosa - Pink and moist without lesions or erythema.  Uvula is midline.  Chest wall - Atraumatic.  Chest wall is nontender.  There are no vesicular rashes noted.  Neck - Supple.  Trachea was midline.  There is no palpable lymphadenopathy or thyromegaly.  There are no meningeal signs  Lungs - Clear to auscultation and percussion bilaterally.  Heart - Irregularly irregular rate and rhythm without any murmurs, clicks, or gallops.  Abdomen - Soft.  Bowel sounds are present.  There is no palpable tenderness.  There is no rebound, guarding, or rigidity.  There are no palpable masses.  There are no pulsatile masses.  Back - Spine is straight and midline.  Mild discomfort with palpation of the paralumbar region.  There is no CVA tenderness.  Extremities - Intact x4 with full range of motion.  There is no palpable edema.  Pulses are intact x4 and equal.  Neurologic - Patient is awake, alert, and oriented x3.  Cranial nerves II through XII are grossly intact.  Motor and sensory functions grossly intact.  Cerebellar function was normal.  Integument - There are no rashes.  There are no petechia or purpura lesions noted.  There are no vesicular lesions noted.           Procedures:  Procedures      Medical Decision Making:      Comorbidities that affect care:    Unsteady gait/balance, degenerative disc disease, hyperlipidemia, restless leg syndrome, paroxysmal atrial fibrillation    External Notes reviewed:    Previous Clinic Note: Office visit with Dr. Maya from 4/4/2024 was reviewed by me.      The following orders were placed and all results were independently analyzed by me:  Orders Placed This Encounter   Procedures    Blood Culture - Blood,    Blood Culture - Blood,    COVID-19, FLU A/B, RSV PCR 1 HR TAT - Swab, Nasopharynx    XR Chest 1 View    CT Head Without Contrast    CT Head Without Contrast    XR Hips Bilateral With or Without Pelvis 2 View    XR  Spine Lumbar 2 or 3 View    XR Shoulder 2+ View Right    XR Shoulder 2+ View Left    [place] Draw    Comprehensive Metabolic Panel    Single High Sensitivity Troponin T    Magnesium    Urinalysis With Microscopic If Indicated (No Culture) - Urine, Clean Catch    CBC Auto Differential    CK    Protime-INR    CK    High Sensitivity Troponin T    Vitamin B12    TSH    Urinalysis, Microscopic Only - Urine, Clean Catch    CBC Auto Differential    High Sensitivity Troponin T 2Hr    Procalcitonin    Lactic Acid, Plasma    BNP    CK    Ferritin    CBC Auto Differential    Diet: Cardiac; Healthy Heart (2-3 Na+); Fluid Consistency: Thin (IDDSI 0)    Undress & Gown    Continuous Pulse Oximetry    Vital Signs    Straight cath    Vital Signs    Intake & Output    Weigh Patient    Oral Care    Maintain IV Access    Telemetry - Place Orders & Notify Provider of Results When Patient Experiences Acute Chest Pain, Dysrhythmia or Respiratory Distress    Up With Assistance    Strict Intake & Output    Nursing Dysphagia Screening (Complete Prior to Giving Anything By Mouth)    Elevate Heels Off of Bed    Turn Patient    Wound Care    Apply Moisture Barrier to All Bony Prominences    Wound Care    Skin Care    Skin Care    Wound Care Traumatic    Code Status and Medical Interventions:    Hospitalist (on-call MD unless specified)    Inpatient Case Management  Consult    OT Consult: Eval & Treat ADL Performance Below Baseline    PT Consult: Eval & Treat Discharge Placement Assessment    Oxygen Therapy- Nasal Cannula; Titrate 1-6 LPM Per SpO2; 90 - 95%    ECG 12 Lead ED Triage Standing Order; Weak / Dizzy / AMS    Wound Ostomy Eval & Treat    Wound Ostomy Eval & Treat    Insert Peripheral IV    Insert Peripheral IV    Inpatient Admission    Fall Precautions    Fall Precautions    CBC & Differential    Green Top (Gel)    Lavender Top    Gold Top - SST    Light Blue Top       Medications Given in the Emergency  Department:  Medications   sodium chloride 0.9 % flush 10 mL (has no administration in time range)   sodium chloride 0.9 % flush 10 mL (10 mL Intravenous Given 4/12/24 0944)   sodium chloride 0.9 % flush 10 mL (has no administration in time range)   sodium chloride 0.9 % infusion 40 mL (has no administration in time range)   aluminum-magnesium hydroxide-simethicone (MAALOX MAX) 400-400-40 MG/5ML suspension 15 mL (has no administration in time range)   melatonin tablet 5 mg (has no administration in time range)   nitroglycerin (NITROSTAT) SL tablet 0.4 mg (has no administration in time range)   sodium chloride 0.9 % infusion (150 mL/hr Intravenous New Bag 4/11/24 1047)   sennosides-docusate (PERICOLACE) 8.6-50 MG per tablet 2 tablet (has no administration in time range)     And   polyethylene glycol (MIRALAX) packet 17 g (has no administration in time range)     And   bisacodyl (DULCOLAX) EC tablet 5 mg (has no administration in time range)     And   bisacodyl (DULCOLAX) suppository 10 mg (has no administration in time range)   atorvastatin (LIPITOR) tablet 10 mg ( Oral Dose Auto Held 4/27/24 0900)   carbidopa-levodopa (SINEMET)  MG per tablet 1 tablet ( Oral Dose Auto Held 4/27/24 2100)   mirtazapine (REMERON) tablet 15 mg (15 mg Oral Given 4/11/24 2048)   furosemide (LASIX) tablet 20 mg ( Oral Dose Auto Held 4/27/24 2100)   nystatin (MYCOSTATIN) powder ( Topical Given 4/12/24 0943)   nicotine (NICODERM CQ) 21 MG/24HR patch 1 patch (has no administration in time range)   hydrOXYzine (ATARAX) tablet 25 mg (has no administration in time range)   ondansetron (ZOFRAN) injection 4 mg (has no administration in time range)   acetaminophen (TYLENOL) tablet 650 mg (650 mg Oral Given 4/12/24 0943)   Diclofenac Sodium (VOLTAREN) 1 % gel 2 g (has no administration in time range)   Lidocaine 4 % 1 patch (has no administration in time range)   prochlorperazine (COMPAZINE) injection 5 mg (has no administration in time range)    magnesium oxide (MAG-OX) tablet 400 mg (400 mg Oral Given 4/12/24 0944)   bacitracin 500 UNIT/GM ointment 0.9 g (0.9 g Topical Given 4/12/24 0943)   multivitamin with minerals 1 tablet (1 tablet Oral Given 4/12/24 0944)   rOPINIRole (REQUIP) tablet 0.5 mg (0.5 mg Oral Given 4/11/24 2047)   apixaban (ELIQUIS) tablet 5 mg (5 mg Oral Given 4/12/24 0944)   metoprolol succinate XL (TOPROL-XL) 24 hr tablet 12.5 mg (12.5 mg Oral Given 4/11/24 2048)   lactated ringers bolus 1,000 mL (1,000 mL Intravenous New Bag 4/10/24 1848)   magnesium sulfate 2g/50 mL (PREMIX) infusion (2 g Intravenous New Bag 4/11/24 1044)        ED Course:    ED Course as of 04/12/24 1218   Fri Apr 12, 2024 1217 EKG performed at 1456 was interpreted by me to show atrial fibrillation with a controlled ventricular rate of 98 bpm.  There is no discernible P waves.  QRS interval is normal.  Axis is leftward -57 degrees.  There is no acute ischemic ST or T wave change identified.  QT corrected is 472 ms. [TB]      ED Course User Index  [TB] John Murphy DO   The patient was seen and evaluated in the ED by me.  The above history and physical examination was performed as documented.  Diagnostic data was obtained.  Results reviewed.  Patient is found to be in acute rhabdomyolysis.  Patient was given IV fluids as well as started on a bicarb drip.  Bicarb drip was later discontinued at the direction of the hospitalist.  Hospital service will admit the patient.    Labs:    Lab Results (last 24 hours)       Procedure Component Value Units Date/Time    Basic Metabolic Panel [[id number]]  (Abnormal) Collected: 04/12/24 0606    Specimen: Blood from Arm, Left Updated: 04/12/24 0713     Glucose 97 mg/dL      BUN 19 mg/dL      Creatinine 1.10 mg/dL      Sodium 139 mmol/L      Potassium 4.2 mmol/L      Chloride 106 mmol/L      CO2 23.3 mmol/L      Calcium 7.8 mg/dL      BUN/Creatinine Ratio 17.3     Anion Gap 9.7 mmol/L      eGFR 65.4 mL/min/1.73     Narrative:       GFR Normal >60  Chronic Kidney Disease <60  Kidney Failure <15    The GFR formula is only valid for adults with stable renal function between ages 18 and 70.    CBC & Differential [[id number]]  (Abnormal) Collected: 04/12/24 0606    Specimen: Blood from Arm, Left Updated: 04/12/24 0626    Narrative:      The following orders were created for panel order CBC & Differential.  Procedure                               Abnormality         Status                     ---------                               -----------         ------                     CBC Auto Differential[[id number]]        Abnormal            Final result                 Please view results for these tests on the individual orders.    Magnesium [[id number]]  (Normal) Collected: 04/12/24 0606    Specimen: Blood from Arm, Left Updated: 04/12/24 0713     Magnesium 2.0 mg/dL     Phosphorus [[id number]]  (Normal) Collected: 04/12/24 0606    Specimen: Blood from Arm, Left Updated: 04/12/24 0713     Phosphorus 2.5 mg/dL     CK [[id number]]  (Abnormal) Collected: 04/12/24 0606    Specimen: Blood from Arm, Left Updated: 04/12/24 0727     Creatine Kinase 2,341 U/L     Ferritin [[id number]]  (Normal) Collected: 04/12/24 0606    Specimen: Blood from Arm, Left Updated: 04/12/24 0652     Ferritin 128.20 ng/mL     Narrative:      <12 ng/mL usually associated with Iron Deficiency Anemia. Above normal range levels may be due to Hepatic and/or Chronic Inflammatory Disease.  Results may be falsely decreased if patient taking Biotin.      CBC Auto Differential [[id number]]  (Abnormal) Collected: 04/12/24 0606    Specimen: Blood from Arm, Left Updated: 04/12/24 0626     WBC 7.86 10*3/mm3      RBC 4.49 10*6/mm3      Hemoglobin 12.7 g/dL      Hematocrit 40.9 %      MCV 91.1 fL      MCH 28.3 pg      MCHC 31.1 g/dL      RDW 14.5 %      RDW-SD 48.4 fl      MPV 9.1 fL      Platelets 253 10*3/mm3      Neutrophil % 67.8 %      Lymphocyte % 14.4 %      Monocyte % 14.4 %      Eosinophil %  2.5 %      Basophil % 0.6 %      Immature Grans % 0.3 %      Neutrophils, Absolute 5.33 10*3/mm3      Lymphocytes, Absolute 1.13 10*3/mm3      Monocytes, Absolute 1.13 10*3/mm3      Eosinophils, Absolute 0.20 10*3/mm3      Basophils, Absolute 0.05 10*3/mm3      Immature Grans, Absolute 0.02 10*3/mm3      nRBC 0.0 /100 WBC              Imaging:    CT Head Without Contrast    Result Date: 4/11/2024   DATE OF EXAM: 4/11/2024 4:02 PM  PROCEDURE: CT HEAD WO CONTRAST-  INDICATIONS: Head trauma, minor (Age >= 65y); T79.6XXA-Traumatic ischemia of muscle, initial encounter; W19.XXXA-Unspecified fall, initial encounter; Z78.9-Other specified health status; R26.2-Difficulty in walking, not elsewhere classified  COMPARISON: CT head without contrast 4/10/2024, CT neck 12/20/2006  TECHNIQUE: Routine transaxial cuts were obtained through the head without the administration of contrast. Automated exposure control and iterative reconstruction methods were used.  FINDINGS: No intracranial hemorrhage. There is severe dilatation of the lateral ventricles which appears similar to the prior study. Third ventricle also dilated measuring up to 1.8 cm in transverse diameter, unchanged. Negative for midline shift or mass effect. Posterior fossa without acute abnormality. Mild cerebellar atrophy. The globes are symmetric. No retro-orbital abnormality. The mastoid air cells are well-aerated. Negative for calvarial fracture. No sinus fluid level.      1. No intracranial hemorrhage. 2. Stable marked ventricular dilatation/chronic hydrocephalus.  Electronically Signed By-Michele Ruelas MD On:4/11/2024 4:21 PM         Differential Diagnosis and Discussion:    Trauma:  Differential diagnosis considered but not limited to were subarachnoid hemorrhage, intracranial bleeding, pneumothorax, cardiac contusion, lung contusion, intra-abdominal bleeding, and compartment syndrome of any extremity or other significant traumatic pathology    All labs were  reviewed and interpreted by me.  All X-rays impressions were independently interpreted by me.  EKG was interpreted by me.  CT scan radiology impression was interpreted by me.    MDM     Amount and/or Complexity of Data Reviewed  Decide to obtain previous medical records or to obtain history from someone other than the patient: yes             Patient Care Considerations:    SEPSIS IS NOT PRESENT IN THE EMERGENCY DEPARTMENT: Patient meets SIRS criteria in the emergency department however the patient does not have a known source of bacterial infection to confirm the diagnosis of sepsis.        Consultants/Shared Management Plan:    Hospitalist: I have discussed the case with Dr. Wang who agrees to accept the patient for admission.    Social Determinants of Health:    Patient is independent, reliable, and has access to care.       Disposition and Care Coordination:    Admit:   Through independent evaluation of the patient's history, physical, and imperical data, the patient meets criteria for inpatient admission to the hospital.        Final diagnoses:   Traumatic rhabdomyolysis, initial encounter   Fall, initial encounter        ED Disposition       ED Disposition   Decision to Admit    Condition   --    Comment   Level of Care: Remote Telemetry [26]   Diagnosis: Rhabdomyolysis [728.88.ICD-9-CM]   Admitting Physician: KELSY WANG [[id number]]   Attending Physician: KELSY WANG [[id number]]   Certification: I Certify That Inpatient Hospital Services Are Medically Necessary For Greater Than 2 Midnights                 This medical record created using voice recognition software.             John Murphy DO  04/12/24 1216     Patient requests all Lab, Cardiology, and Radiology Results on their Discharge Instructions

## 2025-04-24 RX ORDER — FUROSEMIDE 20 MG/1
20 TABLET ORAL 2 TIMES DAILY
Qty: 180 TABLET | Refills: 2 | OUTPATIENT
Start: 2025-04-24

## 2025-05-02 NOTE — PLAN OF CARE
"Goal Outcome Evaluation:  Plan of Care Reviewed With: patient        Progress: improving  Outcome Evaluation: patient has been alert and oriented, on room air with no signs of distress, patient went to MRI today due to left sided weakness and pupils not reactive to light, patient stated that his left arm has been weak for \"awhile.\" MD aware.                             " -4/23: episode of othostatic hypotention during PT (165/79 ---> 113/65), resolved after sitting  -FR advanced to 2L daily

## 2025-05-16 ENCOUNTER — TELEPHONE (OUTPATIENT)
Dept: FAMILY MEDICINE CLINIC | Facility: CLINIC | Age: 89
End: 2025-05-16

## 2025-05-16 NOTE — TELEPHONE ENCOUNTER
Caller: BHAVYA [facility]    Relationship: Home Health    Best call back number:   Telephone Information:   Mobile [phone]      Who are you requesting to speak with (clinical staff, provider,  specific staff member): CLINICAL     What was the call regarding: BHAVYA HOME HEALTH NURSE STATES PATIENT MISSED HIS PT OT AND SKILLED NURSING VISIT BUT WAS DISCHARGED FROM CARE DUE TO MEETING ALL GOALS

## 2025-07-01 ENCOUNTER — TELEPHONE (OUTPATIENT)
Dept: UROLOGY | Age: 89
End: 2025-07-01
Payer: MEDICARE

## 2025-07-01 ENCOUNTER — HOSPITAL ENCOUNTER (OUTPATIENT)
Dept: CT IMAGING | Facility: HOSPITAL | Age: 89
Discharge: HOME | End: 2025-07-01
Admitting: UROLOGY
Payer: MEDICARE

## 2025-07-01 DIAGNOSIS — N21.0 BLADDER STONE: ICD-10-CM

## 2025-07-01 PROCEDURE — 74176 CT ABD & PELVIS W/O CONTRAST: CPT

## 2025-07-01 NOTE — TELEPHONE ENCOUNTER
Hub staff attempted to follow warm transfer process and was unsuccessful     Caller: DANIEL LEON    Relationship to patient: SELF    Best call back number: [phone]    Patient is needing: PT IS RETURNING CALL TO OFFICE. TRYING TO SEE IFF APPT IS BEING MOVED. PLEAS CALL PT BACK TO [facility]. THANK YOU.

## 2025-07-02 ENCOUNTER — TELEPHONE (OUTPATIENT)
Dept: FAMILY MEDICINE CLINIC | Facility: CLINIC | Age: 89
End: 2025-07-02
Payer: MEDICARE

## 2025-07-02 NOTE — TELEPHONE ENCOUNTER
Caller: JHOANA ATWOOD    Relationship: Emergency Contact    Best call back number:     [phone]      Equipment requested: WHEELCHAIR     Prescribing Provider: MD MORENO    Additional information or concerns: ADVISED AN ORDER WAS DONE FOR WHEELCHAIR DURING FIRST VISIT WITH PATIENT ON 3.26.2025, HOWEVER THERE HAS NOT BEEN ANY OTHER COMMUNICATION ON STATUS AND THE PATIENT HAS NOT RECEIVED THE WHEELCHAIR. PLEASE ADVISE.

## 2025-07-10 ENCOUNTER — OFFICE VISIT (OUTPATIENT)
Dept: UROLOGY | Age: 89
End: 2025-07-10
Payer: MEDICARE

## 2025-07-10 VITALS — HEIGHT: 74 IN | BODY MASS INDEX: 28.23 KG/M2

## 2025-07-10 DIAGNOSIS — R39.14 BENIGN PROSTATIC HYPERPLASIA WITH INCOMPLETE BLADDER EMPTYING: ICD-10-CM

## 2025-07-10 DIAGNOSIS — N40.1 BENIGN PROSTATIC HYPERPLASIA WITH INCOMPLETE BLADDER EMPTYING: ICD-10-CM

## 2025-07-10 DIAGNOSIS — N21.0 BLADDER STONE: Primary | ICD-10-CM

## 2025-07-10 RX ORDER — TAMSULOSIN HYDROCHLORIDE 0.4 MG/1
1 CAPSULE ORAL NIGHTLY
Qty: 90 CAPSULE | Refills: 1 | Status: SHIPPED | OUTPATIENT
Start: 2025-07-10

## 2025-07-10 RX ORDER — FINASTERIDE 5 MG/1
5 TABLET, FILM COATED ORAL NIGHTLY
Qty: 90 TABLET | Refills: 1 | Status: SHIPPED | OUTPATIENT
Start: 2025-07-10

## 2025-07-10 NOTE — PROGRESS NOTES
UROLOGY OFFICE FOLLOW UP NOTE    Subjective   HPI  Enrique Wylie is a 89 y.o. male.  History of BPH, nephrolithiasis, last seen for inpatient consultation for CT scan findings including bladder diverticulum containing small air bubble and 4 mm stone located just superior to the left UVJ with mild diffuse left hydroureteronephrosis.  Hydroureteronephrosis significantly improved from prior examinations.  Patient had been admitted for altered mental status and generalized weakness, fever of unknown origin.     History of Present Illness  The patient is an 89-year-old male who presents for evaluation of a bladder stone. He is accompanied by his friends, POA to assist him in making medical decisions.     Patient now home.  States he is doing well.  He reports satisfactory urinary function with no associated back pain.    He was hospitalized on 03/12/2025 due to fever, which was initially suspected to be secondary to a urinary tract infection (UTI). However, his urine was clear, and there was no evidence of a UTI  He is currently not on any anticoagulant therapy. His appetite remains robust.     No complaints today.       Update 7/10/2025: Presents for follow-up left hydroureteronephrosis; history of BPH, bladder diverticulum containing a stone; repeat CT without contrast prior.  Presents with his friends,POA who assist with history.  History of Present Illness  He reports no discomfort during urination, presence of blood in urine, or difficulty in urinating.  Wears depends.  He occasionally needs to use the bathroom at night and has no symptoms of illness, infection, or pain. There is no back pain reported.  Unable to void in office.    He had a consultation with Dr. Li on 03/29/2025, who was supposed to arrange for a wheelchair for him. However, no action has been taken so far. He has a follow-up appointment with her on 09/29/2025. He believes that Medicare will cover 80% of the cost if it is ordered by a  "doctor. He plans to contact Dr. Li's office again tomorrow. He has been receiving home health therapy for some time now.      ______________  Creatinine 3/11/2025: 1.07    CT abdomen pelvis stone protocol 7/1/2025: No evidence for hydronephrosis bilaterally. Punctate nonobstructing nephrolithiasis noted bilaterally. Ureters appear decompressed bilaterally. No evidence for ureteral calculi. Exophytic hypoattenuating structure again noted at the posterior aspect of   the left kidney, and may potentially represent a cyst. Urinary bladder appears distended. A urinary bladder diverticulum containing a calculus is again noted on the left. Prostate is enlarged, with mass effect upon the bladder base.    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  Hydroureteronephrosis significantly improved from prior examinations.             Review of systems  A review of systems was performed, and positive findings are noted in the HPI.    Objective     Vital Signs:   Ht 188 cm (74.02\")   BMI 28.23 kg/m²       Physical exam  No acute distress, well-nourished  Awake alert and oriented  Mood normal; affect normal  Physical Exam    Bladder Scan interpretation 07/10/2025    Estimation of  urine via BVI 3000 [place] Bladder Scan  MA/nurse performing: PEDRO Isabel  Residual Urine: 573 ml  Indication: Bladder stone    Benign prostatic hyperplasia with incomplete bladder emptying   Position: Supine  Examination: Incremental scanning of the suprapubic area using 2.0 MHz transducer using copious amounts of acoustic gel.   Findings: An anechoic area was demonstrated which represented the bladder, with measurement of urine as noted. I inspected this myself. Refer to plan for treatment and plan necessary at this time.         Problem List:  Patient Active " Problem List   Diagnosis    Balance problem    Restless leg syndrome    Encounter for long-term (current) use of other medications    Lumbar spinal stenosis    Grief reaction    Hyperlipidemia    Unsteady gait when walking    Death of wife    Lumbar spinal stenosis    Advance directive in chart    Skin cancer    Nephrolithiasis    Left ureteral stone    Foreign body in genitourinary tract    Advance directive in chart    Bilateral sensorineural hearing loss    Cardiomegaly    Essential hypertension    Fall at home, initial encounter    Paroxysmal atrial fibrillation    Weight loss, unintentional    Adjustment disorder with depressed mood    Back pain    Rhabdomyolysis    JOSE (acute kidney injury)    BRBPR (bright red blood per rectum)    Proctitis    Syncope    Bladder stone    Other hydronephrosis       Assessment & Plan   Diagnoses and all orders for this visit:    1. Bladder stone (Primary)  -     Bladder Scan    2. Benign prostatic hyperplasia with incomplete bladder emptying  -     tamsulosin (FLOMAX) 0.4 MG capsule 24 hr capsule; Take 1 capsule by mouth Every Night.  Dispense: 90 capsule; Refill: 1  -     finasteride (PROSCAR) 5 MG tablet; Take 1 tablet by mouth Every Night.  Dispense: 90 tablet; Refill: 1      Bladder scan performed.  Patient unable to void prior but without significant evidence of retention.  Patient wears briefs.  Assessment & Plan  Left hydronephrosis.  The CT scan shows improvement in hydronephrosis with no backup pressure to the left kidney. The bladder somewhat distended with outpouching, likely due to pressure from an enlarged prostate. There is a small calcium deposit in the diverticulum, which could be due to sediment accumulation or abnormal tissue including malignancy within the differential diagnosis.  Discussed again option of direct visualization via cystoscopy, stone extraction.  Again patient expresses interest in conservative approach despite risk of underlying malignancy.       The patient is not experiencing pain, blood in urine, or back pain.     A renal ultrasound will be scheduled in 6 months to monitor the kidneys.     He will be started on finasteride to shrink the prostate and tamsulosin to facilitate bladder emptying. Prescriptions were sent to the pharmacy.  Side effects discussed.  If he experiences difficulty urinating, dribbling, or left-sided pain, he should inform us immediately.    Mobility issues.  The patient mentioned a previous visit to Dr. Li on 03/29/2025, regarding a wheelchair order, which has not been fulfilled yet. It was noted that Dr. Li's office had placed an order for a wheelchair on 03/26/2025. He was advised to follow up with Dr. Li's office to check the status of the wheelchair order and to inform us if further assistance is needed.         Patient encouraged to follow-up 6 months, with renal ultrasound prior, PVR patient able to void in office  All questions addressed      Patient or patient representative verbalized consent for the use of Ambient Listening during the visit with  Katerina Carpenter MD for chart documentation. 7/10/2025  18:30 EDT

## 2025-07-11 DIAGNOSIS — E78.2 MIXED HYPERLIPIDEMIA: ICD-10-CM

## 2025-07-11 RX ORDER — ATORVASTATIN CALCIUM 10 MG/1
10 TABLET, FILM COATED ORAL DAILY
Qty: 90 TABLET | Refills: 3 | Status: SHIPPED | OUTPATIENT
Start: 2025-07-11

## 2025-07-11 NOTE — TELEPHONE ENCOUNTER
Caller: ETHEL ATWOODLEEN    Relationship: Emergency Contact    Best call back number: [phone]    Requested Prescriptions:   Requested Prescriptions     Pending Prescriptions Disp Refills    atorvastatin (LIPITOR) 10 MG tablet 90 tablet 3     Sig: Take 1 tablet by mouth Daily.        Pharmacy where request should be sent: [facility] - [place], [place] - [address] - [phone]  - [phone]     Last office visit with prescribing clinician: 3/26/2025   Last telemedicine visit with prescribing clinician: Visit date not found   Next office visit with prescribing clinician: 9/29/2025     Additional details provided by patient:     Does the patient have less than a 3 day supply:  [x] Yes  [] No        Antionette Abraham Rep   07/11/25 09:26 EDT

## 2025-07-11 NOTE — TELEPHONE ENCOUNTER
Phone call placed to [facility] to check status, was informed they were waiting on additional documentation. Documentation has been sent. Phone call placed to patient contact to inform of status. Also provided number to [facility] to contact if they do not hear anything.

## 2025-07-18 ENCOUNTER — TELEPHONE (OUTPATIENT)
Dept: FAMILY MEDICINE CLINIC | Facility: CLINIC | Age: 89
End: 2025-07-18

## 2025-08-08 ENCOUNTER — HOSPITAL ENCOUNTER (INPATIENT)
Facility: HOSPITAL | Age: 89
LOS: 18 days | Discharge: NURSING, REHAB OR LONG-TERM CARE | End: 2025-08-26
Attending: PHARMACIST | Admitting: STUDENT IN AN ORGANIZED HEALTH CARE EDUCATION/TRAINING PROGRAM
Payer: MEDICARE

## 2025-08-08 ENCOUNTER — APPOINTMENT (OUTPATIENT)
Dept: CT IMAGING | Facility: HOSPITAL | Age: 89
End: 2025-08-08
Payer: MEDICARE

## 2025-08-08 ENCOUNTER — APPOINTMENT (OUTPATIENT)
Dept: GENERAL RADIOLOGY | Facility: HOSPITAL | Age: 89
End: 2025-08-08
Payer: MEDICARE

## 2025-08-08 DIAGNOSIS — Z78.9 DECREASED ACTIVITIES OF DAILY LIVING (ADL): ICD-10-CM

## 2025-08-08 DIAGNOSIS — G93.41 ACUTE METABOLIC ENCEPHALOPATHY: Primary | ICD-10-CM

## 2025-08-08 DIAGNOSIS — R26.2 DIFFICULTY WALKING: ICD-10-CM

## 2025-08-08 DIAGNOSIS — R53.1 GENERALIZED WEAKNESS: ICD-10-CM

## 2025-08-08 DIAGNOSIS — I48.91 ATRIAL FIBRILLATION, UNSPECIFIED TYPE: ICD-10-CM

## 2025-08-08 DIAGNOSIS — I50.9 CONGESTIVE HEART FAILURE, UNSPECIFIED HF CHRONICITY, UNSPECIFIED HEART FAILURE TYPE: ICD-10-CM

## 2025-08-08 PROBLEM — A41.9 SEPSIS: Status: ACTIVE | Noted: 2025-08-08

## 2025-08-08 LAB
ALBUMIN SERPL-MCNC: 4.2 G/DL (ref 3.5–5.2)
ALBUMIN/GLOB SERPL: 1.3 G/DL
ALP SERPL-CCNC: 110 U/L (ref 39–117)
ALT SERPL W P-5'-P-CCNC: 17 U/L (ref 1–41)
ANION GAP SERPL CALCULATED.3IONS-SCNC: 12.3 MMOL/L (ref 5–15)
APTT PPP: 30.8 SECONDS (ref 78–95.9)
AST SERPL-CCNC: 46 U/L (ref 1–40)
BACTERIA UR QL AUTO: ABNORMAL /HPF
BASOPHILS # BLD AUTO: 0.04 10*3/MM3 (ref 0–0.2)
BASOPHILS NFR BLD AUTO: 0.2 % (ref 0–1.5)
BILIRUB SERPL-MCNC: 2 MG/DL (ref 0–1.2)
BILIRUB UR QL STRIP: NEGATIVE
BUN SERPL-MCNC: 15.1 MG/DL (ref 8–23)
BUN/CREAT SERPL: 13.1 (ref 7–25)
CALCIUM SPEC-SCNC: 9.1 MG/DL (ref 8.6–10.5)
CHLORIDE SERPL-SCNC: 98 MMOL/L (ref 98–107)
CLARITY UR: CLEAR
CO2 SERPL-SCNC: 24.7 MMOL/L (ref 22–29)
COLOR UR: YELLOW
CREAT SERPL-MCNC: 1.15 MG/DL (ref 0.76–1.27)
D-LACTATE SERPL-SCNC: 2 MMOL/L (ref 0.5–2)
DEPRECATED RDW RBC AUTO: 44.6 FL (ref 37–54)
EGFRCR SERPLBLD CKD-EPI 2021: 61.2 ML/MIN/1.73
EOSINOPHIL # BLD AUTO: 0 10*3/MM3 (ref 0–0.4)
EOSINOPHIL NFR BLD AUTO: 0 % (ref 0.3–6.2)
ERYTHROCYTE [DISTWIDTH] IN BLOOD BY AUTOMATED COUNT: 12.7 % (ref 12.3–15.4)
GEN 5 1HR TROPONIN T REFLEX: 31 NG/L
GLOBULIN UR ELPH-MCNC: 3.3 GM/DL
GLUCOSE SERPL-MCNC: 129 MG/DL (ref 65–99)
GLUCOSE UR STRIP-MCNC: NEGATIVE MG/DL
HCT VFR BLD AUTO: 49.1 % (ref 37.5–51)
HGB BLD-MCNC: 16.8 G/DL (ref 13–17.7)
HGB UR QL STRIP.AUTO: ABNORMAL
HOLD SPECIMEN: NORMAL
HOLD SPECIMEN: NORMAL
HYALINE CASTS UR QL AUTO: ABNORMAL /LPF
IMM GRANULOCYTES # BLD AUTO: 0.06 10*3/MM3 (ref 0–0.05)
IMM GRANULOCYTES NFR BLD AUTO: 0.4 % (ref 0–0.5)
INR PPP: 1.22 (ref 0.86–1.15)
KETONES UR QL STRIP: ABNORMAL
LEUKOCYTE ESTERASE UR QL STRIP.AUTO: ABNORMAL
LYMPHOCYTES # BLD AUTO: 0.54 10*3/MM3 (ref 0.7–3.1)
LYMPHOCYTES NFR BLD AUTO: 3.3 % (ref 19.6–45.3)
MAGNESIUM SERPL-MCNC: 2 MG/DL (ref 1.6–2.4)
MCH RBC QN AUTO: 32.6 PG (ref 26.6–33)
MCHC RBC AUTO-ENTMCNC: 34.2 G/DL (ref 31.5–35.7)
MCV RBC AUTO: 95.2 FL (ref 79–97)
MONOCYTES # BLD AUTO: 1.62 10*3/MM3 (ref 0.1–0.9)
MONOCYTES NFR BLD AUTO: 9.8 % (ref 5–12)
NEUTROPHILS NFR BLD AUTO: 14.25 10*3/MM3 (ref 1.7–7)
NEUTROPHILS NFR BLD AUTO: 86.3 % (ref 42.7–76)
NITRITE UR QL STRIP: NEGATIVE
NRBC BLD AUTO-RTO: 0 /100 WBC (ref 0–0.2)
PH UR STRIP.AUTO: 8 [PH] (ref 5–8)
PLATELET # BLD AUTO: 216 10*3/MM3 (ref 140–450)
PMV BLD AUTO: 8.9 FL (ref 6–12)
POTASSIUM SERPL-SCNC: 3.9 MMOL/L (ref 3.5–5.2)
PROCALCITONIN SERPL-MCNC: 0.36 NG/ML (ref 0–0.25)
PROT SERPL-MCNC: 7.5 G/DL (ref 6–8.5)
PROT UR QL STRIP: ABNORMAL
PROTHROMBIN TIME: 16 SECONDS (ref 11.8–14.9)
QT INTERVAL: 370 MS
QTC INTERVAL: 516 MS
RBC # BLD AUTO: 5.16 10*6/MM3 (ref 4.14–5.8)
RBC # UR STRIP: ABNORMAL /HPF
REF LAB TEST METHOD: ABNORMAL
SODIUM SERPL-SCNC: 135 MMOL/L (ref 136–145)
SP GR UR STRIP: 1.02 (ref 1–1.03)
SQUAMOUS #/AREA URNS HPF: ABNORMAL /HPF
TROPONIN T % DELTA: -11
TROPONIN T NUMERIC DELTA: -4 NG/L
TROPONIN T SERPL HS-MCNC: 35 NG/L
UROBILINOGEN UR QL STRIP: ABNORMAL
WBC # UR STRIP: ABNORMAL /HPF
WBC NRBC COR # BLD AUTO: 16.51 10*3/MM3 (ref 3.4–10.8)
WHOLE BLOOD HOLD COAG: NORMAL
WHOLE BLOOD HOLD SPECIMEN: NORMAL

## 2025-08-08 PROCEDURE — 70450 CT HEAD/BRAIN W/O DYE: CPT

## 2025-08-08 PROCEDURE — 87040 BLOOD CULTURE FOR BACTERIA: CPT | Performed by: PHARMACIST

## 2025-08-08 PROCEDURE — 71045 X-RAY EXAM CHEST 1 VIEW: CPT

## 2025-08-08 PROCEDURE — 80053 COMPREHEN METABOLIC PANEL: CPT | Performed by: PHARMACIST

## 2025-08-08 PROCEDURE — 99222 1ST HOSP IP/OBS MODERATE 55: CPT | Performed by: STUDENT IN AN ORGANIZED HEALTH CARE EDUCATION/TRAINING PROGRAM

## 2025-08-08 PROCEDURE — 84145 PROCALCITONIN (PCT): CPT | Performed by: PHARMACIST

## 2025-08-08 PROCEDURE — 93010 ELECTROCARDIOGRAM REPORT: CPT | Performed by: INTERNAL MEDICINE

## 2025-08-08 PROCEDURE — 81001 URINALYSIS AUTO W/SCOPE: CPT | Performed by: PHARMACIST

## 2025-08-08 PROCEDURE — 83735 ASSAY OF MAGNESIUM: CPT | Performed by: PHARMACIST

## 2025-08-08 PROCEDURE — 25810000003 SODIUM CHLORIDE 0.9 % SOLUTION: Performed by: PHARMACIST

## 2025-08-08 PROCEDURE — 84484 ASSAY OF TROPONIN QUANT: CPT | Performed by: PHARMACIST

## 2025-08-08 PROCEDURE — 85610 PROTHROMBIN TIME: CPT | Performed by: PHARMACIST

## 2025-08-08 PROCEDURE — 83605 ASSAY OF LACTIC ACID: CPT | Performed by: PHARMACIST

## 2025-08-08 PROCEDURE — 87154 CUL TYP ID BLD PTHGN 6+ TRGT: CPT | Performed by: PHARMACIST

## 2025-08-08 PROCEDURE — 87147 CULTURE TYPE IMMUNOLOGIC: CPT | Performed by: PHARMACIST

## 2025-08-08 PROCEDURE — 25810000003 SEPSIS FLUID NS 0.9 % SOLUTION: Performed by: PHARMACIST

## 2025-08-08 PROCEDURE — 85730 THROMBOPLASTIN TIME PARTIAL: CPT | Performed by: PHARMACIST

## 2025-08-08 PROCEDURE — 72125 CT NECK SPINE W/O DYE: CPT

## 2025-08-08 PROCEDURE — 25010000002 CEFTRIAXONE PER 250 MG: Performed by: PHARMACIST

## 2025-08-08 PROCEDURE — 85025 COMPLETE CBC W/AUTO DIFF WBC: CPT | Performed by: PHARMACIST

## 2025-08-08 PROCEDURE — 74176 CT ABD & PELVIS W/O CONTRAST: CPT

## 2025-08-08 PROCEDURE — 36415 COLL VENOUS BLD VENIPUNCTURE: CPT

## 2025-08-08 PROCEDURE — 25810000003 SODIUM CHLORIDE 0.9 % SOLUTION: Performed by: STUDENT IN AN ORGANIZED HEALTH CARE EDUCATION/TRAINING PROGRAM

## 2025-08-08 PROCEDURE — 25010000002 HEPARIN (PORCINE) PER 1000 UNITS: Performed by: STUDENT IN AN ORGANIZED HEALTH CARE EDUCATION/TRAINING PROGRAM

## 2025-08-08 PROCEDURE — 99285 EMERGENCY DEPT VISIT HI MDM: CPT

## 2025-08-08 PROCEDURE — 93005 ELECTROCARDIOGRAM TRACING: CPT | Performed by: PHARMACIST

## 2025-08-08 RX ORDER — AMOXICILLIN 250 MG
2 CAPSULE ORAL 2 TIMES DAILY PRN
Status: DISCONTINUED | OUTPATIENT
Start: 2025-08-08 | End: 2025-08-12

## 2025-08-08 RX ORDER — SODIUM CHLORIDE 0.9 % (FLUSH) 0.9 %
10 SYRINGE (ML) INJECTION AS NEEDED
Status: DISCONTINUED | OUTPATIENT
Start: 2025-08-08 | End: 2025-08-26 | Stop reason: HOSPADM

## 2025-08-08 RX ORDER — BISACODYL 10 MG
10 SUPPOSITORY, RECTAL RECTAL DAILY PRN
Status: DISCONTINUED | OUTPATIENT
Start: 2025-08-08 | End: 2025-08-12

## 2025-08-08 RX ORDER — BISACODYL 5 MG/1
5 TABLET, DELAYED RELEASE ORAL DAILY PRN
Status: DISCONTINUED | OUTPATIENT
Start: 2025-08-08 | End: 2025-08-12

## 2025-08-08 RX ORDER — ONDANSETRON 2 MG/ML
4 INJECTION INTRAMUSCULAR; INTRAVENOUS EVERY 6 HOURS PRN
Status: DISCONTINUED | OUTPATIENT
Start: 2025-08-08 | End: 2025-08-12

## 2025-08-08 RX ORDER — ACETAMINOPHEN 650 MG/1
650 SUPPOSITORY RECTAL EVERY 4 HOURS PRN
Status: DISCONTINUED | OUTPATIENT
Start: 2025-08-08 | End: 2025-08-12

## 2025-08-08 RX ORDER — ONDANSETRON 4 MG/1
4 TABLET, ORALLY DISINTEGRATING ORAL EVERY 6 HOURS PRN
Status: DISCONTINUED | OUTPATIENT
Start: 2025-08-08 | End: 2025-08-26 | Stop reason: HOSPADM

## 2025-08-08 RX ORDER — ACETAMINOPHEN 160 MG/5ML
650 SOLUTION ORAL EVERY 4 HOURS PRN
Status: DISCONTINUED | OUTPATIENT
Start: 2025-08-08 | End: 2025-08-12

## 2025-08-08 RX ORDER — NITROGLYCERIN 0.4 MG/1
0.4 TABLET SUBLINGUAL
Status: DISCONTINUED | OUTPATIENT
Start: 2025-08-08 | End: 2025-08-26 | Stop reason: HOSPADM

## 2025-08-08 RX ORDER — ACETAMINOPHEN 325 MG/1
650 TABLET ORAL EVERY 4 HOURS PRN
Status: DISCONTINUED | OUTPATIENT
Start: 2025-08-08 | End: 2025-08-12

## 2025-08-08 RX ORDER — POLYETHYLENE GLYCOL 3350 17 G/17G
17 POWDER, FOR SOLUTION ORAL DAILY PRN
Status: DISCONTINUED | OUTPATIENT
Start: 2025-08-08 | End: 2025-08-12

## 2025-08-08 RX ORDER — SODIUM CHLORIDE 9 MG/ML
40 INJECTION, SOLUTION INTRAVENOUS AS NEEDED
Status: DISCONTINUED | OUTPATIENT
Start: 2025-08-08 | End: 2025-08-26 | Stop reason: HOSPADM

## 2025-08-08 RX ORDER — HEPARIN SODIUM 5000 [USP'U]/ML
5000 INJECTION, SOLUTION INTRAVENOUS; SUBCUTANEOUS EVERY 12 HOURS SCHEDULED
Status: DISCONTINUED | OUTPATIENT
Start: 2025-08-08 | End: 2025-08-26 | Stop reason: HOSPADM

## 2025-08-08 RX ORDER — NYSTATIN 100000 U/G
1 OINTMENT TOPICAL EVERY 12 HOURS SCHEDULED
Status: DISCONTINUED | OUTPATIENT
Start: 2025-08-09 | End: 2025-08-26 | Stop reason: HOSPADM

## 2025-08-08 RX ORDER — SODIUM CHLORIDE 0.9 % (FLUSH) 0.9 %
10 SYRINGE (ML) INJECTION EVERY 12 HOURS SCHEDULED
Status: DISCONTINUED | OUTPATIENT
Start: 2025-08-08 | End: 2025-08-26 | Stop reason: HOSPADM

## 2025-08-08 RX ORDER — SODIUM CHLORIDE 9 MG/ML
75 INJECTION, SOLUTION INTRAVENOUS CONTINUOUS
Status: DISCONTINUED | OUTPATIENT
Start: 2025-08-08 | End: 2025-08-09

## 2025-08-08 RX ADMIN — SODIUM CHLORIDE 2865 ML: 9 INJECTION, SOLUTION INTRAVENOUS at 18:05

## 2025-08-08 RX ADMIN — SODIUM CHLORIDE 75 ML/HR: 9 INJECTION, SOLUTION INTRAVENOUS at 21:31

## 2025-08-08 RX ADMIN — SODIUM CHLORIDE 500 ML: 9 INJECTION, SOLUTION INTRAVENOUS at 15:49

## 2025-08-08 RX ADMIN — Medication 10 MG: at 21:31

## 2025-08-08 RX ADMIN — HEPARIN SODIUM 5000 UNITS: 5000 INJECTION INTRAVENOUS; SUBCUTANEOUS at 21:31

## 2025-08-08 RX ADMIN — Medication 10 ML: at 21:31

## 2025-08-08 RX ADMIN — SODIUM CHLORIDE 2000 MG: 9 INJECTION, SOLUTION INTRAVENOUS at 18:05

## 2025-08-09 LAB
ANION GAP SERPL CALCULATED.3IONS-SCNC: 8.7 MMOL/L (ref 5–15)
BACTERIA BLD CULT: ABNORMAL
BASOPHILS # BLD AUTO: 0.04 10*3/MM3 (ref 0–0.2)
BASOPHILS NFR BLD AUTO: 0.3 % (ref 0–1.5)
BOTTLE TYPE: ABNORMAL
BUN SERPL-MCNC: 13.4 MG/DL (ref 8–23)
BUN/CREAT SERPL: 14.3 (ref 7–25)
CALCIUM SPEC-SCNC: 8.5 MG/DL (ref 8.6–10.5)
CHLORIDE SERPL-SCNC: 105 MMOL/L (ref 98–107)
CO2 SERPL-SCNC: 22.3 MMOL/L (ref 22–29)
CREAT SERPL-MCNC: 0.94 MG/DL (ref 0.76–1.27)
DEPRECATED RDW RBC AUTO: 46 FL (ref 37–54)
EGFRCR SERPLBLD CKD-EPI 2021: 78 ML/MIN/1.73
EOSINOPHIL # BLD AUTO: 0.01 10*3/MM3 (ref 0–0.4)
EOSINOPHIL NFR BLD AUTO: 0.1 % (ref 0.3–6.2)
ERYTHROCYTE [DISTWIDTH] IN BLOOD BY AUTOMATED COUNT: 13 % (ref 12.3–15.4)
GLUCOSE SERPL-MCNC: 103 MG/DL (ref 65–99)
HCT VFR BLD AUTO: 45.4 % (ref 37.5–51)
HGB BLD-MCNC: 15.1 G/DL (ref 13–17.7)
IMM GRANULOCYTES # BLD AUTO: 0.07 10*3/MM3 (ref 0–0.05)
IMM GRANULOCYTES NFR BLD AUTO: 0.5 % (ref 0–0.5)
LYMPHOCYTES # BLD AUTO: 1.07 10*3/MM3 (ref 0.7–3.1)
LYMPHOCYTES NFR BLD AUTO: 7.4 % (ref 19.6–45.3)
MCH RBC QN AUTO: 32.6 PG (ref 26.6–33)
MCHC RBC AUTO-ENTMCNC: 33.3 G/DL (ref 31.5–35.7)
MCV RBC AUTO: 98.1 FL (ref 79–97)
MONOCYTES # BLD AUTO: 2.09 10*3/MM3 (ref 0.1–0.9)
MONOCYTES NFR BLD AUTO: 14.4 % (ref 5–12)
MRSA DNA SPEC QL NAA+PROBE: NORMAL
NEUTROPHILS NFR BLD AUTO: 11.22 10*3/MM3 (ref 1.7–7)
NEUTROPHILS NFR BLD AUTO: 77.3 % (ref 42.7–76)
NRBC BLD AUTO-RTO: 0 /100 WBC (ref 0–0.2)
PLATELET # BLD AUTO: 160 10*3/MM3 (ref 140–450)
PMV BLD AUTO: 9 FL (ref 6–12)
POTASSIUM SERPL-SCNC: 3.9 MMOL/L (ref 3.5–5.2)
PROCALCITONIN SERPL-MCNC: 0.29 NG/ML (ref 0–0.25)
RBC # BLD AUTO: 4.63 10*6/MM3 (ref 4.14–5.8)
SODIUM SERPL-SCNC: 136 MMOL/L (ref 136–145)
WBC NRBC COR # BLD AUTO: 14.5 10*3/MM3 (ref 3.4–10.8)

## 2025-08-09 PROCEDURE — 25010000002 VANCOMYCIN 5 G RECONSTITUTED SOLUTION: Performed by: INTERNAL MEDICINE

## 2025-08-09 PROCEDURE — 87040 BLOOD CULTURE FOR BACTERIA: CPT | Performed by: INTERNAL MEDICINE

## 2025-08-09 PROCEDURE — 87641 MR-STAPH DNA AMP PROBE: CPT | Performed by: INTERNAL MEDICINE

## 2025-08-09 PROCEDURE — 25810000003 SODIUM CHLORIDE 0.9 % SOLUTION: Performed by: INTERNAL MEDICINE

## 2025-08-09 PROCEDURE — 36415 COLL VENOUS BLD VENIPUNCTURE: CPT | Performed by: STUDENT IN AN ORGANIZED HEALTH CARE EDUCATION/TRAINING PROGRAM

## 2025-08-09 PROCEDURE — 84145 PROCALCITONIN (PCT): CPT | Performed by: INTERNAL MEDICINE

## 2025-08-09 PROCEDURE — 80048 BASIC METABOLIC PNL TOTAL CA: CPT | Performed by: STUDENT IN AN ORGANIZED HEALTH CARE EDUCATION/TRAINING PROGRAM

## 2025-08-09 PROCEDURE — 25010000002 HEPARIN (PORCINE) PER 1000 UNITS: Performed by: STUDENT IN AN ORGANIZED HEALTH CARE EDUCATION/TRAINING PROGRAM

## 2025-08-09 PROCEDURE — 85025 COMPLETE CBC W/AUTO DIFF WBC: CPT | Performed by: STUDENT IN AN ORGANIZED HEALTH CARE EDUCATION/TRAINING PROGRAM

## 2025-08-09 PROCEDURE — 25010000002 CEFTRIAXONE PER 250 MG: Performed by: STUDENT IN AN ORGANIZED HEALTH CARE EDUCATION/TRAINING PROGRAM

## 2025-08-09 PROCEDURE — 99232 SBSQ HOSP IP/OBS MODERATE 35: CPT | Performed by: INTERNAL MEDICINE

## 2025-08-09 RX ORDER — VANCOMYCIN/0.9 % SOD CHLORIDE 1.5G/250ML
1500 PLASTIC BAG, INJECTION (ML) INTRAVENOUS EVERY 24 HOURS
Status: DISCONTINUED | OUTPATIENT
Start: 2025-08-10 | End: 2025-08-10

## 2025-08-09 RX ORDER — TAMSULOSIN HYDROCHLORIDE 0.4 MG/1
0.4 CAPSULE ORAL NIGHTLY
Status: DISCONTINUED | OUTPATIENT
Start: 2025-08-09 | End: 2025-08-09

## 2025-08-09 RX ORDER — FINASTERIDE 5 MG/1
5 TABLET, FILM COATED ORAL NIGHTLY
Status: DISCONTINUED | OUTPATIENT
Start: 2025-08-09 | End: 2025-08-09

## 2025-08-09 RX ORDER — CARBIDOPA AND LEVODOPA 25; 100 MG/1; MG/1
1 TABLET ORAL 2 TIMES DAILY
Status: DISCONTINUED | OUTPATIENT
Start: 2025-08-09 | End: 2025-08-26 | Stop reason: HOSPADM

## 2025-08-09 RX ORDER — ATORVASTATIN CALCIUM 10 MG/1
10 TABLET, FILM COATED ORAL NIGHTLY
Status: DISCONTINUED | OUTPATIENT
Start: 2025-08-09 | End: 2025-08-26 | Stop reason: HOSPADM

## 2025-08-09 RX ORDER — FINASTERIDE 5 MG/1
5 TABLET, FILM COATED ORAL NIGHTLY
Status: DISCONTINUED | OUTPATIENT
Start: 2025-08-09 | End: 2025-08-26 | Stop reason: HOSPADM

## 2025-08-09 RX ORDER — METOPROLOL SUCCINATE 25 MG/1
12.5 TABLET, EXTENDED RELEASE ORAL
Status: DISCONTINUED | OUTPATIENT
Start: 2025-08-09 | End: 2025-08-11

## 2025-08-09 RX ORDER — TAMSULOSIN HYDROCHLORIDE 0.4 MG/1
0.4 CAPSULE ORAL NIGHTLY
Status: DISCONTINUED | OUTPATIENT
Start: 2025-08-09 | End: 2025-08-26 | Stop reason: HOSPADM

## 2025-08-09 RX ADMIN — Medication 10 MG: at 20:25

## 2025-08-09 RX ADMIN — HEPARIN SODIUM 5000 UNITS: 5000 INJECTION INTRAVENOUS; SUBCUTANEOUS at 09:27

## 2025-08-09 RX ADMIN — CEFTRIAXONE 2000 MG: 2 INJECTION, POWDER, FOR SOLUTION INTRAMUSCULAR; INTRAVENOUS at 18:16

## 2025-08-09 RX ADMIN — CARBIDOPA AND LEVODOPA 1 TABLET: 25; 100 TABLET ORAL at 20:25

## 2025-08-09 RX ADMIN — HEPARIN SODIUM 5000 UNITS: 5000 INJECTION INTRAVENOUS; SUBCUTANEOUS at 20:25

## 2025-08-09 RX ADMIN — SODIUM CHLORIDE 1750 MG: 9 INJECTION, SOLUTION INTRAVENOUS at 13:54

## 2025-08-09 RX ADMIN — ATORVASTATIN CALCIUM 10 MG: 10 TABLET, FILM COATED ORAL at 20:25

## 2025-08-09 RX ADMIN — TAMSULOSIN HYDROCHLORIDE 0.4 MG: 0.4 CAPSULE ORAL at 20:25

## 2025-08-09 RX ADMIN — Medication 10 ML: at 09:27

## 2025-08-09 RX ADMIN — NYSTATIN 1 APPLICATION: 100000 OINTMENT TOPICAL at 09:35

## 2025-08-09 RX ADMIN — METOPROLOL SUCCINATE 12.5 MG: 25 TABLET, EXTENDED RELEASE ORAL at 09:27

## 2025-08-09 RX ADMIN — FINASTERIDE 5 MG: 5 TABLET, FILM COATED ORAL at 20:25

## 2025-08-09 RX ADMIN — NYSTATIN 1 APPLICATION: 100000 OINTMENT TOPICAL at 20:26

## 2025-08-09 RX ADMIN — CARBIDOPA AND LEVODOPA 1 TABLET: 25; 100 TABLET ORAL at 09:27

## 2025-08-10 LAB
ANION GAP SERPL CALCULATED.3IONS-SCNC: 10.8 MMOL/L (ref 5–15)
BASOPHILS # BLD AUTO: 0.04 10*3/MM3 (ref 0–0.2)
BASOPHILS NFR BLD AUTO: 0.3 % (ref 0–1.5)
BUN SERPL-MCNC: 14.4 MG/DL (ref 8–23)
BUN/CREAT SERPL: 17.8 (ref 7–25)
CALCIUM SPEC-SCNC: 8.2 MG/DL (ref 8.6–10.5)
CHLORIDE SERPL-SCNC: 105 MMOL/L (ref 98–107)
CO2 SERPL-SCNC: 20.2 MMOL/L (ref 22–29)
CREAT SERPL-MCNC: 0.81 MG/DL (ref 0.76–1.27)
DEPRECATED RDW RBC AUTO: 44.7 FL (ref 37–54)
EGFRCR SERPLBLD CKD-EPI 2021: 84.8 ML/MIN/1.73
EOSINOPHIL # BLD AUTO: 0.07 10*3/MM3 (ref 0–0.4)
EOSINOPHIL NFR BLD AUTO: 0.6 % (ref 0.3–6.2)
ERYTHROCYTE [DISTWIDTH] IN BLOOD BY AUTOMATED COUNT: 12.8 % (ref 12.3–15.4)
GLUCOSE SERPL-MCNC: 99 MG/DL (ref 65–99)
HCT VFR BLD AUTO: 41 % (ref 37.5–51)
HGB BLD-MCNC: 14 G/DL (ref 13–17.7)
IMM GRANULOCYTES # BLD AUTO: 0.06 10*3/MM3 (ref 0–0.05)
IMM GRANULOCYTES NFR BLD AUTO: 0.5 % (ref 0–0.5)
LYMPHOCYTES # BLD AUTO: 1.25 10*3/MM3 (ref 0.7–3.1)
LYMPHOCYTES NFR BLD AUTO: 10.4 % (ref 19.6–45.3)
MAGNESIUM SERPL-MCNC: 1.8 MG/DL (ref 1.6–2.4)
MCH RBC QN AUTO: 32.3 PG (ref 26.6–33)
MCHC RBC AUTO-ENTMCNC: 34.1 G/DL (ref 31.5–35.7)
MCV RBC AUTO: 94.7 FL (ref 79–97)
MONOCYTES # BLD AUTO: 1.73 10*3/MM3 (ref 0.1–0.9)
MONOCYTES NFR BLD AUTO: 14.4 % (ref 5–12)
NEUTROPHILS NFR BLD AUTO: 73.8 % (ref 42.7–76)
NEUTROPHILS NFR BLD AUTO: 8.85 10*3/MM3 (ref 1.7–7)
NRBC BLD AUTO-RTO: 0 /100 WBC (ref 0–0.2)
PHOSPHATE SERPL-MCNC: 2.1 MG/DL (ref 2.5–4.5)
PLATELET # BLD AUTO: 136 10*3/MM3 (ref 140–450)
PMV BLD AUTO: 9.4 FL (ref 6–12)
POTASSIUM SERPL-SCNC: 3.7 MMOL/L (ref 3.5–5.2)
PROCALCITONIN SERPL-MCNC: 0.19 NG/ML (ref 0–0.25)
RBC # BLD AUTO: 4.33 10*6/MM3 (ref 4.14–5.8)
SODIUM SERPL-SCNC: 136 MMOL/L (ref 136–145)
VANCOMYCIN SERPL-MCNC: 7.62 MCG/ML (ref 5–40)
WBC NRBC COR # BLD AUTO: 12 10*3/MM3 (ref 3.4–10.8)

## 2025-08-10 PROCEDURE — 99232 SBSQ HOSP IP/OBS MODERATE 35: CPT | Performed by: INTERNAL MEDICINE

## 2025-08-10 PROCEDURE — 80048 BASIC METABOLIC PNL TOTAL CA: CPT | Performed by: INTERNAL MEDICINE

## 2025-08-10 PROCEDURE — 84100 ASSAY OF PHOSPHORUS: CPT | Performed by: INTERNAL MEDICINE

## 2025-08-10 PROCEDURE — 84145 PROCALCITONIN (PCT): CPT | Performed by: INTERNAL MEDICINE

## 2025-08-10 PROCEDURE — 83735 ASSAY OF MAGNESIUM: CPT | Performed by: INTERNAL MEDICINE

## 2025-08-10 PROCEDURE — 25010000002 HEPARIN (PORCINE) PER 1000 UNITS: Performed by: STUDENT IN AN ORGANIZED HEALTH CARE EDUCATION/TRAINING PROGRAM

## 2025-08-10 PROCEDURE — 25810000003 SODIUM CHLORIDE 0.9 % SOLUTION: Performed by: INTERNAL MEDICINE

## 2025-08-10 PROCEDURE — 80202 ASSAY OF VANCOMYCIN: CPT | Performed by: INTERNAL MEDICINE

## 2025-08-10 PROCEDURE — 25010000002 CEFTRIAXONE PER 250 MG: Performed by: STUDENT IN AN ORGANIZED HEALTH CARE EDUCATION/TRAINING PROGRAM

## 2025-08-10 PROCEDURE — 85025 COMPLETE CBC W/AUTO DIFF WBC: CPT | Performed by: INTERNAL MEDICINE

## 2025-08-10 RX ORDER — FENTANYL/ROPIVACAINE/NS/PF 2-625MCG/1
15 PLASTIC BAG, INJECTION (ML) EPIDURAL
Status: COMPLETED | OUTPATIENT
Start: 2025-08-10 | End: 2025-08-10

## 2025-08-10 RX ORDER — METOPROLOL SUCCINATE 25 MG/1
12.5 TABLET, EXTENDED RELEASE ORAL ONCE
Status: COMPLETED | OUTPATIENT
Start: 2025-08-10 | End: 2025-08-10

## 2025-08-10 RX ADMIN — POTASSIUM PHOSPHATE 15 MMOL: 236; 224 INJECTION, SOLUTION INTRAVENOUS at 11:24

## 2025-08-10 RX ADMIN — Medication 10 MG: at 20:42

## 2025-08-10 RX ADMIN — NYSTATIN 1 APPLICATION: 100000 OINTMENT TOPICAL at 08:06

## 2025-08-10 RX ADMIN — CARBIDOPA AND LEVODOPA 1 TABLET: 25; 100 TABLET ORAL at 20:42

## 2025-08-10 RX ADMIN — METOPROLOL SUCCINATE 12.5 MG: 25 TABLET, EXTENDED RELEASE ORAL at 08:07

## 2025-08-10 RX ADMIN — TAMSULOSIN HYDROCHLORIDE 0.4 MG: 0.4 CAPSULE ORAL at 20:42

## 2025-08-10 RX ADMIN — ATORVASTATIN CALCIUM 10 MG: 10 TABLET, FILM COATED ORAL at 20:42

## 2025-08-10 RX ADMIN — METOPROLOL SUCCINATE 12.5 MG: 25 TABLET, EXTENDED RELEASE ORAL at 18:31

## 2025-08-10 RX ADMIN — FINASTERIDE 5 MG: 5 TABLET, FILM COATED ORAL at 20:42

## 2025-08-10 RX ADMIN — CARBIDOPA AND LEVODOPA 1 TABLET: 25; 100 TABLET ORAL at 08:36

## 2025-08-10 RX ADMIN — Medication 10 ML: at 08:07

## 2025-08-10 RX ADMIN — POTASSIUM PHOSPHATE 15 MMOL: 236; 224 INJECTION, SOLUTION INTRAVENOUS at 08:06

## 2025-08-10 RX ADMIN — CEFTRIAXONE 2000 MG: 2 INJECTION, POWDER, FOR SOLUTION INTRAMUSCULAR; INTRAVENOUS at 17:27

## 2025-08-10 RX ADMIN — Medication 10 ML: at 20:42

## 2025-08-10 RX ADMIN — HEPARIN SODIUM 5000 UNITS: 5000 INJECTION INTRAVENOUS; SUBCUTANEOUS at 20:42

## 2025-08-10 RX ADMIN — HEPARIN SODIUM 5000 UNITS: 5000 INJECTION INTRAVENOUS; SUBCUTANEOUS at 08:06

## 2025-08-10 RX ADMIN — NYSTATIN 1 APPLICATION: 100000 OINTMENT TOPICAL at 20:42

## 2025-08-11 LAB
ANION GAP SERPL CALCULATED.3IONS-SCNC: 8.2 MMOL/L (ref 5–15)
BACTERIA SPEC AEROBE CULT: ABNORMAL
BASOPHILS # BLD AUTO: 0.04 10*3/MM3 (ref 0–0.2)
BASOPHILS NFR BLD AUTO: 0.4 % (ref 0–1.5)
BUN SERPL-MCNC: 12.9 MG/DL (ref 8–23)
BUN/CREAT SERPL: 14 (ref 7–25)
CALCIUM SPEC-SCNC: 8.3 MG/DL (ref 8.6–10.5)
CHLORIDE SERPL-SCNC: 104 MMOL/L (ref 98–107)
CO2 SERPL-SCNC: 23.8 MMOL/L (ref 22–29)
CREAT SERPL-MCNC: 0.92 MG/DL (ref 0.76–1.27)
DEPRECATED RDW RBC AUTO: 44.8 FL (ref 37–54)
EGFRCR SERPLBLD CKD-EPI 2021: 80 ML/MIN/1.73
EOSINOPHIL # BLD AUTO: 0.21 10*3/MM3 (ref 0–0.4)
EOSINOPHIL NFR BLD AUTO: 2.1 % (ref 0.3–6.2)
ERYTHROCYTE [DISTWIDTH] IN BLOOD BY AUTOMATED COUNT: 12.7 % (ref 12.3–15.4)
GLUCOSE SERPL-MCNC: 86 MG/DL (ref 65–99)
GRAM STN SPEC: ABNORMAL
GRAM STN SPEC: ABNORMAL
HCT VFR BLD AUTO: 41.2 % (ref 37.5–51)
HGB BLD-MCNC: 14 G/DL (ref 13–17.7)
IMM GRANULOCYTES # BLD AUTO: 0.06 10*3/MM3 (ref 0–0.05)
IMM GRANULOCYTES NFR BLD AUTO: 0.6 % (ref 0–0.5)
ISOLATED FROM: ABNORMAL
LYMPHOCYTES # BLD AUTO: 1.53 10*3/MM3 (ref 0.7–3.1)
LYMPHOCYTES NFR BLD AUTO: 15 % (ref 19.6–45.3)
MAGNESIUM SERPL-MCNC: 1.9 MG/DL (ref 1.6–2.4)
MCH RBC QN AUTO: 32.5 PG (ref 26.6–33)
MCHC RBC AUTO-ENTMCNC: 34 G/DL (ref 31.5–35.7)
MCV RBC AUTO: 95.6 FL (ref 79–97)
MONOCYTES # BLD AUTO: 1.48 10*3/MM3 (ref 0.1–0.9)
MONOCYTES NFR BLD AUTO: 14.6 % (ref 5–12)
NEUTROPHILS NFR BLD AUTO: 6.85 10*3/MM3 (ref 1.7–7)
NEUTROPHILS NFR BLD AUTO: 67.3 % (ref 42.7–76)
NRBC BLD AUTO-RTO: 0 /100 WBC (ref 0–0.2)
PHOSPHATE SERPL-MCNC: 2.9 MG/DL (ref 2.5–4.5)
PLATELET # BLD AUTO: 137 10*3/MM3 (ref 140–450)
PMV BLD AUTO: 9.1 FL (ref 6–12)
POTASSIUM SERPL-SCNC: 3.9 MMOL/L (ref 3.5–5.2)
RBC # BLD AUTO: 4.31 10*6/MM3 (ref 4.14–5.8)
SODIUM SERPL-SCNC: 136 MMOL/L (ref 136–145)
WBC NRBC COR # BLD AUTO: 10.17 10*3/MM3 (ref 3.4–10.8)

## 2025-08-11 PROCEDURE — 99232 SBSQ HOSP IP/OBS MODERATE 35: CPT | Performed by: INTERNAL MEDICINE

## 2025-08-11 PROCEDURE — 25010000002 HEPARIN (PORCINE) PER 1000 UNITS: Performed by: STUDENT IN AN ORGANIZED HEALTH CARE EDUCATION/TRAINING PROGRAM

## 2025-08-11 PROCEDURE — 85025 COMPLETE CBC W/AUTO DIFF WBC: CPT | Performed by: INTERNAL MEDICINE

## 2025-08-11 PROCEDURE — 80048 BASIC METABOLIC PNL TOTAL CA: CPT | Performed by: INTERNAL MEDICINE

## 2025-08-11 PROCEDURE — 97165 OT EVAL LOW COMPLEX 30 MIN: CPT

## 2025-08-11 PROCEDURE — 83735 ASSAY OF MAGNESIUM: CPT | Performed by: INTERNAL MEDICINE

## 2025-08-11 PROCEDURE — 84100 ASSAY OF PHOSPHORUS: CPT | Performed by: INTERNAL MEDICINE

## 2025-08-11 RX ORDER — METOPROLOL SUCCINATE 25 MG/1
25 TABLET, EXTENDED RELEASE ORAL
Status: DISCONTINUED | OUTPATIENT
Start: 2025-08-11 | End: 2025-08-26 | Stop reason: HOSPADM

## 2025-08-11 RX ADMIN — NYSTATIN 1 APPLICATION: 100000 OINTMENT TOPICAL at 08:41

## 2025-08-11 RX ADMIN — FINASTERIDE 5 MG: 5 TABLET, FILM COATED ORAL at 20:41

## 2025-08-11 RX ADMIN — CARBIDOPA AND LEVODOPA 1 TABLET: 25; 100 TABLET ORAL at 08:41

## 2025-08-11 RX ADMIN — NYSTATIN 1 APPLICATION: 100000 OINTMENT TOPICAL at 22:15

## 2025-08-11 RX ADMIN — HEPARIN SODIUM 5000 UNITS: 5000 INJECTION INTRAVENOUS; SUBCUTANEOUS at 20:41

## 2025-08-11 RX ADMIN — HEPARIN SODIUM 5000 UNITS: 5000 INJECTION INTRAVENOUS; SUBCUTANEOUS at 08:41

## 2025-08-11 RX ADMIN — CARBIDOPA AND LEVODOPA 1 TABLET: 25; 100 TABLET ORAL at 20:41

## 2025-08-11 RX ADMIN — Medication 10 ML: at 20:42

## 2025-08-11 RX ADMIN — ATORVASTATIN CALCIUM 10 MG: 10 TABLET, FILM COATED ORAL at 20:41

## 2025-08-11 RX ADMIN — TAMSULOSIN HYDROCHLORIDE 0.4 MG: 0.4 CAPSULE ORAL at 21:34

## 2025-08-11 RX ADMIN — METOPROLOL SUCCINATE 25 MG: 25 TABLET, EXTENDED RELEASE ORAL at 08:41

## 2025-08-11 RX ADMIN — Medication 10 ML: at 08:41

## 2025-08-11 RX ADMIN — Medication 10 MG: at 20:41

## 2025-08-12 LAB
ANION GAP SERPL CALCULATED.3IONS-SCNC: 9.8 MMOL/L (ref 5–15)
BASOPHILS # BLD AUTO: 0.05 10*3/MM3 (ref 0–0.2)
BASOPHILS NFR BLD AUTO: 0.6 % (ref 0–1.5)
BUN SERPL-MCNC: 16.9 MG/DL (ref 8–23)
BUN/CREAT SERPL: 17.6 (ref 7–25)
CALCIUM SPEC-SCNC: 8.4 MG/DL (ref 8.6–10.5)
CHLORIDE SERPL-SCNC: 104 MMOL/L (ref 98–107)
CO2 SERPL-SCNC: 23.2 MMOL/L (ref 22–29)
CREAT SERPL-MCNC: 0.96 MG/DL (ref 0.76–1.27)
DEPRECATED RDW RBC AUTO: 43.9 FL (ref 37–54)
EGFRCR SERPLBLD CKD-EPI 2021: 76 ML/MIN/1.73
EOSINOPHIL # BLD AUTO: 0.29 10*3/MM3 (ref 0–0.4)
EOSINOPHIL NFR BLD AUTO: 3.3 % (ref 0.3–6.2)
ERYTHROCYTE [DISTWIDTH] IN BLOOD BY AUTOMATED COUNT: 12.7 % (ref 12.3–15.4)
GLUCOSE SERPL-MCNC: 93 MG/DL (ref 65–99)
HCT VFR BLD AUTO: 40.5 % (ref 37.5–51)
HGB BLD-MCNC: 13.5 G/DL (ref 13–17.7)
IMM GRANULOCYTES # BLD AUTO: 0.05 10*3/MM3 (ref 0–0.05)
IMM GRANULOCYTES NFR BLD AUTO: 0.6 % (ref 0–0.5)
LYMPHOCYTES # BLD AUTO: 1.58 10*3/MM3 (ref 0.7–3.1)
LYMPHOCYTES NFR BLD AUTO: 18.1 % (ref 19.6–45.3)
MAGNESIUM SERPL-MCNC: 2.1 MG/DL (ref 1.6–2.4)
MCH RBC QN AUTO: 31.8 PG (ref 26.6–33)
MCHC RBC AUTO-ENTMCNC: 33.3 G/DL (ref 31.5–35.7)
MCV RBC AUTO: 95.3 FL (ref 79–97)
MONOCYTES # BLD AUTO: 1.3 10*3/MM3 (ref 0.1–0.9)
MONOCYTES NFR BLD AUTO: 14.9 % (ref 5–12)
NEUTROPHILS NFR BLD AUTO: 5.48 10*3/MM3 (ref 1.7–7)
NEUTROPHILS NFR BLD AUTO: 62.5 % (ref 42.7–76)
NRBC BLD AUTO-RTO: 0 /100 WBC (ref 0–0.2)
NT-PROBNP SERPL-MCNC: 905.5 PG/ML (ref 0–1800)
PLATELET # BLD AUTO: 166 10*3/MM3 (ref 140–450)
PMV BLD AUTO: 9.9 FL (ref 6–12)
POTASSIUM SERPL-SCNC: 3.8 MMOL/L (ref 3.5–5.2)
RBC # BLD AUTO: 4.25 10*6/MM3 (ref 4.14–5.8)
SODIUM SERPL-SCNC: 137 MMOL/L (ref 136–145)
WBC NRBC COR # BLD AUTO: 8.75 10*3/MM3 (ref 3.4–10.8)

## 2025-08-12 PROCEDURE — 83735 ASSAY OF MAGNESIUM: CPT | Performed by: INTERNAL MEDICINE

## 2025-08-12 PROCEDURE — 99233 SBSQ HOSP IP/OBS HIGH 50: CPT | Performed by: INTERNAL MEDICINE

## 2025-08-12 PROCEDURE — 25010000002 HEPARIN (PORCINE) PER 1000 UNITS: Performed by: STUDENT IN AN ORGANIZED HEALTH CARE EDUCATION/TRAINING PROGRAM

## 2025-08-12 PROCEDURE — 97161 PT EVAL LOW COMPLEX 20 MIN: CPT

## 2025-08-12 PROCEDURE — 85025 COMPLETE CBC W/AUTO DIFF WBC: CPT | Performed by: INTERNAL MEDICINE

## 2025-08-12 PROCEDURE — 80048 BASIC METABOLIC PNL TOTAL CA: CPT | Performed by: INTERNAL MEDICINE

## 2025-08-12 PROCEDURE — 83880 ASSAY OF NATRIURETIC PEPTIDE: CPT | Performed by: INTERNAL MEDICINE

## 2025-08-12 PROCEDURE — 97110 THERAPEUTIC EXERCISES: CPT

## 2025-08-12 RX ORDER — NICOTINE 21 MG/24HR
1 PATCH, TRANSDERMAL 24 HOURS TRANSDERMAL DAILY PRN
Status: DISCONTINUED | OUTPATIENT
Start: 2025-08-12 | End: 2025-08-26 | Stop reason: HOSPADM

## 2025-08-12 RX ORDER — ONDANSETRON 2 MG/ML
4 INJECTION INTRAMUSCULAR; INTRAVENOUS EVERY 4 HOURS PRN
Status: DISCONTINUED | OUTPATIENT
Start: 2025-08-12 | End: 2025-08-26 | Stop reason: HOSPADM

## 2025-08-12 RX ORDER — ALUMINA, MAGNESIA, AND SIMETHICONE 2400; 2400; 240 MG/30ML; MG/30ML; MG/30ML
15 SUSPENSION ORAL EVERY 6 HOURS PRN
Status: DISCONTINUED | OUTPATIENT
Start: 2025-08-12 | End: 2025-08-26 | Stop reason: HOSPADM

## 2025-08-12 RX ORDER — LIDOCAINE 4 G/G
1 PATCH TOPICAL DAILY PRN
Status: DISCONTINUED | OUTPATIENT
Start: 2025-08-12 | End: 2025-08-26 | Stop reason: HOSPADM

## 2025-08-12 RX ORDER — ACETAMINOPHEN 325 MG/1
650 TABLET ORAL EVERY 6 HOURS PRN
Status: DISCONTINUED | OUTPATIENT
Start: 2025-08-12 | End: 2025-08-26 | Stop reason: HOSPADM

## 2025-08-12 RX ORDER — FUROSEMIDE 20 MG/1
20 TABLET ORAL EVERY 12 HOURS SCHEDULED
Status: DISCONTINUED | OUTPATIENT
Start: 2025-08-12 | End: 2025-08-14

## 2025-08-12 RX ORDER — LIDOCAINE 5 G/100G
1 CREAM RECTAL; TOPICAL 3 TIMES DAILY PRN
Status: DISCONTINUED | OUTPATIENT
Start: 2025-08-12 | End: 2025-08-26 | Stop reason: HOSPADM

## 2025-08-12 RX ORDER — MIRTAZAPINE 15 MG/1
15 TABLET, FILM COATED ORAL NIGHTLY
Status: DISCONTINUED | OUTPATIENT
Start: 2025-08-12 | End: 2025-08-26 | Stop reason: HOSPADM

## 2025-08-12 RX ORDER — HYDROXYZINE HYDROCHLORIDE 25 MG/1
25 TABLET, FILM COATED ORAL 3 TIMES DAILY PRN
Status: DISCONTINUED | OUTPATIENT
Start: 2025-08-12 | End: 2025-08-26 | Stop reason: HOSPADM

## 2025-08-12 RX ADMIN — ATORVASTATIN CALCIUM 10 MG: 10 TABLET, FILM COATED ORAL at 20:37

## 2025-08-12 RX ADMIN — Medication 10 ML: at 20:37

## 2025-08-12 RX ADMIN — HEPARIN SODIUM 5000 UNITS: 5000 INJECTION INTRAVENOUS; SUBCUTANEOUS at 08:47

## 2025-08-12 RX ADMIN — TAMSULOSIN HYDROCHLORIDE 0.4 MG: 0.4 CAPSULE ORAL at 20:37

## 2025-08-12 RX ADMIN — MIRTAZAPINE 15 MG: 15 TABLET, FILM COATED ORAL at 20:37

## 2025-08-12 RX ADMIN — NYSTATIN 1 APPLICATION: 100000 OINTMENT TOPICAL at 20:37

## 2025-08-12 RX ADMIN — HEPARIN SODIUM 5000 UNITS: 5000 INJECTION INTRAVENOUS; SUBCUTANEOUS at 20:37

## 2025-08-12 RX ADMIN — CARBIDOPA AND LEVODOPA 1 TABLET: 25; 100 TABLET ORAL at 08:47

## 2025-08-12 RX ADMIN — METOPROLOL SUCCINATE 25 MG: 25 TABLET, EXTENDED RELEASE ORAL at 08:47

## 2025-08-12 RX ADMIN — Medication 10 MG: at 20:37

## 2025-08-12 RX ADMIN — CARBIDOPA AND LEVODOPA 1 TABLET: 25; 100 TABLET ORAL at 20:37

## 2025-08-12 RX ADMIN — FINASTERIDE 5 MG: 5 TABLET, FILM COATED ORAL at 20:37

## 2025-08-12 RX ADMIN — NYSTATIN 1 APPLICATION: 100000 OINTMENT TOPICAL at 08:47

## 2025-08-12 RX ADMIN — Medication 10 ML: at 08:47

## 2025-08-13 LAB — BACTERIA SPEC AEROBE CULT: NORMAL

## 2025-08-13 PROCEDURE — 97530 THERAPEUTIC ACTIVITIES: CPT

## 2025-08-13 PROCEDURE — 25010000002 HEPARIN (PORCINE) PER 1000 UNITS: Performed by: STUDENT IN AN ORGANIZED HEALTH CARE EDUCATION/TRAINING PROGRAM

## 2025-08-13 PROCEDURE — 99232 SBSQ HOSP IP/OBS MODERATE 35: CPT | Performed by: INTERNAL MEDICINE

## 2025-08-13 PROCEDURE — 97110 THERAPEUTIC EXERCISES: CPT

## 2025-08-13 RX ADMIN — CARBIDOPA AND LEVODOPA 1 TABLET: 25; 100 TABLET ORAL at 08:37

## 2025-08-13 RX ADMIN — TAMSULOSIN HYDROCHLORIDE 0.4 MG: 0.4 CAPSULE ORAL at 21:54

## 2025-08-13 RX ADMIN — NYSTATIN 1 APPLICATION: 100000 OINTMENT TOPICAL at 08:20

## 2025-08-13 RX ADMIN — FINASTERIDE 5 MG: 5 TABLET, FILM COATED ORAL at 21:54

## 2025-08-13 RX ADMIN — HEPARIN SODIUM 5000 UNITS: 5000 INJECTION INTRAVENOUS; SUBCUTANEOUS at 21:55

## 2025-08-13 RX ADMIN — HEPARIN SODIUM 5000 UNITS: 5000 INJECTION INTRAVENOUS; SUBCUTANEOUS at 08:37

## 2025-08-13 RX ADMIN — Medication 10 ML: at 21:56

## 2025-08-13 RX ADMIN — CARBIDOPA AND LEVODOPA 1 TABLET: 25; 100 TABLET ORAL at 21:54

## 2025-08-13 RX ADMIN — ATORVASTATIN CALCIUM 10 MG: 10 TABLET, FILM COATED ORAL at 21:54

## 2025-08-13 RX ADMIN — Medication 10 MG: at 21:54

## 2025-08-13 RX ADMIN — MIRTAZAPINE 15 MG: 15 TABLET, FILM COATED ORAL at 21:54

## 2025-08-13 RX ADMIN — METOPROLOL SUCCINATE 25 MG: 25 TABLET, EXTENDED RELEASE ORAL at 08:37

## 2025-08-13 RX ADMIN — NYSTATIN 1 APPLICATION: 100000 OINTMENT TOPICAL at 21:56

## 2025-08-14 LAB
BACTERIA SPEC AEROBE CULT: NORMAL
BACTERIA SPEC AEROBE CULT: NORMAL

## 2025-08-14 PROCEDURE — 25010000002 HEPARIN (PORCINE) PER 1000 UNITS: Performed by: STUDENT IN AN ORGANIZED HEALTH CARE EDUCATION/TRAINING PROGRAM

## 2025-08-14 PROCEDURE — 99232 SBSQ HOSP IP/OBS MODERATE 35: CPT | Performed by: INTERNAL MEDICINE

## 2025-08-14 RX ORDER — POTASSIUM CHLORIDE 750 MG/1
10 TABLET, FILM COATED, EXTENDED RELEASE ORAL DAILY
Status: DISCONTINUED | OUTPATIENT
Start: 2025-08-15 | End: 2025-08-26 | Stop reason: HOSPADM

## 2025-08-14 RX ORDER — FUROSEMIDE 20 MG/1
20 TABLET ORAL DAILY
Status: DISCONTINUED | OUTPATIENT
Start: 2025-08-15 | End: 2025-08-19

## 2025-08-14 RX ADMIN — HEPARIN SODIUM 5000 UNITS: 5000 INJECTION INTRAVENOUS; SUBCUTANEOUS at 21:00

## 2025-08-14 RX ADMIN — ATORVASTATIN CALCIUM 10 MG: 10 TABLET, FILM COATED ORAL at 21:00

## 2025-08-14 RX ADMIN — NYSTATIN 1 APPLICATION: 100000 OINTMENT TOPICAL at 21:02

## 2025-08-14 RX ADMIN — HEPARIN SODIUM 5000 UNITS: 5000 INJECTION INTRAVENOUS; SUBCUTANEOUS at 09:39

## 2025-08-14 RX ADMIN — FINASTERIDE 5 MG: 5 TABLET, FILM COATED ORAL at 21:00

## 2025-08-14 RX ADMIN — NYSTATIN 1 APPLICATION: 100000 OINTMENT TOPICAL at 09:40

## 2025-08-14 RX ADMIN — MIRTAZAPINE 15 MG: 15 TABLET, FILM COATED ORAL at 21:00

## 2025-08-14 RX ADMIN — TAMSULOSIN HYDROCHLORIDE 0.4 MG: 0.4 CAPSULE ORAL at 21:00

## 2025-08-14 RX ADMIN — Medication 10 ML: at 21:01

## 2025-08-14 RX ADMIN — Medication 10 MG: at 21:00

## 2025-08-14 RX ADMIN — CARBIDOPA AND LEVODOPA 1 TABLET: 25; 100 TABLET ORAL at 09:40

## 2025-08-14 RX ADMIN — CARBIDOPA AND LEVODOPA 1 TABLET: 25; 100 TABLET ORAL at 21:00

## 2025-08-14 RX ADMIN — Medication 10 ML: at 09:40

## 2025-08-14 RX ADMIN — METOPROLOL SUCCINATE 25 MG: 25 TABLET, EXTENDED RELEASE ORAL at 09:40

## 2025-08-15 PROCEDURE — 99232 SBSQ HOSP IP/OBS MODERATE 35: CPT | Performed by: INTERNAL MEDICINE

## 2025-08-15 PROCEDURE — 97110 THERAPEUTIC EXERCISES: CPT

## 2025-08-15 PROCEDURE — 25010000002 HEPARIN (PORCINE) PER 1000 UNITS: Performed by: STUDENT IN AN ORGANIZED HEALTH CARE EDUCATION/TRAINING PROGRAM

## 2025-08-15 RX ORDER — MIDODRINE HYDROCHLORIDE 5 MG/1
5 TABLET ORAL
Status: DISCONTINUED | OUTPATIENT
Start: 2025-08-15 | End: 2025-08-17

## 2025-08-15 RX ADMIN — NYSTATIN 1 APPLICATION: 100000 OINTMENT TOPICAL at 20:58

## 2025-08-15 RX ADMIN — FINASTERIDE 5 MG: 5 TABLET, FILM COATED ORAL at 20:57

## 2025-08-15 RX ADMIN — HEPARIN SODIUM 5000 UNITS: 5000 INJECTION INTRAVENOUS; SUBCUTANEOUS at 20:57

## 2025-08-15 RX ADMIN — MIRTAZAPINE 15 MG: 15 TABLET, FILM COATED ORAL at 20:57

## 2025-08-15 RX ADMIN — METOPROLOL SUCCINATE 25 MG: 25 TABLET, EXTENDED RELEASE ORAL at 08:47

## 2025-08-15 RX ADMIN — HEPARIN SODIUM 5000 UNITS: 5000 INJECTION INTRAVENOUS; SUBCUTANEOUS at 08:48

## 2025-08-15 RX ADMIN — FUROSEMIDE 20 MG: 20 TABLET ORAL at 08:47

## 2025-08-15 RX ADMIN — Medication 10 ML: at 08:48

## 2025-08-15 RX ADMIN — ATORVASTATIN CALCIUM 10 MG: 10 TABLET, FILM COATED ORAL at 20:57

## 2025-08-15 RX ADMIN — TAMSULOSIN HYDROCHLORIDE 0.4 MG: 0.4 CAPSULE ORAL at 20:57

## 2025-08-15 RX ADMIN — Medication 10 ML: at 20:57

## 2025-08-15 RX ADMIN — NYSTATIN 1 APPLICATION: 100000 OINTMENT TOPICAL at 08:48

## 2025-08-15 RX ADMIN — CARBIDOPA AND LEVODOPA 1 TABLET: 25; 100 TABLET ORAL at 08:47

## 2025-08-15 RX ADMIN — Medication 10 MG: at 20:57

## 2025-08-15 RX ADMIN — POTASSIUM CHLORIDE 10 MEQ: 750 TABLET, EXTENDED RELEASE ORAL at 08:48

## 2025-08-15 RX ADMIN — CARBIDOPA AND LEVODOPA 1 TABLET: 25; 100 TABLET ORAL at 20:57

## 2025-08-15 RX ADMIN — MIDODRINE HYDROCHLORIDE 5 MG: 5 TABLET ORAL at 17:59

## 2025-08-16 PROCEDURE — 25010000002 HEPARIN (PORCINE) PER 1000 UNITS: Performed by: STUDENT IN AN ORGANIZED HEALTH CARE EDUCATION/TRAINING PROGRAM

## 2025-08-16 PROCEDURE — 99233 SBSQ HOSP IP/OBS HIGH 50: CPT | Performed by: INTERNAL MEDICINE

## 2025-08-16 RX ADMIN — TAMSULOSIN HYDROCHLORIDE 0.4 MG: 0.4 CAPSULE ORAL at 21:01

## 2025-08-16 RX ADMIN — FINASTERIDE 5 MG: 5 TABLET, FILM COATED ORAL at 21:02

## 2025-08-16 RX ADMIN — NYSTATIN 1 APPLICATION: 100000 OINTMENT TOPICAL at 21:02

## 2025-08-16 RX ADMIN — HEPARIN SODIUM 5000 UNITS: 5000 INJECTION INTRAVENOUS; SUBCUTANEOUS at 08:47

## 2025-08-16 RX ADMIN — MIDODRINE HYDROCHLORIDE 5 MG: 5 TABLET ORAL at 12:18

## 2025-08-16 RX ADMIN — CARBIDOPA AND LEVODOPA 1 TABLET: 25; 100 TABLET ORAL at 21:01

## 2025-08-16 RX ADMIN — ATORVASTATIN CALCIUM 10 MG: 10 TABLET, FILM COATED ORAL at 21:02

## 2025-08-16 RX ADMIN — Medication 10 ML: at 08:48

## 2025-08-16 RX ADMIN — METOPROLOL SUCCINATE 25 MG: 25 TABLET, EXTENDED RELEASE ORAL at 08:47

## 2025-08-16 RX ADMIN — MIDODRINE HYDROCHLORIDE 5 MG: 5 TABLET ORAL at 17:41

## 2025-08-16 RX ADMIN — MIDODRINE HYDROCHLORIDE 5 MG: 5 TABLET ORAL at 08:47

## 2025-08-16 RX ADMIN — CARBIDOPA AND LEVODOPA 1 TABLET: 25; 100 TABLET ORAL at 08:47

## 2025-08-16 RX ADMIN — POTASSIUM CHLORIDE 10 MEQ: 750 TABLET, EXTENDED RELEASE ORAL at 08:47

## 2025-08-16 RX ADMIN — NYSTATIN 1 APPLICATION: 100000 OINTMENT TOPICAL at 08:48

## 2025-08-16 RX ADMIN — FUROSEMIDE 20 MG: 20 TABLET ORAL at 08:47

## 2025-08-16 RX ADMIN — MIRTAZAPINE 15 MG: 15 TABLET, FILM COATED ORAL at 21:01

## 2025-08-16 RX ADMIN — HEPARIN SODIUM 5000 UNITS: 5000 INJECTION INTRAVENOUS; SUBCUTANEOUS at 21:01

## 2025-08-16 RX ADMIN — Medication 10 MG: at 21:01

## 2025-08-16 RX ADMIN — Medication 10 ML: at 21:02

## 2025-08-17 PROCEDURE — 99232 SBSQ HOSP IP/OBS MODERATE 35: CPT | Performed by: INTERNAL MEDICINE

## 2025-08-17 PROCEDURE — 25010000002 HEPARIN (PORCINE) PER 1000 UNITS: Performed by: STUDENT IN AN ORGANIZED HEALTH CARE EDUCATION/TRAINING PROGRAM

## 2025-08-17 RX ORDER — MIDODRINE HYDROCHLORIDE 10 MG/1
10 TABLET ORAL
Status: DISCONTINUED | OUTPATIENT
Start: 2025-08-17 | End: 2025-08-19

## 2025-08-17 RX ADMIN — Medication 10 ML: at 21:46

## 2025-08-17 RX ADMIN — CARBIDOPA AND LEVODOPA 1 TABLET: 25; 100 TABLET ORAL at 21:45

## 2025-08-17 RX ADMIN — MIDODRINE HYDROCHLORIDE 10 MG: 10 TABLET ORAL at 12:00

## 2025-08-17 RX ADMIN — MIDODRINE HYDROCHLORIDE 10 MG: 10 TABLET ORAL at 09:19

## 2025-08-17 RX ADMIN — ATORVASTATIN CALCIUM 10 MG: 10 TABLET, FILM COATED ORAL at 21:46

## 2025-08-17 RX ADMIN — Medication 10 ML: at 09:20

## 2025-08-17 RX ADMIN — MIDODRINE HYDROCHLORIDE 10 MG: 10 TABLET ORAL at 16:38

## 2025-08-17 RX ADMIN — MIRTAZAPINE 15 MG: 15 TABLET, FILM COATED ORAL at 21:46

## 2025-08-17 RX ADMIN — NYSTATIN 1 APPLICATION: 100000 OINTMENT TOPICAL at 22:03

## 2025-08-17 RX ADMIN — HEPARIN SODIUM 5000 UNITS: 5000 INJECTION INTRAVENOUS; SUBCUTANEOUS at 09:19

## 2025-08-17 RX ADMIN — FUROSEMIDE 20 MG: 20 TABLET ORAL at 09:19

## 2025-08-17 RX ADMIN — POTASSIUM CHLORIDE 10 MEQ: 750 TABLET, EXTENDED RELEASE ORAL at 09:19

## 2025-08-17 RX ADMIN — NYSTATIN 1 APPLICATION: 100000 OINTMENT TOPICAL at 09:22

## 2025-08-17 RX ADMIN — HEPARIN SODIUM 5000 UNITS: 5000 INJECTION INTRAVENOUS; SUBCUTANEOUS at 21:45

## 2025-08-17 RX ADMIN — Medication 10 MG: at 21:45

## 2025-08-17 RX ADMIN — CARBIDOPA AND LEVODOPA 1 TABLET: 25; 100 TABLET ORAL at 09:19

## 2025-08-17 RX ADMIN — FINASTERIDE 5 MG: 5 TABLET, FILM COATED ORAL at 21:46

## 2025-08-17 RX ADMIN — TAMSULOSIN HYDROCHLORIDE 0.4 MG: 0.4 CAPSULE ORAL at 21:46

## 2025-08-17 RX ADMIN — METOPROLOL SUCCINATE 25 MG: 25 TABLET, EXTENDED RELEASE ORAL at 09:23

## 2025-08-18 PROCEDURE — 99232 SBSQ HOSP IP/OBS MODERATE 35: CPT | Performed by: INTERNAL MEDICINE

## 2025-08-18 PROCEDURE — 25010000002 HEPARIN (PORCINE) PER 1000 UNITS: Performed by: STUDENT IN AN ORGANIZED HEALTH CARE EDUCATION/TRAINING PROGRAM

## 2025-08-18 RX ORDER — AMOXICILLIN 250 MG
1 CAPSULE ORAL 2 TIMES DAILY
Status: DISCONTINUED | OUTPATIENT
Start: 2025-08-18 | End: 2025-08-26 | Stop reason: HOSPADM

## 2025-08-18 RX ORDER — POLYETHYLENE GLYCOL 3350 17 G/17G
17 POWDER, FOR SOLUTION ORAL 2 TIMES DAILY
Status: DISPENSED | OUTPATIENT
Start: 2025-08-18 | End: 2025-08-19

## 2025-08-18 RX ORDER — POLYETHYLENE GLYCOL 3350 17 G/17G
17 POWDER, FOR SOLUTION ORAL 2 TIMES DAILY PRN
Status: DISCONTINUED | OUTPATIENT
Start: 2025-08-18 | End: 2025-08-26 | Stop reason: HOSPADM

## 2025-08-18 RX ADMIN — FUROSEMIDE 20 MG: 20 TABLET ORAL at 09:52

## 2025-08-18 RX ADMIN — METOPROLOL SUCCINATE 25 MG: 25 TABLET, EXTENDED RELEASE ORAL at 09:52

## 2025-08-18 RX ADMIN — MIDODRINE HYDROCHLORIDE 10 MG: 10 TABLET ORAL at 09:52

## 2025-08-18 RX ADMIN — TAMSULOSIN HYDROCHLORIDE 0.4 MG: 0.4 CAPSULE ORAL at 21:14

## 2025-08-18 RX ADMIN — MIDODRINE HYDROCHLORIDE 10 MG: 10 TABLET ORAL at 17:37

## 2025-08-18 RX ADMIN — HEPARIN SODIUM 5000 UNITS: 5000 INJECTION INTRAVENOUS; SUBCUTANEOUS at 09:54

## 2025-08-18 RX ADMIN — POLYETHYLENE GLYCOL 3350 17 G: 17 POWDER, FOR SOLUTION ORAL at 11:43

## 2025-08-18 RX ADMIN — MIRTAZAPINE 15 MG: 15 TABLET, FILM COATED ORAL at 21:14

## 2025-08-18 RX ADMIN — SENNOSIDES AND DOCUSATE SODIUM 1 TABLET: 50; 8.6 TABLET ORAL at 11:42

## 2025-08-18 RX ADMIN — POTASSIUM CHLORIDE 10 MEQ: 750 TABLET, EXTENDED RELEASE ORAL at 09:52

## 2025-08-18 RX ADMIN — ATORVASTATIN CALCIUM 10 MG: 10 TABLET, FILM COATED ORAL at 21:14

## 2025-08-18 RX ADMIN — Medication 10 ML: at 21:15

## 2025-08-18 RX ADMIN — FINASTERIDE 5 MG: 5 TABLET, FILM COATED ORAL at 21:14

## 2025-08-18 RX ADMIN — MIDODRINE HYDROCHLORIDE 10 MG: 10 TABLET ORAL at 11:43

## 2025-08-18 RX ADMIN — NYSTATIN 1 APPLICATION: 100000 OINTMENT TOPICAL at 21:16

## 2025-08-18 RX ADMIN — HEPARIN SODIUM 5000 UNITS: 5000 INJECTION INTRAVENOUS; SUBCUTANEOUS at 21:14

## 2025-08-18 RX ADMIN — Medication 10 MG: at 21:14

## 2025-08-18 RX ADMIN — CARBIDOPA AND LEVODOPA 1 TABLET: 25; 100 TABLET ORAL at 21:14

## 2025-08-18 RX ADMIN — Medication 10 ML: at 09:58

## 2025-08-18 RX ADMIN — CARBIDOPA AND LEVODOPA 1 TABLET: 25; 100 TABLET ORAL at 09:52

## 2025-08-19 PROCEDURE — 99232 SBSQ HOSP IP/OBS MODERATE 35: CPT | Performed by: INTERNAL MEDICINE

## 2025-08-19 PROCEDURE — 25010000002 HEPARIN (PORCINE) PER 1000 UNITS: Performed by: STUDENT IN AN ORGANIZED HEALTH CARE EDUCATION/TRAINING PROGRAM

## 2025-08-19 RX ORDER — MIDODRINE HYDROCHLORIDE 5 MG/1
5 TABLET ORAL
Status: DISCONTINUED | OUTPATIENT
Start: 2025-08-19 | End: 2025-08-26 | Stop reason: HOSPADM

## 2025-08-19 RX ADMIN — NYSTATIN 1 APPLICATION: 100000 OINTMENT TOPICAL at 22:56

## 2025-08-19 RX ADMIN — MIDODRINE HYDROCHLORIDE 10 MG: 10 TABLET ORAL at 08:38

## 2025-08-19 RX ADMIN — MIRTAZAPINE 15 MG: 15 TABLET, FILM COATED ORAL at 22:54

## 2025-08-19 RX ADMIN — CARBIDOPA AND LEVODOPA 1 TABLET: 25; 100 TABLET ORAL at 22:54

## 2025-08-19 RX ADMIN — METOPROLOL SUCCINATE 25 MG: 25 TABLET, EXTENDED RELEASE ORAL at 08:38

## 2025-08-19 RX ADMIN — MIDODRINE HYDROCHLORIDE 5 MG: 5 TABLET ORAL at 17:28

## 2025-08-19 RX ADMIN — HEPARIN SODIUM 5000 UNITS: 5000 INJECTION INTRAVENOUS; SUBCUTANEOUS at 08:39

## 2025-08-19 RX ADMIN — ATORVASTATIN CALCIUM 10 MG: 10 TABLET, FILM COATED ORAL at 22:54

## 2025-08-19 RX ADMIN — Medication 10 MG: at 22:54

## 2025-08-19 RX ADMIN — Medication 10 ML: at 22:54

## 2025-08-19 RX ADMIN — TAMSULOSIN HYDROCHLORIDE 0.4 MG: 0.4 CAPSULE ORAL at 22:54

## 2025-08-19 RX ADMIN — POTASSIUM CHLORIDE 10 MEQ: 750 TABLET, EXTENDED RELEASE ORAL at 08:39

## 2025-08-19 RX ADMIN — FINASTERIDE 5 MG: 5 TABLET, FILM COATED ORAL at 22:54

## 2025-08-19 RX ADMIN — Medication 10 ML: at 08:38

## 2025-08-19 RX ADMIN — CARBIDOPA AND LEVODOPA 1 TABLET: 25; 100 TABLET ORAL at 08:39

## 2025-08-19 RX ADMIN — HEPARIN SODIUM 5000 UNITS: 5000 INJECTION INTRAVENOUS; SUBCUTANEOUS at 22:54

## 2025-08-19 RX ADMIN — FUROSEMIDE 20 MG: 20 TABLET ORAL at 08:38

## 2025-08-20 ENCOUNTER — APPOINTMENT (OUTPATIENT)
Dept: GENERAL RADIOLOGY | Facility: HOSPITAL | Age: 89
End: 2025-08-20
Payer: MEDICARE

## 2025-08-20 LAB
ANION GAP SERPL CALCULATED.3IONS-SCNC: 10.3 MMOL/L (ref 5–15)
BILIRUB UR QL STRIP: NEGATIVE
BUN SERPL-MCNC: 18.9 MG/DL (ref 8–23)
BUN/CREAT SERPL: 18.3 (ref 7–25)
CALCIUM SPEC-SCNC: 8.6 MG/DL (ref 8.6–10.5)
CHLORIDE SERPL-SCNC: 99 MMOL/L (ref 98–107)
CLARITY UR: CLEAR
CO2 SERPL-SCNC: 23.7 MMOL/L (ref 22–29)
COLOR UR: ABNORMAL
CREAT SERPL-MCNC: 1.03 MG/DL (ref 0.76–1.27)
D-LACTATE SERPL-SCNC: 1.6 MMOL/L (ref 0.5–2)
D-LACTATE SERPL-SCNC: 2.4 MMOL/L (ref 0.5–2)
DEPRECATED RDW RBC AUTO: 44.1 FL (ref 37–54)
EGFRCR SERPLBLD CKD-EPI 2021: 69.9 ML/MIN/1.73
ERYTHROCYTE [DISTWIDTH] IN BLOOD BY AUTOMATED COUNT: 12.4 % (ref 12.3–15.4)
GLUCOSE SERPL-MCNC: 99 MG/DL (ref 65–99)
GLUCOSE UR STRIP-MCNC: NEGATIVE MG/DL
HCT VFR BLD AUTO: 44.7 % (ref 37.5–51)
HGB BLD-MCNC: 14.9 G/DL (ref 13–17.7)
HGB UR QL STRIP.AUTO: NEGATIVE
KETONES UR QL STRIP: NEGATIVE
LEUKOCYTE ESTERASE UR QL STRIP.AUTO: NEGATIVE
MAGNESIUM SERPL-MCNC: 2.1 MG/DL (ref 1.6–2.4)
MCH RBC QN AUTO: 32.3 PG (ref 26.6–33)
MCHC RBC AUTO-ENTMCNC: 33.3 G/DL (ref 31.5–35.7)
MCV RBC AUTO: 96.8 FL (ref 79–97)
NITRITE UR QL STRIP: NEGATIVE
PH UR STRIP.AUTO: 5.5 [PH] (ref 5–8)
PLATELET # BLD AUTO: 405 10*3/MM3 (ref 140–450)
PMV BLD AUTO: 9.1 FL (ref 6–12)
POTASSIUM SERPL-SCNC: 4.3 MMOL/L (ref 3.5–5.2)
PROCALCITONIN SERPL-MCNC: 0.1 NG/ML (ref 0–0.25)
PROT UR QL STRIP: NEGATIVE
RBC # BLD AUTO: 4.62 10*6/MM3 (ref 4.14–5.8)
SODIUM SERPL-SCNC: 133 MMOL/L (ref 136–145)
SP GR UR STRIP: 1.02 (ref 1–1.03)
UROBILINOGEN UR QL STRIP: ABNORMAL
WBC NRBC COR # BLD AUTO: 25.72 10*3/MM3 (ref 3.4–10.8)

## 2025-08-20 PROCEDURE — 85027 COMPLETE CBC AUTOMATED: CPT | Performed by: INTERNAL MEDICINE

## 2025-08-20 PROCEDURE — 25010000002 HEPARIN (PORCINE) PER 1000 UNITS: Performed by: STUDENT IN AN ORGANIZED HEALTH CARE EDUCATION/TRAINING PROGRAM

## 2025-08-20 PROCEDURE — 71045 X-RAY EXAM CHEST 1 VIEW: CPT

## 2025-08-20 PROCEDURE — 25010000002 CEFTRIAXONE PER 250 MG: Performed by: INTERNAL MEDICINE

## 2025-08-20 PROCEDURE — 97110 THERAPEUTIC EXERCISES: CPT

## 2025-08-20 PROCEDURE — 25810000003 SODIUM CHLORIDE 0.9 % SOLUTION: Performed by: INTERNAL MEDICINE

## 2025-08-20 PROCEDURE — 81003 URINALYSIS AUTO W/O SCOPE: CPT | Performed by: INTERNAL MEDICINE

## 2025-08-20 PROCEDURE — 84145 PROCALCITONIN (PCT): CPT | Performed by: INTERNAL MEDICINE

## 2025-08-20 PROCEDURE — 83605 ASSAY OF LACTIC ACID: CPT | Performed by: INTERNAL MEDICINE

## 2025-08-20 PROCEDURE — 80048 BASIC METABOLIC PNL TOTAL CA: CPT | Performed by: INTERNAL MEDICINE

## 2025-08-20 PROCEDURE — 87040 BLOOD CULTURE FOR BACTERIA: CPT | Performed by: INTERNAL MEDICINE

## 2025-08-20 PROCEDURE — 83735 ASSAY OF MAGNESIUM: CPT | Performed by: INTERNAL MEDICINE

## 2025-08-20 PROCEDURE — 99232 SBSQ HOSP IP/OBS MODERATE 35: CPT | Performed by: INTERNAL MEDICINE

## 2025-08-20 RX ORDER — SODIUM CHLORIDE 9 MG/ML
75 INJECTION, SOLUTION INTRAVENOUS CONTINUOUS
Status: ACTIVE | OUTPATIENT
Start: 2025-08-20 | End: 2025-08-21

## 2025-08-20 RX ADMIN — FINASTERIDE 5 MG: 5 TABLET, FILM COATED ORAL at 20:14

## 2025-08-20 RX ADMIN — NYSTATIN 1 APPLICATION: 100000 OINTMENT TOPICAL at 20:14

## 2025-08-20 RX ADMIN — CEFTRIAXONE SODIUM 1000 MG: 1 INJECTION, POWDER, FOR SOLUTION INTRAMUSCULAR; INTRAVENOUS at 17:03

## 2025-08-20 RX ADMIN — CARBIDOPA AND LEVODOPA 1 TABLET: 25; 100 TABLET ORAL at 08:44

## 2025-08-20 RX ADMIN — NYSTATIN 1 APPLICATION: 100000 OINTMENT TOPICAL at 08:50

## 2025-08-20 RX ADMIN — MIRTAZAPINE 15 MG: 15 TABLET, FILM COATED ORAL at 20:13

## 2025-08-20 RX ADMIN — MIDODRINE HYDROCHLORIDE 5 MG: 5 TABLET ORAL at 15:04

## 2025-08-20 RX ADMIN — Medication 10 ML: at 08:49

## 2025-08-20 RX ADMIN — SENNOSIDES AND DOCUSATE SODIUM 1 TABLET: 50; 8.6 TABLET ORAL at 20:13

## 2025-08-20 RX ADMIN — ATORVASTATIN CALCIUM 10 MG: 10 TABLET, FILM COATED ORAL at 20:15

## 2025-08-20 RX ADMIN — HEPARIN SODIUM 5000 UNITS: 5000 INJECTION INTRAVENOUS; SUBCUTANEOUS at 20:14

## 2025-08-20 RX ADMIN — SODIUM CHLORIDE 75 ML/HR: 9 INJECTION, SOLUTION INTRAVENOUS at 18:18

## 2025-08-20 RX ADMIN — Medication 10 MG: at 20:13

## 2025-08-20 RX ADMIN — MIDODRINE HYDROCHLORIDE 5 MG: 5 TABLET ORAL at 18:18

## 2025-08-20 RX ADMIN — TAMSULOSIN HYDROCHLORIDE 0.4 MG: 0.4 CAPSULE ORAL at 20:13

## 2025-08-20 RX ADMIN — CARBIDOPA AND LEVODOPA 1 TABLET: 25; 100 TABLET ORAL at 20:54

## 2025-08-20 RX ADMIN — HEPARIN SODIUM 5000 UNITS: 5000 INJECTION INTRAVENOUS; SUBCUTANEOUS at 08:44

## 2025-08-20 RX ADMIN — POTASSIUM CHLORIDE 10 MEQ: 750 TABLET, EXTENDED RELEASE ORAL at 08:44

## 2025-08-20 RX ADMIN — MIDODRINE HYDROCHLORIDE 5 MG: 5 TABLET ORAL at 08:44

## 2025-08-21 LAB
ANION GAP SERPL CALCULATED.3IONS-SCNC: 10.5 MMOL/L (ref 5–15)
BUN SERPL-MCNC: 17.6 MG/DL (ref 8–23)
BUN/CREAT SERPL: 20.7 (ref 7–25)
CALCIUM SPEC-SCNC: 8 MG/DL (ref 8.6–10.5)
CHLORIDE SERPL-SCNC: 103 MMOL/L (ref 98–107)
CO2 SERPL-SCNC: 21.5 MMOL/L (ref 22–29)
CREAT SERPL-MCNC: 0.85 MG/DL (ref 0.76–1.27)
DEPRECATED RDW RBC AUTO: 44.8 FL (ref 37–54)
EGFRCR SERPLBLD CKD-EPI 2021: 83.6 ML/MIN/1.73
ERYTHROCYTE [DISTWIDTH] IN BLOOD BY AUTOMATED COUNT: 12.4 % (ref 12.3–15.4)
GLUCOSE SERPL-MCNC: 97 MG/DL (ref 65–99)
HCT VFR BLD AUTO: 43 % (ref 37.5–51)
HGB BLD-MCNC: 14.1 G/DL (ref 13–17.7)
MAGNESIUM SERPL-MCNC: 2.2 MG/DL (ref 1.6–2.4)
MCH RBC QN AUTO: 32.2 PG (ref 26.6–33)
MCHC RBC AUTO-ENTMCNC: 32.8 G/DL (ref 31.5–35.7)
MCV RBC AUTO: 98.2 FL (ref 79–97)
PLATELET # BLD AUTO: 349 10*3/MM3 (ref 140–450)
PMV BLD AUTO: 9.3 FL (ref 6–12)
POTASSIUM SERPL-SCNC: 4.2 MMOL/L (ref 3.5–5.2)
RBC # BLD AUTO: 4.38 10*6/MM3 (ref 4.14–5.8)
SODIUM SERPL-SCNC: 135 MMOL/L (ref 136–145)
WBC NRBC COR # BLD AUTO: 13.83 10*3/MM3 (ref 3.4–10.8)

## 2025-08-21 PROCEDURE — 85027 COMPLETE CBC AUTOMATED: CPT | Performed by: INTERNAL MEDICINE

## 2025-08-21 PROCEDURE — 25010000002 HEPARIN (PORCINE) PER 1000 UNITS: Performed by: STUDENT IN AN ORGANIZED HEALTH CARE EDUCATION/TRAINING PROGRAM

## 2025-08-21 PROCEDURE — 25010000002 CEFTRIAXONE PER 250 MG: Performed by: INTERNAL MEDICINE

## 2025-08-21 PROCEDURE — 83735 ASSAY OF MAGNESIUM: CPT | Performed by: INTERNAL MEDICINE

## 2025-08-21 PROCEDURE — 99232 SBSQ HOSP IP/OBS MODERATE 35: CPT | Performed by: INTERNAL MEDICINE

## 2025-08-21 PROCEDURE — 80048 BASIC METABOLIC PNL TOTAL CA: CPT | Performed by: INTERNAL MEDICINE

## 2025-08-21 RX ADMIN — HEPARIN SODIUM 5000 UNITS: 5000 INJECTION INTRAVENOUS; SUBCUTANEOUS at 20:27

## 2025-08-21 RX ADMIN — NYSTATIN 1 APPLICATION: 100000 OINTMENT TOPICAL at 20:29

## 2025-08-21 RX ADMIN — SENNOSIDES AND DOCUSATE SODIUM 1 TABLET: 50; 8.6 TABLET ORAL at 09:58

## 2025-08-21 RX ADMIN — ATORVASTATIN CALCIUM 10 MG: 10 TABLET, FILM COATED ORAL at 20:26

## 2025-08-21 RX ADMIN — NYSTATIN 1 APPLICATION: 100000 OINTMENT TOPICAL at 09:59

## 2025-08-21 RX ADMIN — MIDODRINE HYDROCHLORIDE 5 MG: 5 TABLET ORAL at 17:18

## 2025-08-21 RX ADMIN — Medication 10 MG: at 20:26

## 2025-08-21 RX ADMIN — Medication 10 ML: at 20:28

## 2025-08-21 RX ADMIN — HEPARIN SODIUM 5000 UNITS: 5000 INJECTION INTRAVENOUS; SUBCUTANEOUS at 09:58

## 2025-08-21 RX ADMIN — TAMSULOSIN HYDROCHLORIDE 0.4 MG: 0.4 CAPSULE ORAL at 20:27

## 2025-08-21 RX ADMIN — METOPROLOL SUCCINATE 25 MG: 25 TABLET, EXTENDED RELEASE ORAL at 09:58

## 2025-08-21 RX ADMIN — CARBIDOPA AND LEVODOPA 1 TABLET: 25; 100 TABLET ORAL at 09:58

## 2025-08-21 RX ADMIN — CEFTRIAXONE SODIUM 1000 MG: 1 INJECTION, POWDER, FOR SOLUTION INTRAMUSCULAR; INTRAVENOUS at 17:19

## 2025-08-21 RX ADMIN — MIDODRINE HYDROCHLORIDE 5 MG: 5 TABLET ORAL at 09:58

## 2025-08-21 RX ADMIN — Medication 10 ML: at 09:58

## 2025-08-21 RX ADMIN — MIRTAZAPINE 15 MG: 15 TABLET, FILM COATED ORAL at 20:26

## 2025-08-21 RX ADMIN — POTASSIUM CHLORIDE 10 MEQ: 750 TABLET, EXTENDED RELEASE ORAL at 09:58

## 2025-08-21 RX ADMIN — CARBIDOPA AND LEVODOPA 1 TABLET: 25; 100 TABLET ORAL at 20:26

## 2025-08-21 RX ADMIN — FINASTERIDE 5 MG: 5 TABLET, FILM COATED ORAL at 20:26

## 2025-08-21 RX ADMIN — SENNOSIDES AND DOCUSATE SODIUM 1 TABLET: 50; 8.6 TABLET ORAL at 20:27

## 2025-08-21 RX ADMIN — MIDODRINE HYDROCHLORIDE 5 MG: 5 TABLET ORAL at 11:23

## 2025-08-22 PROCEDURE — 25010000002 CEFTRIAXONE PER 250 MG: Performed by: INTERNAL MEDICINE

## 2025-08-22 PROCEDURE — 25010000002 HEPARIN (PORCINE) PER 1000 UNITS: Performed by: STUDENT IN AN ORGANIZED HEALTH CARE EDUCATION/TRAINING PROGRAM

## 2025-08-22 PROCEDURE — 99232 SBSQ HOSP IP/OBS MODERATE 35: CPT | Performed by: INTERNAL MEDICINE

## 2025-08-22 RX ADMIN — CARBIDOPA AND LEVODOPA 1 TABLET: 25; 100 TABLET ORAL at 09:11

## 2025-08-22 RX ADMIN — SENNOSIDES AND DOCUSATE SODIUM 1 TABLET: 50; 8.6 TABLET ORAL at 09:11

## 2025-08-22 RX ADMIN — POTASSIUM CHLORIDE 10 MEQ: 750 TABLET, EXTENDED RELEASE ORAL at 09:11

## 2025-08-22 RX ADMIN — Medication 10 ML: at 20:53

## 2025-08-22 RX ADMIN — SENNOSIDES AND DOCUSATE SODIUM 1 TABLET: 50; 8.6 TABLET ORAL at 20:53

## 2025-08-22 RX ADMIN — ATORVASTATIN CALCIUM 10 MG: 10 TABLET, FILM COATED ORAL at 20:53

## 2025-08-22 RX ADMIN — MIDODRINE HYDROCHLORIDE 5 MG: 5 TABLET ORAL at 09:11

## 2025-08-22 RX ADMIN — METOPROLOL SUCCINATE 25 MG: 25 TABLET, EXTENDED RELEASE ORAL at 09:11

## 2025-08-22 RX ADMIN — TAMSULOSIN HYDROCHLORIDE 0.4 MG: 0.4 CAPSULE ORAL at 20:53

## 2025-08-22 RX ADMIN — Medication 10 MG: at 20:53

## 2025-08-22 RX ADMIN — NYSTATIN 1 APPLICATION: 100000 OINTMENT TOPICAL at 09:12

## 2025-08-22 RX ADMIN — MIDODRINE HYDROCHLORIDE 5 MG: 5 TABLET ORAL at 17:38

## 2025-08-22 RX ADMIN — HEPARIN SODIUM 5000 UNITS: 5000 INJECTION INTRAVENOUS; SUBCUTANEOUS at 20:52

## 2025-08-22 RX ADMIN — MIRTAZAPINE 15 MG: 15 TABLET, FILM COATED ORAL at 20:52

## 2025-08-22 RX ADMIN — HEPARIN SODIUM 5000 UNITS: 5000 INJECTION INTRAVENOUS; SUBCUTANEOUS at 09:11

## 2025-08-22 RX ADMIN — FINASTERIDE 5 MG: 5 TABLET, FILM COATED ORAL at 20:53

## 2025-08-22 RX ADMIN — CEFTRIAXONE SODIUM 1000 MG: 1 INJECTION, POWDER, FOR SOLUTION INTRAMUSCULAR; INTRAVENOUS at 17:38

## 2025-08-22 RX ADMIN — MIDODRINE HYDROCHLORIDE 5 MG: 5 TABLET ORAL at 11:45

## 2025-08-22 RX ADMIN — CARBIDOPA AND LEVODOPA 1 TABLET: 25; 100 TABLET ORAL at 20:53

## 2025-08-22 RX ADMIN — Medication 10 ML: at 09:12

## 2025-08-23 PROCEDURE — 25010000002 CEFTRIAXONE PER 250 MG: Performed by: INTERNAL MEDICINE

## 2025-08-23 PROCEDURE — 25010000002 HEPARIN (PORCINE) PER 1000 UNITS: Performed by: STUDENT IN AN ORGANIZED HEALTH CARE EDUCATION/TRAINING PROGRAM

## 2025-08-23 PROCEDURE — 99232 SBSQ HOSP IP/OBS MODERATE 35: CPT | Performed by: INTERNAL MEDICINE

## 2025-08-23 RX ADMIN — METOPROLOL SUCCINATE 25 MG: 25 TABLET, EXTENDED RELEASE ORAL at 09:08

## 2025-08-23 RX ADMIN — TAMSULOSIN HYDROCHLORIDE 0.4 MG: 0.4 CAPSULE ORAL at 21:44

## 2025-08-23 RX ADMIN — CARBIDOPA AND LEVODOPA 1 TABLET: 25; 100 TABLET ORAL at 21:47

## 2025-08-23 RX ADMIN — SENNOSIDES AND DOCUSATE SODIUM 1 TABLET: 50; 8.6 TABLET ORAL at 09:08

## 2025-08-23 RX ADMIN — MIRTAZAPINE 15 MG: 15 TABLET, FILM COATED ORAL at 21:43

## 2025-08-23 RX ADMIN — Medication 10 MG: at 21:44

## 2025-08-23 RX ADMIN — HEPARIN SODIUM 5000 UNITS: 5000 INJECTION INTRAVENOUS; SUBCUTANEOUS at 21:43

## 2025-08-23 RX ADMIN — ATORVASTATIN CALCIUM 10 MG: 10 TABLET, FILM COATED ORAL at 21:44

## 2025-08-23 RX ADMIN — SENNOSIDES AND DOCUSATE SODIUM 1 TABLET: 50; 8.6 TABLET ORAL at 21:43

## 2025-08-23 RX ADMIN — CEFTRIAXONE SODIUM 1000 MG: 1 INJECTION, POWDER, FOR SOLUTION INTRAMUSCULAR; INTRAVENOUS at 16:46

## 2025-08-23 RX ADMIN — Medication 10 ML: at 09:09

## 2025-08-23 RX ADMIN — CARBIDOPA AND LEVODOPA 1 TABLET: 25; 100 TABLET ORAL at 09:08

## 2025-08-23 RX ADMIN — MIDODRINE HYDROCHLORIDE 5 MG: 5 TABLET ORAL at 09:09

## 2025-08-23 RX ADMIN — FINASTERIDE 5 MG: 5 TABLET, FILM COATED ORAL at 21:44

## 2025-08-23 RX ADMIN — MIDODRINE HYDROCHLORIDE 5 MG: 5 TABLET ORAL at 16:46

## 2025-08-23 RX ADMIN — POTASSIUM CHLORIDE 10 MEQ: 750 TABLET, EXTENDED RELEASE ORAL at 09:08

## 2025-08-23 RX ADMIN — HEPARIN SODIUM 5000 UNITS: 5000 INJECTION INTRAVENOUS; SUBCUTANEOUS at 09:09

## 2025-08-24 PROCEDURE — 25010000002 HEPARIN (PORCINE) PER 1000 UNITS: Performed by: STUDENT IN AN ORGANIZED HEALTH CARE EDUCATION/TRAINING PROGRAM

## 2025-08-24 PROCEDURE — 99232 SBSQ HOSP IP/OBS MODERATE 35: CPT | Performed by: INTERNAL MEDICINE

## 2025-08-24 PROCEDURE — 25010000002 CEFTRIAXONE PER 250 MG: Performed by: INTERNAL MEDICINE

## 2025-08-24 RX ADMIN — MIRTAZAPINE 15 MG: 15 TABLET, FILM COATED ORAL at 20:07

## 2025-08-24 RX ADMIN — Medication 10 MG: at 20:07

## 2025-08-24 RX ADMIN — METOPROLOL SUCCINATE 25 MG: 25 TABLET, EXTENDED RELEASE ORAL at 08:53

## 2025-08-24 RX ADMIN — MIDODRINE HYDROCHLORIDE 5 MG: 5 TABLET ORAL at 11:04

## 2025-08-24 RX ADMIN — SENNOSIDES AND DOCUSATE SODIUM 1 TABLET: 50; 8.6 TABLET ORAL at 08:53

## 2025-08-24 RX ADMIN — MIDODRINE HYDROCHLORIDE 5 MG: 5 TABLET ORAL at 17:15

## 2025-08-24 RX ADMIN — MIDODRINE HYDROCHLORIDE 5 MG: 5 TABLET ORAL at 08:53

## 2025-08-24 RX ADMIN — HEPARIN SODIUM 5000 UNITS: 5000 INJECTION INTRAVENOUS; SUBCUTANEOUS at 20:06

## 2025-08-24 RX ADMIN — FINASTERIDE 5 MG: 5 TABLET, FILM COATED ORAL at 20:07

## 2025-08-24 RX ADMIN — NYSTATIN 1 APPLICATION: 100000 OINTMENT TOPICAL at 11:04

## 2025-08-24 RX ADMIN — CEFTRIAXONE SODIUM 1000 MG: 1 INJECTION, POWDER, FOR SOLUTION INTRAMUSCULAR; INTRAVENOUS at 16:12

## 2025-08-24 RX ADMIN — ATORVASTATIN CALCIUM 10 MG: 10 TABLET, FILM COATED ORAL at 20:07

## 2025-08-24 RX ADMIN — HEPARIN SODIUM 5000 UNITS: 5000 INJECTION INTRAVENOUS; SUBCUTANEOUS at 08:53

## 2025-08-24 RX ADMIN — CARBIDOPA AND LEVODOPA 1 TABLET: 25; 100 TABLET ORAL at 08:53

## 2025-08-24 RX ADMIN — Medication 10 ML: at 11:04

## 2025-08-24 RX ADMIN — CARBIDOPA AND LEVODOPA 1 TABLET: 25; 100 TABLET ORAL at 20:07

## 2025-08-24 RX ADMIN — POTASSIUM CHLORIDE 10 MEQ: 750 TABLET, EXTENDED RELEASE ORAL at 08:53

## 2025-08-24 RX ADMIN — TAMSULOSIN HYDROCHLORIDE 0.4 MG: 0.4 CAPSULE ORAL at 20:07

## 2025-08-24 RX ADMIN — SENNOSIDES AND DOCUSATE SODIUM 1 TABLET: 50; 8.6 TABLET ORAL at 20:07

## 2025-08-25 LAB
ANION GAP SERPL CALCULATED.3IONS-SCNC: 10.4 MMOL/L (ref 5–15)
BACTERIA SPEC AEROBE CULT: NORMAL
BACTERIA SPEC AEROBE CULT: NORMAL
BUN SERPL-MCNC: 16.3 MG/DL (ref 8–23)
BUN/CREAT SERPL: 16.5 (ref 7–25)
CALCIUM SPEC-SCNC: 9.2 MG/DL (ref 8.6–10.5)
CHLORIDE SERPL-SCNC: 101 MMOL/L (ref 98–107)
CO2 SERPL-SCNC: 25.6 MMOL/L (ref 22–29)
CREAT SERPL-MCNC: 0.99 MG/DL (ref 0.76–1.27)
DEPRECATED RDW RBC AUTO: 44.9 FL (ref 37–54)
EGFRCR SERPLBLD CKD-EPI 2021: 73.3 ML/MIN/1.73
ERYTHROCYTE [DISTWIDTH] IN BLOOD BY AUTOMATED COUNT: 12.3 % (ref 12.3–15.4)
GLUCOSE SERPL-MCNC: 94 MG/DL (ref 65–99)
HCT VFR BLD AUTO: 51.2 % (ref 37.5–51)
HGB BLD-MCNC: 16.5 G/DL (ref 13–17.7)
MAGNESIUM SERPL-MCNC: 2.1 MG/DL (ref 1.6–2.4)
MCH RBC QN AUTO: 31.9 PG (ref 26.6–33)
MCHC RBC AUTO-ENTMCNC: 32.2 G/DL (ref 31.5–35.7)
MCV RBC AUTO: 99 FL (ref 79–97)
PLATELET # BLD AUTO: 454 10*3/MM3 (ref 140–450)
PMV BLD AUTO: 8.9 FL (ref 6–12)
POTASSIUM SERPL-SCNC: 4.6 MMOL/L (ref 3.5–5.2)
RBC # BLD AUTO: 5.17 10*6/MM3 (ref 4.14–5.8)
SODIUM SERPL-SCNC: 137 MMOL/L (ref 136–145)
WBC NRBC COR # BLD AUTO: 14.25 10*3/MM3 (ref 3.4–10.8)

## 2025-08-25 PROCEDURE — 80048 BASIC METABOLIC PNL TOTAL CA: CPT | Performed by: INTERNAL MEDICINE

## 2025-08-25 PROCEDURE — 99232 SBSQ HOSP IP/OBS MODERATE 35: CPT | Performed by: INTERNAL MEDICINE

## 2025-08-25 PROCEDURE — 25010000002 HEPARIN (PORCINE) PER 1000 UNITS: Performed by: STUDENT IN AN ORGANIZED HEALTH CARE EDUCATION/TRAINING PROGRAM

## 2025-08-25 PROCEDURE — 83735 ASSAY OF MAGNESIUM: CPT | Performed by: INTERNAL MEDICINE

## 2025-08-25 PROCEDURE — 85027 COMPLETE CBC AUTOMATED: CPT | Performed by: INTERNAL MEDICINE

## 2025-08-25 RX ADMIN — MIRTAZAPINE 15 MG: 15 TABLET, FILM COATED ORAL at 20:56

## 2025-08-25 RX ADMIN — CARBIDOPA AND LEVODOPA 1 TABLET: 25; 100 TABLET ORAL at 08:16

## 2025-08-25 RX ADMIN — Medication 10 ML: at 20:57

## 2025-08-25 RX ADMIN — MIDODRINE HYDROCHLORIDE 5 MG: 5 TABLET ORAL at 08:16

## 2025-08-25 RX ADMIN — HEPARIN SODIUM 5000 UNITS: 5000 INJECTION INTRAVENOUS; SUBCUTANEOUS at 20:56

## 2025-08-25 RX ADMIN — FINASTERIDE 5 MG: 5 TABLET, FILM COATED ORAL at 20:56

## 2025-08-25 RX ADMIN — Medication 10 MG: at 20:56

## 2025-08-25 RX ADMIN — POTASSIUM CHLORIDE 10 MEQ: 750 TABLET, EXTENDED RELEASE ORAL at 08:16

## 2025-08-25 RX ADMIN — Medication 10 ML: at 08:16

## 2025-08-25 RX ADMIN — MIDODRINE HYDROCHLORIDE 5 MG: 5 TABLET ORAL at 11:39

## 2025-08-25 RX ADMIN — ATORVASTATIN CALCIUM 10 MG: 10 TABLET, FILM COATED ORAL at 20:56

## 2025-08-25 RX ADMIN — METOPROLOL SUCCINATE 25 MG: 25 TABLET, EXTENDED RELEASE ORAL at 08:16

## 2025-08-25 RX ADMIN — SENNOSIDES AND DOCUSATE SODIUM 1 TABLET: 50; 8.6 TABLET ORAL at 08:16

## 2025-08-25 RX ADMIN — NYSTATIN 1 APPLICATION: 100000 OINTMENT TOPICAL at 11:39

## 2025-08-25 RX ADMIN — CARBIDOPA AND LEVODOPA 1 TABLET: 25; 100 TABLET ORAL at 20:57

## 2025-08-25 RX ADMIN — TAMSULOSIN HYDROCHLORIDE 0.4 MG: 0.4 CAPSULE ORAL at 20:56

## 2025-08-25 RX ADMIN — SENNOSIDES AND DOCUSATE SODIUM 1 TABLET: 50; 8.6 TABLET ORAL at 20:57

## 2025-08-25 RX ADMIN — NYSTATIN 1 APPLICATION: 100000 OINTMENT TOPICAL at 21:07

## 2025-08-25 RX ADMIN — HEPARIN SODIUM 5000 UNITS: 5000 INJECTION INTRAVENOUS; SUBCUTANEOUS at 08:16

## 2025-08-25 RX ADMIN — MIDODRINE HYDROCHLORIDE 5 MG: 5 TABLET ORAL at 17:08

## 2025-08-26 VITALS
OXYGEN SATURATION: 97 % | HEART RATE: 87 BPM | SYSTOLIC BLOOD PRESSURE: 100 MMHG | RESPIRATION RATE: 20 BRPM | BODY MASS INDEX: 26.4 KG/M2 | HEIGHT: 74 IN | TEMPERATURE: 98.3 F | WEIGHT: 205.69 LBS | DIASTOLIC BLOOD PRESSURE: 60 MMHG

## 2025-08-26 PROCEDURE — 25010000002 HEPARIN (PORCINE) PER 1000 UNITS: Performed by: STUDENT IN AN ORGANIZED HEALTH CARE EDUCATION/TRAINING PROGRAM

## 2025-08-26 PROCEDURE — 99239 HOSP IP/OBS DSCHRG MGMT >30: CPT | Performed by: FAMILY MEDICINE

## 2025-08-26 RX ORDER — MIDODRINE HYDROCHLORIDE 5 MG/1
5 TABLET ORAL
Qty: 90 TABLET | Refills: 0 | Status: SHIPPED | OUTPATIENT
Start: 2025-08-26 | End: 2025-09-25

## 2025-08-26 RX ORDER — NYSTATIN 100000 U/G
1 OINTMENT TOPICAL EVERY 12 HOURS SCHEDULED
Qty: 15 G | Refills: 0 | Status: SHIPPED | OUTPATIENT
Start: 2025-08-26

## 2025-08-26 RX ORDER — METOPROLOL SUCCINATE 25 MG/1
25 TABLET, EXTENDED RELEASE ORAL
Qty: 30 TABLET | Refills: 0 | Status: SHIPPED | OUTPATIENT
Start: 2025-08-27 | End: 2025-09-26

## 2025-08-26 RX ORDER — AMOXICILLIN 250 MG
1 CAPSULE ORAL 2 TIMES DAILY
Qty: 60 TABLET | Refills: 0 | Status: SHIPPED | OUTPATIENT
Start: 2025-08-26 | End: 2025-09-25

## 2025-08-26 RX ADMIN — NYSTATIN 1 APPLICATION: 100000 OINTMENT TOPICAL at 08:40

## 2025-08-26 RX ADMIN — MIDODRINE HYDROCHLORIDE 5 MG: 5 TABLET ORAL at 11:27

## 2025-08-26 RX ADMIN — CARBIDOPA AND LEVODOPA 1 TABLET: 25; 100 TABLET ORAL at 08:37

## 2025-08-26 RX ADMIN — HEPARIN SODIUM 5000 UNITS: 5000 INJECTION INTRAVENOUS; SUBCUTANEOUS at 08:38

## 2025-08-26 RX ADMIN — Medication 10 ML: at 08:38

## 2025-08-26 RX ADMIN — POTASSIUM CHLORIDE 10 MEQ: 750 TABLET, EXTENDED RELEASE ORAL at 08:37

## 2025-08-26 RX ADMIN — METOPROLOL SUCCINATE 25 MG: 25 TABLET, EXTENDED RELEASE ORAL at 08:37

## 2025-08-26 RX ADMIN — SENNOSIDES AND DOCUSATE SODIUM 1 TABLET: 50; 8.6 TABLET ORAL at 08:37

## 2025-08-26 RX ADMIN — MIDODRINE HYDROCHLORIDE 5 MG: 5 TABLET ORAL at 08:38

## (undated) DEVICE — GW ULTRATRACK HYBRID STR/TP .035IN 3X150CM EA/5

## (undated) DEVICE — SOL IRR NACL 0.9PCT 3000ML

## (undated) DEVICE — URETERAL ACCESS SHEATH SET: Brand: NAVIGATOR HD

## (undated) DEVICE — CATH URETRL FLXITP POLLACK STD 5F 70CM

## (undated) DEVICE — SOLIDIFIER LIQLOC PLS 1500CC BT

## (undated) DEVICE — TOWEL,OR,DSP,ST,BLUE,STD,4/PK,20PK/CS: Brand: MEDLINE

## (undated) DEVICE — CYSTO PACK: Brand: MEDLINE INDUSTRIES, INC.

## (undated) DEVICE — LINER SURG CANSTR SXN S/RIGD 1500CC

## (undated) DEVICE — ENDOSCOPIC VALVE WITH ADAPTER.: Brand: SURSEAL® II

## (undated) DEVICE — Device

## (undated) DEVICE — SKIN PREP TRAY W/CHG: Brand: MEDLINE INDUSTRIES, INC.

## (undated) DEVICE — SYS IRR PUMP SGL ACTN VAC SYR 10CC

## (undated) DEVICE — FIBR LASR HOLMIUM COMPAT 272MH DISP

## (undated) DEVICE — BASIC SINGLE BASIN-LF: Brand: MEDLINE INDUSTRIES, INC.

## (undated) DEVICE — Y-TYPE TUR/BLADDER IRRIGATION SET, REGULATING CLAMP

## (undated) DEVICE — SOL IRRG H2O PL/BG 1000ML STRL

## (undated) DEVICE — NITINOL STONE RETRIEVAL BASKET: Brand: ESCAPE

## (undated) DEVICE — CONN JET HYDRA H20 AUXILIARY DISP

## (undated) DEVICE — GLV SURG SENSICARE SLT PF LF 7 STRL

## (undated) DEVICE — NITINOL WIRE WITH HYDROPHILIC TIP: Brand: SENSOR

## (undated) DEVICE — CATH 2L URETRL HC 6F 50CM

## (undated) DEVICE — SINGLE-USE BIOPSY FORCEPS: Brand: RADIAL JAW 4

## (undated) DEVICE — Device: Brand: DEFENDO AIR/WATER/SUCTION AND BIOPSY VALVE